# Patient Record
Sex: MALE | Race: WHITE | Employment: OTHER | URBAN - METROPOLITAN AREA
[De-identification: names, ages, dates, MRNs, and addresses within clinical notes are randomized per-mention and may not be internally consistent; named-entity substitution may affect disease eponyms.]

---

## 2017-09-12 ENCOUNTER — OFFICE VISIT (OUTPATIENT)
Dept: FAMILY MEDICINE CLINIC | Age: 67
End: 2017-09-12

## 2017-09-12 VITALS
HEIGHT: 69 IN | WEIGHT: 298.6 LBS | OXYGEN SATURATION: 98 % | BODY MASS INDEX: 44.23 KG/M2 | SYSTOLIC BLOOD PRESSURE: 124 MMHG | DIASTOLIC BLOOD PRESSURE: 72 MMHG | RESPIRATION RATE: 20 BRPM | HEART RATE: 58 BPM

## 2017-09-12 DIAGNOSIS — Z00.00 ROUTINE GENERAL MEDICAL EXAMINATION AT A HEALTH CARE FACILITY: Primary | ICD-10-CM

## 2017-09-12 DIAGNOSIS — S61.011A LACERATION OF THUMB, RIGHT, INITIAL ENCOUNTER: ICD-10-CM

## 2017-09-12 DIAGNOSIS — E66.01 MORBID OBESITY WITH BMI OF 40.0-44.9, ADULT (HCC): ICD-10-CM

## 2017-09-12 DIAGNOSIS — J44.9 OBSTRUCTIVE CHRONIC BRONCHITIS WITHOUT EXACERBATION (HCC): ICD-10-CM

## 2017-09-12 DIAGNOSIS — I71.40 ABDOMINAL AORTIC ANEURYSM WITHOUT RUPTURE: ICD-10-CM

## 2017-09-12 DIAGNOSIS — Z23 NEED FOR VACCINATION: ICD-10-CM

## 2017-09-12 DIAGNOSIS — Z48.02 VISIT FOR SUTURE REMOVAL: ICD-10-CM

## 2017-09-12 PROCEDURE — 1036F TOBACCO NON-USER: CPT | Performed by: FAMILY MEDICINE

## 2017-09-12 PROCEDURE — 4040F PNEUMOC VAC/ADMIN/RCVD: CPT | Performed by: FAMILY MEDICINE

## 2017-09-12 PROCEDURE — G8926 SPIRO NO PERF OR DOC: HCPCS | Performed by: FAMILY MEDICINE

## 2017-09-12 PROCEDURE — G8427 DOCREV CUR MEDS BY ELIG CLIN: HCPCS | Performed by: FAMILY MEDICINE

## 2017-09-12 PROCEDURE — G0008 ADMIN INFLUENZA VIRUS VAC: HCPCS | Performed by: FAMILY MEDICINE

## 2017-09-12 PROCEDURE — G8417 CALC BMI ABV UP PARAM F/U: HCPCS | Performed by: FAMILY MEDICINE

## 2017-09-12 PROCEDURE — 3017F COLORECTAL CA SCREEN DOC REV: CPT | Performed by: FAMILY MEDICINE

## 2017-09-12 PROCEDURE — 99024 POSTOP FOLLOW-UP VISIT: CPT | Performed by: FAMILY MEDICINE

## 2017-09-12 PROCEDURE — 99214 OFFICE O/P EST MOD 30 MIN: CPT | Performed by: FAMILY MEDICINE

## 2017-09-12 PROCEDURE — 90662 IIV NO PRSV INCREASED AG IM: CPT | Performed by: FAMILY MEDICINE

## 2017-09-12 PROCEDURE — 3023F SPIROM DOC REV: CPT | Performed by: FAMILY MEDICINE

## 2017-09-12 PROCEDURE — 1123F ACP DISCUSS/DSCN MKR DOCD: CPT | Performed by: FAMILY MEDICINE

## 2017-09-12 ASSESSMENT — PATIENT HEALTH QUESTIONNAIRE - PHQ9
2. FEELING DOWN, DEPRESSED OR HOPELESS: 0
1. LITTLE INTEREST OR PLEASURE IN DOING THINGS: 0
SUM OF ALL RESPONSES TO PHQ9 QUESTIONS 1 & 2: 0
SUM OF ALL RESPONSES TO PHQ QUESTIONS 1-9: 0

## 2017-10-04 ENCOUNTER — HOSPITAL ENCOUNTER (OUTPATIENT)
Dept: ULTRASOUND IMAGING | Age: 67
Discharge: HOME | End: 2017-10-04
Attending: FAMILY MEDICINE | Admitting: FAMILY MEDICINE

## 2017-10-04 DIAGNOSIS — I71.40 ABDOMINAL AORTIC ANEURYSM WITHOUT RUPTURE: ICD-10-CM

## 2017-10-06 ENCOUNTER — TELEPHONE (OUTPATIENT)
Dept: FAMILY MEDICINE CLINIC | Age: 67
End: 2017-10-06

## 2017-10-06 DIAGNOSIS — I71.40 AAA (ABDOMINAL AORTIC ANEURYSM) WITHOUT RUPTURE: ICD-10-CM

## 2017-10-06 NOTE — TELEPHONE ENCOUNTER
Please notify patient that his ultrasound showed a stable abdominal aneurysm and we will repeat the scan in 3 years.

## 2018-02-09 LAB
ALBUMIN SERPL-MCNC: 3.8 G/DL
ALP BLD-CCNC: 58 U/L
ALT SERPL-CCNC: 28 U/L
ANION GAP SERPL CALCULATED.3IONS-SCNC: 9 MMOL/L
AST SERPL-CCNC: 21 U/L
BASOPHILS ABSOLUTE: 0 /ΜL
BASOPHILS ABSOLUTE: ABNORMAL /ΜL
BASOPHILS RELATIVE PERCENT: 0.5 %
BASOPHILS RELATIVE PERCENT: 0.5 %
BILIRUB SERPL-MCNC: 0.6 MG/DL (ref 0.1–1.4)
BUN BLDV-MCNC: NORMAL MG/DL
CALCIUM SERPL-MCNC: 8.8 MG/DL
CHLORIDE BLD-SCNC: 105 MMOL/L
CHOLESTEROL, TOTAL: 126 MG/DL
CHOLESTEROL/HDL RATIO: ABNORMAL
CO2: 27 MMOL/L
CREAT SERPL-MCNC: 0.9 MG/DL
EOSINOPHILS ABSOLUTE: 0.1 /ΜL
EOSINOPHILS ABSOLUTE: ABNORMAL /ΜL
EOSINOPHILS RELATIVE PERCENT: 2.3 %
EOSINOPHILS RELATIVE PERCENT: 2.3 %
GFR CALCULATED: NORMAL
GLUCOSE BLD-MCNC: 98 MG/DL
HCT VFR BLD CALC: 39.7 % (ref 41–53)
HCT VFR BLD CALC: 39.7 % (ref 41–53)
HDLC SERPL-MCNC: 29 MG/DL (ref 35–70)
HEMOGLOBIN: 13.4 G/DL (ref 13.5–17.5)
HEMOGLOBIN: 13.4 G/DL (ref 13.5–17.5)
LDL CHOLESTEROL CALCULATED: 71 MG/DL (ref 0–160)
LYMPHOCYTES ABSOLUTE: 1.1 /ΜL
LYMPHOCYTES ABSOLUTE: ABNORMAL /ΜL
LYMPHOCYTES RELATIVE PERCENT: 28.4 %
LYMPHOCYTES RELATIVE PERCENT: 28.4 %
MCH RBC QN AUTO: 31.7 PG
MCH RBC QN AUTO: 31.7 PG
MCHC RBC AUTO-ENTMCNC: 33.8 G/DL
MCHC RBC AUTO-ENTMCNC: 33.8 G/DL
MCV RBC AUTO: 93.9 FL
MCV RBC AUTO: 93.9 FL
MONOCYTES ABSOLUTE: 0.3 /ΜL
MONOCYTES ABSOLUTE: 0.32 /ΜL
MONOCYTES RELATIVE PERCENT: ABNORMAL %
MONOCYTES RELATIVE PERCENT: ABNORMAL %
NEUTROPHILS ABSOLUTE: ABNORMAL /ΜL
NEUTROPHILS ABSOLUTE: ABNORMAL /ΜL
NEUTROPHILS RELATIVE PERCENT: ABNORMAL %
NEUTROPHILS RELATIVE PERCENT: ABNORMAL %
PDW BLD-RTO: 13.6 %
PDW BLD-RTO: 13.6 %
PLATELET # BLD: 210 K/ΜL
PLATELET # BLD: 210 K/ΜL
PMV BLD AUTO: 10.3 FL
PMV BLD AUTO: 10.3 FL
POTASSIUM SERPL-SCNC: 3.5 MMOL/L
RBC # BLD: 4.23 10^6/ΜL
RBC # BLD: 4.23 10^6/ΜL
SODIUM BLD-SCNC: 141 MMOL/L
TOTAL PROTEIN: 7
TRIGL SERPL-MCNC: 131 MG/DL
TSH SERPL DL<=0.05 MIU/L-ACNC: 2.39 UIU/ML
VLDLC SERPL CALC-MCNC: ABNORMAL MG/DL
WBC # BLD: 3.87 10^3/ML
WBC # BLD: 3.9 10^3/ML

## 2018-02-26 LAB
ALBUMIN SERPL-MCNC: 3.8 G/DL
ALP BLD-CCNC: 58 U/L
ALT SERPL-CCNC: 28 U/L
ANION GAP SERPL CALCULATED.3IONS-SCNC: 9 MMOL/L
AST SERPL-CCNC: 21 U/L
AVERAGE GLUCOSE: NORMAL
BILIRUB SERPL-MCNC: 0.6 MG/DL (ref 0.1–1.4)
BUN BLDV-MCNC: NORMAL MG/DL
CALCIUM SERPL-MCNC: 8.8 MG/DL
CHLORIDE BLD-SCNC: 105 MMOL/L
CHOLESTEROL, TOTAL: 126 MG/DL
CHOLESTEROL/HDL RATIO: ABNORMAL
CO2: 27 MMOL/L
CREAT SERPL-MCNC: 0.9 MG/DL
GFR CALCULATED: NORMAL
GLUCOSE BLD-MCNC: 98 MG/DL
HBA1C MFR BLD: 5.2 %
HDLC SERPL-MCNC: 29 MG/DL (ref 35–70)
LDL CHOLESTEROL CALCULATED: 71 MG/DL (ref 0–160)
MAGNESIUM: 2 MG/DL
POTASSIUM (K+): 3.6
POTASSIUM SERPL-SCNC: 3.5 MMOL/L
SODIUM BLD-SCNC: 141 MMOL/L
TOTAL PROTEIN: 7
TRIGL SERPL-MCNC: ABNORMAL MG/DL
TSH SERPL DL<=0.05 MIU/L-ACNC: 2.39 UIU/ML
VLDLC SERPL CALC-MCNC: ABNORMAL MG/DL

## 2018-09-11 ENCOUNTER — OFFICE VISIT (OUTPATIENT)
Dept: FAMILY MEDICINE CLINIC | Age: 68
End: 2018-09-11

## 2018-09-11 VITALS
OXYGEN SATURATION: 97 % | DIASTOLIC BLOOD PRESSURE: 76 MMHG | WEIGHT: 312.8 LBS | HEIGHT: 69 IN | HEART RATE: 62 BPM | SYSTOLIC BLOOD PRESSURE: 130 MMHG | BODY MASS INDEX: 46.33 KG/M2

## 2018-09-11 DIAGNOSIS — L57.0 ACTINIC KERATOSIS: ICD-10-CM

## 2018-09-11 DIAGNOSIS — K64.4 EXTERNAL HEMORRHOIDS: ICD-10-CM

## 2018-09-11 DIAGNOSIS — Z00.00 ROUTINE GENERAL MEDICAL EXAMINATION AT A HEALTH CARE FACILITY: Primary | ICD-10-CM

## 2018-09-11 DIAGNOSIS — Z23 NEED FOR INFLUENZA VACCINATION: ICD-10-CM

## 2018-09-11 DIAGNOSIS — E66.01 MORBID OBESITY WITH BMI OF 45.0-49.9, ADULT (HCC): ICD-10-CM

## 2018-09-11 DIAGNOSIS — Z23 NEED FOR PROPHYLACTIC VACCINATION AND INOCULATION AGAINST VARICELLA: ICD-10-CM

## 2018-09-11 DIAGNOSIS — I71.40 AAA (ABDOMINAL AORTIC ANEURYSM) WITHOUT RUPTURE: ICD-10-CM

## 2018-09-11 DIAGNOSIS — J44.9 COPD, MILD (HCC): ICD-10-CM

## 2018-09-11 PROCEDURE — 4040F PNEUMOC VAC/ADMIN/RCVD: CPT | Performed by: FAMILY MEDICINE

## 2018-09-11 PROCEDURE — G0008 ADMIN INFLUENZA VIRUS VAC: HCPCS | Performed by: FAMILY MEDICINE

## 2018-09-11 PROCEDURE — 90662 IIV NO PRSV INCREASED AG IM: CPT | Performed by: FAMILY MEDICINE

## 2018-09-11 PROCEDURE — G0438 PPPS, INITIAL VISIT: HCPCS | Performed by: FAMILY MEDICINE

## 2018-09-11 RX ORDER — HYDROCORTISONE ACETATE 25 MG/1
25 SUPPOSITORY RECTAL 2 TIMES DAILY PRN
Qty: 30 SUPPOSITORY | Refills: 5 | Status: SHIPPED | OUTPATIENT
Start: 2018-09-11 | End: 2019-09-17 | Stop reason: SDUPTHER

## 2018-09-11 ASSESSMENT — PATIENT HEALTH QUESTIONNAIRE - PHQ9
SUM OF ALL RESPONSES TO PHQ QUESTIONS 1-9: 0
SUM OF ALL RESPONSES TO PHQ QUESTIONS 1-9: 0

## 2018-09-11 ASSESSMENT — LIFESTYLE VARIABLES: HOW OFTEN DO YOU HAVE A DRINK CONTAINING ALCOHOL: 0

## 2018-09-11 NOTE — PROGRESS NOTES
[Tramadol] Itching    Vicodin [Hydrocodone-Acetaminophen] Itching    Keflex [Cephalexin] Other (See Comments)     Extreme hyperacitivity    Sulfa Antibiotics Itching and Rash       Prior to Visit Medications    Medication Sig Taking? Authorizing Provider   zoster recombinant adjuvanted vaccine (SHINGRIX) 50 MCG SUSR injection 50 MCG IM then repeat 2-6 months. Yes Parker Cheek MD   albuterol-ipratropium (COMBIVENT RESPIMAT)  MCG/ACT AERS inhaler USE 1 INHALATION TWICE A DAY Yes Parker Cheek MD   hydrocortisone (ANUSOL-HC) 25 MG suppository Place 1 suppository rectally 2 times daily as needed for Hemorrhoids Yes Parker Cheek MD   azelastine HCl 0.15 % SOLN by Nasal route 2 times daily. Yes Historical Provider, MD   hydrochlorothiazide (HYDRODIURIL) 25 MG tablet Take 25 mg by mouth daily. Yes Historical Provider, MD   levothyroxine (SYNTHROID) 25 MCG tablet Take 175 mcg by mouth Daily  Yes Historical Provider, MD   niacin 500 MG CR capsule Take 500 mg by mouth every morning. Yes Historical Provider, MD   traZODone (DESYREL) 50 MG tablet Take 50 mg by mouth nightly. Yes Historical Provider, MD   Multiple Vitamins-Minerals (CENTRUM PO) Take  by mouth. 50 plus Yes Historical Provider, MD   ascorbic acid (VITAMIN C) 500 MG tablet Take 500 mg by mouth daily. Yes Historical Provider, MD   saline nasal gel (AYR) GEL by Nasal route. Yes Historical Provider, MD   mometasone (NASONEX) 50 MCG/ACT nasal spray 2 sprays by Nasal route 2 times daily. Yes Historical Provider, MD   flunisolide (NASALIDE) 25 MCG/ACT (0.025%) SOLN Inhale 2 sprays into the lungs every 12 hours. Yes Historical Provider, MD   SODIUM FLUORIDE, DENTAL GEL, 1.1 % CREA Place  onto teeth. Yes Historical Provider, MD   loratadine (CLARITIN) 10 MG tablet Take 10 mg by mouth daily. PRN Yes Historical Provider, MD   ciprofloxacin-dexamethasone (CIPRODEX) otic suspension 5 drops continuous.  5 drop each ear 09/11/18 1358   BP: 130/76   Site: Right Upper Arm   Position: Sitting   Cuff Size: Large Adult   Pulse: 62   SpO2: 97%   Weight: (!) 312 lb 12.8 oz (141.9 kg)   Height: 5' 9\" (1.753 m)     Body mass index is 46.19 kg/m². Physical Exam   Constitutional: He is oriented to person, place, and time. He appears well-developed and well-nourished. Morbid obese habitus     HENT:   Mouth/Throat: Oropharynx is clear and moist.   Eyes: Pupils are equal, round, and reactive to light. Conjunctivae and EOM are normal.   Neck: Neck supple. Cardiovascular: Normal rate, regular rhythm and normal heart sounds. Pulmonary/Chest: Effort normal and breath sounds normal.   Abdominal: Soft. Bowel sounds are normal.   Neurological: He is alert and oriented to person, place, and time. Skin: Skin is warm and dry. Scattered scaled erythematous macules. Psychiatric: He has a normal mood and affect. Patient's complete Health Risk Assessment and screening values have been reviewed and are found in Flowsheets. The following problems were reviewed today and where indicated follow up appointments were made and/or referrals ordered. Positive Risk Factor Screenings with Interventions:     Health Habits/Nutrition:     Body mass index is 46.19 kg/m². Health Habits/Nutrition Interventions:  · Inadequate physical activity:  patient agrees to increase physical activity as follows: more movement but limited by chronic back and knee pain and will try and get back into the chiropractor.      Personalized Preventive Plan   Current Health Maintenance Status  Immunization History   Administered Date(s) Administered    Influenza A (H1N1) Vaccine IM 11/13/2009    Influenza Vaccine, unspecified formulation 10/15/1996, 10/18/2005, 11/05/2007, 10/20/2008, 09/28/2009, 11/01/2010, 09/27/2011, 10/09/2012, 10/04/2013, 10/14/2014, 11/02/2015    Influenza Virus Vaccine 10/14/2014, 10/01/2015    Influenza, High Dose (Fluzone 65 yrs and older) 10/19/2015, 10/14/2016, 09/12/2017, 09/11/2018    Pneumococcal 13-valent Conjugate (Pumbyoz09) 01/25/2016, 01/27/2016    Pneumococcal Polysaccharide (Lymexetzt23) 01/06/2015    Td 09/19/2003, 08/27/2012    Tdap (Boostrix, Adacel) 11/02/2015, 11/05/2015    Zoster Live (Zostavax) 03/16/2009, 01/06/2015        Health Maintenance   Topic Date Due    Shingles Vaccine (1 of 2 - 2 Dose Series) 03/06/2015    A1C test (Diabetic or Prediabetic)  02/26/2019    TSH testing  02/26/2019    Potassium monitoring  02/26/2019    Creatinine monitoring  02/26/2019    Lipid screen  02/26/2023    Colon cancer screen colonoscopy  12/02/2024    DTaP/Tdap/Td vaccine (2 - Td) 11/05/2025    Flu vaccine  Completed    Pneumococcal low/med risk  Completed    Hepatitis C screen  Completed     Recommendations for Preventive Services Due: see orders and patient instructions/AVS.  . Recommended screening schedule for the next 5-10 years is provided to the patient in written form: see Patient Instructions/AVS.       Diagnosis Orders   1. Routine general medical examination at a health care facility     2. Morbid obesity with BMI of 45.0-49.9, adult ([address]     3. AAA (abdominal aortic aneurysm) without rupture ([address]     4. COPD, mild ([address]     5. Actinic keratosis  External Referral To Dermatology   6. External hemorrhoids  hydrocortisone (ANUSOL-HC) 25 MG suppository   7. Need for prophylactic vaccination and inoculation against varicella  zoster recombinant adjuvanted vaccine (SHINGRIX) 50 MCG SUSR injection   8. Need for influenza vaccination         Quality & Risk Score Accuracy    Visit Dx:  Q84.67, C96.99 - Morbid obesity with BMI of 45.0-49.9, adult ([address]  The current BMI is 46.19 kg/m2 as of 09/11/18 14:13 EDT  Assessment and plan: Worsening.  Patient will work on more exercise/activity and cutting down portions  Visit Dx:  I71.4 - AAA (abdominal aortic aneurysm) without rupture ([address]  Assessment and plan:  Stable based upon

## 2019-02-06 LAB
BASOPHILS ABSOLUTE: ABNORMAL /ΜL
BASOPHILS RELATIVE PERCENT: ABNORMAL %
EOSINOPHILS ABSOLUTE: ABNORMAL /ΜL
EOSINOPHILS RELATIVE PERCENT: ABNORMAL %
HCT VFR BLD CALC: 39.7 % (ref 41–53)
HEMOGLOBIN: 13.5 G/DL (ref 13.5–17.5)
LYMPHOCYTES ABSOLUTE: ABNORMAL /ΜL
LYMPHOCYTES RELATIVE PERCENT: ABNORMAL %
MCH RBC QN AUTO: 32.6 PG
MCHC RBC AUTO-ENTMCNC: 34.1 G/DL
MCV RBC AUTO: 95.5 FL
MONOCYTES ABSOLUTE: ABNORMAL /ΜL
MONOCYTES RELATIVE PERCENT: ABNORMAL %
NEUTROPHILS ABSOLUTE: ABNORMAL /ΜL
NEUTROPHILS RELATIVE PERCENT: ABNORMAL %
PDW BLD-RTO: 14.3 %
PLATELET # BLD: 215 K/ΜL
PMV BLD AUTO: 8.3 FL
RBC # BLD: 4.16 10^6/ΜL
WBC # BLD: 4.1 10^3/ML

## 2019-02-13 LAB
ALBUMIN SERPL-MCNC: 4 G/DL
ALP BLD-CCNC: 65 U/L
ALT SERPL-CCNC: 30 U/L
ANION GAP SERPL CALCULATED.3IONS-SCNC: 9 MMOL/L
AST SERPL-CCNC: 26 U/L
BILIRUB SERPL-MCNC: 0.7 MG/DL (ref 0.1–1.4)
BUN BLDV-MCNC: NORMAL MG/DL
CALCIUM SERPL-MCNC: 8.9 MG/DL
CHLORIDE BLD-SCNC: 106 MMOL/L
CHOLESTEROL, TOTAL: 131 MG/DL
CHOLESTEROL/HDL RATIO: ABNORMAL
CO2: NORMAL MMOL/L
CREAT SERPL-MCNC: 0.9 MG/DL
GFR CALCULATED: NORMAL
GLUCOSE BLD-MCNC: 97 MG/DL
HDLC SERPL-MCNC: 28 MG/DL (ref 35–70)
LDL CHOLESTEROL CALCULATED: 72 MG/DL (ref 0–160)
POTASSIUM SERPL-SCNC: 3.4 MMOL/L
SODIUM BLD-SCNC: 140 MMOL/L
TOTAL PROTEIN: 6.9
TRIGL SERPL-MCNC: 156 MG/DL
VLDLC SERPL CALC-MCNC: ABNORMAL MG/DL

## 2019-03-07 LAB — POTASSIUM (K+): 4.3

## 2019-09-13 LAB
MAGNESIUM: 2.1 MG/DL
POTASSIUM (K+): 3.2

## 2019-09-17 ENCOUNTER — OFFICE VISIT (OUTPATIENT)
Dept: FAMILY MEDICINE CLINIC | Age: 69
End: 2019-09-17
Payer: MEDICARE

## 2019-09-17 VITALS
SYSTOLIC BLOOD PRESSURE: 128 MMHG | OXYGEN SATURATION: 96 % | BODY MASS INDEX: 46.63 KG/M2 | RESPIRATION RATE: 14 BRPM | HEIGHT: 69 IN | WEIGHT: 314.8 LBS | DIASTOLIC BLOOD PRESSURE: 76 MMHG | HEART RATE: 82 BPM

## 2019-09-17 DIAGNOSIS — I71.40 AAA (ABDOMINAL AORTIC ANEURYSM) WITHOUT RUPTURE: ICD-10-CM

## 2019-09-17 DIAGNOSIS — Z23 NEEDS FLU SHOT: ICD-10-CM

## 2019-09-17 DIAGNOSIS — E87.6 HYPOKALEMIA: ICD-10-CM

## 2019-09-17 DIAGNOSIS — Z00.00 ROUTINE GENERAL MEDICAL EXAMINATION AT A HEALTH CARE FACILITY: Primary | ICD-10-CM

## 2019-09-17 DIAGNOSIS — K64.4 EXTERNAL HEMORRHOIDS: ICD-10-CM

## 2019-09-17 DIAGNOSIS — E66.01 MORBID OBESITY WITH BMI OF 40.0-44.9, ADULT (HCC): ICD-10-CM

## 2019-09-17 DIAGNOSIS — J44.9 COPD, MILD (HCC): ICD-10-CM

## 2019-09-17 PROCEDURE — 90653 IIV ADJUVANT VACCINE IM: CPT | Performed by: FAMILY MEDICINE

## 2019-09-17 PROCEDURE — 3017F COLORECTAL CA SCREEN DOC REV: CPT | Performed by: FAMILY MEDICINE

## 2019-09-17 PROCEDURE — G0439 PPPS, SUBSEQ VISIT: HCPCS | Performed by: FAMILY MEDICINE

## 2019-09-17 PROCEDURE — 1123F ACP DISCUSS/DSCN MKR DOCD: CPT | Performed by: FAMILY MEDICINE

## 2019-09-17 PROCEDURE — 4040F PNEUMOC VAC/ADMIN/RCVD: CPT | Performed by: FAMILY MEDICINE

## 2019-09-17 PROCEDURE — G0008 ADMIN INFLUENZA VIRUS VAC: HCPCS | Performed by: FAMILY MEDICINE

## 2019-09-17 RX ORDER — FLUTICASONE PROPIONATE 50 MCG
1 SPRAY, SUSPENSION (ML) NASAL DAILY
Status: ON HOLD | COMMUNITY
End: 2022-08-25 | Stop reason: HOSPADM

## 2019-09-17 RX ORDER — POTASSIUM CHLORIDE 1.5 G/1.77G
20 POWDER, FOR SOLUTION ORAL 2 TIMES DAILY
Status: ON HOLD | COMMUNITY
End: 2022-08-12 | Stop reason: HOSPADM

## 2019-09-17 RX ORDER — HYDROCORTISONE ACETATE 25 MG/1
25 SUPPOSITORY RECTAL 2 TIMES DAILY PRN
Qty: 30 SUPPOSITORY | Refills: 5 | Status: ON HOLD | OUTPATIENT
Start: 2019-09-17 | End: 2022-08-18 | Stop reason: HOSPADM

## 2019-09-17 ASSESSMENT — PATIENT HEALTH QUESTIONNAIRE - PHQ9
SUM OF ALL RESPONSES TO PHQ QUESTIONS 1-9: 0
SUM OF ALL RESPONSES TO PHQ QUESTIONS 1-9: 0

## 2019-09-17 NOTE — PATIENT INSTRUCTIONS
Due to Abdominal Aortic Aneurysm check Oct 2020. Personalized Preventive Plan for Cade Coffey - 9/17/2019  Medicare offers a range of preventive health benefits. Some of the tests and screenings are paid in full while other may be subject to a deductible, co-insurance, and/or copay. Some of these benefits include a comprehensive review of your medical history including lifestyle, illnesses that may run in your family, and various assessments and screenings as appropriate. After reviewing your medical record and screening and assessments performed today your provider may have ordered immunizations, labs, imaging, and/or referrals for you. A list of these orders (if applicable) as well as your Preventive Care list are included within your After Visit Summary for your review. Other Preventive Recommendations:    · A preventive eye exam performed by an eye specialist is recommended every 1-2 years to screen for glaucoma; cataracts, macular degeneration, and other eye disorders. · A preventive dental visit is recommended every 6 months. · Try to get at least 150 minutes of exercise per week or 10,000 steps per day on a pedometer . · Order or download the FREE \"Exercise & Physical Activity: Your Everyday Guide\" from [employer]. Call 4-539.973.5556 or search [employer] online. · You need 6229-6361 mg of calcium and 9637-8084 IU of vitamin D per day. It is possible to meet your calcium requirement with diet alone, but a vitamin D supplement is usually necessary to meet this goal.  · When exposed to the sun, use a sunscreen that protects against both UVA and UVB radiation with an SPF of 30 or greater. Reapply every 2 to 3 hours or after sweating, drying off with a towel, or swimming. · Always wear a seat belt when traveling in a car. Always wear a helmet when riding a bicycle or motorcycle.

## 2019-09-17 NOTE — PROGRESS NOTES
 Zoster Live (Zostavax) 03/16/2009, 01/06/2015    Zoster Recombinant (Shingrix) 06/11/2019, 08/20/2019        Health Maintenance   Topic Date Due    A1C test (Diabetic or Prediabetic)  02/26/2019    TSH testing  02/26/2019    Potassium monitoring  02/26/2019    Creatinine monitoring  02/26/2019    Annual Wellness Visit (AWV)  06/20/2019    Lipid screen  02/26/2023    Colon cancer screen colonoscopy  12/02/2024    DTaP/Tdap/Td vaccine (3 - Td) 11/05/2025    Flu vaccine  Completed    Shingles Vaccine  Completed    Pneumococcal 65+ years Vaccine  Completed    Hepatitis C screen  Completed         Recommendations for Preventive Services Due: see orders and patient instructions/AVS.  . Recommended screening schedule for the next 5-10 years is provided to the patient in written form: see Patient Morgan Fu was seen today for medicare awv. Diagnoses and all orders for this visit:    Routine general medical examination at a health care facility    COPD, mild ([facility]  -     albuterol-ipratropium (COMBIVENT RESPIMAT)  MCG/ACT AERS inhaler; USE 1 INHALATION TWICE A DAY    Morbid obesity with BMI of 40.0-44.9, adult ([facility]    AAA (abdominal aortic aneurysm) without rupture (HCC)    External hemorrhoids  -     hydrocortisone (ANUSOL-HC) 25 MG suppository; Place 1 suppository rectally 2 times daily as needed for Hemorrhoids    Needs flu shot  -     INFLUENZA, TRIV, INACTIVATED, SUBUNIT, ADJUVANTED, 65 YRS AND OLDER, IM, PREFILL SYR, 0.5ML (FLUAD TRIV)    Hypokalemia  Continue K supplements. COPD, mild (HCC)  Stable on combivent 1 puff bid. Morbid obesity with BMI of 40.0-44.9, adult [facility])  Patient will continue to work on weight loss, staying active     AAA (abdominal aortic aneurysm) without rupture ([facility]  Asymptomatic. Patient will have done in 2020 at the [address] [facility] Order given     Return for Medicare Annual Wellness Visit in 1 year.

## 2020-09-15 ASSESSMENT — LIFESTYLE VARIABLES
AUDIT TOTAL SCORE: INCOMPLETE
HOW OFTEN DO YOU HAVE A DRINK CONTAINING ALCOHOL: NEVER
AUDIT-C TOTAL SCORE: INCOMPLETE
HOW OFTEN DO YOU HAVE A DRINK CONTAINING ALCOHOL: 0

## 2020-09-15 ASSESSMENT — PATIENT HEALTH QUESTIONNAIRE - PHQ9
SUM OF ALL RESPONSES TO PHQ QUESTIONS 1-9: 0
1. LITTLE INTEREST OR PLEASURE IN DOING THINGS: 0
2. FEELING DOWN, DEPRESSED OR HOPELESS: 0
SUM OF ALL RESPONSES TO PHQ9 QUESTIONS 1 & 2: 0
SUM OF ALL RESPONSES TO PHQ QUESTIONS 1-9: 0

## 2020-09-22 ENCOUNTER — TELEMEDICINE (OUTPATIENT)
Dept: FAMILY MEDICINE CLINIC | Age: 70
End: 2020-09-22
Payer: MEDICARE

## 2020-09-22 PROCEDURE — G0439 PPPS, SUBSEQ VISIT: HCPCS | Performed by: FAMILY MEDICINE

## 2020-09-22 PROCEDURE — 1123F ACP DISCUSS/DSCN MKR DOCD: CPT | Performed by: FAMILY MEDICINE

## 2020-09-22 PROCEDURE — 3017F COLORECTAL CA SCREEN DOC REV: CPT | Performed by: FAMILY MEDICINE

## 2020-09-22 PROCEDURE — 4040F PNEUMOC VAC/ADMIN/RCVD: CPT | Performed by: FAMILY MEDICINE

## 2020-09-22 NOTE — PROGRESS NOTES
Medicare Annual Wellness Visit  Name: Yuliet Samples Date: 2020   MRN: [id number] Sex: Male   Age: 68 y.o. Ethnicity: Non-/Non    : 1950 Race: Jorge Batista is here for Medicare AWV    Screenings for behavioral, psychosocial and functional/safety risks, and cognitive dysfunction are all negative except as indicated below. These results, as well as other patient data from the [address] form, are documented in Flowsheets linked to this Encounter. COPD has been stable and rarely needs inhaler. Overall doing well. Following with Derm for [place] and ENT s/p PE tues. Following regularly with [facility] providers for med refills. Still has some down thoughts missing his mother who passed away 5 years ago 10/1/2015. Has gained weight since last visit as he is not as active during this COVID time. Allergies   Allergen Reactions    Dilaudid [Hydromorphone Hcl] Itching    Hydrocodone Itching    Morphine Anaphylaxis    Ultram [Tramadol] Itching    Vicodin [Hydrocodone-Acetaminophen] Itching    Keflex [Cephalexin] Other (See Comments)     Extreme hyperacitivity    Sulfa Antibiotics Itching and Rash         Prior to Visit Medications    Medication Sig Taking? Authorizing Provider   potassium chloride (KLOR-CON) 20 MEQ packet Take 20 mEq by mouth 2 times daily Yes Historical Provider, MD   fluticasone (FLONASE) 50 MCG/ACT nasal spray 1 spray by Each Nostril route daily Yes Historical Provider, MD   hydrocortisone (ANUSOL-HC) 25 MG suppository Place 1 suppository rectally 2 times daily as needed for Hemorrhoids Yes Fab Tatum MD   albuterol-ipratropium (COMBIVENT RESPIMAT)  MCG/ACT AERS inhaler USE 1 Dimitris Dye MD   azelastine HCl 0.15 % SOLN by Nasal route 2 times daily. Yes Historical Provider, MD   hydrochlorothiazide (HYDRODIURIL) 25 MG tablet Take 25 mg by mouth daily.  Yes Historical Provider, MD   levothyroxine (SYNTHROID) 25 MCG tablet Take 175 mcg by mouth Daily  Yes Historical Provider, MD   niacin 500 MG CR capsule Take 750 mg by mouth every morning  Yes Historical Provider, MD   traZODone (DESYREL) 50 MG tablet Take 50 mg by mouth nightly. Historical Provider, MD   Multiple Vitamins-Minerals (CENTRUM PO) Take  by mouth. 50 plus  Historical Provider, MD   ascorbic acid (VITAMIN C) 500 MG tablet Take 500 mg by mouth daily. Historical Provider, MD   saline nasal gel (AYR) GEL by Nasal route. Historical Provider, MD   mometasone (NASONEX) 50 MCG/ACT nasal spray 2 sprays by Nasal route 2 times daily. Historical Provider, MD   flunisolide (NASALIDE) 25 MCG/ACT (0.025%) SOLN Inhale 2 sprays into the lungs every 12 hours. Historical Provider, MD   SODIUM FLUORIDE, DENTAL GEL, 1.1 % CREA Place  onto teeth. Historical Provider, MD   loratadine (CLARITIN) 10 MG tablet Take 10 mg by mouth daily. PRN  Historical Provider, MD   ciprofloxacin-dexamethasone (CIPRODEX) otic suspension 5 drops continuous.  5 drop each ear once a week  Historical Provider, MD         Past Medical History:   Diagnosis Date    Abdominal aneurysm ([facility] 2012    4 cm x 3.6 cm followed at [address] [place] [address] 1/21/2014    [facility]    Chronic sinusitis 1/21/2014    COPD, mild ([facility] 1/21/2014    Diverticulosis 2014    Dr. Rosa Maria Mccullough Dyslipidemia 1/21/2014    HTN (hypertension) 1/21/2014    Hyperlipidemia     Hypothyroidism 1/21/2014    Ingrown toenail 2013, 2014    [facility]iatry clinic [facility] 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 Jenaro Diane MEGHAN (obstructive sleep apnea) 1/21/2014    CPAP changed to bipap (July 2014)    Otitis media, serous, TM rupture 1/21/2014    ENT x 2 s/p PE tubes bilaterally, cipro Otic Past Surgical History:   Procedure Laterality Date    [facility]  [facility].      COLONOSCOPY  12/2/14    diverticulosis, internal hemorrhoids    JOINT REPLACEMENT Right 2012    JOINT REPLACEMENT Left 2011    KNEE SURGERY Bilateral     replacement    TOE SURGERY Bilateral     toe nail surgery Dr. Chalino Longoria Right 2014    DR. Rascon          Family History   Problem Relation Age of Onset    High Blood Pressure Mother     Allergies Mother     Anemia Mother     High Cholesterol Mother     Thyroid Disease Mother     Heart Disease Father     Allergies Father     Obesity Paternal Grandmother     Obesity Paternal Grandfather     Cancer Paternal Uncle         prostate cancer       CareTeam (Including outside providers/suppliers regularly involved in providing care):   Patient Care Team:  Spencer Nixon MD as PCP - Burt Rhoades MD as PCP - [facility] Empaneled Provider    Wt Readings from Last 3 Encounters:   09/17/19 (!) 314 lb 12.8 oz (142.8 kg)   09/11/18 (!) 312 lb 12.8 oz (141.9 kg)   09/12/17 298 lb 9.6 oz (135.4 kg)     Patient-Reported Vitals 9/22/2020   Patient-Reported Systolic 683   Patient-Reported Diastolic 66   Patient-Reported Pulse 62      There is no height or weight on file to calculate BMI. Based upon direct observation of the patient, evaluation of cognition reveals recent and remote memory intact. Patient's complete Health Risk Assessment and screening values have been reviewed and are found in Flowsheets. The following problems were reviewed today and where indicated follow up appointments were made and/or referrals ordered.     Blood pressure-  Heart rate-    Respiratory rate-    Temperature-  Pulse oximetry-     Patient-Reported Vitals 9/22/2020   Patient-Reported Systolic 408   Patient-Reported Diastolic 66   Patient-Reported Pulse 62        Nursing note reviewed  There were no vitals taken for this visit. BP Readings from Last 3 Encounters:   09/17/19 128/76   09/11/18 130/76   09/12/17 124/72     Wt Readings from Last 3 Encounters:   09/17/19 (!) 314 lb 12.8 oz (142.8 kg)   09/11/18 (!) 312 lb 12.8 oz (141.9 kg)   09/12/17 298 lb 9.6 oz (135.4 kg)       Constitutional: [x] Appears well-developed and well-nourished [x] No apparent distress      [] Abnormal-   Mental status  [x] Alert and awake  [x] Oriented to person/place/time [x]Able to follow commands      Eyes:  EOM    [x]  Normal  [] Abnormal-  Sclera  [x]  Normal  [] Abnormal -         Discharge []  None visible  [] Abnormal -    HENT:   [x] Normocephalic, atraumatic. [] Abnormal   [] Mouth/Throat: Mucous membranes are moist.     External Ears [] Normal  [] Abnormal-     Neck: [] No visualized mass     Pulmonary/Chest: [x] Respiratory effort normal.  [x] No visualized signs of difficulty breathing or respiratory distress        [] Abnormal-      Musculoskeletal:   [] Normal gait with no signs of ataxia         [] Normal range of motion of neck        [] Abnormal-       Neurological:        [x] No Facial Asymmetry (Cranial nerve 7 motor function) (limited exam to video visit)          [x] No gaze palsy        [] Abnormal-         Skin:        [] No significant exanthematous lesions or discoloration noted on facial skin         [] Abnormal-            Psychiatric:       [x] Normal Affect [x] No Hallucinations        [] Abnormal-       Positive Risk Factor Screenings with Interventions:     Health Habits/Nutrition:  Health Habits/Nutrition  Do you exercise for at least 20 minutes 2-3 times per week?: (!) No  Have you lost any weight without trying in the past 3 months?: No  Do you eat fewer than 2 meals per day?: No  Have you seen a dentist within the past year?: Yes  There is no height or weight on file to calculate BMI. Health Habits/Nutrition Interventions:  · Follow up with dentist as scheduled.  He is trying to stay active for weight control    Hearing/Vision:  No exam data present  Hearing/Vision  Do you or your family notice any trouble with your hearing?: No  Do you have difficulty driving, watching TV, or doing any of your daily activities because of your eyesight?: No  Have you had an eye exam within the past year?: (!) No  Hearing/Vision Interventions:  · non indicated      Following with dentist and having periodontal disease bilateral jaw.        Personalized Preventive Plan   Current Health Maintenance Status  Immunization History   Administered Date(s) Administered    Hepatitis A 03/07/2019    Hepatitis A Vaccine 03/07/2019    Hepatitis A/Hepatitis B (Twinrix) 03/07/2019, 04/05/2019, 09/06/2019    Hepatitis B 03/07/2019    Hepatitis B vaccine 03/07/2019    Influenza 10/01/2015    Influenza A (T1B7-65) Vaccine IM 11/13/2009    Influenza A (H9K3-51) Vaccine PF IM 12/10/2009    Influenza Vaccine, unspecified formulation 10/15/1996, 10/18/2005, 11/05/2007, 10/20/2008, 09/28/2009, 11/01/2010, 09/27/2011, 10/09/2012, 10/04/2013, 10/14/2014, 11/02/2015    Influenza Virus Vaccine 10/15/1996, 11/05/2007, 10/20/2008, 09/28/2009, 11/01/2010, 09/27/2011, 10/09/2012, 10/04/2013, 10/14/2014    Influenza Whole 10/14/2014    Influenza, High Dose (Fluzone 65 yrs and older) 10/19/2015, 10/14/2016, 09/12/2017, 09/11/2018    Influenza, MDCK Quadv, with preserv IM (Flucelvax 4 yrs and older) 06/11/2019, 08/20/2019    Influenza, Triv, inactivated, subunit, adjuvanted, IM (Fluad 65 yrs and older) 09/17/2019    Pneumococcal Conjugate 13-valent (Irtifsc14) 01/25/2016, 01/27/2016    Pneumococcal Polysaccharide (Ienuibhvm56) 01/06/2015    Td (Adult), 5 Lf Tetanus Toxoid, Pf (Tenivac, Decavac) 08/27/2012    Td vaccine (adult) 09/19/2003    Td, unspecified formulation 09/19/2003, 08/27/2012    Tdap (Boostrix, Adacel) 11/02/2015, 11/05/2015    Zoster Live (Zostavax) 03/16/2009, 01/06/2015    Zoster Recombinant (Shingrix) 06/11/2019, 08/20/2019 Health Maintenance   Topic Date Due    A1C test (Diabetic or Prediabetic)  02/26/2019    TSH testing  02/26/2019    Annual Wellness Visit (AWV)  06/20/2019    Creatinine monitoring  02/13/2020    Flu vaccine (1) 09/01/2020    Potassium monitoring  09/13/2020    Lipid screen  02/13/2024    Colon cancer screen colonoscopy  12/02/2024    DTaP/Tdap/Td vaccine (3 - Td) 11/05/2025    Shingles Vaccine  Completed    Pneumococcal 65+ years Vaccine  Completed    Hepatitis C screen  Completed    Hepatitis A vaccine  Aged Out    Hepatitis B vaccine  Aged Out    Hib vaccine  Aged Out    Meningococcal (ACWY) vaccine  Aged Out      Diagnosis Orders   1. Routine general medical examination at a health care facility     2. COPD, mild ([address]     3. AAA (abdominal aortic aneurysm) without rupture (HCC)  US ABDOMINAL AORTA LIMITED   4. Morbid obesity with BMI of 40.0-44.9, adult ([address]     5. Acquired hypothyroidism  Basic Metabolic Panel       Recommendations for Preventive Services Due: see orders and patient instructions/AVS.  . Recommended screening schedule for the next 5-10 years is provided to the patient in written form: see Patient Ajit Fisher was seen today for medicare awv. Diagnoses and all orders for this visit:    Routine general medical examination at a health care facility    COPD, mild ([address]    AAA (abdominal aortic aneurysm) without rupture ([address]  -     US ABDOMINAL AORTA LIMITED; Future    Morbid obesity with BMI of 40.0-44.9, adult [facility])    Acquired hypothyroidism  -     Basic Metabolic Panel; Future              Harpal Sensor is a 68 y.o. male being evaluated by a Virtual Visit (video and audio) encounter to address concerns as mentioned above. A caregiver was present when appropriate.  Due to this being a TeleHealth encounter (During [facility] public health emergency), evaluation of the following organ systems was limited: Vitals/Constitutional/EENT/Resp/CV/GI//MS/Neuro/Skin/Heme-Lymph-Imm. Pursuant to the emergency declaration under the [facility] [address] and the [employer] Act, this Virtual Visit was conducted with patient's (and/or legal guardian's) consent, to reduce the patient's risk of exposure to COVID-19 and provide necessary medical care. The patient (and/or legal guardian) has also been advised to contact this office for worsening conditions or problems, and seek emergency medical treatment and/or call 911 if deemed necessary. Patient identification was verified at the start of the visit: Yes     Diagnosis Orders   1. Routine general medical examination at a health care facility     2. COPD, mild ([address] - stable and asymptomatic. Will continue to follow. Pneuovax booster next year. 3. AAA (abdominal aortic aneurysm) without rupture ([address] -  Asyptomatic. Due fo screening. Order placed. US ABDOMINAL AORTA LIMITED   4. Morbid obesity with BMI of 40.0-44.9, adult [facility]) - patient working on staying active and cutting down portions. 5. Acquired hypothyroidism - managed by [place] providers Basic Metabolic Panel       Derm and Dr. [facility]     Services were provided through a video synchronous discussion virtually to substitute for in-person clinic visit. Patient and provider were located at their individual homes. --Mami Bhardwaj MD on 9/22/2020 at 6:28 PM    An electronic signature was used to authenticate this note.

## 2020-09-22 NOTE — PATIENT INSTRUCTIONS
Personalized Preventive Plan for Eri Pardo - 9/22/2020  Medicare offers a range of preventive health benefits. Some of the tests and screenings are paid in full while other may be subject to a deductible, co-insurance, and/or copay. Some of these benefits include a comprehensive review of your medical history including lifestyle, illnesses that may run in your family, and various assessments and screenings as appropriate. After reviewing your medical record and screening and assessments performed today your provider may have ordered immunizations, labs, imaging, and/or referrals for you. A list of these orders (if applicable) as well as your Preventive Care list are included within your After Visit Summary for your review. Other Preventive Recommendations:    · A preventive eye exam performed by an eye specialist is recommended every 1-2 years to screen for glaucoma; cataracts, macular degeneration, and other eye disorders. · A preventive dental visit is recommended every 6 months. · Try to get at least 150 minutes of exercise per week or 10,000 steps per day on a pedometer . · Order or download the FREE \"Exercise & Physical Activity: Your Everyday Guide\" from [employer]. Call 4-610.187.2934 or search [employer] online. · You need 0408-2123 mg of calcium and 9036-2128 IU of vitamin D per day. It is possible to meet your calcium requirement with diet alone, but a vitamin D supplement is usually necessary to meet this goal.  · When exposed to the sun, use a sunscreen that protects against both UVA and UVB radiation with an SPF of 30 or greater. Reapply every 2 to 3 hours or after sweating, drying off with a towel, or swimming. · Always wear a seat belt when traveling in a car. Always wear a helmet when riding a bicycle or motorcycle.

## 2020-10-01 ENCOUNTER — HOSPITAL ENCOUNTER (OUTPATIENT)
Dept: ULTRASOUND IMAGING | Age: 70
Discharge: HOME | End: 2020-10-01
Payer: MEDICARE

## 2020-10-01 ENCOUNTER — HOSPITAL ENCOUNTER (OUTPATIENT)
Age: 70
Discharge: HOME | End: 2020-10-01
Payer: MEDICARE

## 2020-10-01 LAB
ANION GAP SERPL CALCULATED.3IONS-SCNC: 12 MMOL/L (ref 4–16)
BUN BLDV-MCNC: 12 MG/DL (ref 6–23)
CALCIUM SERPL-MCNC: 9.2 MG/DL (ref 8.3–10.6)
CHLORIDE BLD-SCNC: 101 MMOL/L (ref 99–110)
CO2: 27 MMOL/L (ref 21–32)
CREAT SERPL-MCNC: 0.9 MG/DL (ref 0.9–1.3)
GFR AFRICAN AMERICAN: >60 ML/MIN/1.73M2
GFR NON-AFRICAN AMERICAN: >60 ML/MIN/1.73M2
GLUCOSE BLD-MCNC: 101 MG/DL (ref 70–99)
POTASSIUM SERPL-SCNC: 4 MMOL/L (ref 3.5–5.1)
SODIUM BLD-SCNC: 140 MMOL/L (ref 135–145)

## 2020-10-01 PROCEDURE — 80048 BASIC METABOLIC PNL TOTAL CA: CPT

## 2020-10-01 PROCEDURE — 36415 COLL VENOUS BLD VENIPUNCTURE: CPT

## 2020-10-01 PROCEDURE — 93978 VASCULAR STUDY: CPT

## 2021-02-05 LAB
AVERAGE GLUCOSE: NORMAL
HBA1C MFR BLD: 5.1 %
TSH SERPL DL<=0.05 MIU/L-ACNC: 3.4 UIU/ML

## 2021-10-21 ASSESSMENT — LIFESTYLE VARIABLES
HOW OFTEN DO YOU HAVE A DRINK CONTAINING ALCOHOL: NEVER
HOW OFTEN DO YOU HAVE A DRINK CONTAINING ALCOHOL: 0
AUDIT TOTAL SCORE: INCOMPLETE
AUDIT-C TOTAL SCORE: INCOMPLETE

## 2021-10-21 ASSESSMENT — PATIENT HEALTH QUESTIONNAIRE - PHQ9
1. LITTLE INTEREST OR PLEASURE IN DOING THINGS: 0
SUM OF ALL RESPONSES TO PHQ QUESTIONS 1-9: 0
2. FEELING DOWN, DEPRESSED OR HOPELESS: 0
SUM OF ALL RESPONSES TO PHQ9 QUESTIONS 1 & 2: 0

## 2021-10-28 ENCOUNTER — OFFICE VISIT (OUTPATIENT)
Dept: FAMILY MEDICINE CLINIC | Age: 71
End: 2021-10-28
Payer: MEDICARE

## 2021-10-28 VITALS
BODY MASS INDEX: 45.85 KG/M2 | OXYGEN SATURATION: 96 % | WEIGHT: 309.6 LBS | DIASTOLIC BLOOD PRESSURE: 76 MMHG | HEIGHT: 69 IN | SYSTOLIC BLOOD PRESSURE: 128 MMHG | HEART RATE: 77 BPM

## 2021-10-28 DIAGNOSIS — Z86.69 HISTORY OF RECURRENT EAR INFECTION: ICD-10-CM

## 2021-10-28 DIAGNOSIS — E78.5 DYSLIPIDEMIA: ICD-10-CM

## 2021-10-28 DIAGNOSIS — J32.9 CHRONIC SINUSITIS, UNSPECIFIED LOCATION: ICD-10-CM

## 2021-10-28 DIAGNOSIS — Z00.00 ROUTINE GENERAL MEDICAL EXAMINATION AT A HEALTH CARE FACILITY: Primary | ICD-10-CM

## 2021-10-28 DIAGNOSIS — J44.9 COPD, MILD (HCC): ICD-10-CM

## 2021-10-28 DIAGNOSIS — I10 PRIMARY HYPERTENSION: ICD-10-CM

## 2021-10-28 DIAGNOSIS — Z86.39 HISTORY OF HYPOKALEMIA: ICD-10-CM

## 2021-10-28 DIAGNOSIS — G47.33 OSA (OBSTRUCTIVE SLEEP APNEA): ICD-10-CM

## 2021-10-28 DIAGNOSIS — Z12.5 SCREENING FOR PROSTATE CANCER: ICD-10-CM

## 2021-10-28 DIAGNOSIS — I71.40 AAA (ABDOMINAL AORTIC ANEURYSM) WITHOUT RUPTURE: ICD-10-CM

## 2021-10-28 DIAGNOSIS — F51.01 PRIMARY INSOMNIA: ICD-10-CM

## 2021-10-28 PROCEDURE — G0439 PPPS, SUBSEQ VISIT: HCPCS | Performed by: NURSE PRACTITIONER

## 2021-10-28 PROCEDURE — G8484 FLU IMMUNIZE NO ADMIN: HCPCS | Performed by: NURSE PRACTITIONER

## 2021-10-28 PROCEDURE — 4040F PNEUMOC VAC/ADMIN/RCVD: CPT | Performed by: NURSE PRACTITIONER

## 2021-10-28 PROCEDURE — 1123F ACP DISCUSS/DSCN MKR DOCD: CPT | Performed by: NURSE PRACTITIONER

## 2021-10-28 PROCEDURE — 3017F COLORECTAL CA SCREEN DOC REV: CPT | Performed by: NURSE PRACTITIONER

## 2021-10-28 NOTE — PROGRESS NOTES
10/28/2021     Leighton Alfaro (:  1950) is a 65 y.o. male, here for evaluation of the following medical concerns: Follow-up on chronic's    COPD  uses Combivent twice a day  Albuterol as needed  Follows with [place] pulmonology, but PCP has been refilling meds. Reports shortness of breath with extreme exertion, but no regular cough    Smoker - stopped       Had Covid  In 2021 and reports hot flashes since recovering  Hot flashes are about 1-2 times per week. He does not want any work-up for this, but wants to continue to \"see how it goes\". History of PE tubes, none current. Has had recurrent ear infections and is on ciprodex once weekly-following with ENT    Hypothyroid-taking Synthroid 25 mcg daily    Trazodone 50 mg nightly for insomnia- has been on it 20+ years. No falls or AM grogginess. [facility] --- [facility]     Nasal sprays. Alternates nasalide and nasonex seasonal allergies rhinitis     hypertension- taking hydrochlorothiazide     He is taking potassium 20 MDQ twice daily for history of hypokalemia    He is on niacin for triglycerides    Denies needs or concerns today other than medication refills, labs and concerned about hot flashes after having Covid      AAA- 2020 imaging    Stable 3.1 cm fusiform aneurysm of the distal abdominal aorta.  Further   follow-up of this abnormality is as advised below.       RECOMMENDATIONS:   3.1 cm abdominal aortic aneurysm. Recommend follow-up every 3 years. Review of Systems    Prior to Visit Medications    Medication Sig Taking?  Authorizing Provider   albuterol-ipratropium (COMBIVENT RESPIMAT)  MCG/ACT AERS inhaler USE 1 INHALATION TWICE A DAY Yes DEYA Silverio - NP   potassium chloride (KLOR-CON) 20 MEQ packet Take 20 mEq by mouth 2 times daily Yes Historical Provider, MD   fluticasone (FLONASE) 50 MCG/ACT nasal spray 1 spray by Each Nostril route daily Yes Historical Provider, MD hydrocortisone (ANUSOL-HC) 25 MG suppository Place 1 suppository rectally 2 times daily as needed for Hemorrhoids Yes Libby Corado MD   azelastine HCl 0.15 % SOLN by Nasal route 2 times daily. Yes Historical Provider, MD   levothyroxine (SYNTHROID) 25 MCG tablet Take 175 mcg by mouth Daily  Yes Historical Provider, MD   niacin 500 MG CR capsule Take 750 mg by mouth every morning  Yes Historical Provider, MD   traZODone (DESYREL) 50 MG tablet Take 50 mg by mouth nightly. Yes Historical Provider, MD   Multiple Vitamins-Minerals (CENTRUM PO) Take  by mouth. 50 plus Yes Historical Provider, MD   ascorbic acid (VITAMIN C) 500 MG tablet Take 500 mg by mouth daily. Yes Historical Provider, MD   saline nasal gel (AYR) GEL by Nasal route. Yes Historical Provider, MD   mometasone (NASONEX) 50 MCG/ACT nasal spray 2 sprays by Nasal route 2 times daily. Yes Historical Provider, MD   flunisolide (NASALIDE) 25 MCG/ACT (0.025%) SOLN Inhale 2 sprays into the lungs every 12 hours. Yes Historical Provider, MD   SODIUM FLUORIDE, DENTAL GEL, 1.1 % CREA Place  onto teeth. Yes Historical Provider, MD   ciprofloxacin-dexamethasone (CIPRODEX) otic suspension 5 drops continuous. 5 drop each ear once a week Yes Historical Provider, MD        Social History     Tobacco Use    Smoking status: Former Smoker     Packs/day: 2.00     Years: 36.00     Pack years: 72.00     Types: Cigarettes    Smokeless tobacco: Never Used    Tobacco comment: quit APril 2002   Substance Use Topics    Alcohol use: No     Alcohol/week: 0.0 standard drinks        Vitals:    10/28/21 1018   BP: 128/76   Site: Left Upper Arm   Position: Sitting   Cuff Size: Large Adult   Pulse: 77   SpO2: 96%   Weight: (!) 309 lb 9.6 oz (140.4 kg)   Height: 5' 9\" (1.753 m)     Estimated body mass index is 45.72 kg/m² as calculated from the following:    Height as of this encounter: 5' 9\" (1.753 m).     Weight as of this encounter: 309 lb 9.6 oz (140.4 kg).    Physical Exam  Vitals reviewed. Constitutional:       General: He is not in acute distress. Appearance: Normal appearance. He is obese. He is not ill-appearing, toxic-appearing or diaphoretic. HENT:      Head: Normocephalic and atraumatic. Nose: Nose normal.   Eyes:      Extraocular Movements: Extraocular movements intact. Pupils: Pupils are equal, round, and reactive to light. Cardiovascular:      Rate and Rhythm: Normal rate and regular rhythm. Heart sounds: Normal heart sounds. Pulmonary:      Effort: Pulmonary effort is normal. No respiratory distress. Breath sounds: No wheezing, rhonchi or rales. Abdominal:      General: Bowel sounds are normal. There is no distension. Palpations: Abdomen is soft. There is no mass. Tenderness: There is no abdominal tenderness. Hernia: No hernia is present. Musculoskeletal:         General: Normal range of motion. Cervical back: Normal range of motion and neck supple. Right lower leg: No edema. Left lower leg: No edema. Skin:     General: Skin is warm and dry. Neurological:      General: No focal deficit present. Mental Status: He is alert and oriented to person, place, and time. Mental status is at baseline. Motor: No weakness. Gait: Gait normal.   Psychiatric:         Mood and Affect: Mood normal.         Behavior: Behavior normal.         Thought Content: Thought content normal.         Judgment: Judgment normal.         ASSESSMENT/PLAN:  1. Routine general medical examination at a health care facility      2. COPD, mild ([address]  Controlled. Follows [facility] at [facility]; however, they do not fill his medications  Albuterol as needed  - albuterol-ipratropium (COMBIVENT RESPIMAT)  MCG/ACT AERS inhaler; USE 1 INHALATION TWICE A DAY  Dispense: 12 g; Refill: 11    3. Body mass index (BMI) 45.0-49.9, adult  Portion control, low-carb, exercise as tolerated-walking    4.  MEGHAN (obstructive sleep apnea)- on BIPAP  Compliant with BiPAP nightly  Follows with pulmonology at the [facility]      7. AAA (abdominal aortic aneurysm) without rupture ([facility]- stable , due   Last imaging ----3.1 cm. Radiology advised to follow-up in 3 years    8. Primary hypertension  Controlled with hydrochlorothiazide  Goal less than 140/90    9. Dyslipidemia  Not medicated  Taking niacin for triglycerides    10. Chronic sinusitis, unspecified location  Alternating Nasonex and Nasalide, nasal saline  Not taking antihistamine  Symptoms are controlled    11. Primary insomnia  No falls or drowsiness  Taking trazodone long-term    12. History of recurrent ear infection  Ciprodex weekly ordered by ENT    History of hypokalemia-continue potassium daily. Check labs        Fasting labs today  He will have to get his flu vaccine at his pharmacy-this office is out of high-dose flu vaccines currently          All care gaps addressed     All questions answered    Discussed use, benefit, and side effects of prescribed medications. Barriers to compliance discussed. All patient questions answered. Pt voiced understanding. Present to the ER for any emergent or acute symptoms not managed at home or in office. Please note that this chart was generated using dragon dictation software. Although every effort was made to ensure the accuracy of this automated transcription, some errors in transcription may have occurred. Return in 6 months (on 2022) for Medicare Annual Wellness Visit in 1 year---6 month . An electronic signature was used to authenticate this note. --DEYA Davey NP on 10/28/2021 at 1:09 PM                             -------------------------------------                    Medicare Annual Wellness Visit  Name: Ruth Castellanos Date: 10/28/2021   MRN: [id number] Sex: Male   Age: 65 y.o.  Ethnicity: Non- / Non    : 1950 Race: White (non-)      Yamini Aleman is here for Medicare AWV and Post-COVID Symptoms (having hot flashes post coivd)    Screenings for behavioral, psychosocial and functional/safety risks, and cognitive dysfunction are all negative except as indicated below. These results, as well as other patient data from the [address] form, are documented in Flowsheets linked to this Encounter. Allergies   Allergen Reactions    Dilaudid [Hydromorphone Hcl] Itching    Hydrocodone Itching    Morphine Anaphylaxis    Ultram [Tramadol] Itching    Vicodin [Hydrocodone-Acetaminophen] Itching    Keflex [Cephalexin] Other (See Comments)     Extreme hyperacitivity    Sulfa Antibiotics Itching and Rash         Prior to Visit Medications    Medication Sig Taking? Authorizing Provider   albuterol-ipratropium (COMBIVENT RESPIMAT)  MCG/ACT AERS inhaler USE 1 INHALATION TWICE A DAY Yes DEYA Blanchard - CHELA   potassium chloride (KLOR-CON) 20 MEQ packet Take 20 mEq by mouth 2 times daily Yes Historical Provider, MD   fluticasone (FLONASE) 50 MCG/ACT nasal spray 1 spray by Each Nostril route daily Yes Historical Provider, MD   hydrocortisone (ANUSOL-HC) 25 MG suppository Place 1 suppository rectally 2 times daily as needed for Hemorrhoids Yes Jim Tatum MD   azelastine HCl 0.15 % SOLN by Nasal route 2 times daily. Yes Historical Provider, MD   levothyroxine (SYNTHROID) 25 MCG tablet Take 175 mcg by mouth Daily  Yes Historical Provider, MD   niacin 500 MG CR capsule Take 750 mg by mouth every morning  Yes Historical Provider, MD   traZODone (DESYREL) 50 MG tablet Take 50 mg by mouth nightly. Yes Historical Provider, MD   Multiple Vitamins-Minerals (CENTRUM PO) Take  by mouth. 50 plus Yes Historical Provider, MD   ascorbic acid (VITAMIN C) 500 MG tablet Take 500 mg by mouth daily. Yes Historical Provider, MD   saline nasal gel (AYR) GEL by Nasal route.  Yes Historical Provider, MD   mometasone (NASONEX) 50 MCG/ACT nasal spray 2 sprays by Nasal route 2 times daily. Yes Historical Provider, MD   flunisolide (NASALIDE) 25 MCG/ACT (0.025%) SOLN Inhale 2 sprays into the lungs every 12 hours. Yes Historical Provider, MD   SODIUM FLUORIDE, DENTAL GEL, 1.1 % CREA Place  onto teeth. Yes Historical Provider, MD   ciprofloxacin-dexamethasone (CIPRODEX) otic suspension 5 drops continuous. 5 drop each ear once a week Yes Historical Provider, MD         Past Medical History:   Diagnosis Date    Abdominal aneurysm ([facility] 2012    4 cm x 3.6 cm [facility] at [address] [facility] 1/21/2014    [facility]    Chronic sinusitis 1/21/2014    COPD, mild ([facility] 1/21/2014    Diverticulosis 2014    Dr. Erika Chavez Dyslipidemia 1/21/2014    HTN (hypertension) 1/21/2014    Hyperlipidemia     Hypothyroidism 1/21/2014    Ingrown toenail 2013, 2014    podiatry clinic at [place] [address]. 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 Randol Severs MEGHAN (obstructive sleep apnea) 1/21/2014    CPAP changed to bipap (July 2014)    Otitis media, serous, TM rupture 1/21/2014    ENT x 2 s/p PE tubes bilaterally, cipro Otic       Past Surgical History:   Procedure Laterality Date    [facility]COPY  [facility]- [facility].      COLONOSCOPY  12/2/14    diverticulosis, internal hemorrhoids    JOINT REPLACEMENT Right 2012    JOINT REPLACEMENT Left 2011    KNEE SURGERY Bilateral     replacement    TOE SURGERY Bilateral     toe nail surgery Dr. Marko Dean Right 2014    DR. Rascon          Family History   Problem Relation Age of Onset    High Blood Pressure Mother     Allergies Mother     Anemia Mother     High Cholesterol Mother     Thyroid Disease Mother     Heart Disease Father     Allergies Father     Obesity Paternal Grandmother     Obesity Paternal Grandfather     Cancer Paternal Uncle         prostate cancer       CareTeam (Including outside providers/suppliers regularly involved in providing care):   Patient Care Team:  Jim Tatum MD as PCP - Des Roberts MD as PCP - [facility]    Wt Readings from Last 3 Encounters:   10/28/21 (!) 309 lb 9.6 oz (140.4 kg)   09/17/19 (!) 314 lb 12.8 oz (142.8 kg)   09/11/18 (!) 312 lb 12.8 oz (141.9 kg)     Vitals:    10/28/21 1018   BP: 128/76   Site: Left Upper Arm   Position: Sitting   Cuff Size: Large Adult   Pulse: 77   SpO2: 96%   Weight: (!) 309 lb 9.6 oz (140.4 kg)   Height: 5' 9\" (1.753 m)     Body mass index is 45.72 kg/m². Based upon direct observation of the patient, evaluation of cognition reveals recent and remote memory intact. Patient's complete Health Risk Assessment and screening values have been reviewed and are found in Flowsheets. The following problems were reviewed today and where indicated follow up appointments were made and/or referrals ordered. Positive Risk Factor Screenings with Interventions:            General Health and ACP:  General  In general, how would you say your health is?: Good  In the past 7 days, have you experienced any of the following?  New or Increased Pain, New or Increased Fatigue, Loneliness, Social Isolation, Stress or Anger?: None of These  Do you get the social and emotional support that you need?: Yes  Do you have a Living Will?: Yes  Advance Directives     Power of [address] [place] [address] Will ACP-Advance Directive ACP-Power of     Not on File Not on File Not on File Not on File      General Health Risk Interventions:  · n/a    Health Habits/Nutrition:  Health Habits/Nutrition  Do you exercise for at least 20 minutes 2-3 times per week?: (!) No  Have you lost any weight without trying in the past 3 months?: No  Do you eat only one meal per day?: No  Have you seen the dentist within the past year?: Yes  Body mass index: (!) 45.71  Health Habits/Nutrition Interventions:  · Inadequate physical activity:  educational materials provided to promote increased physical activity  · Nutritional issues:  educational materials for healthy, well-balanced diet provided       Personalized Preventive Plan   Current Health Maintenance Status  Immunization History   Administered Date(s) Administered    Monico CADET, Primary or Immunocompromised, PF, 100mcg/0.5mL 01/12/2021, 02/08/2021    Hepatitis A 03/07/2019    Hepatitis A Vaccine 03/07/2019    Hepatitis A/Hepatitis B (Twinrix) 03/07/2019, 04/05/2019, 09/06/2019    Hepatitis B 03/07/2019    Hepatitis B vaccine 03/07/2019    Influenza 10/01/2015    Influenza A (I1C8-68) Vaccine IM 11/13/2009    Influenza A (I8C2-64) Vaccine PF IM 12/10/2009    Influenza Vaccine, unspecified formulation 10/15/1996, 10/18/2005, 11/05/2007, 10/20/2008, 09/28/2009, 11/01/2010, 09/27/2011, 10/09/2012, 10/04/2013, 10/14/2014, 11/02/2015    Influenza Virus Vaccine 10/15/1996, 11/05/2007, 10/20/2008, 09/28/2009, 11/01/2010, 09/27/2011, 10/09/2012, 10/04/2013, 10/14/2014    Influenza Whole 10/14/2014    Influenza, High Dose (Fluzone 65 yrs and older) 10/19/2015, 10/14/2016, 09/12/2017, 09/11/2018, 09/25/2020    Influenza, MDCK Quadv, with preserv IM (Flucelvax 2 yrs and older) 06/11/2019, 08/20/2019    Influenza, Triv, inactivated, subunit, adjuvanted, IM (Fluad 65 yrs and older) 09/17/2019    Pneumococcal Conjugate 13-valent ([id number]) 01/25/2016, 01/27/2016    Pneumococcal Polysaccharide ([id number]) 01/06/2015    Td (Adult), 5 Lf Tetanus Toxoid, Pf (Tenivac, Decavac) 08/27/2012    Td vaccine (adult) 09/19/2003, 08/27/2012    Td, unspecified formulation 09/19/2003, 08/27/2012    Tdap (Boostrix, Adacel) 11/02/2015, 11/05/2015    Zoster Live (Zostavax) 03/16/2009, 01/06/2015    Zoster Recombinant (Shingrix) 06/11/2019, 08/20/2019 Health Maintenance   Topic Date Due    Flu vaccine (1) 09/01/2021    Annual Wellness Visit (AWV)  09/23/2021    A1C test (Diabetic or Prediabetic)  02/05/2022    TSH testing  02/05/2022    Lipid screen  02/13/2024    Colon cancer screen colonoscopy  12/02/2024    DTaP/Tdap/Td vaccine (3 - Td or Tdap) 11/05/2025    Shingles Vaccine  Completed    Pneumococcal 65+ years Vaccine  Completed    COVID-19 Vaccine  Completed    Hepatitis C screen  Completed    Hepatitis A vaccine  Aged Out    Hepatitis B vaccine  Aged Out    Hib vaccine  Aged Out    Meningococcal (ACWY) vaccine  Aged Out     Recommendations for [employer] Due: see orders and patient instructions/AVS.  . Recommended screening schedule for the next 5-10 years is provided to the patient in written form: see Patient Sonali Mosquera was seen today for medicare awv and post-covid symptoms. Diagnoses and all orders for this visit:    Routine general medical examination at a health care facility    COPD, mild ([address]  -     albuterol-ipratropium (COMBIVENT RESPIMAT)  MCG/ACT AERS inhaler; USE 1 INHALATION TWICE A DAY    Body mass index (BMI) 45.0-49.9, adult    MEGHAN (obstructive sleep apnea)- on BIPAP    AAA (abdominal aortic aneurysm) without rupture ([address]- stable 2020, due 2023    Primary hypertension  -     Comprehensive Metabolic Panel; Future    Dyslipidemia  Comments:  No statin-taking niacin  Orders:  -     Lipid Panel; Future    Chronic sinusitis, unspecified location    Primary insomnia  Comments:  Trazodone long-term-given by PCP    History of recurrent ear infection  Comments:  Ciprodex once weekly ordered by ENT    Screening for prostate cancer  -     Psa screening;  Future    History of hypokalemia  Comments:  Taking potassium supplement daily                   Advance Care Planning   Advanced Care Planning: Discussed the patients choices for care and treatment in case of a health event that adversely affects decision-making abilities. Also discussed the patients long-term treatment options. Reviewed with the patient the [address] of [facility] Will Declaration forms  Reviewed the process of designating a competent adult as an Agent (or -in-fact) that could take make health care decisions for the patient if incompetent. Patient was asked to complete the declaration forms, either acknowledge the forms by a public notary or an eligible witness and provide a signed copy to the practice office. Time spent (minutes): 5     Obesity Counseling: Assessed behavioral health risks and factors affecting choice of behavior. Suggested weight control approaches, including dietary changes behavioral modification and follow up plan. Provided educational and support documentation. Time spent (minutes): 5    Cardiovascular Disease Risk Counseling: Assessed the patient's risk to develop cardiovascular disease and reviewed main risk factors. Reviewed steps to reduce disease risk including:   · Quitting tobacco use, reducing amount smoked, or not starting the habit  · Making healthy food choices  · Being physically active and gradualy increasing activity levels   · Reduce weight and determine a healthy BMI goal  · Monitor blood pressure and treat if higher than 140/90 mmHg  · Maintain blood total cholesterol levels under 5 mmol/l or 190 mg/dl  · Maintain LDL cholesterol levels under 3.0 mmol/l or 115 mg/dl   · Control blood glucose levels  · Consider taking aspirin (75 mg daily), once blood pressure is controlled   Provided a follow up plan.   Time spent (minutes): 5

## 2021-10-28 NOTE — PATIENT INSTRUCTIONS
Learning About Medical Power of   What is a medical power of ? A medical power of , also called a durable power of  for health care, is one type of the legal forms called advance directives. It lets you name the person you want to make treatment decisions for you if you can't speak or decide for yourself. The person you choose is called your health care agent. This person is also called a health care proxy or health care surrogate. A medical power of  may be called something else in your state. How do you choose a health care agent? Choose your health care agent carefully. This person may or may not be a family member. Talk to the person before you make your final decision. Make sure he or she is comfortable with this responsibility. It's a good idea to choose someone who:  · Is at least 25years old. · Knows you well and understands what makes life meaningful for you. · Understands your Restorationist and moral values. · Will do what you want, not what he or she wants. · Will be able to make difficult choices at a stressful time. · Will be able to refuse or stop treatment, if that is what you would want, even if you could die. · Will be firm and confident with health professionals if needed. · Will ask questions to get needed information. · Lives near you or agrees to travel to you if needed. Your family may help you make medical decisions while you can still be part of that process. But it's important to choose one person to be your health care agent in case you aren't able to make decisions for yourself. If you don't fill out the legal form and name a health care agent, the decisions your family can make may be limited. A health care agent may be called something else in your state. Who will make decisions for you if you don't have a health care agent? If you don't have a health care agent or a living will, you may not get the care you want. Decisions may be made by family members who disagree about your medical care. Or decisions may be made by a medical professional who doesn't know you well. In some cases, a  makes the decisions. When you name a health care agent, it is very clear who has the power to make health decisions for you. How do you name a health care agent? You name your health care agent on a legal form. This form is usually called a medical power of . Ask your hospital, state bar association, or office on aging where to find these forms. You must sign the form to make it legal. Some states require you to get the form notarized. This means that a person called a  watches you sign the form and then he or she signs the form. Some states also require that two or more witnesses sign the form. Be sure to tell your family members and doctors who your health care agent is. Where can you learn more? Go to https://OvercartpepicewMyCityWay.uKnow.com. org and sign in to your eCourier.co.uk account. Enter [id number] in the [employer] box to learn more about \"Learning About [address]. \"     If you do not have an account, please click on the \"Sign Up Now\" link. Current as of: March 17, 2021               Content Version: 13.0  © 4128-8028 Healthwise, Incorporated. Care instructions adapted under license by [facility] ([facility]). If you have questions about a medical condition or this instruction, always ask your healthcare professional. [place] [address] any warranty or liability for your use of this information. DASH Diet: Care Instructions  Your Care Instructions     The DASH diet is an eating plan that can help lower your blood pressure. DASH stands for Dietary Approaches to Stop Hypertension. Hypertension is high blood pressure. The DASH diet focuses on eating foods that are high in calcium, potassium, and magnesium. These nutrients can lower blood pressure.  The foods that are highest in these nutrients are fruits, vegetables, low-fat dairy products, nuts, seeds, and legumes. But taking calcium, potassium, and magnesium supplements instead of eating foods that are high in those nutrients does not have the same effect. The DASH diet also includes whole grains, fish, and poultry. The DASH diet is one of several lifestyle changes your doctor may recommend to lower your high blood pressure. Your doctor may also want you to decrease the amount of sodium in your diet. Lowering sodium while following the DASH diet can lower blood pressure even further than just the DASH diet alone. Follow-up care is a key part of your treatment and safety. Be sure to make and go to all appointments, and call your doctor if you are having problems. It's also a good idea to know your test results and keep a list of the medicines you take. How can you care for yourself at home? Following the DASH diet  · Eat 4 to 5 servings of fruit each day. A serving is 1 medium-sized piece of fruit, ½ cup chopped or canned fruit, 1/4 cup dried fruit, or 4 ounces (½ cup) of fruit juice. Choose fruit more often than fruit juice. · Eat 4 to 5 servings of vegetables each day. A serving is 1 cup of lettuce or raw leafy vegetables, ½ cup of chopped or cooked vegetables, or 4 ounces (½ cup) of vegetable juice. Choose vegetables more often than vegetable juice. · Get 2 to 3 servings of low-fat and fat-free dairy each day. A serving is 8 ounces of milk, 1 cup of yogurt, or 1 ½ ounces of cheese. · Eat 6 to 8 servings of grains each day. A serving is 1 slice of bread, 1 ounce of dry cereal, or ½ cup of cooked rice, pasta, or cooked cereal. Try to choose whole-grain products as much as possible. · Limit lean meat, poultry, and fish to 2 servings each day. A serving is 3 ounces, about the size of a deck of cards. · Eat 4 to 5 servings of nuts, seeds, and legumes (cooked dried beans, lentils, and split peas) each week.  A serving is 1/3 cup of nuts, 2 tablespoons of seeds, or ½ cup of cooked beans or peas. · Limit fats and oils to 2 to 3 servings each day. A serving is 1 teaspoon of vegetable oil or 2 tablespoons of salad dressing. · Limit sweets and added sugars to 5 servings or less a week. A serving is 1 tablespoon jelly or jam, ½ cup sorbet, or 1 cup of lemonade. · Eat less than 2,300 milligrams (mg) of sodium a day. If you limit your sodium to 1,500 mg a day, you can lower your blood pressure even more. · Be aware that all of these are the suggested number of servings for people who eat 1,800 to 2,000 calories a day. Your recommended number of servings may be different if you need more or fewer calories. Tips for success  · Start small. Do not try to make dramatic changes to your diet all at once. You might feel that you are missing out on your favorite foods and then be more likely to not follow the plan. Make small changes, and stick with them. Once those changes become habit, add a few more changes. · Try some of the following:  ? Make it a goal to eat a fruit or vegetable at every meal and at snacks. This will make it easy to get the recommended amount of fruits and vegetables each day. ? Try yogurt topped with fruit and nuts for a snack or healthy dessert. ? Add lettuce, tomato, cucumber, and onion to sandwiches. ? Combine a ready-made pizza crust with low-fat mozzarella cheese and lots of vegetable toppings. Try using tomatoes, squash, spinach, broccoli, carrots, cauliflower, and onions. ? Have a variety of cut-up vegetables with a low-fat dip as an appetizer instead of chips and dip. ? Sprinkle sunflower seeds or chopped almonds over salads. Or try adding chopped walnuts or almonds to cooked vegetables. ? Try some vegetarian meals using beans and peas. Add garbanzo or kidney beans to salads. Make burritos and tacos with mashed wolff beans or black beans. Where can you learn more? Go to https://ian.health-partners. org and sign in to your FST21 account. Enter J434 in the GetO2 box to learn more about \"DASH Diet: Care Instructions. \"     If you do not have an account, please click on the \"Sign Up Now\" link. Current as of: April 29, 2021               Content Version: 13.0  © 9250-4758 [employer]. Care instructions adapted under license by [facility] ([facility]). If you have questions about a medical condition or this instruction, always ask your healthcare professional. [place] [address] any warranty or liability for your use of this information. Learning About Healthy Weight  What is a healthy weight? A healthy weight is the weight at which you feel good about yourself and have energy for work and play. It's also one that lowers your risk for health problems. What can you do to stay at a healthy weight? It can be hard to stay at a healthy weight, especially when fast food, vending-machine snacks, and processed foods are so easy to find. And with your busy lifestyle, activity may be low on your list of things to do. But staying at a healthy weight may be easier than you think. Here are some dos and don'ts for staying at a healthy weight. Do eat healthy foods  The kinds of foods you eat have a big impact on both your weight and your health. Reaching and staying at a healthy weight is not about going on a diet. It's about making healthier food choices every day and changing your diet for good. Healthy eating means eating a variety of foods so that you get all the nutrients you need. Your body needs protein, carbohydrate, and fats for energy. They keep your heart beating, your brain active, and your muscles working. On most days, try to eat from each food group. This means eating a variety of:  · Whole grains, such as whole wheat breads and pastas. · Fruits and vegetables. · Dairy products, such as low-fat milk, yogurt, and cheese.   · Lean proteins, such as all types of fish, chicken without the skin, and beans. Don't have too much or too little of one thing. All foods, if eaten in moderation, can be part of healthy eating. Even sweets can be okay. If your favorite foods are high in fat, salt, sugar, or calories, limit how often you eat them. Eat smaller servings, or look for healthy substitutes. Do watch what you eat  Many people eat more than their bodies need. Part of staying at a healthy weight means learning how much food you really need from day to day and not eating more than that. Even with healthy foods, eating too much can make you gain weight. Having a well-balanced diet means that you eat enough, but not too much, and that your food gives you the nutrients you need to stay healthy. So listen to your body. Eat when you're hungry. Stop when you feel satisfied. It's a good idea to have healthy snacks ready for when you get hungry. Keep healthy snacks with you at work, in your car, and at home. If you have a healthy snack easily available, you'll be less likely to pick a candy bar or bag of chips from a vending machine instead. Some healthy snacks you might want to keep on hand are fruit, low-fat yogurt, string cheese, low-fat microwave popcorn, raisins and other dried fruit, nuts, whole wheat crackers, pretzels, carrots, celery sticks, and broccoli. Do some physical activity  A big part of reaching and staying at a healthy weight is being active. When you're active, you burn calories. This makes it easier to reach and stay at a healthy weight. When you're active on a regular basis, your body burns more calories, even when you're at rest. Being active helps you lose fat and build lean muscle. Try to be active for at least 1 hour every day. This may sound like a lot, but it's okay to be active in smaller blocks of time that add up to 1 hour a day. Any activity that makes your heart beat faster and keeps it there for a while counts.  A brisk walk, run, or swim will get your heart beating faster. So will climbing stairs, shooting baskets, or cycling. Even some household chores like vacuuming and mowing the lawn will get your heart rate up. Pick activities that you enjoy--ones that make your heart beat faster, your muscles stronger, and your muscles and joints more flexible. If you find more than one thing you like doing, do them all. You don't have to do the same thing every day. Don't diet  Diets don't work. Diets are temporary. Because you give up so much when you diet, you may be hungry and think about food all the time. And after you stop dieting, you also may overeat to make up for what you missed. Most people who diet end up gaining back the pounds they lost--and more. Remember that healthy bodies come in lots of shapes and sizes. Everyone can get healthier by eating better and being more active. Where can you learn more? Go to https://Vyyo.Atlas Learning. org and sign in to your Elyssafregori account. Enter [id number] in the [employer] box to learn more about \"Learning About Healthy Weight. \"     If you do not have an account, please click on the \"Sign Up Now\" link. Current as of: March 17, 2021               Content Version: 13.0  © 2006-2021 Healthwise, Incorporated. Care instructions adapted under license by [facility] ([facility]). If you have questions about a medical condition or this instruction, always ask your healthcare professional. [place] [address] any warranty or liability for your use of this information. Eating Healthy Foods: Care Instructions  Your Care Instructions     Eating healthy foods can help lower your risk for disease. Healthy food gives you energy and keeps your heart strong, your brain active, your muscles working, and your bones strong. A healthy diet includes a variety of foods from the basic food groups: grains, vegetables, fruits, milk and milk products, and meat and beans.  Some people may eat more of their favorite foods from only one food group and, as a result, miss getting the nutrients they need. So, it is important to pay attention not only to what you eat but also to what you are missing from your diet. You can eat a healthy, balanced diet by making a few small changes. Follow-up care is a key part of your treatment and safety. Be sure to make and go to all appointments, and call your doctor if you are having problems. It's also a good idea to know your test results and keep a list of the medicines you take. How can you care for yourself at home? Look at what you eat  · Keep a food diary for a week or two and record everything you eat or drink. Track the number of servings you eat from each food group. · For a balanced diet every day, eat a variety of:  ? 6 or more ounce-equivalents of grains, such as cereals, breads, crackers, rice, or pasta, every day. An ounce-equivalent is 1 slice of bread, 1 cup of ready-to-eat cereal, or ½ cup of cooked rice, cooked pasta, or cooked cereal.  ? 2½ cups of vegetables, especially:  § Dark-green vegetables such as broccoli and spinach. § Orange vegetables such as carrots and sweet potatoes. § Dry beans (such as wolff and kidney beans) and peas (such as lentils). ? 2 cups of fresh, frozen, or canned fruit. A small apple or 1 banana or orange equals 1 cup. ? 3 cups of nonfat or low-fat milk, yogurt, or other milk products. ? 5½ ounces of meat and beans, such as chicken, fish, lean meat, beans, nuts, and seeds. One egg, 1 tablespoon of peanut butter, ½ ounce nuts or seeds, or ¼ cup of cooked beans equals 1 ounce of meat. · Learn how to read food labels for serving sizes and ingredients. Fast-food and convenience-food meals often contain few or no fruits or vegetables. Make sure you eat some fruits and vegetables to make the meal more nutritious. · Look at your food diary.  For each food group, add up what you have eaten and then divide the total by the number of days. This will give you an idea of how much you are eating from each food group. See if you can find some ways to change your diet to make it more healthy. Start small  · Do not try to make dramatic changes to your diet all at once. You might feel that you are missing out on your favorite foods and then be more likely to fail. · Start slowly, and gradually change your habits. Try some of the following:  ? Use whole wheat bread instead of white bread. ? Use nonfat or low-fat milk instead of whole milk. ? Eat brown rice instead of white rice, and eat whole wheat pasta instead of white-flour pasta. ? Try low-fat cheeses and low-fat yogurt. ? Add more fruits and vegetables to meals and have them for snacks. ? Add lettuce, tomato, cucumber, and onion to sandwiches. ? Add fruit to yogurt and cereal.  Enjoy food  · You can still eat your favorite foods. You just may need to eat less of them. If your favorite foods are high in fat, salt, and sugar, limit how often you eat them, but do not cut them out entirely. · Eat a wide variety of foods. Make healthy choices when eating out  · The type of restaurant you choose can help you make healthy choices. Even fast-food chains are now offering more low-fat or healthier choices on the menu. · Choose smaller portions, or take half of your meal home. · When eating out, try:  ? A veggie pizza with a whole wheat crust or grilled chicken (instead of sausage or pepperoni). ? Pasta with roasted vegetables, grilled chicken, or marinara sauce instead of cream sauce. ? A vegetable wrap or grilled chicken wrap. ? Broiled or poached food instead of fried or breaded items. Make healthy choices easy  · Buy packaged, prewashed, ready-to-eat fresh vegetables and fruits, such as baby carrots, salad mixes, and chopped or shredded broccoli and cauliflower. · Buy packaged, presliced fruits, such as melon or pineapple. · Choose 100% fruit or vegetable juice instead of soda.  Limit juice intake to 4 to 6 oz (½ to ¾ cup) a day. · Blend low-fat yogurt, fruit juice, and canned or frozen fruit to make a smoothie for breakfast or a snack. Where can you learn more? Go to https://chleonidas.Burstly. org and sign in to your ioSafe account. Enter C034 in the [facility] box to learn more about \"Eating Healthy Foods: Care Instructions. \"     If you do not have an account, please click on the \"Sign Up Now\" link. Current as of: December 17, 2020               Content Version: 13.0  © 6765-3165 Healthwise, Zoomabet. Care instructions adapted under license by [facility] ([facility]). If you have questions about a medical condition or this instruction, always ask your healthcare professional. [address] any warranty or liability for your use of this information. Personalized Preventive Plan for Varun Quesada - 10/28/2021  Medicare offers a range of preventive health benefits. Some of the tests and screenings are paid in full while other may be subject to a deductible, co-insurance, and/or copay. Some of these benefits include a comprehensive review of your medical history including lifestyle, illnesses that may run in your family, and various assessments and screenings as appropriate. After reviewing your medical record and screening and assessments performed today your provider may have ordered immunizations, labs, imaging, and/or referrals for you. A list of these orders (if applicable) as well as your Preventive Care list are included within your After Visit Summary for your review. Other Preventive Recommendations:    · A preventive eye exam performed by an eye specialist is recommended every 1-2 years to screen for glaucoma; cataracts, macular degeneration, and other eye disorders. · A preventive dental visit is recommended every 6 months.   · Try to get at least 150 minutes of exercise per week or 10,000 steps per day on a pedometer .  · Order or download the FREE \"Exercise & Physical Activity: Your Everyday Guide\" from [employer]. Call 2-102.955.5758 or search [employer] online. · You need 5844-8641 mg of calcium and 7459-6630 IU of vitamin D per day. It is possible to meet your calcium requirement with diet alone, but a vitamin D supplement is usually necessary to meet this goal.  · When exposed to the sun, use a sunscreen that protects against both UVA and UVB radiation with an SPF of 30 or greater. Reapply every 2 to 3 hours or after sweating, drying off with a towel, or swimming. · Always wear a seat belt when traveling in a car. Always wear a helmet when riding a bicycle or motorcycle.

## 2021-10-29 DIAGNOSIS — E78.5 DYSLIPIDEMIA: ICD-10-CM

## 2021-10-29 DIAGNOSIS — I10 PRIMARY HYPERTENSION: ICD-10-CM

## 2021-10-29 DIAGNOSIS — Z12.5 SCREENING FOR PROSTATE CANCER: ICD-10-CM

## 2021-10-29 LAB
A/G RATIO: 1.5 (ref 1.1–2.2)
ALBUMIN SERPL-MCNC: 4.4 G/DL (ref 3.4–5)
ALP BLD-CCNC: 74 U/L (ref 40–129)
ALT SERPL-CCNC: 16 U/L (ref 10–40)
ANION GAP SERPL CALCULATED.3IONS-SCNC: 15 MMOL/L (ref 3–16)
AST SERPL-CCNC: 21 U/L (ref 15–37)
BILIRUB SERPL-MCNC: 0.5 MG/DL (ref 0–1)
BUN BLDV-MCNC: 9 MG/DL (ref 7–20)
CALCIUM SERPL-MCNC: 9.4 MG/DL (ref 8.3–10.6)
CHLORIDE BLD-SCNC: 98 MMOL/L (ref 99–110)
CHOLESTEROL, TOTAL: 143 MG/DL (ref 0–199)
CO2: 24 MMOL/L (ref 21–32)
CREAT SERPL-MCNC: 0.8 MG/DL (ref 0.8–1.3)
GFR AFRICAN AMERICAN: >60
GFR NON-AFRICAN AMERICAN: >60
GLUCOSE BLD-MCNC: 92 MG/DL (ref 70–99)
HDLC SERPL-MCNC: 32 MG/DL (ref 40–60)
LDL CHOLESTEROL CALCULATED: 88 MG/DL
POTASSIUM SERPL-SCNC: 4 MMOL/L (ref 3.5–5.1)
PROSTATE SPECIFIC ANTIGEN: <0.01 NG/ML (ref 0–4)
SODIUM BLD-SCNC: 137 MMOL/L (ref 136–145)
TOTAL PROTEIN: 7.4 G/DL (ref 6.4–8.2)
TRIGL SERPL-MCNC: 117 MG/DL (ref 0–150)
VLDLC SERPL CALC-MCNC: 23 MG/DL

## 2021-11-27 DIAGNOSIS — J44.9 COPD, MILD (HCC): ICD-10-CM

## 2022-05-03 ENCOUNTER — OFFICE VISIT (OUTPATIENT)
Dept: FAMILY MEDICINE CLINIC | Age: 72
End: 2022-05-03
Payer: MEDICARE

## 2022-05-03 VITALS
HEART RATE: 62 BPM | SYSTOLIC BLOOD PRESSURE: 128 MMHG | DIASTOLIC BLOOD PRESSURE: 86 MMHG | WEIGHT: 315 LBS | BODY MASS INDEX: 47.99 KG/M2 | OXYGEN SATURATION: 96 %

## 2022-05-03 DIAGNOSIS — J44.9 COPD, MILD (HCC): Primary | ICD-10-CM

## 2022-05-03 DIAGNOSIS — R19.5 POSITIVE FIT (FECAL IMMUNOCHEMICAL TEST): ICD-10-CM

## 2022-05-03 DIAGNOSIS — I10 PRIMARY HYPERTENSION: ICD-10-CM

## 2022-05-03 DIAGNOSIS — I71.40 AAA (ABDOMINAL AORTIC ANEURYSM) WITHOUT RUPTURE: ICD-10-CM

## 2022-05-03 PROCEDURE — 3017F COLORECTAL CA SCREEN DOC REV: CPT | Performed by: FAMILY MEDICINE

## 2022-05-03 PROCEDURE — 3023F SPIROM DOC REV: CPT | Performed by: FAMILY MEDICINE

## 2022-05-03 PROCEDURE — 99213 OFFICE O/P EST LOW 20 MIN: CPT | Performed by: FAMILY MEDICINE

## 2022-05-03 PROCEDURE — G8427 DOCREV CUR MEDS BY ELIG CLIN: HCPCS | Performed by: FAMILY MEDICINE

## 2022-05-03 PROCEDURE — 1123F ACP DISCUSS/DSCN MKR DOCD: CPT | Performed by: FAMILY MEDICINE

## 2022-05-03 PROCEDURE — 4040F PNEUMOC VAC/ADMIN/RCVD: CPT | Performed by: FAMILY MEDICINE

## 2022-05-03 PROCEDURE — 1036F TOBACCO NON-USER: CPT | Performed by: FAMILY MEDICINE

## 2022-05-03 PROCEDURE — G8417 CALC BMI ABV UP PARAM F/U: HCPCS | Performed by: FAMILY MEDICINE

## 2022-05-03 RX ORDER — HYDROCHLOROTHIAZIDE 25 MG/1
25 TABLET ORAL DAILY
COMMUNITY
Start: 2022-05-03 | End: 2022-08-07 | Stop reason: ALTCHOICE

## 2022-05-03 RX ORDER — PSEUDOEPHEDRINE HCL 30 MG
100 TABLET ORAL DAILY
COMMUNITY
End: 2022-05-17

## 2022-05-03 NOTE — PROGRESS NOTES
Plan: 1. COPD, mild ([address]  Assessment & Plan:  Stable on periodic use of inhaler twice per day with guiafenisen as needed Will continue to follow   2. AAA (abdominal aortic aneurysm) without rupture [facility])  Assessment & Plan:  Doing well overall. Will plan to repeat imaging next year per recommended plan   3. Positive FIT (fecal immunochemical test)  -     Herberth Bridges MD, Gastroenterology, [facility]  4. Primary hypertension  Assessment & Plan:  Stable on hydrochlorothiazide. [facility] also [facility]     All patient questions answered. Pt voiced understanding. Return for Keep upcoming appointment in this office. -----------------------------------------------------------------------------------------------            Chief Complaint   Patient presents with    Follow-up   [facility], TSH this year Feb was normal.  He is staying busy by participating in clubs and memberships around [facility]. He is very active as a [employer] member. His moods are doing fine. His breathing has been stable using the inhaler twice a day. The [facility] providers also had him do a fit test which was positive but he would like to get his colonoscopy evaluation here in [facility] and needs a referral.  He is compliant with his hydrochlorothiazide and potassium per his [facility] doctors. ROS: Pertinent items are noted in HPI. All other ROS negative     reports that he has quit smoking. His smoking use included cigarettes. He has a 72.00 pack-year smoking history.  He has never used smokeless tobacco.      Physical Exam   Nursing note reviewed  /86 (Site: Left Upper Arm, Position: Sitting, Cuff Size: Medium Adult)   Pulse 62   Wt (!) 325 lb (147.4 kg)   SpO2 96%   BMI 47.99 kg/m²   BP Readings from Last 3 Encounters:   05/03/22 128/86   10/28/21 128/76   09/17/19 128/76     Wt Readings from Last 3 Encounters:   05/03/22 (!) 325 lb (147.4 kg)   10/28/21 (!) 309 lb 9.6 oz (140.4 kg)   09/17/19 (!) 314 lb 12.8 oz (142.8 kg)     No results found for this visit on 05/03/22. General appearance: cooperative   Neck: supple, symmetrical, trachea midline  Lungs: clear to auscultation bilaterally, no wheezing or ronchi. Heart: regular rate and rhythm, S1, S2 normal,  Abdomen:  bowel sounds normal and soft, non-tender  MSK no LE edema  Skin: Skin color, texture, turgor normal. No rashes or lesions  Neurologic: Grossly normal   Psych: Alert and oriented, appropriate affect.     Assessment and Plan: See above

## 2022-05-04 ENCOUNTER — TELEPHONE (OUTPATIENT)
Dept: GASTROENTEROLOGY | Age: 72
End: 2022-05-04

## 2022-05-17 ENCOUNTER — PREP FOR PROCEDURE (OUTPATIENT)
Dept: GASTROENTEROLOGY | Age: 72
End: 2022-05-17

## 2022-05-17 ENCOUNTER — OFFICE VISIT (OUTPATIENT)
Dept: GASTROENTEROLOGY | Age: 72
End: 2022-05-17
Payer: MEDICARE

## 2022-05-17 VITALS
TEMPERATURE: 96.9 F | SYSTOLIC BLOOD PRESSURE: 114 MMHG | WEIGHT: 315 LBS | OXYGEN SATURATION: 95 % | BODY MASS INDEX: 46.65 KG/M2 | HEIGHT: 69 IN | HEART RATE: 62 BPM | DIASTOLIC BLOOD PRESSURE: 60 MMHG

## 2022-05-17 DIAGNOSIS — R19.5 POSITIVE FIT (FECAL IMMUNOCHEMICAL TEST): Primary | ICD-10-CM

## 2022-05-17 DIAGNOSIS — Z87.19 HISTORY OF DIVERTICULOSIS: ICD-10-CM

## 2022-05-17 PROCEDURE — G8417 CALC BMI ABV UP PARAM F/U: HCPCS | Performed by: NURSE PRACTITIONER

## 2022-05-17 PROCEDURE — 99204 OFFICE O/P NEW MOD 45 MIN: CPT | Performed by: NURSE PRACTITIONER

## 2022-05-17 PROCEDURE — 1123F ACP DISCUSS/DSCN MKR DOCD: CPT | Performed by: NURSE PRACTITIONER

## 2022-05-17 PROCEDURE — 4040F PNEUMOC VAC/ADMIN/RCVD: CPT | Performed by: NURSE PRACTITIONER

## 2022-05-17 PROCEDURE — G8427 DOCREV CUR MEDS BY ELIG CLIN: HCPCS | Performed by: NURSE PRACTITIONER

## 2022-05-17 PROCEDURE — 3017F COLORECTAL CA SCREEN DOC REV: CPT | Performed by: NURSE PRACTITIONER

## 2022-05-17 PROCEDURE — 1036F TOBACCO NON-USER: CPT | Performed by: NURSE PRACTITIONER

## 2022-05-17 RX ORDER — BISACODYL 5 MG
TABLET, DELAYED RELEASE (ENTERIC COATED) ORAL
Qty: 4 TABLET | Refills: 0 | Status: ON HOLD | OUTPATIENT
Start: 2022-05-17 | End: 2022-08-12 | Stop reason: HOSPADM

## 2022-05-17 RX ORDER — SODIUM CHLORIDE 0.9 % (FLUSH) 0.9 %
5-40 SYRINGE (ML) INJECTION PRN
Status: CANCELLED | OUTPATIENT
Start: 2022-05-17

## 2022-05-17 RX ORDER — ASPIRIN 81 MG
60 TABLET, DELAYED RELEASE (ENTERIC COATED) ORAL DAILY
COMMUNITY
Start: 2019-01-01

## 2022-05-17 RX ORDER — SODIUM CHLORIDE 9 MG/ML
25 INJECTION, SOLUTION INTRAVENOUS PRN
Status: CANCELLED | OUTPATIENT
Start: 2022-05-17

## 2022-05-17 RX ORDER — POLYETHYLENE GLYCOL 3350 17 G/17G
238 POWDER, FOR SOLUTION ORAL ONCE
Qty: 238 G | Refills: 0 | Status: SHIPPED | OUTPATIENT
Start: 2022-05-17 | End: 2022-05-17

## 2022-05-17 RX ORDER — SODIUM CHLORIDE 0.9 % (FLUSH) 0.9 %
5-40 SYRINGE (ML) INJECTION EVERY 12 HOURS SCHEDULED
Status: CANCELLED | OUTPATIENT
Start: 2022-05-17

## 2022-05-17 RX ORDER — SODIUM CHLORIDE, SODIUM LACTATE, POTASSIUM CHLORIDE, CALCIUM CHLORIDE 600; 310; 30; 20 MG/100ML; MG/100ML; MG/100ML; MG/100ML
INJECTION, SOLUTION INTRAVENOUS CONTINUOUS
Status: CANCELLED | OUTPATIENT
Start: 2022-05-17

## 2022-05-17 ASSESSMENT — ENCOUNTER SYMPTOMS
BACK PAIN: 1
NAUSEA: 0
ABDOMINAL PAIN: 0
EYE DISCHARGE: 0
DIARRHEA: 0
CONSTIPATION: 0
WHEEZING: 0
COUGH: 0
COLOR CHANGE: 0
SHORTNESS OF BREATH: 0
EYE PAIN: 0
BLOOD IN STOOL: 0
VOMITING: 0

## 2022-05-17 NOTE — PROGRESS NOTES
Chandrika Calle 66 y.o. male was seen by DENNIS Ron on 05/17/22     Wt Readings from Last 3 Encounters:   05/17/22 (!) 324 lb 9.6 oz (147.2 kg)   05/03/22 (!) 325 lb (147.4 kg)   10/28/21 (!) 309 lb 9.6 oz (140.4 kg)       HPI  Chandrika Calle is a pleasant 66 y.o.  male who presents today for positive fecal immunochemical (FIT) test.  He has a past medical history of abdominal aneurysm, arthritis, chronic back pain, chronic sinusitis, COPD, mild, diverticulosis, dyslipidemia, HTN (hypertension), hyperlipidemia, hypothyroidism, ingrown toenail, MDRO (multiple drug resistant organisms) resistance, morbid obesity with BMI of 45.0-49.9, adult, onychocryptosis, MEGHAN (obstructive sleep apnea), and otitis media, serous, TM rupture. His FIT test was noted to be positive done on 4-7-2022. His last colonoscopy was done by Dr. Drew Epley on 12-2-2014 showing left sided diverticulosis and non-bleeding hemorrhoids. He denies changes in his bowel pattern. His typical bowel pattern is daily to every other day with soft brown formed stools. He takes Benefiber once a day. No diarrhea or constipation. No blood in his stools or melena. No excess belching or flatulence. His appetite is good without early satiety. His weight is stable. No nausea or vomiting. No abdominal pain, bloating or distention. No heartburn or acid reflux. No nocturnal awakeings with acid reflux. No dysphagia or apin with swallowing. No family history of stomach or colon cancer. ROS  Review of Systems   Constitutional: Negative for chills, diaphoresis and fever. HENT: Positive for tinnitus. Negative for ear pain and hearing loss. Eyes: Positive for visual disturbance. Negative for pain and discharge. Respiratory: Negative for cough, shortness of breath and wheezing. Cardiovascular: Negative for chest pain, palpitations and leg swelling.    Gastrointestinal: Negative for abdominal pain, blood in stool, constipation, diarrhea, nausea and vomiting. Endocrine: Negative for cold intolerance and heat intolerance. Genitourinary: Negative for dysuria, frequency, hematuria and urgency. Musculoskeletal: Positive for back pain. Negative for myalgias and neck pain. Skin: Negative for color change, pallor and rash. Allergic/Immunologic: Positive for environmental allergies (Pollen, ragweed). Negative for food allergies. Neurological: Positive for headaches. Negative for dizziness, seizures and weakness. Hematological: Bruises/bleeds easily. Psychiatric/Behavioral: Negative for dysphoric mood and sleep disturbance. The patient is not nervous/anxious. Allergies  Allergies   Allergen Reactions    Dilaudid [Hydromorphone Hcl] Itching    Hydrocodone Itching    Morphine Anaphylaxis    Ultram [Tramadol] Itching    Vicodin [Hydrocodone-Acetaminophen] Itching    Keflex [Cephalexin] Other (See Comments)     Extreme hyperacitivity    Sulfa Antibiotics Itching and Rash       Medications  Current Outpatient Medications   Medication Sig Dispense Refill    Docusate Sodium 100 MG TABS       hydroCHLOROthiazide (HYDRODIURIL) 25 MG tablet Take 1 tablet by mouth daily      albuterol-ipratropium (COMBIVENT RESPIMAT)  MCG/ACT AERS inhaler USE 1 INHALATION TWICE A DAY 12 g 5    potassium chloride (KLOR-CON) 20 MEQ packet Take 20 mEq by mouth 2 times daily      fluticasone (FLONASE) 50 MCG/ACT nasal spray 1 spray by Each Nostril route daily      hydrocortisone (ANUSOL-HC) 25 MG suppository Place 1 suppository rectally 2 times daily as needed for Hemorrhoids 30 suppository 5    azelastine HCl 0.15 % SOLN by Nasal route 2 times daily.  levothyroxine (SYNTHROID) 25 MCG tablet Take 175 mcg by mouth Daily       niacin 500 MG CR capsule Take 750 mg by mouth every morning       traZODone (DESYREL) 50 MG tablet Take 50 mg by mouth nightly.  Multiple Vitamins-Minerals (CENTRUM PO) Take  by mouth.  50 plus      ascorbic acid (VITAMIN C) 500 MG tablet Take 500 mg by mouth daily.  saline nasal gel (AYR) GEL by Nasal route.  mometasone (NASONEX) 50 MCG/ACT nasal spray 2 sprays by Nasal route 2 times daily.  flunisolide (NASALIDE) 25 MCG/ACT (0.025%) SOLN Inhale 2 sprays into the lungs every 12 hours.  SODIUM FLUORIDE, DENTAL GEL, 1.1 % CREA Place  onto teeth.  ciprofloxacin-dexamethasone (CIPRODEX) otic suspension 5 drops continuous. 5 drop each ear once a week       No current facility-administered medications for this visit. Past medical history:   He has a past medical history of Abdominal aneurysm ([facility] [address] Arthritis, Chronic back pain, Chronic sinusitis, COPD, mild ([facility] [address] Diverticulosis, Dyslipidemia, HTN (hypertension), Hyperlipidemia, Hypothyroidism, Ingrown toenail, MDRO (multiple drug resistant organisms) resistance, Morbid obesity with BMI of 45.0-49.9, [facility] Onychocryptosis, MEGHAN (obstructive sleep apnea), and Otitis media, serous, TM rupture. Past surgical history:  He has a past surgical history that includes joint replacement (Right, 2012); joint replacement (Left, 2011); Tympanostomy tube placement (Right, 2014); knee surgery (Bilateral); Colonoscopy (2008); Colonoscopy (12/2/14); and Toe Surgery (Bilateral). Social History:  He reports that he quit smoking about 20 years ago. His smoking use included cigarettes. He has a 72.00 pack-year smoking history. He has never used smokeless tobacco. He reports that he does not drink alcohol and does not use drugs. Family history:  His family history includes Allergies in his father and mother; Anemia in his mother; Cancer in his paternal uncle; Heart Disease in his father; High Blood Pressure in his mother; High Cholesterol in his mother; Obesity in his paternal grandfather and paternal grandmother; Thyroid Disease in his mother.     Objective    Vitals:    05/17/22 1029   BP: 114/60   Pulse: 62   Temp: 96.9 °F (36.1 °C)   SpO2: 95%        Physical exam    Physical Exam  Constitutional:       General: He is not in acute distress. Appearance: He is well-developed. He is obese. He is not ill-appearing, toxic-appearing or diaphoretic. HENT:      Head: Normocephalic and atraumatic. Nose: Nose normal.      Mouth/Throat:      Mouth: Mucous membranes are moist.   Cardiovascular:      Rate and Rhythm: Normal rate and regular rhythm. Pulses: Normal pulses. Heart sounds: Normal heart sounds. No murmur heard. No gallop. Pulmonary:      Effort: Pulmonary effort is normal. No respiratory distress. Breath sounds: Normal breath sounds. No stridor. No wheezing or rhonchi. Abdominal:      General: Bowel sounds are normal. There is no distension. Palpations: Abdomen is soft. There is no mass. Tenderness: There is no abdominal tenderness. Hernia: No hernia is present. Musculoskeletal:         General: Normal range of motion. Cervical back: Neck supple. Skin:     General: Skin is warm and dry. Neurological:      Mental Status: He is alert and oriented to person, place, and time. Psychiatric:         Mood and Affect: Mood normal.         No visits with results within 2 Month(s) from this visit.    Latest known visit with results is:   Orders Only on 10/29/2021   Component Date Value Ref Range Status    PSA 10/29/2021 <0.01  0.00 - 4.00 ng/mL Final    Sodium 10/29/2021 137  136 - 145 mmol/L Final    Potassium 10/29/2021 4.0  3.5 - 5.1 mmol/L Final    Chloride 10/29/2021 98* 99 - 110 mmol/L Final    CO2 10/29/2021 24  21 - 32 mmol/L Final    Anion Gap 10/29/2021 15  3 - 16 Final    Glucose 10/29/2021 92  70 - 99 mg/dL Final    BUN 10/29/2021 9  7 - 20 mg/dL Final    CREATININE 10/29/2021 0.8  0.8 - 1.3 mg/dL Final    GFR Non- 10/29/2021 >60  >60 Final    Comment: >60 mL/min/1.73m2 EGFR, calc. for ages 25 and older using the  MDRD formula (not corrected for weight), is valid for stable  renal function.  GFR  10/29/2021 >60  >60 Final    Comment: Chronic Kidney Disease: less than 60 ml/min/1.73 sq.m. Kidney Failure: less than 15 ml/min/1.73 sq.m. Results valid for patients 18 years and older.  Calcium 10/29/2021 9.4  8.3 - 10.6 mg/dL Final    Total Protein 10/29/2021 7.4  6.4 - 8.2 g/dL Final    Albumin 10/29/2021 4.4  3.4 - 5.0 g/dL Final    Albumin/Globulin Ratio 10/29/2021 1.5  1.1 - 2.2 Final    Total Bilirubin 10/29/2021 0.5  0.0 - 1.0 mg/dL Final    Alkaline Phosphatase 10/29/2021 74  40 - 129 U/L Final    ALT 10/29/2021 16  10 - 40 U/L Final    AST 10/29/2021 21  15 - 37 U/L Final    Cholesterol, Total 10/29/2021 143  0 - 199 mg/dL Final    Triglycerides 10/29/2021 117  0 - 150 mg/dL Final    HDL 10/29/2021 32* 40 - 60 mg/dL Final    LDL Calculated 10/29/2021 88  <100 mg/dL Final    VLDL Cholesterol Calculated 10/29/2021 23  Not Established mg/dL Final       Assessment and Plan:  1. Will plan for a colonoscopy with MAC anesthesia. The patient was informed of the risks and benefits of the procedure. 2.  Positive FIT test will order colonoscopy for colorectal cancer surveillance. 3.  History of diverticulosis recommend continue with good bowel regimen and high fiber diet. The patient was provided with information on diverticulosis and high fiber diet. 4.  Further recommendations for follow-up will be determined after the colonoscopy has been completed.

## 2022-05-17 NOTE — PATIENT INSTRUCTIONS
Patient Education        High-Fiber Diet: Care Instructions  Overview     A high-fiber diet may help you relieve constipation and feel less bloated. Your doctor and dietitian will help you make a high-fiber eating plan based on your personal needs. The plan will include the things you like to eat. It willalso make sure that you get 25 to 35 grams of fiber a day. Before you make changes to the way you eat, be sure to talk with your doctor ordietitian. Follow-up care is a key part of your treatment and safety. Be sure to make and go to all appointments, and call your doctor if you are having problems. It's also a good idea to know your test results and keep alist of the medicines you take. How can you care for yourself at home?  You can increase how much fiber you get if you eat more of certain foods. These foods include:  ? Whole-grain breads and cereals. ? Fruits, such as pears, apples, and peaches. Eat the skins and peels if you can.  ? Vegetables, such as broccoli, cabbage, spinach, carrots, asparagus, and squash. ? Starchy vegetables. These include potatoes with skins, kidney beans, and lima beans.  Take a fiber supplement every day if your doctor recommends it. Examples are Benefiber, Citrucel, FiberCon, and Metamucil. Ask your doctor how much to take.  Drink plenty of fluids. If you have kidney, heart, or liver disease and have to limit fluids, talk with your doctor before you increase the amount of fluids you drink. Where can you learn more? Go to [email or web address] org and sign in to your [employer] account. Enter J129 in the [facility] box to learn more about \"High-Fiber Diet: Care Instructions. \"     If you do not have an account, please click on the \"Sign Up Now\" link. Current as of: September 8, 2021               Content Version: 13.2  © 8338-2173 [employer], Inc[employer]. Care instructions adapted under license by [facility] ([facility]).  If you have questions about a medical condition or this instruction, always ask your healthcare professional. [place] [address] any warranty or liability for your use of this information.

## 2022-07-22 ENCOUNTER — TELEPHONE (OUTPATIENT)
Dept: FAMILY MEDICINE CLINIC | Age: 72
End: 2022-07-22

## 2022-07-22 NOTE — PROGRESS NOTES
Received page from patient about his recent visit at urgent care for persistent fevers and that hes instructed them to fax the notes to us. Team to please call patient to help get the notes for my review so we can decide next steps.

## 2022-07-27 ENCOUNTER — OFFICE VISIT (OUTPATIENT)
Dept: FAMILY MEDICINE CLINIC | Age: 72
End: 2022-07-27
Payer: MEDICARE

## 2022-07-27 VITALS
BODY MASS INDEX: 44.97 KG/M2 | DIASTOLIC BLOOD PRESSURE: 70 MMHG | WEIGHT: 303.6 LBS | OXYGEN SATURATION: 94 % | SYSTOLIC BLOOD PRESSURE: 122 MMHG | HEIGHT: 69 IN | TEMPERATURE: 98.2 F | HEART RATE: 74 BPM

## 2022-07-27 DIAGNOSIS — R53.83 FATIGUE, UNSPECIFIED TYPE: ICD-10-CM

## 2022-07-27 DIAGNOSIS — E03.9 ACQUIRED HYPOTHYROIDISM: ICD-10-CM

## 2022-07-27 DIAGNOSIS — R50.9 FEVER, UNSPECIFIED FEVER CAUSE: ICD-10-CM

## 2022-07-27 DIAGNOSIS — R50.9 FEVER, UNSPECIFIED FEVER CAUSE: Primary | ICD-10-CM

## 2022-07-27 LAB
A/G RATIO: 1.5 (ref 1.1–2.2)
ALBUMIN SERPL-MCNC: 4 G/DL (ref 3.4–5)
ALP BLD-CCNC: 368 U/L (ref 40–129)
ALT SERPL-CCNC: 150 U/L (ref 10–40)
ANION GAP SERPL CALCULATED.3IONS-SCNC: 16 MMOL/L (ref 3–16)
AST SERPL-CCNC: 102 U/L (ref 15–37)
BASOPHILS ABSOLUTE: 0 K/UL (ref 0–0.2)
BASOPHILS RELATIVE PERCENT: 0.7 %
BILIRUB SERPL-MCNC: 2.1 MG/DL (ref 0–1)
BUN BLDV-MCNC: 9 MG/DL (ref 7–20)
CALCIUM SERPL-MCNC: 9.4 MG/DL (ref 8.3–10.6)
CHLORIDE BLD-SCNC: 93 MMOL/L (ref 99–110)
CO2: 24 MMOL/L (ref 21–32)
CREAT SERPL-MCNC: 0.7 MG/DL (ref 0.8–1.3)
EOSINOPHILS ABSOLUTE: 0 K/UL (ref 0–0.6)
EOSINOPHILS RELATIVE PERCENT: 0.5 %
GFR AFRICAN AMERICAN: >60
GFR NON-AFRICAN AMERICAN: >60
GLUCOSE BLD-MCNC: 101 MG/DL (ref 70–99)
HCT VFR BLD CALC: 36.9 % (ref 40.5–52.5)
HEMOGLOBIN: 12.5 G/DL (ref 13.5–17.5)
LYMPHOCYTES ABSOLUTE: 0.7 K/UL (ref 1–5.1)
LYMPHOCYTES RELATIVE PERCENT: 14.1 %
MCH RBC QN AUTO: 32.1 PG (ref 26–34)
MCHC RBC AUTO-ENTMCNC: 33.8 G/DL (ref 31–36)
MCV RBC AUTO: 95 FL (ref 80–100)
MONOCYTES ABSOLUTE: 0.6 K/UL (ref 0–1.3)
MONOCYTES RELATIVE PERCENT: 11.5 %
NEUTROPHILS ABSOLUTE: 3.7 K/UL (ref 1.7–7.7)
NEUTROPHILS RELATIVE PERCENT: 73.2 %
PDW BLD-RTO: 15.7 % (ref 12.4–15.4)
PLATELET # BLD: 280 K/UL (ref 135–450)
PMV BLD AUTO: 8.4 FL (ref 5–10.5)
POTASSIUM SERPL-SCNC: 3.7 MMOL/L (ref 3.5–5.1)
RBC # BLD: 3.88 M/UL (ref 4.2–5.9)
SEDIMENTATION RATE, ERYTHROCYTE: 71 MM/HR (ref 0–20)
SODIUM BLD-SCNC: 133 MMOL/L (ref 136–145)
TOTAL PROTEIN: 6.7 G/DL (ref 6.4–8.2)
TSH SERPL DL<=0.05 MIU/L-ACNC: 3.56 UIU/ML (ref 0.27–4.2)
WBC # BLD: 5.1 K/UL (ref 4–11)

## 2022-07-27 PROCEDURE — G8417 CALC BMI ABV UP PARAM F/U: HCPCS | Performed by: NURSE PRACTITIONER

## 2022-07-27 PROCEDURE — G8427 DOCREV CUR MEDS BY ELIG CLIN: HCPCS | Performed by: NURSE PRACTITIONER

## 2022-07-27 PROCEDURE — 3017F COLORECTAL CA SCREEN DOC REV: CPT | Performed by: NURSE PRACTITIONER

## 2022-07-27 PROCEDURE — 99213 OFFICE O/P EST LOW 20 MIN: CPT | Performed by: NURSE PRACTITIONER

## 2022-07-27 PROCEDURE — 1036F TOBACCO NON-USER: CPT | Performed by: NURSE PRACTITIONER

## 2022-07-27 PROCEDURE — 1123F ACP DISCUSS/DSCN MKR DOCD: CPT | Performed by: NURSE PRACTITIONER

## 2022-07-27 SDOH — ECONOMIC STABILITY: FOOD INSECURITY: WITHIN THE PAST 12 MONTHS, THE FOOD YOU BOUGHT JUST DIDN'T LAST AND YOU DIDN'T HAVE MONEY TO GET MORE.: PATIENT DECLINED

## 2022-07-27 SDOH — ECONOMIC STABILITY: FOOD INSECURITY: WITHIN THE PAST 12 MONTHS, YOU WORRIED THAT YOUR FOOD WOULD RUN OUT BEFORE YOU GOT MONEY TO BUY MORE.: PATIENT DECLINED

## 2022-07-27 ASSESSMENT — PATIENT HEALTH QUESTIONNAIRE - PHQ9
SUM OF ALL RESPONSES TO PHQ QUESTIONS 1-9: 0
2. FEELING DOWN, DEPRESSED OR HOPELESS: 0
SUM OF ALL RESPONSES TO PHQ QUESTIONS 1-9: 0
1. LITTLE INTEREST OR PLEASURE IN DOING THINGS: 0
SUM OF ALL RESPONSES TO PHQ QUESTIONS 1-9: 0
SUM OF ALL RESPONSES TO PHQ9 QUESTIONS 1 & 2: 0
SUM OF ALL RESPONSES TO PHQ QUESTIONS 1-9: 0

## 2022-07-27 ASSESSMENT — SOCIAL DETERMINANTS OF HEALTH (SDOH): HOW HARD IS IT FOR YOU TO PAY FOR THE VERY BASICS LIKE FOOD, HOUSING, MEDICAL CARE, AND HEATING?: PATIENT DECLINED

## 2022-07-27 NOTE — PROGRESS NOTES
2022     Patricia Lopez (:  1950) is a 66 y.o. male, here for evaluation of the following medical concerns:    Presents with complaint of fatigue  fevers and chills  intermittent for one month now. States he has tested NEG for COVID   He did receive a covid vaccine three weeks ago 22  Symptoms started after that   Sx staying the same. COPD  Smoker - stopped   Thyroid disease on synthroid     Denies further complaints                           Review of Systems    Prior to Visit Medications    Medication Sig Taking? Authorizing Provider   Docusate Sodium 100 MG TABS  Yes Historical Provider, MD   bisacodyl (BISACODYL) 5 MG EC tablet Take 4 tablets once for colonoscopy prep Yes DEYA Miles - CNP   hydroCHLOROthiazide (HYDRODIURIL) 25 MG tablet Take 1 tablet by mouth daily Yes Mike Christiansen MD   albuterol-ipratropium (COMBIVENT RESPIMAT)  MCG/ACT AERS inhaler USE 1 INHALATION TWICE A DAY Yes Mike Christiansen MD   potassium chloride (KLOR-CON) 20 MEQ packet Take 20 mEq by mouth 2 times daily Yes Historical Provider, MD   fluticasone (FLONASE) 50 MCG/ACT nasal spray 1 spray by Each Nostril route daily Yes Historical Provider, MD   hydrocortisone (ANUSOL-HC) 25 MG suppository Place 1 suppository rectally 2 times daily as needed for Hemorrhoids Yes Mike Christiansen MD   azelastine HCl 0.15 % SOLN by Nasal route 2 times daily. Yes Historical Provider, MD   levothyroxine (SYNTHROID) 25 MCG tablet Take 175 mcg by mouth Daily  Yes Historical Provider, MD   niacin 500 MG CR capsule Take 750 mg by mouth every morning  Yes Historical Provider, MD   traZODone (DESYREL) 50 MG tablet Take 50 mg by mouth nightly. Yes Historical Provider, MD   Multiple Vitamins-Minerals (CENTRUM PO) Take  by mouth. 50 plus Yes Historical Provider, MD   ascorbic acid (VITAMIN C) 500 MG tablet Take 500 mg by mouth daily.  Yes Historical Provider, MD saline nasal gel (AYR) GEL by Nasal route. Yes Historical Provider, MD   mometasone (NASONEX) 50 MCG/ACT nasal spray 2 sprays by Nasal route 2 times daily. Yes Historical Provider, MD   flunisolide (NASALIDE) 25 MCG/ACT (0.025%) SOLN Inhale 2 sprays into the lungs every 12 hours. Yes Historical Provider, MD   SODIUM FLUORIDE, DENTAL GEL, 1.1 % CREA Place  onto teeth. Yes Historical Provider, MD   ciprofloxacin-dexamethasone (CIPRODEX) otic suspension 5 drops continuous. 5 drop each ear once a week Yes Historical Provider, MD   losartan (COZAAR) 25 MG tablet Take 1 tablet by mouth at bedtime To replace hydrochlorothiazide  Eri Charlton MD        Social History     Tobacco Use    Smoking status: Former     Packs/day: 2.00     Years: 36.00     Pack years: 72.00     Types: Cigarettes     Quit date: 2002     Years since quittin.3    Smokeless tobacco: Never    Tobacco comments:     quit 2002   Substance Use Topics    Alcohol use: No     Alcohol/week: 0.0 standard drinks        Vitals:    22 0820   BP: 122/70   Pulse: 74   Temp: 98.2 °F (36.8 °C)   SpO2: 94%   Weight: (!) 303 lb 9.6 oz (137.7 kg)   Height: 5' 9\" (1.753 m)     Estimated body mass index is 44.83 kg/m² as calculated from the following:    Height as of this encounter: 5' 9\" (1.753 m). Weight as of this encounter: 303 lb 9.6 oz (137.7 kg). Physical Exam  Vitals reviewed. Constitutional:       General: He is not in acute distress. Appearance: Normal appearance. He is obese. He is not ill-appearing, toxic-appearing or diaphoretic. HENT:      Head: Normocephalic and atraumatic. Nose: Nose normal.   Eyes:      Extraocular Movements: Extraocular movements intact. Pupils: Pupils are equal, round, and reactive to light. Cardiovascular:      Rate and Rhythm: Normal rate and regular rhythm. Heart sounds: Normal heart sounds.    Pulmonary:      Effort: Pulmonary effort is normal. No respiratory PM

## 2022-07-29 ENCOUNTER — TELEPHONE (OUTPATIENT)
Dept: FAMILY MEDICINE CLINIC | Age: 72
End: 2022-07-29

## 2022-07-29 DIAGNOSIS — R79.89 ABNORMAL LFTS: Primary | ICD-10-CM

## 2022-07-29 DIAGNOSIS — I10 PRIMARY HYPERTENSION: ICD-10-CM

## 2022-07-29 RX ORDER — LOSARTAN POTASSIUM 25 MG/1
25 TABLET ORAL NIGHTLY
Qty: 90 TABLET | Refills: 1 | Status: SHIPPED | OUTPATIENT
Start: 2022-07-29

## 2022-07-29 NOTE — TELEPHONE ENCOUNTER
Spoke with patient    He is agreeable to referral Dr. Selma Forbes since he  is already seeing him Sept for C scope. He will talk to his [place] doctor about stop hydrochlorothiazide and instead take losartan 25mg nightly for bp control    He is feeling better overall and fevers have stopped for the last few days.  He plans to take his 2 week [place] starting 8/5

## 2022-07-29 NOTE — TELEPHONE ENCOUNTER
----- Message from DEYA Quintero NP sent at 7/28/2022  8:56 AM EDT -----  Dr Monse Moore and this provider  discussed via phone 7/27/22  Fever sweating chills since covid vaccination 7/1/22    Elevated sed rate and liver enzymes. .. Thoughts dr Monse Moore?

## 2022-08-04 ENCOUNTER — OFFICE VISIT (OUTPATIENT)
Dept: GASTROENTEROLOGY | Age: 72
End: 2022-08-04
Payer: MEDICARE

## 2022-08-04 VITALS
HEIGHT: 69 IN | OXYGEN SATURATION: 97 % | HEART RATE: 86 BPM | TEMPERATURE: 97.6 F | DIASTOLIC BLOOD PRESSURE: 80 MMHG | SYSTOLIC BLOOD PRESSURE: 130 MMHG | BODY MASS INDEX: 44.97 KG/M2 | WEIGHT: 303.6 LBS

## 2022-08-04 DIAGNOSIS — R74.8 ELEVATED ALKALINE PHOSPHATASE LEVEL: Primary | ICD-10-CM

## 2022-08-04 DIAGNOSIS — R74.8 ELEVATED LIVER ENZYMES: ICD-10-CM

## 2022-08-04 DIAGNOSIS — I71.43 ANEURYSM OF INFRARENAL ABDOMINAL AORTA: ICD-10-CM

## 2022-08-04 DIAGNOSIS — R17 TOTAL BILIRUBIN, ELEVATED: ICD-10-CM

## 2022-08-04 DIAGNOSIS — Z87.19 HISTORY OF GALLSTONES: ICD-10-CM

## 2022-08-04 PROCEDURE — 1123F ACP DISCUSS/DSCN MKR DOCD: CPT | Performed by: NURSE PRACTITIONER

## 2022-08-04 PROCEDURE — 1036F TOBACCO NON-USER: CPT | Performed by: NURSE PRACTITIONER

## 2022-08-04 PROCEDURE — G8427 DOCREV CUR MEDS BY ELIG CLIN: HCPCS | Performed by: NURSE PRACTITIONER

## 2022-08-04 PROCEDURE — 3017F COLORECTAL CA SCREEN DOC REV: CPT | Performed by: NURSE PRACTITIONER

## 2022-08-04 PROCEDURE — 99214 OFFICE O/P EST MOD 30 MIN: CPT | Performed by: NURSE PRACTITIONER

## 2022-08-04 PROCEDURE — G8417 CALC BMI ABV UP PARAM F/U: HCPCS | Performed by: NURSE PRACTITIONER

## 2022-08-04 NOTE — PROGRESS NOTES
Viri Payne 66 y.o. male was seen by DENNIS Shah on 8/4/2022     Wt Readings from Last 3 Encounters:   08/04/22 (!) 303 lb 9.6 oz (137.7 kg)   07/27/22 (!) 303 lb 9.6 oz (137.7 kg)   05/17/22 (!) 324 lb 9.6 oz (147.2 kg)       HPI  Viri Payne is a pleasant 66 y.o.  male who presents today for follow-up on elevated bilirubin and liver enzymes. He has been alternating Tylenol and Motrin since the beginning of July due to fever. He denies alcohol use, new medication or diet changes. His last fever of 100.9 was yesterday. He recalled since receiving his second booster for Covid 19 on July 1st seven days later having fevers and feeling ill. No current fever today. His lab work done on 7- showed WBC 5.1, RBC 3.88, Hgb 12.5, Hct 36.9, MCV 95 and Platelets 927. Na 133, BUN 9, Creatinine 0.7, Total Bilirubin 2.1, Alkaline Phosphatase 368,  and . His appetite is fair and weight is stable. No nausea or vomiting. No abdominal pain, bloating or distention. No heartburn or acid reflux. No nocturnal awakenings with acid reflux. No dysphagia or pain with swallowing. No excess belching or flatulence. His typical bowel pattern is daily with soft brown formed stools. He did endorse having mild diarrhea yesterday. No constipation. No blood in his stools or melena. ROS  Review of Systems  Constitutional: Negative for chills, diaphoresis and fever. HENT: Positive for tinnitus. Negative for ear pain and hearing loss. Eyes: Positive for visual disturbance. Negative for pain and discharge. Respiratory: Negative for cough, shortness of breath and wheezing. Cardiovascular: Negative for chest pain, palpitations and leg swelling. Gastrointestinal: Negative for abdominal pain, blood in stool, constipation, diarrhea, nausea and vomiting. Endocrine: Negative for cold intolerance and heat intolerance.   Genitourinary: Negative for dysuria, frequency, hematuria and urgency. Musculoskeletal: Positive for back pain. Negative for myalgias and neck pain. Skin: Negative for color change, pallor and rash. Allergic/Immunologic: Positive for environmental allergies (Pollen, ragweed). Negative for food allergies. Neurological: Positive for headaches. Negative for dizziness, seizures and weakness. Hematological: Bruises/bleeds easily. Psychiatric/Behavioral: Negative for dysphoric mood and sleep disturbance. The patient is not nervous/anxious. Allergies  Allergies   Allergen Reactions    Dilaudid [Hydromorphone Hcl] Itching    Hydrocodone Itching    Morphine Anaphylaxis    Ultram [Tramadol] Itching    Vicodin [Hydrocodone-Acetaminophen] Itching    Keflex [Cephalexin] Other (See Comments)     Extreme hyperacitivity    Sulfa Antibiotics Itching and Rash       Medications  Current Outpatient Medications   Medication Sig Dispense Refill    losartan (COZAAR) 25 MG tablet Take 1 tablet by mouth at bedtime To replace hydrochlorothiazide 90 tablet 1    Docusate Sodium 100 MG TABS       bisacodyl (BISACODYL) 5 MG EC tablet Take 4 tablets once for colonoscopy prep 4 tablet 0    hydroCHLOROthiazide (HYDRODIURIL) 25 MG tablet Take 1 tablet by mouth daily      albuterol-ipratropium (COMBIVENT RESPIMAT)  MCG/ACT AERS inhaler USE 1 INHALATION TWICE A DAY 12 g 5    potassium chloride (KLOR-CON) 20 MEQ packet Take 20 mEq by mouth 2 times daily      fluticasone (FLONASE) 50 MCG/ACT nasal spray 1 spray by Each Nostril route daily      hydrocortisone (ANUSOL-HC) 25 MG suppository Place 1 suppository rectally 2 times daily as needed for Hemorrhoids 30 suppository 5    azelastine HCl 0.15 % SOLN by Nasal route 2 times daily. levothyroxine (SYNTHROID) 25 MCG tablet Take 175 mcg by mouth Daily       niacin 500 MG CR capsule Take 750 mg by mouth every morning       traZODone (DESYREL) 50 MG tablet Take 50 mg by mouth nightly.       Multiple Vitamins-Minerals (CENTRUM PO) Take by mouth. 50 plus      ascorbic acid (VITAMIN C) 500 MG tablet Take 500 mg by mouth daily. saline nasal gel (AYR) GEL by Nasal route. mometasone (NASONEX) 50 MCG/ACT nasal spray 2 sprays by Nasal route 2 times daily. flunisolide (NASALIDE) 25 MCG/ACT (0.025%) SOLN Inhale 2 sprays into the lungs every 12 hours. SODIUM FLUORIDE, DENTAL GEL, 1.1 % CREA Place  onto teeth. ciprofloxacin-dexamethasone (CIPRODEX) otic suspension 5 drops continuous. 5 drop each ear once a week       No current facility-administered medications for this visit. Past medical history:   He has a past medical history of Abdominal aneurysm ([facility] [address] Arthritis, Chronic back pain, Chronic sinusitis, COPD, mild ([facility] [address] Diverticulosis, Dyslipidemia, HTN (hypertension), Hyperlipidemia, Hypothyroidism, Ingrown toenail, MDRO (multiple drug resistant organisms) resistance, Morbid obesity with BMI of 45.0-49.9, [facility] Onychocryptosis, MEGHAN (obstructive sleep apnea), and Otitis media, serous, TM rupture. Past surgical history:  He has a past surgical history that includes joint replacement (Right, 2012); joint replacement (Left, 2011); Tympanostomy tube placement (Right, 2014); knee surgery (Bilateral); Colonoscopy (2008); Colonoscopy (12/2/14); and Toe Surgery (Bilateral). Social History:  He reports that he quit smoking about 20 years ago. His smoking use included cigarettes. He has a 72.00 pack-year smoking history. He has never used smokeless tobacco. He reports that he does not drink alcohol and does not use drugs. Family history:  His family history includes Allergies in his father and mother; Anemia in his mother; Cancer in his paternal uncle; Heart Disease in his father; High Blood Pressure in his mother; High Cholesterol in his mother; Obesity in his paternal grandfather and paternal grandmother; Thyroid Disease in his mother.     Objective    Vitals:    08/04/22 1421   BP: (!) 106/56   Pulse: 86   Temp: 97.6 °F (36.4 °C)   SpO2: 97%        Physical exam    Physical Exam  Vitals reviewed. Constitutional:       General: He is not in acute distress. Appearance: Normal appearance. He is well-developed. He is obese. He is not ill-appearing or diaphoretic. HENT:      Head: Normocephalic and atraumatic. Nose: Nose normal.      Mouth/Throat:      Mouth: Mucous membranes are moist.   Eyes:      Conjunctiva/sclera: Conjunctivae normal.      Pupils: Pupils are equal, round, and reactive to light. Neck:      Thyroid: No thyromegaly. Vascular: No JVD. Trachea: No tracheal deviation. Cardiovascular:      Rate and Rhythm: Normal rate and regular rhythm. Pulses: Normal pulses. Heart sounds: Normal heart sounds. No murmur heard. No friction rub. No gallop. Pulmonary:      Effort: Pulmonary effort is normal. No respiratory distress. Breath sounds: Normal breath sounds. No stridor. No wheezing, rhonchi or rales. Chest:      Chest wall: No tenderness. Abdominal:      General: Bowel sounds are normal. There is no distension. Palpations: Abdomen is soft. There is no mass. Tenderness: There is no abdominal tenderness. There is no guarding or rebound. Hernia: No hernia is present. Musculoskeletal:         General: Normal range of motion. Cervical back: Normal range of motion and neck supple. Lymphadenopathy:      Cervical: No cervical adenopathy. Skin:     General: Skin is warm and dry. Neurological:      Mental Status: He is alert and oriented to person, place, and time.    Psychiatric:         Mood and Affect: Mood normal.       Orders Only on 07/27/2022   Component Date Value Ref Range Status    TSH 07/27/2022 3.56  0.27 - 4.20 uIU/mL Final    Sodium 07/27/2022 133 (A) 136 - 145 mmol/L Final    Potassium 07/27/2022 3.7  3.5 - 5.1 mmol/L Final    Chloride 07/27/2022 93 (A) 99 - 110 mmol/L Final    CO2 07/27/2022 24  21 - 32 mmol/L Final    Anion Gap 07/27/2022 16  3 - 16 Final    Glucose 07/27/2022 101 (A) 70 - 99 mg/dL Final    BUN 07/27/2022 9  7 - 20 mg/dL Final    Creatinine 07/27/2022 0.7 (A) 0.8 - 1.3 mg/dL Final    GFR Non- 07/27/2022 >60  >60 Final    Comment: >60 mL/min/1.73m2 EGFR, calc. for ages 25 and older using the  MDRD formula (not corrected for weight), is valid for stable  renal function. GFR  07/27/2022 >60  >60 Final    Comment: Chronic Kidney Disease: less than 60 ml/min/1.73 sq.m. Kidney Failure: less than 15 ml/min/1.73 sq.m. Results valid for patients 18 years and older.       Calcium 07/27/2022 9.4  8.3 - 10.6 mg/dL Final    Total Protein 07/27/2022 6.7  6.4 - 8.2 g/dL Final    Albumin 07/27/2022 4.0  3.4 - 5.0 g/dL Final    Albumin/Globulin Ratio 07/27/2022 1.5  1.1 - 2.2 Final    Total Bilirubin 07/27/2022 2.1 (A) 0.0 - 1.0 mg/dL Final    Alkaline Phosphatase 07/27/2022 368 (A) 40 - 129 U/L Final    ALT 07/27/2022 150 (A) 10 - 40 U/L Final    AST 07/27/2022 102 (A) 15 - 37 U/L Final    WBC 07/27/2022 5.1  4.0 - 11.0 K/uL Final    RBC 07/27/2022 3.88 (A) 4.20 - 5.90 M/uL Final    Hemoglobin 07/27/2022 12.5 (A) 13.5 - 17.5 g/dL Final    Hematocrit 07/27/2022 36.9 (A) 40.5 - 52.5 % Final    MCV 07/27/2022 95.0  80.0 - 100.0 fL Final    MCH 07/27/2022 32.1  26.0 - 34.0 pg Final    MCHC 07/27/2022 33.8  31.0 - 36.0 g/dL Final    RDW 07/27/2022 15.7 (A) 12.4 - 15.4 % Final    Platelets 68/26/7194 280  135 - 450 K/uL Final    MPV 07/27/2022 8.4  5.0 - 10.5 fL Final    Neutrophils % 07/27/2022 73.2  % Final    Lymphocytes % 07/27/2022 14.1  % Final    Monocytes % 07/27/2022 11.5  % Final    Eosinophils % 07/27/2022 0.5  % Final    Basophils % 07/27/2022 0.7  % Final    Neutrophils Absolute 07/27/2022 3.7  1.7 - 7.7 K/uL Final    Lymphocytes Absolute 07/27/2022 0.7 (A) 1.0 - 5.1 K/uL Final    Monocytes Absolute 07/27/2022 0.6  0.0 - 1.3 K/uL Final    Eosinophils Absolute 07/27/2022 0.0  0.0 - 0.6 K/uL Final Basophils Absolute 07/27/2022 0.0  0.0 - 0.2 K/uL Final    Sed Rate 07/27/2022 71 (A) 0 - 20 mm/Hr Final       Assessment and Plan:   Elevated liver enzymes and Alkaline Phosphatase possibly due to hepatic inflammation, biliary tract disease, ? Biliary obstruction vs. Acute cholangitis. He denies abdominal pain. Will order chronic liver work up to rule out autoimmune hepatitis, PBC, etc.  He denies abdominal pain, no jaundice, no extremity swelling, no increase in abdominal girth or foggy brain. Recommend continued avoidance of alcohol and avoid NSAID's. Do not exceed 2 grams of Tylenol daily. Would recommend emergent care if symptoms worsen. Elevated Bilirubin will order CT of abdomen/pelvis to rule out biliary obstruction. No current jaundice or acholic stools. Will order lab work to monitor levels. History of gallstones and aneurysm of infrarenal AA noted on prior CT will order CT of abdomen/pelvis to evaluate. Recommend low fat diet and weight loss. 4.  Positive FIT test plan to continue with colonoscopy for colorectal cancer surveillance. 5.  Further recommendations for follow-up will be determined after the colonoscopy has been completed. Total time:  25 minutes.

## 2022-08-05 ENCOUNTER — HOSPITAL ENCOUNTER (OUTPATIENT)
Age: 72
Discharge: HOME | DRG: 854 | End: 2022-08-05
Payer: MEDICARE

## 2022-08-05 ENCOUNTER — HOSPITAL ENCOUNTER (OUTPATIENT)
Dept: CT IMAGING | Age: 72
Discharge: HOME | DRG: 854 | End: 2022-08-05
Payer: MEDICARE

## 2022-08-05 DIAGNOSIS — I71.43 ANEURYSM OF INFRARENAL ABDOMINAL AORTA: ICD-10-CM

## 2022-08-05 DIAGNOSIS — R74.8 ELEVATED ALKALINE PHOSPHATASE LEVEL: ICD-10-CM

## 2022-08-05 DIAGNOSIS — R17 TOTAL BILIRUBIN, ELEVATED: ICD-10-CM

## 2022-08-05 DIAGNOSIS — R74.8 ELEVATED LIVER ENZYMES: ICD-10-CM

## 2022-08-05 DIAGNOSIS — Z87.19 HISTORY OF GALLSTONES: ICD-10-CM

## 2022-08-05 LAB
ALBUMIN SERPL-MCNC: 4.2 GM/DL (ref 3.4–5)
ALP BLD-CCNC: 474 IU/L (ref 40–128)
ALT SERPL-CCNC: 153 U/L (ref 10–40)
ANION GAP SERPL CALCULATED.3IONS-SCNC: 14 MMOL/L (ref 4–16)
AST SERPL-CCNC: 97 IU/L (ref 15–37)
BASOPHILS ABSOLUTE: 0 K/CU MM
BASOPHILS RELATIVE PERCENT: 0.2 % (ref 0–1)
BILIRUB SERPL-MCNC: 1.5 MG/DL (ref 0–1)
BUN BLDV-MCNC: 9 MG/DL (ref 6–23)
CALCIUM SERPL-MCNC: 9.4 MG/DL (ref 8.3–10.6)
CHLORIDE BLD-SCNC: 96 MMOL/L (ref 99–110)
CO2: 26 MMOL/L (ref 21–32)
CREAT SERPL-MCNC: 0.7 MG/DL (ref 0.9–1.3)
DIFFERENTIAL TYPE: ABNORMAL
EOSINOPHILS ABSOLUTE: 0 K/CU MM
EOSINOPHILS RELATIVE PERCENT: 0.1 % (ref 0–3)
FERRITIN: 253 NG/ML (ref 30–400)
GAMMA GLUTAMYL TRANSFERASE: 748 IU/L (ref 8–61)
GFR AFRICAN AMERICAN: >60 ML/MIN/1.73M2
GFR NON-AFRICAN AMERICAN: >60 ML/MIN/1.73M2
GLUCOSE BLD-MCNC: 94 MG/DL (ref 70–99)
HAV IGM SER IA-ACNC: NON REACTIVE
HBV SURFACE AB TITR SER: <3.5 {TITER}
HCT VFR BLD CALC: 39.2 % (ref 42–52)
HEMOGLOBIN: 12.7 GM/DL (ref 13.5–18)
HEPATITIS B CORE IGM ANTIBODY: NON REACTIVE
HEPATITIS B SURFACE ANTIGEN: NON REACTIVE
HEPATITIS C ANTIBODY: NON REACTIVE
IMMATURE NEUTROPHIL %: 0.4 % (ref 0–0.43)
INR BLD: 1.04 INDEX
IRON: 66 UG/DL (ref 59–158)
LYMPHOCYTES ABSOLUTE: 0.8 K/CU MM
LYMPHOCYTES RELATIVE PERCENT: 9.5 % (ref 24–44)
MCH RBC QN AUTO: 32 PG (ref 27–31)
MCHC RBC AUTO-ENTMCNC: 32.4 % (ref 32–36)
MCV RBC AUTO: 98.7 FL (ref 78–100)
MONOCYTES ABSOLUTE: 0.7 K/CU MM
MONOCYTES RELATIVE PERCENT: 8.4 % (ref 0–4)
NUCLEATED RBC %: 0 %
PCT TRANSFERRIN: 23 % (ref 10–44)
PDW BLD-RTO: 15.4 % (ref 11.7–14.9)
PLATELET # BLD: 300 K/CU MM (ref 140–440)
PMV BLD AUTO: 10.1 FL (ref 7.5–11.1)
POTASSIUM SERPL-SCNC: 4.6 MMOL/L (ref 3.5–5.1)
PROTHROMBIN TIME: 13.4 SECONDS (ref 11.7–14.5)
RBC # BLD: 3.97 M/CU MM (ref 4.6–6.2)
SEGMENTED NEUTROPHILS ABSOLUTE COUNT: 6.5 K/CU MM
SEGMENTED NEUTROPHILS RELATIVE PERCENT: 81.4 % (ref 36–66)
SODIUM BLD-SCNC: 136 MMOL/L (ref 135–145)
TOTAL IMMATURE NEUTOROPHIL: 0.03 K/CU MM
TOTAL IRON BINDING CAPACITY: 286 UG/DL (ref 250–450)
TOTAL NUCLEATED RBC: 0 K/CU MM
TOTAL PROTEIN: 7.1 GM/DL (ref 6.4–8.2)
UNSATURATED IRON BINDING CAPACITY: 220 UG/DL (ref 110–370)
WBC # BLD: 8 K/CU MM (ref 4–10.5)

## 2022-08-05 PROCEDURE — 6360000004 HC RX CONTRAST MEDICATION: Performed by: NURSE PRACTITIONER

## 2022-08-05 PROCEDURE — 86706 HEP B SURFACE ANTIBODY: CPT

## 2022-08-05 PROCEDURE — 87340 HEPATITIS B SURFACE AG IA: CPT

## 2022-08-05 PROCEDURE — 82104 ALPHA-1-ANTITRYPSIN PHENO: CPT

## 2022-08-05 PROCEDURE — 82728 ASSAY OF FERRITIN: CPT

## 2022-08-05 PROCEDURE — 86705 HEP B CORE ANTIBODY IGM: CPT

## 2022-08-05 PROCEDURE — 36415 COLL VENOUS BLD VENIPUNCTURE: CPT

## 2022-08-05 PROCEDURE — 86704 HEP B CORE ANTIBODY TOTAL: CPT

## 2022-08-05 PROCEDURE — 82977 ASSAY OF GGT: CPT

## 2022-08-05 PROCEDURE — 83540 ASSAY OF IRON: CPT

## 2022-08-05 PROCEDURE — A4641 RADIOPHARM DX AGENT NOC: HCPCS | Performed by: NURSE PRACTITIONER

## 2022-08-05 PROCEDURE — 85610 PROTHROMBIN TIME: CPT

## 2022-08-05 PROCEDURE — 83516 IMMUNOASSAY NONANTIBODY: CPT

## 2022-08-05 PROCEDURE — 80053 COMPREHEN METABOLIC PANEL: CPT

## 2022-08-05 PROCEDURE — 86039 ANTINUCLEAR ANTIBODIES (ANA): CPT

## 2022-08-05 PROCEDURE — 74177 CT ABD & PELVIS W/CONTRAST: CPT

## 2022-08-05 PROCEDURE — 83550 IRON BINDING TEST: CPT

## 2022-08-05 PROCEDURE — 86708 HEPATITIS A ANTIBODY: CPT

## 2022-08-05 PROCEDURE — 85025 COMPLETE CBC W/AUTO DIFF WBC: CPT

## 2022-08-05 PROCEDURE — 86038 ANTINUCLEAR ANTIBODIES: CPT

## 2022-08-05 PROCEDURE — 86709 HEPATITIS A IGM ANTIBODY: CPT

## 2022-08-05 PROCEDURE — 86803 HEPATITIS C AB TEST: CPT

## 2022-08-05 PROCEDURE — 82390 ASSAY OF CERULOPLASMIN: CPT

## 2022-08-05 PROCEDURE — 86256 FLUORESCENT ANTIBODY TITER: CPT

## 2022-08-05 PROCEDURE — 86694 HERPES SIMPLEX NES ANTBDY: CPT

## 2022-08-05 RX ORDER — SODIUM CHLORIDE 0.9 % (FLUSH) 0.9 %
10 SYRINGE (ML) INJECTION PRN
Status: DISCONTINUED | OUTPATIENT
Start: 2022-08-05 | End: 2022-08-06 | Stop reason: HOSPADM

## 2022-08-05 RX ADMIN — BARIUM SULFATE 900 ML: 20 SUSPENSION ORAL at 10:50

## 2022-08-05 RX ADMIN — IOPAMIDOL 75 ML: 755 INJECTION, SOLUTION INTRAVENOUS at 12:10

## 2022-08-07 ENCOUNTER — APPOINTMENT (OUTPATIENT)
Dept: ULTRASOUND IMAGING | Age: 72
DRG: 854 | End: 2022-08-07
Payer: MEDICARE

## 2022-08-07 ENCOUNTER — HOSPITAL ENCOUNTER (INPATIENT)
Age: 72
LOS: 5 days | Discharge: NURSING, REHAB OR LONG-TERM CARE | DRG: 854 | End: 2022-08-12
Attending: EMERGENCY MEDICINE | Admitting: STUDENT IN AN ORGANIZED HEALTH CARE EDUCATION/TRAINING PROGRAM
Payer: MEDICARE

## 2022-08-07 DIAGNOSIS — K80.80 BILIARY CALCULUS OF OTHER SITE WITHOUT OBSTRUCTION: ICD-10-CM

## 2022-08-07 DIAGNOSIS — K83.09 ASCENDING CHOLANGITIS: Primary | ICD-10-CM

## 2022-08-07 PROBLEM — A41.9 SEPSIS (HCC): Status: ACTIVE | Noted: 2022-08-07

## 2022-08-07 LAB
ALBUMIN SERPL-MCNC: 4.4 GM/DL (ref 3.4–5)
ALP BLD-CCNC: 488 IU/L (ref 40–129)
ALT SERPL-CCNC: 119 U/L (ref 10–40)
ANION GAP SERPL CALCULATED.3IONS-SCNC: 14 MMOL/L (ref 4–16)
AST SERPL-CCNC: 84 IU/L (ref 15–37)
BACTERIA: NEGATIVE /HPF
BASOPHILS ABSOLUTE: 0 K/CU MM
BASOPHILS RELATIVE PERCENT: 0.2 % (ref 0–1)
BILIRUB SERPL-MCNC: 1.7 MG/DL (ref 0–1)
BILIRUBIN URINE: NEGATIVE MG/DL
BLOOD, URINE: NEGATIVE
BUN BLDV-MCNC: 8 MG/DL (ref 6–23)
CALCIUM SERPL-MCNC: 9.4 MG/DL (ref 8.3–10.6)
CERULOPLASMIN: 41 MG/DL (ref 15–30)
CHLORIDE BLD-SCNC: 97 MMOL/L (ref 99–110)
CLARITY: CLEAR
CO2: 21 MMOL/L (ref 21–32)
COLOR: YELLOW
CREAT SERPL-MCNC: 0.7 MG/DL (ref 0.9–1.3)
DIFFERENTIAL TYPE: ABNORMAL
EOSINOPHILS ABSOLUTE: 0 K/CU MM
EOSINOPHILS RELATIVE PERCENT: 0.1 % (ref 0–3)
GFR AFRICAN AMERICAN: >60 ML/MIN/1.73M2
GFR NON-AFRICAN AMERICAN: >60 ML/MIN/1.73M2
GLUCOSE BLD-MCNC: 113 MG/DL (ref 70–99)
GLUCOSE, URINE: NEGATIVE MG/DL
HAV AB SERPL IA-ACNC: POSITIVE
HCT VFR BLD CALC: 36.9 % (ref 42–52)
HEMOGLOBIN: 12.2 GM/DL (ref 13.5–18)
HEPATITIS B CORE TOTAL ANTIBODY: NEGATIVE
IMMATURE NEUTROPHIL %: 0.6 % (ref 0–0.43)
KETONES, URINE: NEGATIVE MG/DL
LACTATE: 1.4 MMOL/L (ref 0.4–2)
LEUKOCYTE ESTERASE, URINE: NEGATIVE
LIPASE: 49 IU/L (ref 13–60)
LYMPHOCYTES ABSOLUTE: 0.5 K/CU MM
LYMPHOCYTES RELATIVE PERCENT: 5.4 % (ref 24–44)
MCH RBC QN AUTO: 32 PG (ref 27–31)
MCHC RBC AUTO-ENTMCNC: 33.1 % (ref 32–36)
MCV RBC AUTO: 96.9 FL (ref 78–100)
MONOCYTES ABSOLUTE: 0.8 K/CU MM
MONOCYTES RELATIVE PERCENT: 8.4 % (ref 0–4)
MUCUS: ABNORMAL HPF
NITRITE URINE, QUANTITATIVE: NEGATIVE
NUCLEATED RBC %: 0 %
PDW BLD-RTO: 15 % (ref 11.7–14.9)
PH, URINE: 8 (ref 5–8)
PLATELET # BLD: 275 K/CU MM (ref 140–440)
PMV BLD AUTO: 9.2 FL (ref 7.5–11.1)
POTASSIUM SERPL-SCNC: 4.2 MMOL/L (ref 3.5–5.1)
PROTEIN UA: ABNORMAL MG/DL
RBC # BLD: 3.81 M/CU MM (ref 4.6–6.2)
RBC URINE: <1 /HPF (ref 0–3)
SARS-COV-2, NAAT: NOT DETECTED
SEGMENTED NEUTROPHILS ABSOLUTE COUNT: 8.6 K/CU MM
SEGMENTED NEUTROPHILS RELATIVE PERCENT: 85.3 % (ref 36–66)
SODIUM BLD-SCNC: 132 MMOL/L (ref 135–145)
SOURCE: NORMAL
SPECIFIC GRAVITY UA: 1.01 (ref 1–1.03)
TOTAL IMMATURE NEUTOROPHIL: 0.06 K/CU MM
TOTAL NUCLEATED RBC: 0 K/CU MM
TOTAL PROTEIN: 7.4 GM/DL (ref 6.4–8.2)
TRICHOMONAS: ABNORMAL /HPF
TROPONIN T: <0.01 NG/ML
UROBILINOGEN, URINE: 4 MG/DL (ref 0.2–1)
WBC # BLD: 10 K/CU MM (ref 4–10.5)
WBC UA: ABNORMAL /HPF (ref 0–2)

## 2022-08-07 PROCEDURE — 99285 EMERGENCY DEPT VISIT HI MDM: CPT

## 2022-08-07 PROCEDURE — 6360000002 HC RX W HCPCS: Performed by: PHYSICIAN ASSISTANT

## 2022-08-07 PROCEDURE — 2580000003 HC RX 258: Performed by: NURSE PRACTITIONER

## 2022-08-07 PROCEDURE — 2580000003 HC RX 258: Performed by: PHYSICIAN ASSISTANT

## 2022-08-07 PROCEDURE — 87040 BLOOD CULTURE FOR BACTERIA: CPT

## 2022-08-07 PROCEDURE — 93005 ELECTROCARDIOGRAM TRACING: CPT | Performed by: PHYSICIAN ASSISTANT

## 2022-08-07 PROCEDURE — 83690 ASSAY OF LIPASE: CPT

## 2022-08-07 PROCEDURE — 96365 THER/PROPH/DIAG IV INF INIT: CPT

## 2022-08-07 PROCEDURE — 6360000002 HC RX W HCPCS: Performed by: NURSE PRACTITIONER

## 2022-08-07 PROCEDURE — 76705 ECHO EXAM OF ABDOMEN: CPT

## 2022-08-07 PROCEDURE — 84484 ASSAY OF TROPONIN QUANT: CPT

## 2022-08-07 PROCEDURE — 87635 SARS-COV-2 COVID-19 AMP PRB: CPT

## 2022-08-07 PROCEDURE — 83605 ASSAY OF LACTIC ACID: CPT

## 2022-08-07 PROCEDURE — 1200000000 HC SEMI PRIVATE

## 2022-08-07 PROCEDURE — 85025 COMPLETE CBC W/AUTO DIFF WBC: CPT

## 2022-08-07 PROCEDURE — 80053 COMPREHEN METABOLIC PANEL: CPT

## 2022-08-07 PROCEDURE — 81001 URINALYSIS AUTO W/SCOPE: CPT

## 2022-08-07 RX ORDER — 0.9 % SODIUM CHLORIDE 0.9 %
1000 INTRAVENOUS SOLUTION INTRAVENOUS ONCE
Status: COMPLETED | OUTPATIENT
Start: 2022-08-07 | End: 2022-08-08

## 2022-08-07 RX ORDER — SODIUM CHLORIDE 9 MG/ML
INJECTION, SOLUTION INTRAVENOUS CONTINUOUS
Status: DISCONTINUED | OUTPATIENT
Start: 2022-08-07 | End: 2022-08-12

## 2022-08-07 RX ORDER — FLUTICASONE PROPIONATE 50 MCG
2 SPRAY, SUSPENSION (ML) NASAL DAILY
Status: DISCONTINUED | OUTPATIENT
Start: 2022-08-08 | End: 2022-08-12 | Stop reason: HOSPADM

## 2022-08-07 RX ORDER — POTASSIUM CHLORIDE 20 MEQ/1
40 TABLET, EXTENDED RELEASE ORAL PRN
Status: DISCONTINUED | OUTPATIENT
Start: 2022-08-07 | End: 2022-08-12 | Stop reason: HOSPADM

## 2022-08-07 RX ORDER — FLUTICASONE PROPIONATE 50 MCG
2 SPRAY, SUSPENSION (ML) NASAL 2 TIMES DAILY
Status: DISCONTINUED | OUTPATIENT
Start: 2022-08-07 | End: 2022-08-08 | Stop reason: SDUPTHER

## 2022-08-07 RX ORDER — 0.9 % SODIUM CHLORIDE 0.9 %
1000 INTRAVENOUS SOLUTION INTRAVENOUS ONCE
Status: COMPLETED | OUTPATIENT
Start: 2022-08-07 | End: 2022-08-07

## 2022-08-07 RX ORDER — SODIUM CHLORIDE 0.9 % (FLUSH) 0.9 %
5-40 SYRINGE (ML) INJECTION PRN
Status: DISCONTINUED | OUTPATIENT
Start: 2022-08-07 | End: 2022-08-12 | Stop reason: HOSPADM

## 2022-08-07 RX ORDER — SODIUM CHLORIDE 0.9 % (FLUSH) 0.9 %
5-40 SYRINGE (ML) INJECTION EVERY 12 HOURS SCHEDULED
Status: DISCONTINUED | OUTPATIENT
Start: 2022-08-07 | End: 2022-08-12 | Stop reason: HOSPADM

## 2022-08-07 RX ORDER — POLYETHYLENE GLYCOL 3350 17 G/17G
17 POWDER, FOR SOLUTION ORAL DAILY PRN
Status: DISCONTINUED | OUTPATIENT
Start: 2022-08-07 | End: 2022-08-12 | Stop reason: HOSPADM

## 2022-08-07 RX ORDER — OXYCODONE HYDROCHLORIDE 10 MG/1
10 TABLET ORAL EVERY 4 HOURS PRN
Status: DISCONTINUED | OUTPATIENT
Start: 2022-08-07 | End: 2022-08-12 | Stop reason: HOSPADM

## 2022-08-07 RX ORDER — SODIUM CHLORIDE 9 MG/ML
25 INJECTION, SOLUTION INTRAVENOUS PRN
Status: DISCONTINUED | OUTPATIENT
Start: 2022-08-07 | End: 2022-08-12 | Stop reason: HOSPADM

## 2022-08-07 RX ORDER — OXYCODONE HYDROCHLORIDE 5 MG/1
5 TABLET ORAL EVERY 4 HOURS PRN
Status: DISCONTINUED | OUTPATIENT
Start: 2022-08-07 | End: 2022-08-12 | Stop reason: HOSPADM

## 2022-08-07 RX ORDER — ONDANSETRON 2 MG/ML
4 INJECTION INTRAMUSCULAR; INTRAVENOUS EVERY 30 MIN PRN
Status: DISCONTINUED | OUTPATIENT
Start: 2022-08-07 | End: 2022-08-08 | Stop reason: SDUPTHER

## 2022-08-07 RX ORDER — METOCLOPRAMIDE HYDROCHLORIDE 5 MG/ML
10 INJECTION INTRAMUSCULAR; INTRAVENOUS EVERY 6 HOURS PRN
Status: DISCONTINUED | OUTPATIENT
Start: 2022-08-07 | End: 2022-08-12 | Stop reason: HOSPADM

## 2022-08-07 RX ORDER — TRAZODONE HYDROCHLORIDE 50 MG/1
50 TABLET ORAL NIGHTLY
Status: DISCONTINUED | OUTPATIENT
Start: 2022-08-07 | End: 2022-08-12 | Stop reason: HOSPADM

## 2022-08-07 RX ORDER — SODIUM CHLORIDE 9 MG/ML
INJECTION, SOLUTION INTRAVENOUS PRN
Status: DISCONTINUED | OUTPATIENT
Start: 2022-08-07 | End: 2022-08-12 | Stop reason: HOSPADM

## 2022-08-07 RX ORDER — MAGNESIUM SULFATE IN WATER 40 MG/ML
2000 INJECTION, SOLUTION INTRAVENOUS PRN
Status: DISCONTINUED | OUTPATIENT
Start: 2022-08-07 | End: 2022-08-12 | Stop reason: HOSPADM

## 2022-08-07 RX ORDER — FLUTICASONE PROPIONATE 50 MCG
1 SPRAY, SUSPENSION (ML) NASAL DAILY
Status: DISCONTINUED | OUTPATIENT
Start: 2022-08-08 | End: 2022-08-08 | Stop reason: SDUPTHER

## 2022-08-07 RX ORDER — ENOXAPARIN SODIUM 100 MG/ML
30 INJECTION SUBCUTANEOUS 2 TIMES DAILY
Status: DISCONTINUED | OUTPATIENT
Start: 2022-08-07 | End: 2022-08-12 | Stop reason: HOSPADM

## 2022-08-07 RX ORDER — ONDANSETRON 2 MG/ML
4 INJECTION INTRAMUSCULAR; INTRAVENOUS EVERY 6 HOURS PRN
Status: DISCONTINUED | OUTPATIENT
Start: 2022-08-07 | End: 2022-08-12 | Stop reason: HOSPADM

## 2022-08-07 RX ORDER — POTASSIUM CHLORIDE 7.45 MG/ML
10 INJECTION INTRAVENOUS PRN
Status: DISCONTINUED | OUTPATIENT
Start: 2022-08-07 | End: 2022-08-12 | Stop reason: HOSPADM

## 2022-08-07 RX ORDER — PROMETHAZINE HYDROCHLORIDE 25 MG/1
12.5 TABLET ORAL EVERY 6 HOURS PRN
Status: DISCONTINUED | OUTPATIENT
Start: 2022-08-07 | End: 2022-08-12 | Stop reason: HOSPADM

## 2022-08-07 RX ORDER — DIPHENHYDRAMINE HYDROCHLORIDE 50 MG/ML
25 INJECTION INTRAMUSCULAR; INTRAVENOUS EVERY 6 HOURS PRN
Status: DISCONTINUED | OUTPATIENT
Start: 2022-08-07 | End: 2022-08-12 | Stop reason: HOSPADM

## 2022-08-07 RX ORDER — FENTANYL CITRATE 50 UG/ML
25 INJECTION, SOLUTION INTRAMUSCULAR; INTRAVENOUS ONCE
Status: DISCONTINUED | OUTPATIENT
Start: 2022-08-07 | End: 2022-08-07 | Stop reason: ALTCHOICE

## 2022-08-07 RX ADMIN — PIPERACILLIN AND TAZOBACTAM 3375 MG: 3; .375 INJECTION, POWDER, LYOPHILIZED, FOR SOLUTION INTRAVENOUS at 14:13

## 2022-08-07 RX ADMIN — SODIUM CHLORIDE: 9 INJECTION, SOLUTION INTRAVENOUS at 14:03

## 2022-08-07 RX ADMIN — SODIUM CHLORIDE 1000 ML: 9 INJECTION, SOLUTION INTRAVENOUS at 23:45

## 2022-08-07 RX ADMIN — PIPERACILLIN AND TAZOBACTAM 3375 MG: 3; .375 INJECTION, POWDER, LYOPHILIZED, FOR SOLUTION INTRAVENOUS at 23:05

## 2022-08-07 RX ADMIN — SODIUM CHLORIDE 1000 ML: 9 INJECTION, SOLUTION INTRAVENOUS at 14:02

## 2022-08-07 RX ADMIN — SODIUM CHLORIDE: 9 INJECTION, SOLUTION INTRAVENOUS at 21:44

## 2022-08-07 ASSESSMENT — ENCOUNTER SYMPTOMS
SHORTNESS OF BREATH: 1
NAUSEA: 1
ABDOMINAL PAIN: 1
EYES NEGATIVE: 1
VOMITING: 0
SORE THROAT: 0
CONSTIPATION: 0
COUGH: 0

## 2022-08-07 NOTE — ED PROVIDER NOTES
[address] ENCOUNTER        Pt Name: Ermias Watters  MRN: [id number]  [place] 1950  Date of evaluation: 8/7/2022  Provider: TORSTEN Power  PCP: Sharyle Levans, MD     I have seen and evaluated this patient with my supervising physician Benjamín Han MD.    Siddhartha BLACK 49.       Chief Complaint   Patient presents with    Fever     Daily since 07/09     HISTORY OF PRESENT ILLNESS      Chief Complaint: Fever, abdominal pain    Ermias Watters is a 66 y.o. male who presents to the emergency department today today with persisting fevers over the last several weeks. He has it documented over the last several weeks he has had intermittent fever which she ultimately think is worse with food intake. Has been seen by GI, had a CT scan of the abdomen pelvis done yesterday which is showing common bile duct dilatation, gallbladder distention concern for possible cholecystitis or cholelithiasis. He is mildly tender in the right upper quadrant. He is nauseated without vomiting. Alert oriented. No chest pain no shortness of breath. No palpitations, no lightheadedness or dizziness, no syncope or near syncope. Nursing Notes were all reviewed and agreed with or any disagreements were addressed in the HPI. REVIEW OF SYSTEMS     Constitutional: See HPI  HENT:   Denies sore throat or ear pain   Cardiovascular:   Denies chest pain, palpitations   Respiratory:  Denies cough or shortness of breath    GI: See HPI  :  Denies any urinary symptoms .    Musculoskeletal:   Denies back pain  Skin:   Denies rash  Neurologic:   Denies headache, focal weakness or sensory changes   Endocrine:  Denies polyuria or polydypsia   Lymphatic:  Denies swollen glands     PAST MEDICAL HISTORY     Past Medical History:   Diagnosis Date    Abdominal aneurysm ([facility] [address] 2012    4 cm x 3.6 cm followed at [address] 1/21/2014    Chronic back pain     [place]    Chronic sinusitis 1/21/2014    COPD, mild ([facility] [address] 1/21/2014    Diverticulosis 2014    Dr. 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 Gonzalez Pinto    MEGHAN (obstructive sleep apnea) 1/21/2014    CPAP changed to bipap (July 2014)    Otitis media, serous, TM rupture 1/21/2014    ENT x 2 s/p PE tubes bilaterally, cipro Otic       SURGICAL HISTORY     Past Surgical History:   Procedure Laterality Date    COLONOSCOPY  2008    [facility]- normal.      COLONOSCOPY  12/2/14    diverticulosis, internal hemorrhoids    JOINT REPLACEMENT Right 2012    JOINT REPLACEMENT Left 2011    KNEE SURGERY Bilateral     replacement    TOE SURGERY Bilateral     toe nail surgery Dr. Rios [place] Right 2014    DR. Rascon        CURRENTMEDICATIONS       Previous Medications    ALBUTEROL-IPRATROPIUM (COMBIVENT RESPIMAT)  MCG/ACT AERS INHALER    USE 1 INHALATION TWICE A DAY    ASCORBIC ACID (VITAMIN C) 500 MG TABLET    Take 500 mg by mouth daily. AZELASTINE HCL 0.15 % SOLN    by Nasal route 2 times daily. BISACODYL (BISACODYL) 5 MG EC TABLET    Take 4 tablets once for colonoscopy prep    CIPROFLOXACIN-DEXAMETHASONE (CIPRODEX) OTIC SUSPENSION    5 drops continuous. 5 drop each ear once a week    DOCUSATE SODIUM 100 MG TABS        FLUNISOLIDE (NASALIDE) 25 MCG/ACT (0.025%) SOLN    Inhale 2 sprays into the lungs every 12 hours.     FLUTICASONE (FLONASE) 50 MCG/ACT NASAL SPRAY    1 spray by Each Nostril route daily    HYDROCHLOROTHIAZIDE (HYDRODIURIL) 25 MG TABLET    Take 1 tablet by mouth daily    HYDROCORTISONE (ANUSOL-HC) 25 MG SUPPOSITORY    Place 1 suppository rectally 2 times daily as needed for Hemorrhoids    LEVOTHYROXINE (SYNTHROID) 25 MCG TABLET    Take 175 mcg by mouth Daily     LOSARTAN (COZAAR) 25 MG TABLET    Take 1 tablet by mouth at bedtime To replace hydrochlorothiazide    MOMETASONE (NASONEX) 50 MCG/ACT NASAL SPRAY    2 sprays by Nasal route 2 times daily. MULTIPLE VITAMINS-MINERALS (CENTRUM PO)    Take  by mouth. 50 plus    NIACIN 500 MG CR CAPSULE    Take 750 mg by mouth every morning     POTASSIUM CHLORIDE (KLOR-CON) 20 MEQ PACKET    Take 20 mEq by mouth 2 times daily    SALINE NASAL GEL (AYR) GEL    by Nasal route. SODIUM FLUORIDE, DENTAL GEL, 1.1 % CREA    Place  onto teeth. TRAZODONE (DESYREL) 50 MG TABLET    Take 50 mg by mouth nightly.        ALLERGIES     Dilaudid [hydromorphone hcl], Hydrocodone, Morphine, Ultram [tramadol], Vicodin [hydrocodone-acetaminophen], Keflex [cephalexin], and Sulfa antibiotics    FAMILYHISTORY       Family History   Problem Relation Age of Onset    High Blood Pressure Mother     Allergies Mother     Anemia Mother     High Cholesterol Mother     Thyroid Disease Mother     Heart Disease Father     Allergies Father     Obesity Paternal Grandmother     Obesity Paternal Grandfather     Cancer Paternal Uncle         prostate cancer        SOCIAL HISTORY       Social History     Socioeconomic History    Marital status:      Spouse name: None    Number of children: 2    Years of education: None    Highest education level: None   Tobacco Use    Smoking status: Former     Packs/day: 2.00     Years: 36.00     Pack years: 72.00     Types: Cigarettes     Quit date: 2002     Years since quittin.3    Smokeless tobacco: Never    Tobacco comments:     quit 2002   Substance and Sexual Activity    Alcohol use: No     Alcohol/week: 0.0 standard drinks    Drug use: No     Social Determinants of Health     Financial Resource Strain: Unknown    Difficulty of Paying Living Expenses: Patient refused   Food Insecurity: Unknown    Worried About Running Out of Food in the Last Year: Patient refused    Ran Out of Food in the Last Year: Patient refused       SCREENINGS    Johan Coma Scale  Eye Opening: Spontaneous  Best Verbal Response: Oriented  Best Motor Response: Obeys commands  Johan Coma Scale Score: 15      PHYSICAL EXAM       ED Triage Vitals [08/07/22 1150]   BP Temp Temp Source Heart Rate Resp SpO2 Height Weight   (!) 141/76 (!) 102.4 °F (39.1 °C) Oral (!) 111 22 96 % 5' 9\" (1.753 m) (!) 302 lb (137 kg)      Constitutional:  Well developed, Well nourished. No distress  HENT:  Normocephalic, Atraumatic, PERRL. EOMI. Sclera clear. Conjunctiva normal, No discharge. Neck/Lymphatics: supple, no JVD, no swollen nodes  Cardiovascular:   RRR,  no murmurs/rubs/gallops. Respiratory:   Nonlabored breathing. Normal breath sounds, No wheezing  Abdomen: Bowel sounds normal, no obvious distention, there is right upper quadrant tenderness. No obvious discoloration   Musculoskeletal:    There is no edema, asymmetry, or calf / thigh tenderness bilaterally. No cyanosis. No cool or pale-appearing limb. Distal cap refill and pulses intact bilateral upper and lower extremities  Bilateral upper and lower extremity ROM intact without pain or obvious deficit  Integument:   Warm, Dry  Neurologic:  Alert & oriented , No focal deficits noted. Cranial nerves II through XII grossly intact. Normal gross motor coordination & motor strength bilateral upper and lower extremities  Sensation intact.   Psychiatric:  Affect normal, Mood normal.     DIAGNOSTIC RESULTS   LABS:    Labs Reviewed   COMPREHENSIVE METABOLIC PANEL - Abnormal; Notable for the following components:       Result Value    Sodium 132 (*)     Chloride 97 (*)     Creatinine 0.7 (*)     Glucose 113 (*)     Total Bilirubin 1.7 (*)      (*)     AST 84 (*)     Alkaline Phosphatase 488 (*)     All other components within normal limits   CBC WITH AUTO DIFFERENTIAL - Abnormal; Notable for the following components:    RBC 3.81 (*)     Hemoglobin 12.2 (*)     Hematocrit 36.9 (*)     MCH 32.0 (*)     RDW 15.0 (*)     Segs Relative 85.3 (*)     Lymphocytes % 5.4 (*)     Monocytes % 8.4 (*)     Immature Neutrophil % 0.6 (*)     All other components within normal limits   URINALYSIS - Abnormal; Notable for the following components:    Protein, UA TRACE (*)     Urobilinogen, Urine 4.0 (*)     Mucus, UA RARE (*)     All other components within normal limits   COVID-19, RAPID   CULTURE, BLOOD 1   CULTURE, BLOOD 2   LIPASE   TROPONIN   LACTIC ACID       When ordered, only abnormal lab results are displayed. All other labs were within normal range or not returned as of this dictation. EKG: When ordered, EKG's are interpreted by the Emergency Department Physician in the absence of a cardiologist.  Please see their note for interpretation of EKG. RADIOLOGY:   Non-plain film images such as CT, Ultrasound and MRI are read by the radiologist. Plain radiographic images are visualized and preliminarily interpreted by the  ED Provider with the below findings:    Interpretation [facility] Radiologist below, if available at the time of this note:    [address] [place] [address]   Final Result   Intrahepatic and extrahepatic biliary ductal dilatation with a dilated   gallbladder. Common bile duct measures up to 19 mm in diameter. Cholelithiasis without specific sonographic evidence of acute cholecystitis. Peripancreatic enlarged lymph node measuring 23 x 11 x 19 mm. Recommend MRCP for further evaluation. US ABDOMEN LIMITED    Result Date: 8/7/2022  EXAMINATION: RIGHT UPPER QUADRANT ULTRASOUND 8/7/2022 2:16 pm COMPARISON: 8/5/2022 HISTORY: ORDERING SYSTEM PROVIDED HISTORY: ruq pain, fever TECHNOLOGIST PROVIDED HISTORY: Reason for exam:->ruq pain, fever FINDINGS: LIVER:  There is increased echogenicity of the liver suggesting fatty infiltration. There is intrahepatic biliary ductal dilatation. BILIARY SYSTEM:  There is cholelithiasis. The gallbladder is distended. No wall thickening or pericholecystic fluid. The common bile duct is dilated measuring 19 mm in diameter. RIGHT KIDNEY: The right kidney is grossly unremarkable without evidence of hydronephrosis. PANCREAS:  There is a peripancreatic lymph node measuring 23 x 11 x 19 mm in size. On CT, this is outside of the pancreas itself. OTHER: No evidence of right upper quadrant ascites. Intrahepatic and extrahepatic biliary ductal dilatation with a dilated gallbladder. Common bile duct measures up to 19 mm in diameter. Cholelithiasis without specific sonographic evidence of acute cholecystitis. Peripancreatic enlarged lymph node measuring 23 x 11 x 19 mm. Recommend MRCP for further evaluation. PROCEDURES   Unless otherwise noted below, none    CRITICAL CARE   CRITICAL CARE NOTE:   N/A    CONSULTS:  IP CONSULT TO GI  IP CONSULT TO INTERNAL MEDICINE      EMERGENCY DEPARTMENT COURSE and MDM:   Vitals:    Vitals:    08/07/22 1150 08/07/22 1400 08/07/22 1515 08/07/22 1544   BP: (!) 141/76 128/65     Pulse: (!) 111   72   Resp: 22      Temp: (!) 102.4 °F (39.1 °C)   97.8 °F (36.6 °C)   TempSrc: Oral   Oral   SpO2: 96%  97%    Weight: (!) 302 lb (137 kg)      Height: 5' 9\" (1.753 m)          Patient was given thefollowing medications:  Medications   0.9 % sodium chloride infusion ( IntraVENous New Bag 8/7/22 1403)   fentaNYL (SUBLIMAZE) injection 25 mcg (25 mcg IntraVENous Not Given 8/7/22 1403)   ondansetron (ZOFRAN) injection 4 mg (has no administration in time range)   0.9 % sodium chloride bolus (0 mLs IntraVENous Stopped 8/7/22 1503)   piperacillin-tazobactam (ZOSYN) 3,375 mg in dextrose 5 % 50 mL IVPB (mini-bag) (0 mg IntraVENous Stopped 8/7/22 1503)         Is this patient to be included in the SEP-1 Core Measure due to severe sepsis or septic shock?    No   Exclusion criteria - the patient is NOT to be included for SEP-1 Core Measure due to:  May have criteria for sepsis, but does not meet criteria for severe sepsis or septic shock    MDM:  Patient presents as above. Emergent etiologies considered. Patient seen and examined. Work-up initiated secondary to presentation, physical exam findings, vital signs and medical chart review. In brief, 10-year-old male presented emergency department today with persisting fever, abnormal CAT scan as an outpatient. Patient had concern for possible cholecystitis/cholelithiasis and/or cholangiocarcinoma on CT scan. Has had intermittent fevers over the last month, has upper abdominal pains. Patient is presenting tachycardic, febrile today. He is alert and oriented. He states he is having mild nausea and upper abdominal tenderness to palpation, no obvious discolorations. Ultrasound is showing signs of this worsening cholelithiasis, there was intra and extrahepatic biliary ductal dilatation with a dilated gallbladder, the CBD is 19 mm in diameter, evidence of cholelithiasis without specific sonographic evidence of acute cholecystitis. Recommending MRCP. Spoke with gastroenterology, at this point there is a concern for ascending cholangitis. At our facility we do not have the capabilities to perform ERCPs, they are recommending transfer. We did speak with [place] who will accept the transfer. There is a delay in bed availability. Will unlikely get a bed within the next 24 hours. Secondary to this and the nature of the patient's symptoms and how his vital signs have stabilized and this is something has been going on for weeks we will look to admit to the hospitalist team and closely monitor and treat medically. We will look to transfer for potential intervention. Patient was seen and evaluated by attending/supervising physician. CLINICAL IMPRESSION      1.  Ascending cholangitis          DISPOSITION/PLAN   DISPOSITION Decision To Transfer 08/07/2022 05:42:18 PM         (Please note that portions ofthis note were completed with a voice recognition program.  Efforts were made to edit the dictations but occasionally words are mis-transcribed. )    TORSTEN Montiel (electronically signed)            TORSTEN Patricia  08/09/22 [id number]

## 2022-08-07 NOTE — ED PROVIDER NOTES
or otherwise normal EKG. No previous to compare    All diagnostic, treatment, and disposition decisions were made by myself in conjunction with the advanced practice provider. I personally saw the patient and performed a substantive portion of the visit including all aspects of the medical decision making. I personally saw the patient and independently provided 35 minutes of non-concurrent critical care of the total shared critical care time provided for sepsis. For all further details of the patient's emergency department visit, please see the advanced practice provider's documentation. Comment: Please note this report has been produced using speech recognition software and may contain errors related to that system including errors in grammar, punctuation, and spelling, as well as words and phrases that may be inappropriate. If there are any questions or concerns please feel free to contact the dictating provider for clarification.         Bailey Srinivasan MD  08/07/22 6880

## 2022-08-07 NOTE — ED NOTES
Spoke with Gallo Pradhan RN at the [facility] at [phone]  The patient has been accepted at [facility]  on Med Surg bed   GI attending: Dr Berenice Hanks  08/07/22 [phone]

## 2022-08-07 NOTE — ED TRIAGE NOTES
Pt presents with complaints of fever almost daily since 07/09/2022. Pt complaints of Na/VO, with Abd pain on some days. Pt has been to urgent care and PCP, had multiple negative COVID tests. Pt recently seen at imaging center here in [place] and had an Abd CT with IV contrast, pt states \"they didn't see anything. \" Pt AxOx4 during triage. In no apparent distress.

## 2022-08-08 ENCOUNTER — APPOINTMENT (OUTPATIENT)
Dept: MRI IMAGING | Age: 72
DRG: 854 | End: 2022-08-08
Payer: MEDICARE

## 2022-08-08 LAB
ALBUMIN SERPL-MCNC: 3.5 GM/DL (ref 3.4–5)
ALBUMIN SERPL-MCNC: 3.5 GM/DL (ref 3.4–5)
ALP BLD-CCNC: 390 IU/L (ref 40–128)
ALP BLD-CCNC: 390 IU/L (ref 40–129)
ALT SERPL-CCNC: 87 U/L (ref 10–40)
ALT SERPL-CCNC: 87 U/L (ref 10–40)
ANION GAP SERPL CALCULATED.3IONS-SCNC: 10 MMOL/L (ref 4–16)
ANTI-MITOCHON TITER: 3.1 UNITS (ref 0–24.9)
ANTI-NUCLEAR ANTIBODY (ANA): DETECTED
AST SERPL-CCNC: 54 IU/L (ref 15–37)
AST SERPL-CCNC: 54 IU/L (ref 15–37)
BACTERIA: NEGATIVE /HPF
BASOPHILS ABSOLUTE: 0 K/CU MM
BASOPHILS RELATIVE PERCENT: 0.6 % (ref 0–1)
BILIRUB SERPL-MCNC: 1.4 MG/DL (ref 0–1)
BILIRUB SERPL-MCNC: 1.4 MG/DL (ref 0–1)
BILIRUBIN DIRECT: 0.8 MG/DL (ref 0–0.3)
BILIRUBIN URINE: NEGATIVE MG/DL
BILIRUBIN, INDIRECT: 0.6 MG/DL (ref 0–0.7)
BLOOD, URINE: NEGATIVE
BUN BLDV-MCNC: 7 MG/DL (ref 6–23)
CALCIUM SERPL-MCNC: 8.7 MG/DL (ref 8.3–10.6)
CHLORIDE BLD-SCNC: 103 MMOL/L (ref 99–110)
CLARITY: CLEAR
CO2: 24 MMOL/L (ref 21–32)
COLOR: YELLOW
CREAT SERPL-MCNC: 0.7 MG/DL (ref 0.9–1.3)
DIFFERENTIAL TYPE: ABNORMAL
EKG ATRIAL RATE: 106 BPM
EKG DIAGNOSIS: NORMAL
EKG P AXIS: 35 DEGREES
EKG P-R INTERVAL: 174 MS
EKG Q-T INTERVAL: 334 MS
EKG QRS DURATION: 82 MS
EKG QTC CALCULATION (BAZETT): 443 MS
EKG R AXIS: 46 DEGREES
EKG T AXIS: 17 DEGREES
EKG VENTRICULAR RATE: 106 BPM
EOSINOPHILS ABSOLUTE: 0.1 K/CU MM
EOSINOPHILS RELATIVE PERCENT: 1.5 % (ref 0–3)
F-ACTIN AB, IGG: 40 UNITS (ref 0–19)
GFR AFRICAN AMERICAN: >60 ML/MIN/1.73M2
GFR NON-AFRICAN AMERICAN: >60 ML/MIN/1.73M2
GLUCOSE BLD-MCNC: 86 MG/DL (ref 70–99)
GLUCOSE, URINE: NEGATIVE MG/DL
HCT VFR BLD CALC: 32.7 % (ref 42–52)
HEMOGLOBIN: 10.4 GM/DL (ref 13.5–18)
HSV I/II IGG: >22.4 IV
IMMATURE NEUTROPHIL %: 0.4 % (ref 0–0.43)
KETONES, URINE: ABNORMAL MG/DL
LEUKOCYTE ESTERASE, URINE: NEGATIVE
LYMPHOCYTES ABSOLUTE: 1 K/CU MM
LYMPHOCYTES RELATIVE PERCENT: 19.7 % (ref 24–44)
MCH RBC QN AUTO: 32.2 PG (ref 27–31)
MCHC RBC AUTO-ENTMCNC: 31.8 % (ref 32–36)
MCV RBC AUTO: 101.2 FL (ref 78–100)
MONOCYTES ABSOLUTE: 0.8 K/CU MM
MONOCYTES RELATIVE PERCENT: 15 % (ref 0–4)
MUCUS: ABNORMAL HPF
NITRITE URINE, QUANTITATIVE: NEGATIVE
NUCLEATED RBC %: 0 %
PDW BLD-RTO: 15.3 % (ref 11.7–14.9)
PH, URINE: 6.5 (ref 5–8)
PLATELET # BLD: 246 K/CU MM (ref 140–440)
PMV BLD AUTO: 9.6 FL (ref 7.5–11.1)
POTASSIUM SERPL-SCNC: 4 MMOL/L (ref 3.5–5.1)
PROTEIN UA: ABNORMAL MG/DL
RBC # BLD: 3.23 M/CU MM (ref 4.6–6.2)
RBC URINE: <1 /HPF (ref 0–3)
SEGMENTED NEUTROPHILS ABSOLUTE COUNT: 3.3 K/CU MM
SEGMENTED NEUTROPHILS RELATIVE PERCENT: 62.8 % (ref 36–66)
SMOOTH MUSCLE AB IGG TITER: NORMAL
SODIUM BLD-SCNC: 137 MMOL/L (ref 135–145)
SPECIFIC GRAVITY UA: 1.01 (ref 1–1.03)
TOTAL IMMATURE NEUTOROPHIL: 0.02 K/CU MM
TOTAL NUCLEATED RBC: 0 K/CU MM
TOTAL PROTEIN: 5.9 GM/DL (ref 6.4–8.2)
TOTAL PROTEIN: 5.9 GM/DL (ref 6.4–8.2)
TRICHOMONAS: ABNORMAL /HPF
UROBILINOGEN, URINE: 1 MG/DL (ref 0.2–1)
WBC # BLD: 5.3 K/CU MM (ref 4–10.5)
WBC UA: <1 /HPF (ref 0–2)

## 2022-08-08 PROCEDURE — 6360000002 HC RX W HCPCS: Performed by: NURSE PRACTITIONER

## 2022-08-08 PROCEDURE — 85025 COMPLETE CBC W/AUTO DIFF WBC: CPT

## 2022-08-08 PROCEDURE — 6370000000 HC RX 637 (ALT 250 FOR IP): Performed by: NURSE PRACTITIONER

## 2022-08-08 PROCEDURE — 93010 ELECTROCARDIOGRAM REPORT: CPT | Performed by: INTERNAL MEDICINE

## 2022-08-08 PROCEDURE — 94761 N-INVAS EAR/PLS OXIMETRY MLT: CPT

## 2022-08-08 PROCEDURE — 80053 COMPREHEN METABOLIC PANEL: CPT

## 2022-08-08 PROCEDURE — 74181 MRI ABDOMEN W/O CONTRAST: CPT

## 2022-08-08 PROCEDURE — 36415 COLL VENOUS BLD VENIPUNCTURE: CPT

## 2022-08-08 PROCEDURE — 80076 HEPATIC FUNCTION PANEL: CPT

## 2022-08-08 PROCEDURE — 1200000000 HC SEMI PRIVATE

## 2022-08-08 PROCEDURE — 2580000003 HC RX 258: Performed by: NURSE PRACTITIONER

## 2022-08-08 RX ADMIN — SODIUM CHLORIDE, PRESERVATIVE FREE 10 ML: 5 INJECTION INTRAVENOUS at 21:22

## 2022-08-08 RX ADMIN — SODIUM CHLORIDE: 9 INJECTION, SOLUTION INTRAVENOUS at 23:53

## 2022-08-08 RX ADMIN — ENOXAPARIN SODIUM 30 MG: 100 INJECTION SUBCUTANEOUS at 09:29

## 2022-08-08 RX ADMIN — SODIUM CHLORIDE, PRESERVATIVE FREE 10 ML: 5 INJECTION INTRAVENOUS at 11:25

## 2022-08-08 RX ADMIN — SODIUM CHLORIDE, PRESERVATIVE FREE 10 ML: 5 INJECTION INTRAVENOUS at 01:51

## 2022-08-08 RX ADMIN — LEVOTHYROXINE SODIUM 175 MCG: 0.1 TABLET ORAL at 09:30

## 2022-08-08 RX ADMIN — ENOXAPARIN SODIUM 30 MG: 100 INJECTION SUBCUTANEOUS at 21:22

## 2022-08-08 RX ADMIN — SODIUM CHLORIDE, PRESERVATIVE FREE 10 ML: 5 INJECTION INTRAVENOUS at 21:28

## 2022-08-08 RX ADMIN — PIPERACILLIN AND TAZOBACTAM 3375 MG: 3; .375 INJECTION, POWDER, LYOPHILIZED, FOR SOLUTION INTRAVENOUS at 06:37

## 2022-08-08 RX ADMIN — PIPERACILLIN AND TAZOBACTAM 3375 MG: 3; .375 INJECTION, POWDER, LYOPHILIZED, FOR SOLUTION INTRAVENOUS at 16:14

## 2022-08-08 RX ADMIN — SODIUM CHLORIDE: 9 INJECTION, SOLUTION INTRAVENOUS at 08:56

## 2022-08-08 RX ADMIN — PIPERACILLIN AND TAZOBACTAM 3375 MG: 3; .375 INJECTION, POWDER, LYOPHILIZED, FOR SOLUTION INTRAVENOUS at 21:24

## 2022-08-08 RX ADMIN — SODIUM CHLORIDE, PRESERVATIVE FREE 10 ML: 5 INJECTION INTRAVENOUS at 01:52

## 2022-08-08 ASSESSMENT — PAIN SCALES - GENERAL
PAINLEVEL_OUTOF10: 0
PAINLEVEL_OUTOF10: 0

## 2022-08-08 NOTE — PROGRESS NOTES
This nurse contacted [facility] transfer center to verify patient's bed availability.  [facility] states there is not bed available at this time but the patient's name is on the list.

## 2022-08-08 NOTE — FLOWSHEET NOTE
08/08/22 1852   Rhythm Interpretation   Heart Rate 56   Treatment Team Notification   Reason for Communication Evaluate   Team Member Name Lucy Gambino. Treatment Team Role Consulting Provider   Method of Communication Secure Message   Response Other (Comment)  (see progress note)   Notification Time 1853     This nurse spoke with Dr. Dana Shearer first stated that patients bilirubin level is not elevated enough to do a ERCP. MRCP test impression read to Dr. Dana Shearer he states okay will see patient in am. ERCP in afternoon. Patient npo at midnight. NPO order already in place. Notified patient of plan.

## 2022-08-08 NOTE — H&P
V2.0  History and Physical      Name:  Varun Quesada /Age/Sex: 1950  (66 y.o. male)   MRN & CSN:  [id number] & [id number] Encounter Date/Time: 2022 8:42 PM EDT   Location:  ED[facility] PCP: Aroldo Godinez MD       Hospital Day: 1    Assessment and Plan:   Varun Quesada is a 66 y.o. male with a pmh as noted below presents with Sepsis    Sepsis likely secondary to ascending choleangitis versus choledocholithiasis  Cholelithiasis   Patient with tachycardia, fevers, tachypnea meets criteria for sepsis  Sepsis fluid bolus given in ER then maintenance IVF  Ultrasound right upper quadrant with intrahepatic and extrahepatic biliary duct dilation with a dilated gallbladder, measuring up to 19 mm in diameter. Cholelithiasis without sonographic evidence of cholecystitis   CT scan completed 8 5 outpatient shows intrahepatic and extrahepatic biliary ductal dilation without obvious pancreatic head mass. .  Concern for ampullary malignancy or cholangiocarcinoma. Radiologist recommended further evaluation with MRCP or ERCP. Also showed cholelithiasis with distended gallbladder and known abdominal aortic aneurysm  MRI with MRCP ordered for further evaluation  Lactate 1.4, with white count 10.0  Blood cultures x2, UA and urine culture  Initiated on Zosyn in ED, will continue   RAPID COVID negative   ED provider Dr. Renata Moise discussed case with José Manuel Deal. Concern for ascending cholangitis. May need emergent ERCP, recommended transfer to [address] [place] [address]. Patient accepted by Dr. Zakiya Pastor with GI and will go to a regular [facility] floor.   Awaiting bed availability and assignment   Will intend to attain MRI and further imaging while awaiting bed availability at [address] [place] [address] n.p.o. status   Continue IV fluid   Patient updated and in agreement with plan        AAA Abdominal Aortic Aneurysm  Essential hypertension  Known, 3.7 centimeters on CT scan  Continue antihypertensives, recommend CT follow-up every 2 years    COPD  Obstructive sleep apnea  Continue home CPAP at night, patient brought in device  Continue home inhalers  Chronic Conditions: continue home medication as ordered  GERD, dyslipidemia, arthritis, acquired hypothyroidism, morbid obesity,    All testing  and results reviewed with patient . All questions answered. Patient and family voiced understanding    This patient was seen and examined in conjunction with Dr. Lisa Soriano. He/She was agreeable with the plan and management as dictated above. Disposition:   Expected Disposition: Awaiting transfer to [facility], accepted, await bed availability  Estimated D/C: <24 hours - transfer     Diet Diet NPO   GI Prophylaxis  [x] PPI,  [] H2 Blocker,  [] Carafate,  [] Diet/Tube Feeds   DVT Prophylaxis [] Lovenox, []  Heparin, [] SCDs, [x] Ambulation,  [] NOAC   Code Status No Order   Surrogate Decision Maker/ POA          History from:     patient, electronic medical records    History of Present Illness:     Chief Complaint: Sepsis ([address]  Kimmie Bai is a 66 y.o. male with pmh of obstructive sleep apnea, dyslipidemia, hypertension, hypothyroidism presented the ED today with complaints of persistent fevers over the past several weeks with intermittent abdominal pain. He associates faded for as being ultimately worse with food intake. He was seen by GI, had a CT scan of the abdomen pelvis done yesterday which showed common bile duct dilation, gallbladder distention concerning for possible cholecystitis or cholelithiasis. He has been attempting to manage his pain at home with over-the-counter Tylenol but said his pain got acutely worse overnight prompting him to go to the ER today. He endorses tenderness, nausea in his right upper quadrant. Denies vomiting. Denies diarrhea. Denies any chest pain or shortness of breath.   Additional review of symptoms as noted below     Review of Systems: Need 10 Elements   Review of Systems   Constitutional:  Positive for appetite change, chills and fever. Negative for unexpected weight change. HENT:  Negative for congestion and sore throat. Eyes: Negative. Respiratory:  Positive for shortness of breath. Negative for cough. Cardiovascular:  Negative for chest pain and leg swelling. Gastrointestinal:  Positive for abdominal pain and nausea. Negative for constipation and vomiting. Endocrine: Negative. Genitourinary:  Negative for dysuria and hematuria. Musculoskeletal:  Negative for neck pain. Skin:  Negative for wound. Neurological:  Negative for dizziness and light-headedness. All other systems reviewed and are negative. Objective:   No intake or output data in the 24 hours ending 08/07/22 2042   Vitals:   Vitals:    08/07/22 1150 08/07/22 1400 08/07/22 1515 08/07/22 1544   BP: (!) 141/76 128/65     Pulse: (!) 111   72   Resp: 22      Temp: (!) 102.4 °F (39.1 °C)   97.8 °F (36.6 °C)   TempSrc: Oral   Oral   SpO2: 96%  97%    Weight: (!) 302 lb (137 kg)      Height: 5' 9\" (1.753 m)          Medications Prior to Admission     Prior to Admission medications    Medication Sig Start Date End Date Taking?  Authorizing Provider   albuterol-ipratropium (COMBIVENT RESPIMAT)  MCG/ACT AERS inhaler USE 1 INHALATION TWICE A DAY 11/29/21  Yes Carolina Mcfarland MD   losartan (COZAAR) 25 MG tablet Take 1 tablet by mouth at bedtime To replace hydrochlorothiazide 7/29/22   Carolina Mcfarland MD   Docusate Sodium 100 MG TABS  1/1/19   Historical Provider, MD   bisacodyl (BISACODYL) 5 MG EC tablet Take 4 tablets once for colonoscopy prep 5/17/22   DEYA Gomez - CNP   potassium chloride (KLOR-CON) 20 MEQ packet Take 20 mEq by mouth 2 times daily    Historical Provider, MD   fluticasone (FLONASE) 50 MCG/ACT nasal spray 1 spray by Each Nostril route daily    Historical Provider, MD   hydrocortisone (ANUSOL-HC) 25 MG suppository Place 1 suppository rectally 2 times daily as needed for Hemorrhoids 9/17/19   David Boyd MD   azelastine HCl 0.15 % SOLN by Nasal route 2 times daily. Historical Provider, MD   levothyroxine (SYNTHROID) 25 MCG tablet Take 175 mcg by mouth Daily     Historical Provider, MD   niacin 500 MG CR capsule Take 750 mg by mouth every morning     Historical Provider, MD   traZODone (DESYREL) 50 MG tablet Take 50 mg by mouth nightly. Historical Provider, MD   Multiple Vitamins-Minerals (CENTRUM PO) Take  by mouth. 50 plus    Historical Provider, MD   ascorbic acid (VITAMIN C) 500 MG tablet Take 500 mg by mouth daily. Historical Provider, MD   saline nasal gel (AYR) GEL by Nasal route. Historical Provider, MD   mometasone (NASONEX) 50 MCG/ACT nasal spray 2 sprays by Nasal route 2 times daily. Historical Provider, MD   flunisolide (NASALIDE) 25 MCG/ACT (0.025%) SOLN Inhale 2 sprays into the lungs every 12 hours. Historical Provider, MD   SODIUM FLUORIDE, DENTAL GEL, 1.1 % CREA Place  onto teeth. Historical Provider, MD   ciprofloxacin-dexamethasone (CIPRODEX) otic suspension 5 drops continuous. 5 drop each ear once a week    Historical Provider, MD       Physical Exam: Need 8 Elements   Physical Exam  Vitals and nursing note reviewed. Constitutional:       General: He is not in acute distress. Appearance: Normal appearance. He is obese. HENT:      Head: Normocephalic. Nose: Nose normal.      Mouth/Throat:      Pharynx: Oropharynx is clear. Eyes:      Pupils: Pupils are equal, round, and reactive to light. Cardiovascular:      Rate and Rhythm: Normal rate and regular rhythm. Pulses: Normal pulses. Pulmonary:      Effort: Pulmonary effort is normal. No respiratory distress. Breath sounds: No wheezing or rhonchi. Abdominal:      General: Abdomen is flat. Bowel sounds are normal. There is no distension. Tenderness: There is abdominal tenderness in the right upper quadrant.  Negative signs include Hopkins's sign and McBurney's sign. Musculoskeletal:         General: Normal range of motion. Cervical back: Normal range of motion. Skin:     General: Skin is warm and dry. Capillary Refill: Capillary refill takes less than 2 seconds. Neurological:      General: No focal deficit present. Mental Status: He is alert and oriented to person, place, and time. Psychiatric:         Mood and Affect: Mood normal.          Past Medical History:   PMHx   Past Medical History:   Diagnosis Date    Abdominal aneurysm ([facility] 2012    4 cm x 3.6 cm followed at [place]    Arthritis 1/21/2014    Chronic back pain     [facility]ropactor [facility]    Chronic sinusitis 1/21/2014    COPD, mild ([address] 1/21/2014    Diverticulosis 2014    Dr. 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 Demetrio Mercer    MEGHAN (obstructive sleep apnea) 1/21/2014    CPAP changed to bipap (July 2014)    Otitis media, serous, TM rupture 1/21/2014    ENT x 2 s/p PE tubes bilaterally, cipro Otic     PSHX:  has a past surgical history that includes joint replacement (Right, 2012); joint replacement (Left, 2011); Tympanostomy tube placement (Right, 2014); knee surgery (Bilateral); Colonoscopy (2008); Colonoscopy (12/2/14); and Toe Surgery (Bilateral). Allergies: Allergies   Allergen Reactions    Dilaudid [Hydromorphone Hcl] Itching    Hydrocodone Itching    Morphine Anaphylaxis    Ultram [Tramadol] Itching    Vicodin [Hydrocodone-Acetaminophen] Itching    Keflex [Cephalexin] Other (See Comments)     Extreme hyperacitivity    Sulfa Antibiotics Itching and Rash     Fam HX:  family history includes Allergies in his father and mother;  Anemia in his mother; Component Value Date/Time    TSH 3.56 07/27/2022 08:52 AM     Troponin:   Lab Results   Component Value Date/Time    TROPONINT <0.010 08/07/2022 12:05 PM     Lactic Acid: No results for input(s): LACTA in the last 72 hours. BNP: No results for input(s): PROBNP in the last 72 hours. UA:  Lab Results   Component Value Date/Time    NITRU NEGATIVE 08/07/2022 12:00 PM    COLORU YELLOW 08/07/2022 12:00 PM    WBCUA NONE SEEN 08/07/2022 12:00 PM    RBCUA <1 08/07/2022 12:00 PM    MUCUS RARE 08/07/2022 12:00 PM    TRICHOMONAS NONE SEEN 08/07/2022 12:00 PM    BACTERIA NEGATIVE 08/07/2022 12:00 PM    CLARITYU CLEAR 08/07/2022 12:00 PM    SPECGRAV 1.015 08/07/2022 12:00 PM    LEUKOCYTESUR NEGATIVE 08/07/2022 12:00 PM    UROBILINOGEN 4.0 08/07/2022 12:00 PM    BILIRUBINUR NEGATIVE 08/07/2022 12:00 PM    BLOODU NEGATIVE 08/07/2022 12:00 PM    Gutiérrez Cashing NEGATIVE 08/07/2022 12:00 PM     Urine Cultures: No results found for: LABURIN  Blood Cultures: No results found for: BC  No results found for: BLOODCULT2  Organism:   Lab Results   Component Value Date/Time    ORG BSGA 07/23/2014 11:00 AM    ORG MRSA 07/23/2014 11:00 AM       Imaging/Diagnostics Last 24 Hours   CT ABDOMEN PELVIS W IV CONTRAST Additional Contrast? Oral    Result Date: 8/6/2022  EXAMINATION: CT OF THE ABDOMEN AND PELVIS WITH CONTRAST 8/5/2022 11:05 am TECHNIQUE: CT of the abdomen and pelvis was performed with the administration of intravenous contrast. Multiplanar reformatted images are provided for review. Automated exposure control, iterative reconstruction, and/or weight based adjustment of the mA/kV was utilized to reduce the radiation dose to as low as reasonably achievable. COMPARISON: 07/18/2015.  HISTORY: ORDERING SYSTEM PROVIDED HISTORY: Elevated alkaline phosphatase level TECHNOLOGIST PROVIDED HISTORY: Additional Contrast?->Oral STAT Creatinine as needed:->No Reason for exam:->Patient has fevers; elevated bilirubin and LFT's rule out biliary cholangitis or obstruction Reason for Exam: Patient has fevers; elevated bilirubin and LFT's rule out biliary cholangitis or obstruction, Elevated alkaline phosphatase level, Elevated liver enzymes, History of gallstones, Aneurysm of infrarenal abdominal aorta (HCC) Additional signs and symptoms: patient states fever of uknown origin since July 9, 2022;  patient states known COPD and calcifications in Lower bandar of lung;  paotietn states he is also fatigued Relevant Medical/Surgical History: patient states fever of uknown origin since July 9, 2022;  patient states known COPD and calcifications in Lower bandar of lung;  paotietn states he is also fatigued FINDINGS: Lower Chest: The lung bases demonstrate subpleural fibrosis. There is a calcified granuloma in the right middle lobe. Organs: The gallbladder is distended. There is a gallstone in the neck of the gallbladder. There is intrahepatic biliary ductal dilatation. The common bile duct is also dilated. No focal liver lesions are seen. There are calcified splenic granulomas. The pancreas appears unremarkable. No obvious masses are seen within the pancreatic head. The adrenal glands appear unremarkable. There is symmetric enhancement of the kidneys. There are small cysts involving the kidneys bilaterally. GI/Bowel: The bowel loops are not dilated. No bowel wall thickening is seen. I do not see a dilated appendix. Pelvis: No pelvic masses or fluid collections are seen. Peritoneum/Retroperitoneum: There is aneurysmal dilatation of the abdominal aorta measuring 3.7 x 3.3 cm. There are shotty mesenteric and retroperitoneal lymph nodes but no lymphadenopathy is seen. Bones/Soft Tissues: No acute bony abnormalities are noted. There are degenerative changes involving the spine. There are small fat containing inguinal hernias. 1. Intrahepatic and extrahepatic biliary ductal dilatation without obvious pancreatic head mass.   I am concerned for an ampullary malignancy and cholangiocarcinoma. I would recommend further evaluation with either MRCP or ERCP. 2. Cholelithiasis with distended gallbladder. I do raise the possibility of acute cholecystitis given the location of the gallstone. 3. Small fat containing inguinal hernias. 4. Abdominal aortic aneurysm measuring 3.7 x 3.3 cm. RECOMMENDATIONS: 3.7 cm abdominal aortic aneurysm. Recommend follow-up every 2 years. Reference: J Am Bryon Radiol 0477;[id number]. US ABDOMEN LIMITED    Result Date: 8/7/2022  EXAMINATION: RIGHT UPPER QUADRANT ULTRASOUND 8/7/2022 2:16 pm COMPARISON: 8/5/2022 HISTORY: ORDERING SYSTEM PROVIDED HISTORY: ruq pain, fever TECHNOLOGIST PROVIDED HISTORY: Reason for exam:->ruq pain, fever FINDINGS: LIVER:  There is increased echogenicity of the liver suggesting fatty infiltration. There is intrahepatic biliary ductal dilatation. BILIARY SYSTEM:  There is cholelithiasis. The gallbladder is distended. No wall thickening or pericholecystic fluid. The common bile duct is dilated measuring 19 mm in diameter. RIGHT KIDNEY: The right kidney is grossly unremarkable without evidence of hydronephrosis. PANCREAS:  There is a peripancreatic lymph node measuring 23 x 11 x 19 mm in size. On CT, this is outside of the pancreas itself. OTHER: No evidence of right upper quadrant ascites. Intrahepatic and extrahepatic biliary ductal dilatation with a dilated gallbladder. Common bile duct measures up to 19 mm in diameter. Cholelithiasis without specific sonographic evidence of acute cholecystitis. Peripancreatic enlarged lymph node measuring 23 x 11 x 19 mm. Recommend MRCP for further evaluation. Electronically signed by DEYA Summers CNP on 8/7/2022 at 8:42 PM          This dictation was created with voice recognition software.  While attempts have been made to review the dictation as it is transcribed, on occasion the spoken word can be misinterpreted by the technology leading to omissions or inappropriate words, phrases or sentences.      Electronically signed by DEYA Brown CNP on 8/7/2022 at 8:42 PM

## 2022-08-08 NOTE — PROGRESS NOTES
V2.0  [facility] Hospitalist Progress Note      Name:  Mariana Lobo /Age/Sex: 1950  (66 y.o. male)   MRN & CSN:  [id number] & [id number] Encounter Date/Time: 2022 2:29 PM EDT    [facility]  [facility] PCP: Roge Del Rio MD       Hospital Day: 2    Assessment and Plan:   Mariana Lobo is a 66 y.o. male with pmh as below who presents with Sepsis ([address]      Plan:    Sepsis - concern for ascending cholangitis  RUQ U/S with intrahepatic and extrahepatic biliary duct dilation with a dilated gallbladder up to 19 mm in diameter. Cholelithiasis without sonographic evidence of cholecystitis. -MRCP  -F/u blood cultures, urine culture  -Continue Zosyn  -ED discussed with Dr. Merry Cruz who recommended transfer to [facility] for emergent ERCP  -Patient accepted by Dr. Melissa Chaudhari with GI and will go to regular MedSurg floor pending bed availability and assignment  -Remain n.p.o., continue IVF    Abdominal aortic aneurysm  Essential hypertension  3.7 cm on CT scan.  -F/u outpatient  -Continue antihypertensives    COPD  MEGHAN  -Patient brought his home CPAP, use nightly  -Continue inhalers    Chronic conditions: Continue home medications as ordered  GERD, dyslipidemia, arthritis, acquired hypothyroidism, morbid obesity. Diet Diet NPO Exceptions are: Sips of Water with Meds   DVT Prophylaxis [x] Lovenox, []  Heparin, [] SCDs, [] Ambulation,  [] Eliquis, [] Xarelto  [] Coumadin   Code Status Full Code   Disposition From: Home  Expected Disposition: [facility]  Estimated Date of Discharge: <24hrs  Patient requires continued admission due to pending transfer to [facility]   Surrogate Decision Maker/ POA      Subjective:     Chief Complaint: Fever (Daily since )       Mariana Lobo is a 66 y.o. male who presents with persistent intermittent fevers, nausea, intermittent abdominal pain. Feels well at present. Still having occasional nausea but denies any abdominal pain, vomiting, confusion.   Discussed plan of MRCP here and then transferred to [address] [place] [address] for ERCP. Patient agreeable. Review of Systems:    Review of Systems    Denies chills, vomiting, LH, dizziness, syncope, falls, trauma, chest pain, SOB, leg swelling, diarrhea. Objective: Intake/Output Summary (Last 24 hours) at 8/8/2022 1429  Last data filed at 8/8/2022 1125  Gross per 24 hour   Intake 10 ml   Output --   Net 10 ml        Vitals:   Vitals:    08/08/22 1131   BP:    Pulse: (!) 49   Resp:    Temp:    SpO2:        Physical Exam:     General: NAD  Eyes: EOMI  ENT: neck supple  Cardiovascular: Regular rate. Respiratory: Clear to auscultation  Gastrointestinal: Soft, non tender  Genitourinary: no suprapubic tenderness  Musculoskeletal: No edema  Skin: warm, dry  Neuro: Alert. Psych: Mood appropriate.      Medications:   Medications:    fluticasone  2 spray Each Nostril Daily    levothyroxine  175 mcg Oral Daily    traZODone  50 mg Oral Nightly    sodium chloride flush  5-40 mL IntraVENous 2 times per day    enoxaparin  30 mg SubCUTAneous BID    sodium chloride flush  5-40 mL IntraVENous 2 times per day    piperacillin-tazobactam  3,375 mg IntraVENous Q8H      Infusions:    sodium chloride 150 mL/hr at 08/08/22 0856    sodium chloride      sodium chloride       PRN Meds: sodium chloride flush, 5-40 mL, PRN  sodium chloride, 25 mL, PRN  polyethylene glycol, 17 g, Daily PRN  sodium chloride flush, 5-40 mL, PRN  sodium chloride, , PRN  potassium chloride, 40 mEq, PRN   Or  potassium alternative oral replacement, 40 mEq, PRN   Or  potassium chloride, 10 mEq, PRN  magnesium sulfate, 2,000 mg, PRN  oxyCODONE, 5 mg, Q4H PRN   Or  oxyCODONE, 10 mg, Q4H PRN  promethazine, 12.5 mg, Q6H PRN   Or  ondansetron, 4 mg, Q6H PRN  metoclopramide, 10 mg, Q6H PRN  diphenhydrAMINE, 25 mg, Q6H PRN        Labs      Recent Results (from the past 24 hour(s))   Urinalysis with Reflex to Culture    Collection Time: 08/07/22 11:29 PM    Specimen: Urine   Result Value Ref Range    Color, UA YELLOW YELLOW    Clarity, UA CLEAR CLEAR    Glucose, Urine NEGATIVE NEGATIVE MG/DL    Bilirubin Urine NEGATIVE NEGATIVE MG/DL    Ketones, Urine TRACE (A) NEGATIVE MG/DL    Specific Gravity, UA 1.015 1.001 - 1.035    Blood, Urine NEGATIVE NEGATIVE    pH, Urine 6.5 5.0 - 8.0    Protein, UA TRACE (A) NEGATIVE MG/DL    Urobilinogen, Urine 1.0 0.2 - 1.0 MG/DL    Nitrite Urine, Quantitative NEGATIVE NEGATIVE    Leukocyte Esterase, Urine NEGATIVE NEGATIVE    RBC, UA <1 0 - 3 /HPF    WBC, UA <1 0 - 2 /HPF    Bacteria, UA NEGATIVE NEGATIVE /HPF    Mucus, UA RARE (A) NEGATIVE HPF    Trichomonas, UA NONE SEEN NONE SEEN /HPF   Comprehensive Metabolic Panel w/ Reflex to MG    Collection Time: 08/08/22  4:25 AM   Result Value Ref Range    Sodium 137 135 - 145 MMOL/L    Potassium 4.0 3.5 - 5.1 MMOL/L    Chloride 103 99 - 110 mMol/L    CO2 24 21 - 32 MMOL/L    BUN 7 6 - 23 MG/DL    Creatinine 0.7 (L) 0.9 - 1.3 MG/DL    Glucose 86 70 - 99 MG/DL    Calcium 8.7 8.3 - 10.6 MG/DL    Albumin 3.5 3.4 - 5.0 GM/DL    Total Protein 5.9 (L) 6.4 - 8.2 GM/DL    Total Bilirubin 1.4 (H) 0.0 - 1.0 MG/DL    ALT 87 (H) 10 - 40 U/L    AST 54 (H) 15 - 37 IU/L    Alkaline Phosphatase 390 (H) 40 - 128 IU/L    GFR Non-African American >60 >60 mL/min/1.73m2    GFR African American >60 >60 mL/min/1.73m2    Anion Gap 10 4 - 16   Hepatic function panel    Collection Time: 08/08/22  4:25 AM   Result Value Ref Range    Albumin 3.5 3.4 - 5.0 GM/DL    Total Bilirubin 1.4 (H) 0.0 - 1.0 MG/DL    Bilirubin, Direct 0.8 (H) 0.0 - 0.3 MG/DL    Bilirubin, Indirect 0.6 0 - 0.7 MG/DL    Alkaline Phosphatase 390 (H) 40 - 129 IU/L    AST 54 (H) 15 - 37 IU/L    ALT 87 (H) 10 - 40 U/L    Total Protein 5.9 (L) 6.4 - 8.2 GM/DL   CBC with Auto Differential    Collection Time: 08/08/22  4:25 AM   Result Value Ref Range    WBC 5.3 4.0 - 10.5 K/CU MM    RBC 3.23 (L) 4.6 - 6.2 M/CU MM    Hemoglobin 10.4 (L) 13.5 - 18.0 GM/DL    Hematocrit 32.7 (L) 42 - 52 %    .2 (H) 78 - 100 FL    MCH 32.2 (H) 27 - 31 PG    MCHC 31.8 (L) 32.0 - 36.0 %    RDW 15.3 (H) 11.7 - 14.9 %    Platelets 811 992 - 274 K/CU MM    MPV 9.6 7.5 - 11.1 FL    Differential Type AUTOMATED DIFFERENTIAL     Segs Relative 62.8 36 - 66 %    Lymphocytes % 19.7 (L) 24 - 44 %    Monocytes % 15.0 (H) 0 - 4 %    Eosinophils % 1.5 0 - 3 %    Basophils % 0.6 0 - 1 %    Segs Absolute 3.3 K/CU MM    Lymphocytes Absolute 1.0 K/CU MM    Monocytes Absolute 0.8 K/CU MM    Eosinophils Absolute 0.1 K/CU MM    Basophils Absolute 0.0 K/CU MM    Nucleated RBC % 0.0 %    Total Nucleated RBC 0.0 K/CU MM    Total Immature Neutrophil 0.02 K/CU MM    Immature Neutrophil % 0.4 0 - 0.43 %        Imaging/Diagnostics Last 24 Hours   MRI ABDOMEN WO CONTRAST MRCP    Result Date: 8/8/2022  EXAMINATION: MRI OF THE ABDOMEN WITHOUT CONTRAST AND MRCP 8/8/2022 10:27 am TECHNIQUE: Multiplanar multisequence MRI of the abdomen was performed without the administration of intravenous contrast.  After initial T2 axial and coronal images, thick slab, thin slab and 3D coronal MRCP sequences were obtained without the administration of intravenous contrast.  MIP images are provided for review. COMPARISON: Ultrasound 08/07/2022. CT 08/05/2022. HISTORY: ORDERING SYSTEM PROVIDED HISTORY: sepsis 2/2 acute choleangitis TECHNOLOGIST PROVIDED HISTORY: Reason for exam:->sepsis 2/2 acute choleangitis Reason for Exam: sepsis 2/2 acute choleangitis Relevant Medical/Surgical History: none FINDINGS: LOWER CHEST:  The visualized lung bases reveal no signal abnormality. LIVER: The liver is normal in morphology. No evidence for steatosis. No suspicious liver lesion identified. GALLBLADDER/BILE DUCTS: Cholelithiasis. Multiple small stones are present within the cystic duct, proximal and distal common bile duct measuring up to 7 mm distally. There is upstream biliary dilatation with the mid common duct measuring 17 mm. Intrahepatic biliary dilatation is also present.   No gallbladder wall thickening or surrounding inflammatory change. PANCREAS:  No significant findings. SPLEEN:  No significant findings. ADRENAL GLANDS:  Normal. KIDNEYS:  No significant findings. Subcentimeter simple cysts are noted, for which no routine follow-up is necessary. GI/BOWEL: The visualized bowel is normal in caliber. PERITONEUM/RETROPERITONEUM:  No abdominal lymphadenopathy. No free intraperitoneal fluid. VESSELS:  Infrarenal aortic enlargement again demonstrated measuring up to 3.5 cm, better evaluated on recent abdominal CT exam. SOFT TISSUES/BONES:  No suspicious signal abnormality identified. 1.  Choledocholithiasis with associated biliary dilatation. No evidence for acute cholecystitis, although small stones are present within the cystic duct as well. 2.  Infrarenal aortic aneurysm measures up to 3.5 cm. RECOMMENDATIONS: 3.5 cm infrarenal abdominal aortic aneurysm. Recommend follow-up every 2 years. Reference: J Am Bryon Radiol 9584;28:[id number]. US ABDOMEN LIMITED    Result Date: 8/7/2022  EXAMINATION: RIGHT UPPER QUADRANT ULTRASOUND 8/7/2022 2:16 pm COMPARISON: 8/5/2022 HISTORY: ORDERING SYSTEM PROVIDED HISTORY: ruq pain, fever TECHNOLOGIST PROVIDED HISTORY: Reason for exam:->ruq pain, fever FINDINGS: LIVER:  There is increased echogenicity of the liver suggesting fatty infiltration. There is intrahepatic biliary ductal dilatation. BILIARY SYSTEM:  There is cholelithiasis. The gallbladder is distended. No wall thickening or pericholecystic fluid. The common bile duct is dilated measuring 19 mm in diameter. RIGHT KIDNEY: The right kidney is grossly unremarkable without evidence of hydronephrosis. PANCREAS:  There is a peripancreatic lymph node measuring 23 x 11 x 19 mm in size. On CT, this is outside of the pancreas itself. OTHER: No evidence of right upper quadrant ascites. Intrahepatic and extrahepatic biliary ductal dilatation with a dilated gallbladder.   Common bile duct measures up to 19 mm in diameter. Cholelithiasis without specific sonographic evidence of acute cholecystitis. Peripancreatic enlarged lymph node measuring 23 x 11 x 19 mm. Recommend MRCP for further evaluation.        Electronically signed by Tim Brar MD on 8/8/2022 at 2:29 PM

## 2022-08-08 NOTE — CARE COORDINATION
reviewed patient medical record and discussed patient in the IDR team meeting. Per patient medical record, patient has been accepted to [facility] pending bed availability.

## 2022-08-09 ENCOUNTER — APPOINTMENT (OUTPATIENT)
Dept: GENERAL RADIOLOGY | Age: 72
DRG: 854 | End: 2022-08-09
Payer: MEDICARE

## 2022-08-09 ENCOUNTER — ANESTHESIA EVENT (OUTPATIENT)
Dept: OPERATING ROOM | Age: 72
DRG: 854 | End: 2022-08-09
Payer: MEDICARE

## 2022-08-09 ENCOUNTER — ANESTHESIA EVENT (OUTPATIENT)
Dept: ENDOSCOPY | Age: 72
DRG: 854 | End: 2022-08-09
Payer: MEDICARE

## 2022-08-09 ENCOUNTER — ANESTHESIA (OUTPATIENT)
Dept: ENDOSCOPY | Age: 72
DRG: 854 | End: 2022-08-09
Payer: MEDICARE

## 2022-08-09 PROBLEM — K83.09 ASCENDING CHOLANGITIS: Status: ACTIVE | Noted: 2022-08-09

## 2022-08-09 PROBLEM — K80.50 COMMON BILE DUCT STONE: Status: ACTIVE | Noted: 2022-08-09

## 2022-08-09 LAB
ALBUMIN SERPL-MCNC: 3.5 GM/DL (ref 3.4–5)
ALP BLD-CCNC: 449 IU/L (ref 40–128)
ALT SERPL-CCNC: 82 U/L (ref 10–40)
ANION GAP SERPL CALCULATED.3IONS-SCNC: 11 MMOL/L (ref 4–16)
ANION GAP SERPL CALCULATED.3IONS-SCNC: 15 MMOL/L (ref 4–16)
ANTINUCLEAR ANTIBODY, HEP-2, IGG: NORMAL
AST SERPL-CCNC: 66 IU/L (ref 15–37)
BASOPHILS ABSOLUTE: 0 K/CU MM
BASOPHILS RELATIVE PERCENT: 0.5 % (ref 0–1)
BILIRUB SERPL-MCNC: 1.7 MG/DL (ref 0–1)
BUN BLDV-MCNC: 11 MG/DL (ref 6–23)
BUN BLDV-MCNC: 9 MG/DL (ref 6–23)
CALCIUM SERPL-MCNC: 8.5 MG/DL (ref 8.3–10.6)
CALCIUM SERPL-MCNC: 8.5 MG/DL (ref 8.3–10.6)
CHLORIDE BLD-SCNC: 106 MMOL/L (ref 99–110)
CHLORIDE BLD-SCNC: 107 MMOL/L (ref 99–110)
CO2: 20 MMOL/L (ref 21–32)
CO2: 21 MMOL/L (ref 21–32)
CREAT SERPL-MCNC: 0.6 MG/DL (ref 0.9–1.3)
CREAT SERPL-MCNC: 0.7 MG/DL (ref 0.9–1.3)
DIFFERENTIAL TYPE: ABNORMAL
EOSINOPHILS ABSOLUTE: 0.1 K/CU MM
EOSINOPHILS RELATIVE PERCENT: 1.8 % (ref 0–3)
GFR AFRICAN AMERICAN: >60 ML/MIN/1.73M2
GFR AFRICAN AMERICAN: >60 ML/MIN/1.73M2
GFR NON-AFRICAN AMERICAN: >60 ML/MIN/1.73M2
GFR NON-AFRICAN AMERICAN: >60 ML/MIN/1.73M2
GLUCOSE BLD-MCNC: 107 MG/DL (ref 70–99)
GLUCOSE BLD-MCNC: 70 MG/DL (ref 70–99)
HCT VFR BLD CALC: 32.4 % (ref 42–52)
HEMOGLOBIN: 10.2 GM/DL (ref 13.5–18)
IMMATURE NEUTROPHIL %: 0.4 % (ref 0–0.43)
INTERPRETATION: NORMAL
LYMPHOCYTES ABSOLUTE: 1.2 K/CU MM
LYMPHOCYTES RELATIVE PERCENT: 21.4 % (ref 24–44)
MCH RBC QN AUTO: 32.1 PG (ref 27–31)
MCHC RBC AUTO-ENTMCNC: 31.5 % (ref 32–36)
MCV RBC AUTO: 101.9 FL (ref 78–100)
MONOCYTES ABSOLUTE: 0.6 K/CU MM
MONOCYTES RELATIVE PERCENT: 11.1 % (ref 0–4)
NUCLEATED RBC %: 0 %
PDW BLD-RTO: 15.1 % (ref 11.7–14.9)
PLATELET # BLD: 260 K/CU MM (ref 140–440)
PMV BLD AUTO: 9.9 FL (ref 7.5–11.1)
POTASSIUM SERPL-SCNC: 4.1 MMOL/L (ref 3.5–5.1)
POTASSIUM SERPL-SCNC: 4.6 MMOL/L (ref 3.5–5.1)
RBC # BLD: 3.18 M/CU MM (ref 4.6–6.2)
SEGMENTED NEUTROPHILS ABSOLUTE COUNT: 3.6 K/CU MM
SEGMENTED NEUTROPHILS RELATIVE PERCENT: 64.8 % (ref 36–66)
SODIUM BLD-SCNC: 139 MMOL/L (ref 135–145)
SODIUM BLD-SCNC: 141 MMOL/L (ref 135–145)
T4 FREE: 1.36 NG/DL (ref 0.9–1.8)
TOTAL IMMATURE NEUTOROPHIL: 0.02 K/CU MM
TOTAL NUCLEATED RBC: 0 K/CU MM
TOTAL PROTEIN: 5.6 GM/DL (ref 6.4–8.2)
TSH HIGH SENSITIVITY: 2.33 UIU/ML (ref 0.27–4.2)
WBC # BLD: 5.5 K/CU MM (ref 4–10.5)

## 2022-08-09 PROCEDURE — 6360000002 HC RX W HCPCS: Performed by: INTERNAL MEDICINE

## 2022-08-09 PROCEDURE — 2709999900 HC NON-CHARGEABLE SUPPLY: Performed by: INTERNAL MEDICINE

## 2022-08-09 PROCEDURE — 2580000003 HC RX 258: Performed by: INTERNAL MEDICINE

## 2022-08-09 PROCEDURE — 2580000003 HC RX 258: Performed by: NURSE PRACTITIONER

## 2022-08-09 PROCEDURE — 99221 1ST HOSP IP/OBS SF/LOW 40: CPT | Performed by: SURGERY

## 2022-08-09 PROCEDURE — 6370000000 HC RX 637 (ALT 250 FOR IP): Performed by: INTERNAL MEDICINE

## 2022-08-09 PROCEDURE — 99222 1ST HOSP IP/OBS MODERATE 55: CPT | Performed by: INTERNAL MEDICINE

## 2022-08-09 PROCEDURE — 6370000000 HC RX 637 (ALT 250 FOR IP): Performed by: NURSE PRACTITIONER

## 2022-08-09 PROCEDURE — 85025 COMPLETE CBC W/AUTO DIFF WBC: CPT

## 2022-08-09 PROCEDURE — 0F798ZZ DILATION OF COMMON BILE DUCT, VIA NATURAL OR ARTIFICIAL OPENING ENDOSCOPIC: ICD-10-PCS | Performed by: INTERNAL MEDICINE

## 2022-08-09 PROCEDURE — 36415 COLL VENOUS BLD VENIPUNCTURE: CPT

## 2022-08-09 PROCEDURE — APPNB180 APP NON BILLABLE TIME > 60 MINS: Performed by: NURSE PRACTITIONER

## 2022-08-09 PROCEDURE — 2500000003 HC RX 250 WO HCPCS

## 2022-08-09 PROCEDURE — 3700000000 HC ANESTHESIA ATTENDED CARE: Performed by: INTERNAL MEDICINE

## 2022-08-09 PROCEDURE — 3700000001 HC ADD 15 MINUTES (ANESTHESIA): Performed by: INTERNAL MEDICINE

## 2022-08-09 PROCEDURE — 43262 ENDO CHOLANGIOPANCREATOGRAPH: CPT | Performed by: INTERNAL MEDICINE

## 2022-08-09 PROCEDURE — 1200000000 HC SEMI PRIVATE

## 2022-08-09 PROCEDURE — 6360000002 HC RX W HCPCS

## 2022-08-09 PROCEDURE — 7100000001 HC PACU RECOVERY - ADDTL 15 MIN: Performed by: INTERNAL MEDICINE

## 2022-08-09 PROCEDURE — C1769 GUIDE WIRE: HCPCS | Performed by: INTERNAL MEDICINE

## 2022-08-09 PROCEDURE — 99223 1ST HOSP IP/OBS HIGH 75: CPT | Performed by: INTERNAL MEDICINE

## 2022-08-09 PROCEDURE — 6360000002 HC RX W HCPCS: Performed by: NURSE PRACTITIONER

## 2022-08-09 PROCEDURE — 80048 BASIC METABOLIC PNL TOTAL CA: CPT

## 2022-08-09 PROCEDURE — 43264 ERCP REMOVE DUCT CALCULI: CPT | Performed by: INTERNAL MEDICINE

## 2022-08-09 PROCEDURE — 84439 ASSAY OF FREE THYROXINE: CPT

## 2022-08-09 PROCEDURE — 80053 COMPREHEN METABOLIC PANEL: CPT

## 2022-08-09 PROCEDURE — 3609014900 HC ERCP W/SPHINCTEROTOMY &/OR PAPILLOTOMY: Performed by: INTERNAL MEDICINE

## 2022-08-09 PROCEDURE — 7100000000 HC PACU RECOVERY - FIRST 15 MIN: Performed by: INTERNAL MEDICINE

## 2022-08-09 PROCEDURE — 6360000004 HC RX CONTRAST MEDICATION: Performed by: INTERNAL MEDICINE

## 2022-08-09 PROCEDURE — 84443 ASSAY THYROID STIM HORMONE: CPT

## 2022-08-09 PROCEDURE — 2720000010 HC SURG SUPPLY STERILE: Performed by: INTERNAL MEDICINE

## 2022-08-09 PROCEDURE — 76000 FLUOROSCOPY <1 HR PHYS/QHP: CPT

## 2022-08-09 RX ORDER — PROPOFOL 10 MG/ML
INJECTION, EMULSION INTRAVENOUS PRN
Status: DISCONTINUED | OUTPATIENT
Start: 2022-08-09 | End: 2022-08-09 | Stop reason: SDUPTHER

## 2022-08-09 RX ORDER — DROPERIDOL 2.5 MG/ML
0.62 INJECTION, SOLUTION INTRAMUSCULAR; INTRAVENOUS
Status: DISCONTINUED | OUTPATIENT
Start: 2022-08-09 | End: 2022-08-09 | Stop reason: HOSPADM

## 2022-08-09 RX ORDER — SODIUM CHLORIDE 9 MG/ML
25 INJECTION, SOLUTION INTRAVENOUS PRN
Status: DISCONTINUED | OUTPATIENT
Start: 2022-08-09 | End: 2022-08-09 | Stop reason: HOSPADM

## 2022-08-09 RX ORDER — SUCCINYLCHOLINE/SOD CL,ISO/PF 100 MG/5ML
SYRINGE (ML) INTRAVENOUS PRN
Status: DISCONTINUED | OUTPATIENT
Start: 2022-08-09 | End: 2022-08-09 | Stop reason: SDUPTHER

## 2022-08-09 RX ORDER — ONDANSETRON 2 MG/ML
4 INJECTION INTRAMUSCULAR; INTRAVENOUS
Status: DISCONTINUED | OUTPATIENT
Start: 2022-08-09 | End: 2022-08-09 | Stop reason: HOSPADM

## 2022-08-09 RX ORDER — LABETALOL HYDROCHLORIDE 5 MG/ML
10 INJECTION, SOLUTION INTRAVENOUS
Status: DISCONTINUED | OUTPATIENT
Start: 2022-08-09 | End: 2022-08-09 | Stop reason: HOSPADM

## 2022-08-09 RX ORDER — ROCURONIUM BROMIDE 10 MG/ML
INJECTION, SOLUTION INTRAVENOUS PRN
Status: DISCONTINUED | OUTPATIENT
Start: 2022-08-09 | End: 2022-08-09 | Stop reason: SDUPTHER

## 2022-08-09 RX ORDER — EPINEPHRINE 1 MG/ML
INJECTION, SOLUTION, CONCENTRATE INTRAVENOUS PRN
Status: DISCONTINUED | OUTPATIENT
Start: 2022-08-09 | End: 2022-08-09 | Stop reason: SDUPTHER

## 2022-08-09 RX ORDER — LOSARTAN POTASSIUM 25 MG/1
25 TABLET ORAL DAILY
Status: DISCONTINUED | OUTPATIENT
Start: 2022-08-09 | End: 2022-08-12 | Stop reason: HOSPADM

## 2022-08-09 RX ORDER — SODIUM CHLORIDE 0.9 % (FLUSH) 0.9 %
5-40 SYRINGE (ML) INJECTION EVERY 12 HOURS SCHEDULED
Status: DISCONTINUED | OUTPATIENT
Start: 2022-08-09 | End: 2022-08-09 | Stop reason: HOSPADM

## 2022-08-09 RX ORDER — IPRATROPIUM BROMIDE AND ALBUTEROL SULFATE 2.5; .5 MG/3ML; MG/3ML
1 SOLUTION RESPIRATORY (INHALATION)
Status: DISCONTINUED | OUTPATIENT
Start: 2022-08-09 | End: 2022-08-09 | Stop reason: HOSPADM

## 2022-08-09 RX ORDER — SODIUM CHLORIDE 0.9 % (FLUSH) 0.9 %
5-40 SYRINGE (ML) INJECTION PRN
Status: DISCONTINUED | OUTPATIENT
Start: 2022-08-09 | End: 2022-08-09 | Stop reason: HOSPADM

## 2022-08-09 RX ORDER — FENTANYL CITRATE 50 UG/ML
25 INJECTION, SOLUTION INTRAMUSCULAR; INTRAVENOUS EVERY 5 MIN PRN
Status: DISCONTINUED | OUTPATIENT
Start: 2022-08-09 | End: 2022-08-09 | Stop reason: HOSPADM

## 2022-08-09 RX ORDER — LIDOCAINE HYDROCHLORIDE 20 MG/ML
INJECTION, SOLUTION EPIDURAL; INFILTRATION; INTRACAUDAL; PERINEURAL PRN
Status: DISCONTINUED | OUTPATIENT
Start: 2022-08-09 | End: 2022-08-09 | Stop reason: SDUPTHER

## 2022-08-09 RX ORDER — DEXAMETHASONE SODIUM PHOSPHATE 4 MG/ML
INJECTION, SOLUTION INTRA-ARTICULAR; INTRALESIONAL; INTRAMUSCULAR; INTRAVENOUS; SOFT TISSUE PRN
Status: DISCONTINUED | OUTPATIENT
Start: 2022-08-09 | End: 2022-08-09 | Stop reason: SDUPTHER

## 2022-08-09 RX ORDER — ONDANSETRON 2 MG/ML
INJECTION INTRAMUSCULAR; INTRAVENOUS PRN
Status: DISCONTINUED | OUTPATIENT
Start: 2022-08-09 | End: 2022-08-09 | Stop reason: SDUPTHER

## 2022-08-09 RX ORDER — HYDRALAZINE HYDROCHLORIDE 20 MG/ML
10 INJECTION INTRAMUSCULAR; INTRAVENOUS
Status: DISCONTINUED | OUTPATIENT
Start: 2022-08-09 | End: 2022-08-09 | Stop reason: HOSPADM

## 2022-08-09 RX ADMIN — PROPOFOL 150 MG: 10 INJECTION, EMULSION INTRAVENOUS at 12:22

## 2022-08-09 RX ADMIN — PIPERACILLIN AND TAZOBACTAM 3375 MG: 3; .375 INJECTION, POWDER, LYOPHILIZED, FOR SOLUTION INTRAVENOUS at 23:10

## 2022-08-09 RX ADMIN — LOSARTAN POTASSIUM 25 MG: 25 TABLET, FILM COATED ORAL at 17:40

## 2022-08-09 RX ADMIN — Medication 200 MG: at 12:31

## 2022-08-09 RX ADMIN — LEVOTHYROXINE SODIUM 175 MCG: 0.1 TABLET ORAL at 07:02

## 2022-08-09 RX ADMIN — ROCURONIUM BROMIDE 10 MG: 10 SOLUTION INTRAVENOUS at 12:22

## 2022-08-09 RX ADMIN — INDOMETHACIN 100 MG: 50 SUPPOSITORY RECTAL at 12:41

## 2022-08-09 RX ADMIN — PIPERACILLIN AND TAZOBACTAM 3375 MG: 3; .375 INJECTION, POWDER, LYOPHILIZED, FOR SOLUTION INTRAVENOUS at 07:05

## 2022-08-09 RX ADMIN — PIPERACILLIN AND TAZOBACTAM 3375 MG: 3; .375 INJECTION, POWDER, LYOPHILIZED, FOR SOLUTION INTRAVENOUS at 16:21

## 2022-08-09 RX ADMIN — FLUTICASONE PROPIONATE 2 SPRAY: 50 SPRAY, METERED NASAL at 23:01

## 2022-08-09 RX ADMIN — ONDANSETRON 4 MG: 2 INJECTION INTRAMUSCULAR; INTRAVENOUS at 12:40

## 2022-08-09 RX ADMIN — SODIUM CHLORIDE: 9 INJECTION, SOLUTION INTRAVENOUS at 23:09

## 2022-08-09 RX ADMIN — DEXAMETHASONE SODIUM PHOSPHATE 8 MG: 4 INJECTION, SOLUTION INTRAMUSCULAR; INTRAVENOUS at 12:22

## 2022-08-09 RX ADMIN — SODIUM CHLORIDE: 9 INJECTION, SOLUTION INTRAVENOUS at 14:13

## 2022-08-09 RX ADMIN — SODIUM CHLORIDE: 9 INJECTION, SOLUTION INTRAVENOUS at 06:45

## 2022-08-09 RX ADMIN — EPINEPHRINE 8 MCG: 1 INJECTION, SOLUTION, CONCENTRATE INTRAVENOUS at 12:17

## 2022-08-09 RX ADMIN — LIDOCAINE HYDROCHLORIDE 5 ML: 20 INJECTION, SOLUTION EPIDURAL; INFILTRATION; INTRACAUDAL; PERINEURAL at 12:22

## 2022-08-09 RX ADMIN — TRAZODONE HYDROCHLORIDE 50 MG: 50 TABLET ORAL at 23:01

## 2022-08-09 RX ADMIN — SUGAMMADEX 400 MG: 100 INJECTION, SOLUTION INTRAVENOUS at 13:41

## 2022-08-09 RX ADMIN — ROCURONIUM BROMIDE 40 MG: 10 SOLUTION INTRAVENOUS at 12:37

## 2022-08-09 ASSESSMENT — PAIN SCALES - GENERAL
PAINLEVEL_OUTOF10: 0

## 2022-08-09 ASSESSMENT — PAIN - FUNCTIONAL ASSESSMENT: PAIN_FUNCTIONAL_ASSESSMENT: NONE - DENIES PAIN

## 2022-08-09 ASSESSMENT — ENCOUNTER SYMPTOMS
SHORTNESS OF BREATH: 0
NAUSEA: 1

## 2022-08-09 NOTE — CONSULTS
Department of Internal Medicine  Gastroenterology Consult Note  Ashley Hernandez MD      Reason for Consult:  Common bile duct stone. Primary Care Physician:  Emily Clifton MD    History Obtained From:  patient    HISTORY OF PRESENT ILLNESS:              The patient is a 66 y.o.  male who presents with about a week history of intermittent fever. He noted that the fever was made worse by eating. He did see the gi NP who noted a gallstone that he was not previously aware of. A ct of the abdomen was ordered and revealed intra and extrahepatic duct dilation. He developed nausea and went to the ED and was subsequently admitted. He was to be transferred to [address] [place] [address] but a bed was not obtainable so I was contacted. A recent MRCP revealed intra and extrahepatic duct dilation with a cbd stone. His labs on admssion showed an ast of 84, alt of 119 and a bili of 1.7 with an alkaline phosphatase of 488. He feels better.      Past Medical History:        Diagnosis Date    Abdominal aneurysm ([facility] [address] 2012    4 cm x 3.6 cm followed at [place]    Arthritis 1/21/2014    Chronic back pain     [facility]    Chronic sinusitis 1/21/2014    COPD, mild ([facility] [address] 1/21/2014    Diverticulosis 2014    Dr. 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 Alicja Sotelo    MEGHAN (obstructive sleep apnea) 1/21/2014    CPAP changed to bipap (July 2014)    Otitis media, serous, TM rupture 1/21/2014    ENT x 2 s/p PE tubes bilaterally, cipro Otic       Past Surgical History:        Procedure Laterality Date    COLONOSCOPY  2008    [facility] normal.      COLONOSCOPY  12/2/14    diverticulosis, internal hemorrhoids JOINT REPLACEMENT Right 2012    JOINT REPLACEMENT Left 2011    KNEE SURGERY Bilateral     replacement    TOE SURGERY Bilateral     toe nail surgery Dr. Lois Monique Right 2014    DR. Rascon        Medications Prior to Admission:    Prior to Admission medications    Medication Sig Start Date End Date Taking? Authorizing Provider   albuterol-ipratropium (COMBIVENT RESPIMAT)  MCG/ACT AERS inhaler USE 1 INHALATION TWICE A DAY 11/29/21  Yes Amy Proctor MD   losartan (COZAAR) 25 MG tablet Take 1 tablet by mouth at bedtime To replace hydrochlorothiazide 7/29/22   Amy Proctor MD   Docusate Sodium 100 MG TABS Take 60 mg by mouth daily 1/1/19   Historical Provider, MD   bisacodyl (BISACODYL) 5 MG EC tablet Take 4 tablets once for colonoscopy prep  Patient not taking: Reported on 8/8/2022 5/17/22   DEYA Ford - CNP   potassium chloride (KLOR-CON) 20 MEQ packet Take 20 mEq by mouth 2 times daily    Historical Provider, MD   fluticasone (FLONASE) 50 MCG/ACT nasal spray 1 spray by Each Nostril route daily    Historical Provider, MD   hydrocortisone (ANUSOL-HC) 25 MG suppository Place 1 suppository rectally 2 times daily as needed for Hemorrhoids  Patient not taking: Reported on 8/8/2022 9/17/19   Amy Proctor MD   azelastine HCl 0.15 % SOLN by Nasal route 2 times daily. Patient not taking: Reported on 8/8/2022    Historical Provider, MD   levothyroxine (SYNTHROID) 25 MCG tablet Take 175 mcg by mouth Daily     Historical Provider, MD   niacin 500 MG CR capsule Take 750 mg by mouth every morning     Historical Provider, MD   traZODone (DESYREL) 50 MG tablet Take 50 mg by mouth nightly. Historical Provider, MD   Multiple Vitamins-Minerals (CENTRUM PO) Take  by mouth. 50 plus    Historical Provider, MD   ascorbic acid (VITAMIN C) 500 MG tablet Take 500 mg by mouth daily.     Historical Provider, MD   saline nasal gel (AYR) GEL by Nasal route. Historical Provider, MD   mometasone (NASONEX) 50 MCG/ACT nasal spray 2 sprays by Nasal route 2 times daily. Historical Provider, MD   flunisolide (NASALIDE) 25 MCG/ACT (0.025%) SOLN Inhale 2 sprays into the lungs every 12 hours. Historical Provider, MD   SODIUM FLUORIDE, DENTAL GEL, 1.1 % CREA Place  onto teeth. Historical Provider, MD   ciprofloxacin-dexamethasone (CIPRODEX) otic suspension 5 drops continuous. 5 drop each ear once a week  Patient not taking: Reported on 8/8/2022    Historical Provider, MD       Allergies: Allergies   Allergen Reactions    Dilaudid [Hydromorphone Hcl] Itching    Hydrocodone Itching    Morphine Anaphylaxis    Ultram [Tramadol] Itching    Vicodin [Hydrocodone-Acetaminophen] Itching    Keflex [Cephalexin] Other (See Comments)     Extreme hyperacitivity    Sulfa Antibiotics Itching and Rash   . Social History:    TOBACCO:  No  ETOH:  No    Family History:   Family History   Problem Relation Age of Onset    High Blood Pressure Mother     Allergies Mother     Anemia Mother     High Cholesterol Mother     Thyroid Disease Mother     Heart Disease Father     Allergies Father     Obesity Paternal Grandmother     Obesity Paternal Grandfather     Cancer Paternal Uncle         prostate cancer       REVIEW OF SYSTEMS: (POSITIVES WILL BE UNDERLINED)  CONSTITUTIONAL:    Weight change,fatigue, fever, chills  EYES:  Diplopia, change in vision  EARS:  hearing loss, tinnitus, vertigo  NOSE:   epistaxis  MOUTH/THROAT:     hoarseness, sore throat. RESPIRATORY:  SOB,  cough, sputum, hemoptysis  CARDIOVASCULAR : chest pain,palpitations, dyspnea exertion, orthopnea, paroxysmal nocturnal dyspnea, pedal edema. GASTROINTESTINAL:  See HPI  GENITOURINARY:   dysuria, hematuria, . HEMATOLOGIC/LYMPHATIC:   Anemia, bleeding tendencies.   MUSCULOSKELETAL:    myalgias,  joint pain  NEUROLOGICAL:   Loss of Consciousness, paresthesias, anesthesias, focal weakness  SKIN :   History of dermatitis, rashes  PSYCHIATRIC:  depression, , anxiety past psychosis  ENDOCRINE:  History of diabetes, thyroid disease  ALL/IMM : allergies, rashes    PHYSICAL EXAM:    Vitals:  BP (!) 115/55   Pulse (!) 45   Temp 98.2 °F (36.8 °C) (Oral)   Resp 18   Ht 5' 9\" (1.753 m)   Wt (!) 303 lb 9 oz (137.7 kg)   SpO2 95%   BMI 44.83 kg/m²   CONSTITUTIONAL: alert, cooperative, no apparent distress,   EYES:  pupils equal, round and reactive to light and sclera clear  ENT:  normocepalic, without obvious abnormality  NECK:  supple, symmetrical, trachea midline  HEMATOLOGIC/LYMPHATICS:  no cervical lymphadenopathy and no supraclavicular lymphadenopathy  LUNGS:  clear to auscultation  CARDIOVASCULAR:  regular rate and rhythm and no murmur noted  ABDOMEN:  Soft, non-tender with normal bowel sounds. No organomegaly or masses  NEUROLOGIC: no focal deficit detected  SKIN:  no lesions  EXTREMITIES: no clubbing, cyanosis, or edema    IMPRESSION:  1) Common bile duct stone. 2) Ascending cholangitis. RECOMMENDATIONS:  1) Will schedule ERCP for today. 2) Surgical consult.          Electronically signed by Iveth Castillo MD on 8/9/2022 at 7:51 AM

## 2022-08-09 NOTE — ANESTHESIA PRE PROCEDURE
by mouth daily. Historical Provider, MD   saline nasal gel (AYR) GEL by Nasal route. Historical Provider, MD   mometasone (NASONEX) 50 MCG/ACT nasal spray 2 sprays by Nasal route 2 times daily. Historical Provider, MD   flunisolide (NASALIDE) 25 MCG/ACT (0.025%) SOLN Inhale 2 sprays into the lungs every 12 hours. Historical Provider, MD   SODIUM FLUORIDE, DENTAL GEL, 1.1 % CREA Place  onto teeth. Historical Provider, MD   ciprofloxacin-dexamethasone (CIPRODEX) otic suspension 5 drops continuous.  5 drop each ear once a week  Patient not taking: Reported on 8/8/2022    Historical Provider, MD       Current medications:    Current Facility-Administered Medications   Medication Dose Route Frequency Provider Last Rate Last Admin    indomethacin (INDOCIN) 50 MG suppository 100 mg  100 mg Rectal Once Mere Ashley MD        sodium chloride (OCEAN, BABY AYR) 0.65 % nasal spray 1 spray  1 spray Each Nostril PRN Tere Singleton MD        0.9 % sodium chloride infusion   IntraVENous Continuous Tere Singleton MD 50 mL/hr at 08/09/22 1046 Rate Change at 08/09/22 1046    fluticasone (FLONASE) 50 MCG/ACT nasal spray 2 spray  2 spray Each Nostril Daily DEYA Olguin CNP        levothyroxine (SYNTHROID) tablet 175 mcg  175 mcg Oral Daily DEYA Jackson - CNP   175 mcg at 08/09/22 0702    traZODone (DESYREL) tablet 50 mg  50 mg Oral Nightly DEYA Olguin CNP        sodium chloride flush 0.9 % injection 5-40 mL  5-40 mL IntraVENous 2 times per day DEYA Jackson - CNP   10 mL at 08/08/22 2128    sodium chloride flush 0.9 % injection 5-40 mL  5-40 mL IntraVENous PRN DEYA Olguin CNP        0.9 % sodium chloride infusion  25 mL IntraVENous PRN DEYA Olguin CNP        enoxaparin Sodium (LOVENOX) injection 30 mg  30 mg SubCUTAneous BID DEYA Jackson - CNP   30 mg at 08/08/22 2122    polyethylene glycol (GLYCOLAX) packet 17 g  17 g Oral Daily PRN Nova I Scotty, APRN - CNP        sodium chloride flush 0.9 % injection 5-40 mL  5-40 mL IntraVENous 2 times per day Kylee Bell, APRN - CNP   10 mL at 08/08/22 2122    sodium chloride flush 0.9 % injection 5-40 mL  5-40 mL IntraVENous PRN Nova I Scotty, APRN - CNP        0.9 % sodium chloride infusion   IntraVENous PRN Nova I Scotty, APRN - CNP        potassium chloride (KLOR-CON M) extended release tablet 40 mEq  40 mEq Oral PRN Nova I Scotty, APRN - CNP        Or    potassium bicarb-citric acid (EFFER-K) effervescent tablet 40 mEq  40 mEq Oral PRN Nova I Scotty, APRN - CNP        Or    potassium chloride 10 mEq/100 mL IVPB (Peripheral Line)  10 mEq IntraVENous PRN Nova I Scotty, APRN - CNP        magnesium sulfate 2000 mg in 50 mL IVPB premix  2,000 mg IntraVENous PRN Nova I Scotty, APRN - CNP        oxyCODONE (ROXICODONE) immediate release tablet 5 mg  5 mg Oral Q4H PRN Nova I Scotty, APRN - CNP        Or    oxyCODONE HCl (OXY-IR) immediate release tablet 10 mg  10 mg Oral Q4H PRN Nova I Scotty, APRN - CNP        promethazine (PHENERGAN) tablet 12.5 mg  12.5 mg Oral Q6H PRN Nova I Scotty, APRN - CNP        Or    ondansetron (ZOFRAN) injection 4 mg  4 mg IntraVENous Q6H PRN Nova I Scotty, APRN - CNP        metoclopramide (REGLAN) injection 10 mg  10 mg IntraVENous Q6H PRN Nova I Scotty, APRN - CNP        diphenhydrAMINE (BENADRYL) injection 25 mg  25 mg IntraVENous Q6H PRN Nova I Scotty, APRN - CNP        piperacillin-tazobactam (ZOSYN) 3,375 mg in dextrose 5 % 50 mL IVPB (mini-bag)  3,375 mg IntraVENous Q8H Nova I Scotty, APRN - CNP 12.5 mL/hr at 08/09/22 0705 3,375 mg at 08/09/22 0705       Allergies:     Allergies   Allergen Reactions    Dilaudid [Hydromorphone Hcl] Itching    Hydrocodone Itching    Morphine Anaphylaxis    Ultram [Tramadol] Itching    Vicodin [Hydrocodone-Acetaminophen] Itching    Keflex [Cephalexin] Other (See Comments)     Extreme hyperacitivity    Sulfa Antibiotics Itching and Rash       Problem List:    Patient Active Problem List   Diagnosis Code    MEGHAN (obstructive sleep apnea)- on BIPAP G47.33    COPD, mild (HCC) J44.9    Arthritis M19.90    Chronic sinusitis J32.9    Acquired hypothyroidism E03.9    Dyslipidemia E78.5    HTN (hypertension) I10    Anemia D64.9    NSAID long-term use Z79.1    GERD (gastroesophageal reflux disease)- due to chronic NSAID use K21.9    Elevated fasting glucose R73.01    External hemorrhoids K64.4    AAA (abdominal aortic aneurysm) without rupture (HCC)- stable 2020, due 2023 I71.4    Primary insomnia F51.01    History of recurrent ear infection Z86.69    Positive FIT (fecal immunochemical test) R19.5    Sepsis ([facility]) A41.9       Past Medical History:        Diagnosis Date    Abdominal aneurysm ([facility] [address] 2012    4 cm x 3.6 cm followed at [id number] [place] [address] 1/21/2014    [facility]    Chronic sinusitis 1/21/2014    COPD, mild ([facility] [address] 1/21/2014    Diverticulosis 2014    Dr. Nestor Jorge C Scope    Dyslipidemia 1/21/2014    HTN (hypertension) 1/21/2014    Hyperlipidemia     Hypothyroidism 1/21/2014    Ingrown toenail 2013, 2014    podiatry clinic at [place] [address]. 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 Cash Duran MEGHAN (obstructive sleep apnea) 1/21/2014    CPAP changed to bipap (July 2014)    Otitis media, serous, TM rupture 1/21/2014    ENT x 2 s/p PE tubes bilaterally, cipro Otic       Past Surgical History:        Procedure Laterality Date    [facility]  2008    [facility]- [facility].      COLONOSCOPY  12/2/14    diverticulosis, internal hemorrhoids    JOINT REPLACEMENT Right 2012    JOINT REPLACEMENT Left     KNEE SURGERY Bilateral     replacement    TOE SURGERY Bilateral     toe nail surgery Dr. Lexie Francisco Right     DR. Rascon        Social History:    Social History     Tobacco Use    Smoking status: Former     Packs/day: 2.00     Years: 36.00     Pack years: 72.00     Types: Cigarettes     Quit date: 2002     Years since quittin.3    Smokeless tobacco: Never    Tobacco comments:     quit 2002   Substance Use Topics    Alcohol use: No     Alcohol/week: 0.0 standard drinks                                Counseling given: Not Answered  Tobacco comments: quit 2002      Vital Signs (Current):   Vitals:    22 2128 22 0300 22 0700 22 0753   BP: (!) 148/72 (!) 115/55  139/66   Pulse: 57 (!) 45  60   Resp: 18 18  18   Temp: 36.8 °C (98.2 °F) 36.8 °C (98.2 °F)  36.9 °C (98.5 °F)   TempSrc: Oral Oral  Oral   SpO2: 98% 95%  98%   Weight:   (!) 303 lb 9 oz (137.7 kg)    Height:                                                  BP Readings from Last 3 Encounters:   22 139/66   22 130/80   22 122/70       NPO Status:                                                                                 BMI:   Wt Readings from Last 3 Encounters:   22 (!) 303 lb 9 oz (137.7 kg)   22 (!) 302 lb (137 kg)   22 (!) 303 lb 9.6 oz (137.7 kg)     Body mass index is 44.83 kg/m².     CBC:   Lab Results   Component Value Date/Time    WBC 5.5 2022 01:47 AM    RBC 3.18 2022 01:47 AM    HGB 10.2 2022 01:47 AM    HCT 32.4 2022 01:47 AM    .9 2022 01:47 AM    RDW 15.1 2022 01:47 AM     2022 01:47 AM       CMP:   Lab Results   Component Value Date/Time     2022 01:47 AM    K 4.1 2022 01:47 AM     2022 01:47 AM    CO2 21 2022 01:47 AM    BUN 9 2022 01:47 AM    CREATININE 0.7 2022 01:47 AM    GFRAA >60 2022 01:47 AM AGRATIO 1.5 07/27/2022 08:52 AM    LABGLOM >60 08/09/2022 01:47 AM    GLUCOSE 70 08/09/2022 01:47 AM    PROT 5.6 08/09/2022 01:47 AM    CALCIUM 8.5 08/09/2022 01:47 AM    BILITOT 1.7 08/09/2022 01:47 AM    ALKPHOS 449 08/09/2022 01:47 AM    AST 66 08/09/2022 01:47 AM    ALT 82 08/09/2022 01:47 AM       POC Tests: No results for input(s): POCGLU, POCNA, POCK, POCCL, POCBUN, POCHEMO, POCHCT in the last 72 hours. Coags:   Lab Results   Component Value Date/Time    PROTIME 13.4 08/05/2022 10:34 AM    INR 1.04 08/05/2022 10:34 AM    APTT 31.4 11/04/2014 08:03 AM       HCG (If Applicable): No results found for: PREGTESTUR, PREGSERUM, HCG, HCGQUANT     ABGs: No results found for: PHART, PO2ART, KRD9UHB, BLR5GNS, BEART, D8HOTCEA     Type & Screen (If Applicable):  No results found for: LABABO, LABRH    Drug/Infectious Status (If Applicable):  Lab Results   Component Value Date/Time    HEPCAB NON REACTIVE 08/05/2022 10:34 AM       COVID-19 Screening (If Applicable):   Lab Results   Component Value Date/Time    COVID19 NOT DETECTED 08/07/2022 11:51 AM           Anesthesia Evaluation  Patient summary reviewed  Airway: Mallampati: II  TM distance: <3 FB   Neck ROM: full  Mouth opening: > = 3 FB   Dental:    (+) implants and bridge      Pulmonary:   (+) COPD:  sleep apnea:                             Cardiovascular:    (+) hypertension:, hyperlipidemia      ECG reviewed  Rhythm: irregular  Rate: abnormal                    Neuro/Psych:               GI/Hepatic/Renal:   (+) GERD:,           Endo/Other:    (+) hypothyroidism::., .                 Abdominal:   (+) obese,           Vascular: Other Findings:           Anesthesia Plan      general     ASA 3     (Chart review)  Induction: intravenous. MIPS: Postoperative opioids intended. Anesthetic plan and risks discussed with patient. Plan discussed with attending.     Attending anesthesiologist reviewed and agrees with Preprocedure content                Herlinda Briscoe Tiffanie Prince - DARSHAN   8/9/2022

## 2022-08-09 NOTE — CONSULTS
CARDIOLOGY CONSULT NOTE       Reason for consultation: Bradycardia    Referring physician:  Elisha Garcia MD     Primary care physician: Belinda Atwood MD      Dear  Dr. Elisha Garcia MD   Thanks for the consult. Chief Complaints :  Chief Complaint   Patient presents with    Fever     Daily since 07/09        History of present illness:Matthew is a 66 y. o.year old who has past medical history of MEGHAN, dyslipidemia, hypertension, obesity, AAA, and hypothyroidism. Patient presents emergency department 2 days prior to consult with complaints of fever and abdominal pain for several weeks. Patient was seen and evaluated by GI who conducted a CT scan of abdomen and pelvis showing acute cholecystitis. Today patient had ERCP. Cardiology consulted for bradycardia. Patient denies seen cardiology in the past.    Past medical history:    has a past medical history of Abdominal aneurysm ([address] Arthritis, Chronic back pain, Chronic sinusitis, COPD, mild ([address] Diverticulosis, Dyslipidemia, HTN (hypertension), Hyperlipidemia, Hypothyroidism, Ingrown toenail, MDRO (multiple drug resistant organisms) resistance, Morbid obesity with BMI of 45.0-49.9, adult ([address] Onychocryptosis, MEGHAN (obstructive sleep apnea), and Otitis media, serous, TM rupture. Past surgical history:   has a past surgical history that includes joint replacement (Right, 2012); joint replacement (Left, 2011); Tympanostomy tube placement (Right, 2014); knee surgery (Bilateral); Colonoscopy (2008); Colonoscopy (12/02/2014); Toe Surgery (Bilateral); and ERCP (08/09/2022). Social History:   reports that he quit smoking about 20 years ago. His smoking use included cigarettes. He has a 72.00 pack-year smoking history. He has never used smokeless tobacco. He reports that he does not drink alcohol and does not use drugs.   Family history:   no family history of CAD, STROKE of DM at early age    Allergies   Allergen Reactions Dilaudid [Hydromorphone Hcl] Itching    Hydrocodone Itching    Morphine Anaphylaxis    Ultram [Tramadol] Itching    Vicodin [Hydrocodone-Acetaminophen] Itching    Keflex [Cephalexin] Other (See Comments)     Extreme hyperacitivity    Sulfa Antibiotics Itching and Rash       sodium chloride (OCEAN, BABY AYR) 0.65 % nasal spray 1 spray, PRN  0.9 % sodium chloride infusion, Continuous  fluticasone (FLONASE) 50 MCG/ACT nasal spray 2 spray, Daily  levothyroxine (SYNTHROID) tablet 175 mcg, Daily  traZODone (DESYREL) tablet 50 mg, Nightly  sodium chloride flush 0.9 % injection 5-40 mL, 2 times per day  sodium chloride flush 0.9 % injection 5-40 mL, PRN  0.9 % sodium chloride infusion, PRN  enoxaparin Sodium (LOVENOX) injection 30 mg, BID  polyethylene glycol (GLYCOLAX) packet 17 g, Daily PRN  sodium chloride flush 0.9 % injection 5-40 mL, 2 times per day  sodium chloride flush 0.9 % injection 5-40 mL, PRN  0.9 % sodium chloride infusion, PRN  potassium chloride (KLOR-CON M) extended release tablet 40 mEq, PRN   Or  potassium bicarb-citric acid (EFFER-K) effervescent tablet 40 mEq, PRN   Or  potassium chloride 10 mEq/100 mL IVPB (Peripheral Line), PRN  magnesium sulfate 2000 mg in 50 mL IVPB premix, PRN  oxyCODONE (ROXICODONE) immediate release tablet 5 mg, Q4H PRN   Or  oxyCODONE HCl (OXY-IR) immediate release tablet 10 mg, Q4H PRN  promethazine (PHENERGAN) tablet 12.5 mg, Q6H PRN   Or  ondansetron (ZOFRAN) injection 4 mg, Q6H PRN  metoclopramide (REGLAN) injection 10 mg, Q6H PRN  diphenhydrAMINE (BENADRYL) injection 25 mg, Q6H PRN  piperacillin-tazobactam (ZOSYN) 3,375 mg in dextrose 5 % 50 mL IVPB (mini-bag), Q8H      Current Facility-Administered Medications   Medication Dose Route Frequency Provider Last Rate Last Admin    sodium chloride (OCEAN, BABY AYR) 0.65 % nasal spray 1 spray  1 spray Each Nostril PRN Mere Ashley MD        0.9 % sodium chloride infusion   IntraVENous Continuous Mere Ashley MD 50 mL/hr at 08/09/22 1413 New Bag at 08/09/22 1413    fluticasone (FLONASE) 50 MCG/ACT nasal spray 2 spray  2 spray Each Nostril Daily Bertha Madison MD        levothyroxine (SYNTHROID) tablet 175 mcg  175 mcg Oral Daily Bertha Madison MD   175 mcg at 08/09/22 5790    traZODone (DESYREL) tablet 50 mg  50 mg Oral Nightly Bertha Madison MD        sodium chloride flush 0.9 % injection 5-40 mL  5-40 mL IntraVENous 2 times per day Bertha Madison MD   10 mL at 08/08/22 2128    sodium chloride flush 0.9 % injection 5-40 mL  5-40 mL IntraVENous PRN Bertha Madison MD        0.9 % sodium chloride infusion  25 mL IntraVENous PRN Bertha Madison MD        enoxaparin Sodium (LOVENOX) injection 30 mg  30 mg SubCUTAneous BID Bertha Madison MD   30 mg at 08/08/22 2122    polyethylene glycol (GLYCOLAX) packet 17 g  17 g Oral Daily PRN Bertha Madison MD        sodium chloride flush 0.9 % injection 5-40 mL  5-40 mL IntraVENous 2 times per day Bertha Madison MD   10 mL at 08/08/22 2122    sodium chloride flush 0.9 % injection 5-40 mL  5-40 mL IntraVENous PRN Bertha Madison MD        0.9 % sodium chloride infusion   IntraVENous PRN Bertha Madison MD        potassium chloride Viktoriya ALVARADO) extended release tablet 40 mEq  40 mEq Oral PRN Bertha Madison MD        Or    potassium bicarb-citric acid (EFFER-K) effervescent tablet 40 mEq  40 mEq Oral PRN Bertha Madison MD        Or    potassium chloride 10 mEq/100 mL IVPB (Peripheral Line)  10 mEq IntraVENous PRN Bertha Madison MD        magnesium sulfate 2000 mg in 50 mL IVPB premix  2,000 mg IntraVENous PRN Bertha Madison MD        oxyCODONE (ROXICODONE) immediate release tablet 5 mg  5 mg Oral Q4H PRN Bertha Madison MD        Or    oxyCODONE HCl (OXY-IR) immediate release tablet 10 mg  10 mg Oral Q4H PRN Bertha Madison MD        promethazine (PHENERGAN) tablet 12.5 mg  12.5 mg Oral Q6H PRN Bertha Madison MD        Or    ondansetron [facility]) injection 4 mg  4 mg IntraVENous Q6H PRN Karyle Cradle, MD        metoclopramide [facility]) injection 10 mg  10 mg IntraVENous Q6H PRN Karyle Cradle, MD        diphenhydrAMINE (BENADRYL) injection 25 mg  25 mg IntraVENous Q6H PRN Karyle Cradle, MD        piperacillin-tazobactam (ZOSYN) 3,375 mg in dextrose 5 % 50 mL IVPB (mini-bag)  3,375 mg IntraVENous Claudia Lees MD 12.5 mL/hr at 08/09/22 0705 3,375 mg at 08/09/22 1653     Review of Systems:   Review of Systems   Respiratory:  Negative for shortness of breath. Cardiovascular:  Negative for chest pain, palpitations and leg swelling. Musculoskeletal: Negative. Skin: Negative. Neurological:  Negative for dizziness and weakness. All other systems reviewed and are negative. Physical Examination:    Physical Exam  Constitutional:       Appearance: He is well-developed. He is obese. Cardiovascular:      Rate and Rhythm: Regular rhythm. Bradycardia present. Pulses: Intact distal pulses. Dorsalis pedis pulses are 2+ on the right side and 2+ on the left side. Posterior tibial pulses are 2+ on the right side and 2+ on the left side. Heart sounds: Normal heart sounds, S1 normal and S2 normal.   Pulmonary:      Effort: Pulmonary effort is normal.      Breath sounds: Normal breath sounds. Musculoskeletal:         General: Normal range of motion. Skin:     General: Skin is warm and dry. Neurological:      Mental Status: He is alert and oriented to person, place, and time.          Medical decision making and Data review:  Vitals:    08/09/22 1425 08/09/22 1430 08/09/22 1458 08/09/22 1539   BP: 130/75 130/73 131/75 137/78   Pulse: 54  (!) 41 (!) 48   Resp:   18 20   Temp:   97.5 °F (36.4 °C)    TempSrc:    Axillary   SpO2: 97% 96% 96% 96%   Weight:       Height:            Lab Review   Recent Labs     08/09/22  0147   WBC 5.5   HGB 10.2*   HCT 32.4*         Recent Labs     08/09/22  0147      K 4.1      CO2 21   BUN 9   CREATININE 0.7*     Recent Labs     08/08/22  0425 08/09/22  0147   AST 54*  54* 66*   ALT 87*  87* 82*   BILIDIR 0.8*  --    BILITOT 1.4*  1.4* 1.7*   ALKPHOS 390*  390* 449*     Recent Labs     08/07/22  1205   TROPONINT <0.010       No results for input(s): PROBNP in the last 72 hours. Lab Results   Component Value Date    INR 1.04 08/05/2022    PROTIME 13.4 08/05/2022       EKG: (reviewed by myself)    ECHO:(reviewed by myself)    Chest Xray:(reviewed by myself)  CT ABDOMEN PELVIS W IV CONTRAST Additional Contrast? Oral    Result Date: 8/6/2022  EXAMINATION: CT OF THE ABDOMEN AND PELVIS WITH CONTRAST 8/5/2022 11:05 am TECHNIQUE: CT of the abdomen and pelvis was performed with the administration of intravenous contrast. Multiplanar reformatted images are provided for review. Automated exposure control, iterative reconstruction, and/or weight based adjustment of the mA/kV was utilized to reduce the radiation dose to as low as reasonably achievable. COMPARISON: 07/18/2015.  HISTORY: ORDERING SYSTEM PROVIDED HISTORY: Elevated alkaline phosphatase level TECHNOLOGIST PROVIDED HISTORY: Additional Contrast?->Oral STAT Creatinine as needed:->No Reason for exam:->Patient has fevers; elevated bilirubin and LFT's rule out biliary cholangitis or obstruction Reason for Exam: Patient has fevers; elevated bilirubin and LFT's rule out biliary cholangitis or obstruction, Elevated alkaline phosphatase level, Elevated liver enzymes, History of gallstones, Aneurysm of infrarenal abdominal aorta (HCC) Additional signs and symptoms: patient states fever of uknown origin since July 9, 2022;  patient states known COPD and calcifications in Lower bandar of lung;  paotietn states he is also fatigued Relevant Medical/Surgical History: patient states fever of uknown origin since July 9, 2022;  patient states known COPD and calcifications in Lower bandar of lung;  paotietn states he is also fatigued FINDINGS: Lower Chest: The lung bases demonstrate subpleural fibrosis. There is a calcified granuloma in the right middle lobe. Organs: The gallbladder is distended. There is a gallstone in the neck of the gallbladder. There is intrahepatic biliary ductal dilatation. The common bile duct is also dilated. No focal liver lesions are seen. There are calcified splenic granulomas. The pancreas appears unremarkable. No obvious masses are seen within the pancreatic head. The adrenal glands appear unremarkable. There is symmetric enhancement of the kidneys. There are small cysts involving the kidneys bilaterally. GI/Bowel: The bowel loops are not dilated. No bowel wall thickening is seen. I do not see a dilated appendix. Pelvis: No pelvic masses or fluid collections are seen. Peritoneum/Retroperitoneum: There is aneurysmal dilatation of the abdominal aorta measuring 3.7 x 3.3 cm. There are shotty mesenteric and retroperitoneal lymph nodes but no lymphadenopathy is seen. Bones/Soft Tissues: No acute bony abnormalities are noted. There are degenerative changes involving the spine. There are small fat containing inguinal hernias. 1. Intrahepatic and extrahepatic biliary ductal dilatation without obvious pancreatic head mass. I am concerned for an ampullary malignancy and cholangiocarcinoma. I would recommend further evaluation with either MRCP or ERCP. 2. Cholelithiasis with distended gallbladder. I do raise the possibility of acute cholecystitis given the location of the gallstone. 3. Small fat containing inguinal hernias. 4. Abdominal aortic aneurysm measuring 3.7 x 3.3 cm. RECOMMENDATIONS: 3.7 cm abdominal aortic aneurysm. Recommend follow-up every 2 years. Reference: J Am Bryon Radiol 2157;[id number].      MRI ABDOMEN WO CONTRAST MRCP    Result Date: 8/8/2022  EXAMINATION: MRI OF THE ABDOMEN WITHOUT CONTRAST AND MRCP 8/8/2022 10:27 am TECHNIQUE: Multiplanar multisequence MRI of the abdomen was performed without the administration of intravenous contrast.  After initial T2 axial and coronal images, thick slab, thin slab and 3D coronal MRCP sequences were obtained without the administration of intravenous contrast.  MIP images are provided for review. COMPARISON: Ultrasound 08/07/2022. CT 08/05/2022. HISTORY: ORDERING SYSTEM PROVIDED HISTORY: sepsis 2/2 acute choleangitis TECHNOLOGIST PROVIDED HISTORY: Reason for exam:->sepsis 2/2 acute choleangitis Reason for Exam: sepsis 2/2 acute choleangitis Relevant Medical/Surgical History: none FINDINGS: LOWER CHEST:  The visualized lung bases reveal no signal abnormality. LIVER: The liver is normal in morphology. No evidence for steatosis. No suspicious liver lesion identified. GALLBLADDER/BILE DUCTS: Cholelithiasis. Multiple small stones are present within the cystic duct, proximal and distal common bile duct measuring up to 7 mm distally. There is upstream biliary dilatation with the mid common duct measuring 17 mm. Intrahepatic biliary dilatation is also present. No gallbladder wall thickening or surrounding inflammatory change. PANCREAS:  No significant findings. SPLEEN:  No significant findings. ADRENAL GLANDS:  Normal. KIDNEYS:  No significant findings. Subcentimeter simple cysts are noted, for which no routine follow-up is necessary. GI/BOWEL: The visualized bowel is normal in caliber. PERITONEUM/RETROPERITONEUM:  No abdominal lymphadenopathy. No free intraperitoneal fluid. VESSELS:  Infrarenal aortic enlargement again demonstrated measuring up to 3.5 cm, better evaluated on recent abdominal CT exam. SOFT TISSUES/BONES:  No suspicious signal abnormality identified. 1.  Choledocholithiasis with associated biliary dilatation. No evidence for acute cholecystitis, although small stones are present within the cystic duct as well. 2.  Infrarenal aortic aneurysm measures up to 3.5 cm. RECOMMENDATIONS: 3.5 cm infrarenal abdominal aortic aneurysm. Recommend follow-up every 2 years.  Reference: J adult ([address] Onychocryptosis, MEGHAN (obstructive sleep apnea), and Otitis media, serous, TM rupture. Thank you  much for consult and giving us the opportunity in contributing in the care of this patient. Please feel free to call me for any questions. Kai Ryan, APRN - CNP, 8/9/2022 3:39 PM          CARDIOLOGY ATTENDING ADDENDUM    I have seen, spoken to and examined this patient personally, independent of the NP/PAC. I have reviewed the hospital care given to date and reviewed all pertinent labs and imaging. I have spoken with patient, nursing staff and provided written and verbal instructions . The above note has been reviewed. I have spent substantive amount of time in formulating patient care. Physical Exam:    General:   Awake, alert  Head:normal  Eye:normal  Chest:   Clear to auscultation 0 Basilar crackles   Cardiovascular:  S1S2   Abdomen: soft   Extremities:   0 edema  Pulses; palpable      MEDICAL DECISION MAKING :          Bradycardia: Rhythm strips reviewed, lowest rate 34, is sinus bradycardia. Likely in the setting of sepsis and MEGHAN. Will get echo to R/O any structural abnormalities. Recommend OP event monitor and ischemic work-up. Pre-Op cardiovascular risk. Patient is active with no prior cardiac history. No ST or T wave abnormalities on EKG. Patient considered moderate cardiac risk for low risk surgery. HTN: B/P stable, restart home medication Losartan 25 mg daily. Sepsis:cholangitis, S/P ERCP sphincter/papillotomy. Pending cholecystomy. MEGHAN: patient has home CPAP and is using while hospitalized. [facility]: unchanged[facility] AAA 3.7 cm, continue with good B/P control. Outpatient follow-up at [place]  Dyslipidemia: continue statins   Obesity: recommend weight loss.      Dr. Nabeel Garcia MD

## 2022-08-09 NOTE — PLAN OF CARE
Problem: Discharge Planning  Goal: Discharge to home or other facility with appropriate resources  Outcome: Progressing  Flowsheets  Taken 8/8/2022 1728 by Fede Gambino LPN  Discharge to home or other facility with appropriate resources: Identify discharge learning needs (meds, wound care, etc)  Taken 8/8/2022 1712 by Fede Gambino LPN  Discharge to home or other facility with appropriate resources: Identify discharge learning needs (meds, wound care, etc)     Problem: Pain  Goal: Verbalizes/displays adequate comfort level or baseline comfort level  8/9/2022 0112 by Narcisa Lopez RN  Outcome: Progressing  8/8/2022 1726 by Vidhya Puri RN  Outcome: Progressing     Problem: ABCDS Injury Assessment  Goal: Absence of physical injury  Outcome: Progressing

## 2022-08-09 NOTE — PROGRESS NOTES
1348- pt. Arrived to pacu via bed. She is attached to monitor and alarms on. Received report from [place], CRNA and Alexandrea Mcmillan RN. Pt. Is drowsy from anesthesia but will respond to verbal stimuli. He is SB on the monitor. Pt. Is wearing a simple mask at 8L. Pt. Denies pain or n/v. Will continue to monitor. 1400- Dr. Cayla Roe at bedside to speak with patient. 46- Dr. Cecilio Shahid at bedside to evaluate and speak with pt.     1420- pt. Awake and alert. He is able to answer questions appropriately. Pt. Is on RA sating 96%. HR is 53 SB. Pt. Denies pain or n/v. IV fluids infusing into left ac without any issues. At this time pt is stable and able to transfer to floor for continued care. Called and gave report to pt's receiving nurse, Sally Sorensen. 1430- spoke with pt's nurse Sunita and updated on new orders for pt. Pt.  To be clear liquids and NPO at midnight per Dr. Cecilio Shahid, due to potentially having surgery tomorrow am.

## 2022-08-09 NOTE — ANESTHESIA POSTPROCEDURE EVALUATION
Department of Anesthesiology  Postprocedure Note    Patient: Socrates Mckenzie  MRN: [id number]  YOB: 1950  Date of evaluation: 8/9/2022      Procedure Summary     Date: 08/09/22 Room / Location: [address]    Anesthesia Start: 1217 Anesthesia Stop: 1351    Procedure: ERCP SPHINCTER/PAPILLOTOMY and sweep and removal of debris, sludge and stones with use of 12-15 extractor balloon Diagnosis:       Gall stones, common bile duct      (Gall stones, common bile duct [K80.50])    Surgeons: Beth Gamble MD Responsible Provider: Chidi Valadez MD    Anesthesia Type: General ASA Status: 3          Anesthesia Type: General    Nati Phase I: Nati Score: 10    Nati Phase II:        Anesthesia Post Evaluation    Patient location during evaluation: PACU  Patient participation: complete - patient participated  Level of consciousness: awake and alert  Pain score: 1  Airway patency: patent  Nausea & Vomiting: no nausea and no vomiting  Complications: no  Cardiovascular status: blood pressure returned to baseline  Respiratory status: acceptable  Hydration status: euvolemic

## 2022-08-09 NOTE — BRIEF OP NOTE
Brief Postoperative Note      Patient: Johnnie Lincoln  YOB: 1950  MRN: [id number]    Date of Procedure: 8/9/2022    Pre-Op Diagnosis: Gall stones, common bile duct [K80.50]    Post-Op Diagnosis: CBD stone. Procedure(s):  ERCP SPHINCTER/PAPILLOTOMY    Surgeon(s):  Ajit Kasper MD    Assistant:  * No surgical staff found *    Anesthesia: General    Estimated Blood Loss (mL): Minimal    Complications: None    Specimens:   * No specimens in log *    Implants:  * No implants in log *      Drains: * No LDAs found *    Findings: As above, duct massively dilated. Cholangiogram did not specifically show stone. As mrcp showed cbd stone, decision made to do sphincterotomy. Duct swept x 3 with 12,15 mm balloon. Ampulla was edematous with heme at start of procedure ? If patient had already passed stone overnight. Needs to be kept npo overnight, watch for signs of cholangitis and pancreatitis.      Electronically signed by Ajit Kasper MD on 8/9/2022 at 1:41 PM

## 2022-08-09 NOTE — PROGRESS NOTES
This nurse contacted Dr Alyssa Kaiser to ask if patient's transfer to [facility] was emergent because [facility] did not have a bed available. Dr Lady Peoples wanted to make sure if we would need to find another option for the patient to transfer if necessary. Dr Alyssa Kaiser states to consult Dr Camilla Storey for ERCP and see if he can do it at our facility. Primary nurse notified of new consult.

## 2022-08-09 NOTE — CONSULTS
Department of General Surgery   Surgical Service Dr. Vianney Ott   Consult Note    Date of Consult: 8/9/22    Reason for Consult:  Choledocholithiasis    Requesting Physician:  Hospitalist    CHIEF COMPLAINT:  abdominal pain and fever    History Obtained From:  patient, electronic medical record    HISTORY OF PRESENT ILLNESS:      The patient is a 66 y.o. male who presented to the ED with complaints described as:      Location: Fevers, nausea  Quality: denies  Severity: denied abdominal pain  Duration: ongoing for about 1-2 weeks  Timing: acute  Context: denies  Modifying factors: denies  Associated signs and symptoms: fevers, chills, nausea. Pt was seen by PCP and then by GI. He was found to have abnormal LFTs and then was worked up further with imaging. Pt's condition worsened and then he presented to ED. Further w/u showed CBD stones. Pt was started on IV Abx and was initially going to be transferred to [address] [place] [address] lack of ERCP here. Pt did have ERCP here today with Dr. Marco A Martinez.          Past Medical History:    Past Medical History:   Diagnosis Date    Abdominal aneurysm ([facility] 2012    4 cm x 3.6 cm followed at [address] [facility]    Arthritis 1/21/2014    [facility]ronic [facility]    Chronic sinusitis 1/21/2014    COPD, mild ([address] 1/21/2014    Diverticulosis 2014    Dr. 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 Brittany Vasquez    MEGHAN (obstructive sleep apnea) 1/21/2014    CPAP changed to bipap (July 2014)    Otitis media, serous, TM rupture 1/21/2014    ENT x 2 s/p PE tubes bilaterally, cipro Otic       Past Surgical History:    Past Surgical History:   Procedure Laterality Date COLONOSCOPY  2008    [facility]- normal.      COLONOSCOPY  12/2/14    diverticulosis, internal hemorrhoids    JOINT REPLACEMENT Right 2012    JOINT REPLACEMENT Left 2011    KNEE SURGERY Bilateral     replacement    TOE SURGERY Bilateral     toe nail surgery Dr. India Hoyos Right 2014    DR. Rascon        Current Medications:   Current Facility-Administered Medications   Medication Dose Route Frequency Provider Last Rate Last Admin    indomethacin (INDOCIN) 50 MG suppository 100 mg  100 mg Rectal Once Ajit Kasper MD        0.9 % sodium chloride infusion   IntraVENous Continuous Antonio Blount  mL/hr at 08/09/22 0645 New Bag at 08/09/22 0645    fluticasone (FLONASE) 50 MCG/ACT nasal spray 2 spray  2 spray Each Nostril Daily Nova AUSTIN Fajardo APRN - CNP        levothyroxine (SYNTHROID) tablet 175 mcg  175 mcg Oral Daily Shruti Coppersmith, APRN - CNP   175 mcg at 08/09/22 1526    traZODone (DESYREL) tablet 50 mg  50 mg Oral Nightly Nova Fajardo, APRN - CNP        sodium chloride flush 0.9 % injection 5-40 mL  5-40 mL IntraVENous 2 times per day Shruti Coppersmith, APRN - CNP   10 mL at 08/08/22 2128    sodium chloride flush 0.9 % injection 5-40 mL  5-40 mL IntraVENous PRN Nova I Scotty, APRN - CNP        0.9 % sodium chloride infusion  25 mL IntraVENous PRN Nova I Scotty APRN - CNP        enoxaparin Sodium (LOVENOX) injection 30 mg  30 mg SubCUTAneous BID Shruti Coppersmith, APRN - CNP   30 mg at 08/08/22 2122    polyethylene glycol (GLYCOLAX) packet 17 g  17 g Oral Daily PRN Nova I Scotty, APRN - CNP        sodium chloride flush 0.9 % injection 5-40 mL  5-40 mL IntraVENous 2 times per day Shruti Coppersmith, APRN - CNP   10 mL at 08/08/22 2122    sodium chloride flush 0.9 % injection 5-40 mL  5-40 mL IntraVENous PRN Nova I Scotty, APRN - CNP        0.9 % sodium chloride infusion   IntraVENous PRN Shruti Coppersmith, APRN - quit APril 2002   Substance and Sexual Activity    Alcohol use: No     Alcohol/week: 0.0 standard drinks    Drug use: No     Social Determinants of Health     Financial Resource Strain: Unknown    Difficulty of Paying Living Expenses: Patient refused   Food Insecurity: Unknown    Worried About Running Out of Food in the Last Year: Patient refused    Ran Out of Food in the Last Year: Patient refused       Family History:   Family History   Problem Relation Age of Onset    High Blood Pressure Mother     Allergies Mother     Anemia Mother     High Cholesterol Mother     Thyroid Disease Mother     Heart Disease Father     Allergies Father     Obesity Paternal Grandmother     Obesity Paternal Grandfather     Cancer Paternal Uncle         prostate cancer       REVIEW OFSYSTEMS:    Review of Systems   Constitutional:  Positive for chills, fatigue and fever. Gastrointestinal:  Positive for nausea. All other systems reviewed and are negative. PHYSICAL EXAM:  Vitals:    08/08/22 2128 08/09/22 0300 08/09/22 0700 08/09/22 0753   BP: (!) 148/72 (!) 115/55  139/66   Pulse: 57 (!) 45  60   Resp: 18 18  18   Temp: 98.2 °F (36.8 °C) 98.2 °F (36.8 °C)  98.5 °F (36.9 °C)   TempSrc: Oral Oral  Oral   SpO2: 98% 95%  98%   Weight:   (!) 303 lb 9 oz (137.7 kg)    Height:           Physical Exam  Vitals reviewed. Constitutional:       General: He is not in acute distress. Appearance: He is obese. HENT:      Head: Normocephalic and atraumatic. Right Ear: External ear normal.      Left Ear: External ear normal.      Nose: Nose normal.   Eyes:      General:         Right eye: No discharge. Left eye: No discharge. Extraocular Movements: Extraocular movements intact. Cardiovascular:      Rate and Rhythm: Normal rate. Pulmonary:      Effort: No respiratory distress. Abdominal:      Palpations: Abdomen is soft. Tenderness: There is no abdominal tenderness.    Musculoskeletal:         General: No swelling. Skin:     Coloration: Skin is not jaundiced. Neurological:      General: No focal deficit present. Mental Status: He is alert. Psychiatric:         Mood and Affect: Mood normal.         Thought Content:  Thought content normal.         DATA:    CBC:   Lab Results   Component Value Date/Time    WBC 5.5 08/09/2022 01:47 AM    RBC 3.18 08/09/2022 01:47 AM    HGB 10.2 08/09/2022 01:47 AM    HCT 32.4 08/09/2022 01:47 AM    .9 08/09/2022 01:47 AM    MCH 32.1 08/09/2022 01:47 AM    MCHC 31.5 08/09/2022 01:47 AM    RDW 15.1 08/09/2022 01:47 AM     08/09/2022 01:47 AM    MPV 9.9 08/09/2022 01:47 AM     CBC with Differential:    Lab Results   Component Value Date/Time    WBC 5.5 08/09/2022 01:47 AM    RBC 3.18 08/09/2022 01:47 AM    HGB 10.2 08/09/2022 01:47 AM    HCT 32.4 08/09/2022 01:47 AM     08/09/2022 01:47 AM    .9 08/09/2022 01:47 AM    MCH 32.1 08/09/2022 01:47 AM    MCHC 31.5 08/09/2022 01:47 AM    RDW 15.1 08/09/2022 01:47 AM    SEGSPCT 64.8 08/09/2022 01:47 AM    LYMPHOPCT 21.4 08/09/2022 01:47 AM    MONOPCT 11.1 08/09/2022 01:47 AM    EOSPCT 2.3 02/09/2018 12:00 AM    EOSPCT 2.3 02/09/2018 12:00 AM    BASOPCT 0.5 08/09/2022 01:47 AM    MONOSABS 0.6 08/09/2022 01:47 AM    LYMPHSABS 1.2 08/09/2022 01:47 AM    EOSABS 0.1 08/09/2022 01:47 AM    BASOSABS 0.0 08/09/2022 01:47 AM    DIFFTYPE AUTOMATED DIFFERENTIAL 08/09/2022 01:47 AM     WBC:    Lab Results   Component Value Date/Time    WBC 5.5 08/09/2022 01:47 AM     Platelets:    Lab Results   Component Value Date/Time     08/09/2022 01:47 AM     Hemoglobin/Hematocrit:    Lab Results   Component Value Date/Time    HGB 10.2 08/09/2022 01:47 AM    HCT 32.4 08/09/2022 01:47 AM     CMP:    Lab Results   Component Value Date/Time     08/09/2022 01:47 AM    K 4.1 08/09/2022 01:47 AM     08/09/2022 01:47 AM    CO2 21 08/09/2022 01:47 AM    BUN 9 08/09/2022 01:47 AM    CREATININE 0.7 08/09/2022 01:47 AM GFRAA >60 08/09/2022 01:47 AM    AGRATIO 1.5 07/27/2022 08:52 AM    LABGLOM >60 08/09/2022 01:47 AM    GLUCOSE 70 08/09/2022 01:47 AM    PROT 5.6 08/09/2022 01:47 AM    LABALBU 3.5 08/09/2022 01:47 AM    CALCIUM 8.5 08/09/2022 01:47 AM    BILITOT 1.7 08/09/2022 01:47 AM    ALKPHOS 449 08/09/2022 01:47 AM    AST 66 08/09/2022 01:47 AM    ALT 82 08/09/2022 01:47 AM     BMP:    Lab Results   Component Value Date/Time     08/09/2022 01:47 AM    K 4.1 08/09/2022 01:47 AM     08/09/2022 01:47 AM    CO2 21 08/09/2022 01:47 AM    BUN 9 08/09/2022 01:47 AM    LABALBU 3.5 08/09/2022 01:47 AM    CREATININE 0.7 08/09/2022 01:47 AM    CALCIUM 8.5 08/09/2022 01:47 AM    GFRAA >60 08/09/2022 01:47 AM    LABGLOM >60 08/09/2022 01:47 AM    GLUCOSE 70 08/09/2022 01:47 AM     Sodium:    Lab Results   Component Value Date/Time     08/09/2022 01:47 AM     Potassium:    Lab Results   Component Value Date/Time    K 4.1 08/09/2022 01:47 AM     BUN/Creatinine:    Lab Results   Component Value Date/Time    BUN 9 08/09/2022 01:47 AM    CREATININE 0.7 08/09/2022 01:47 AM     Hepatic Function Panel:    Lab Results   Component Value Date/Time    ALKPHOS 449 08/09/2022 01:47 AM    ALT 82 08/09/2022 01:47 AM    AST 66 08/09/2022 01:47 AM    PROT 5.6 08/09/2022 01:47 AM    BILITOT 1.7 08/09/2022 01:47 AM    BILIDIR 0.8 08/08/2022 04:25 AM    IBILI 0.6 08/08/2022 04:25 AM    LABALBU 3.5 08/09/2022 01:47 AM     Albumin:    Lab Results   Component Value Date/Time    LABALBU 3.5 08/09/2022 01:47 AM     Calcium:    Lab Results   Component Value Date/Time    CALCIUM 8.5 08/09/2022 01:47 AM     Ionized Calcium:  No results found for: IONCA  Magnesium:    Lab Results   Component Value Date/Time    MG 2.1 09/13/2019 12:00 AM     Phosphorus:  No results found for: PHOS  LDH:  No results found for: LDH  Uric Acid:  No results found for: LABURIC, URICACID  PT/INR:    Lab Results   Component Value Date/Time    PROTIME 13.4 08/05/2022 10:34 AM INR 1.04 08/05/2022 10:34 AM     Warfarin PT/INR:  No components found for: PTPATWAR, PTINRWAR    CT A/P:  1. Intrahepatic and extrahepatic biliary ductal dilatation without obvious   pancreatic head mass. I am concerned for an ampullary malignancy and   cholangiocarcinoma. I would recommend further evaluation with either MRCP or   ERCP. 2. Cholelithiasis with distended gallbladder. I do raise the possibility of   acute cholecystitis given the location of the gallstone. 3. Small fat containing inguinal hernias. 4. Abdominal aortic aneurysm measuring 3.7 x 3.3 cm. US:  Intrahepatic and extrahepatic biliary ductal dilatation with a dilated   gallbladder. Common bile duct measures up to 19 mm in diameter. Cholelithiasis without specific sonographic evidence of acute cholecystitis. Peripancreatic enlarged lymph node measuring 23 x 11 x 19 mm. Recommend MRCP for further evaluation. MRCP:  1. Choledocholithiasis with associated biliary dilatation. No evidence for   acute cholecystitis, although small stones are present within the cystic duct   as well. 2.  Infrarenal aortic aneurysm measures up to 3.5 cm. IMPRESSION:        Patient Active Problem List:     MEGHAN (obstructive sleep apnea)- on BIPAP     COPD, mild (HCC)     Arthritis     Chronic sinusitis     Acquired hypothyroidism     Dyslipidemia     HTN (hypertension)     Anemia     NSAID long-term use     GERD (gastroesophageal reflux disease)- due to chronic NSAID use     Elevated fasting glucose     External hemorrhoids     AAA (abdominal aortic aneurysm) without rupture ([address]- stable 2020, due 2023     Primary insomnia     History of recurrent ear infection     Positive FIT (fecal immunochemical test)     Sepsis ([address]      65 y/o M with cholelithiasis, choledocholithiasis     PLAN:    -Reviewed labs, images, exam findings with pt. -Reviewed Dr. Meng Ferndale brief op note.   -Continue IVF, IV Abx.  -Clears.  NPO at midnight.   -Check lipase, hepatic panel tomorrow.   -Ultimately will need lap alina prior to d/c. Timing TBD based on clinical course and labs.          Ina King MD

## 2022-08-09 NOTE — CARE COORDINATION
CM reviewed notes. Pt to have an ERCP today. Will follow. . [address]  1230 CM collaborated with Ghassan Khan, charge nurse. Pt is no longer going to [address] [place] [address]. Earnestine Simon CM will follow. Pt currently out of room.  [address]

## 2022-08-09 NOTE — ANESTHESIA PRE PROCEDURE
mg by mouth daily. Historical Provider, MD   saline nasal gel (AYR) GEL by Nasal route. Historical Provider, MD   mometasone (NASONEX) 50 MCG/ACT nasal spray 2 sprays by Nasal route 2 times daily. Historical Provider, MD   flunisolide (NASALIDE) 25 MCG/ACT (0.025%) SOLN Inhale 2 sprays into the lungs every 12 hours. Historical Provider, MD   SODIUM FLUORIDE, DENTAL GEL, 1.1 % CREA Place  onto teeth. Historical Provider, MD   ciprofloxacin-dexamethasone (CIPRODEX) otic suspension 5 drops continuous.  5 drop each ear once a week  Patient not taking: Reported on 8/8/2022    Historical Provider, MD       Current medications:    Current Facility-Administered Medications   Medication Dose Route Frequency Provider Last Rate Last Admin    sodium chloride (OCEAN, BABY AYR) 0.65 % nasal spray 1 spray  1 spray Each Nostril PRN Danika Pham MD        0.9 % sodium chloride infusion   IntraVENous Continuous Danika Pham MD 50 mL/hr at 08/09/22 1413 New Bag at 08/09/22 1413    fluticasone (FLONASE) 50 MCG/ACT nasal spray 2 spray  2 spray Each Nostril Daily Danika Pham MD        levothyroxine (SYNTHROID) tablet 175 mcg  175 mcg Oral Daily Danika Pham MD   175 mcg at 08/09/22 4341    traZODone (DESYREL) tablet 50 mg  50 mg Oral Nightly Danika Pham MD        sodium chloride flush 0.9 % injection 5-40 mL  5-40 mL IntraVENous 2 times per day Danika Pham MD   10 mL at 08/08/22 2128    sodium chloride flush 0.9 % injection 5-40 mL  5-40 mL IntraVENous PRN Danika Pham MD        0.9 % sodium chloride infusion  25 mL IntraVENous PRN Danika Pham MD        enoxaparin Sodium (LOVENOX) injection 30 mg  30 mg SubCUTAneous BID Danika Pham MD   30 mg at 08/08/22 2122    polyethylene glycol (GLYCOLAX) packet 17 g  17 g Oral Daily PRN Danika Pham MD        sodium chloride flush 0.9 % injection 5-40 mL  5-40 mL IntraVENous 2 times per day Danika Pham MD   10 mL at 08/08/22 2122    sodium chloride flush 0.9 % injection 5-40 mL  5-40 mL IntraVENous PRN Too Olsen MD        0.9 % sodium chloride infusion   IntraVENous PRN Too Olsen MD        potassium chloride (KLOR-CON M) extended release tablet 40 mEq  40 mEq Oral PRN Too Olsen MD        Or    potassium bicarb-citric acid (EFFER-K) effervescent tablet 40 mEq  40 mEq Oral PRN Too Olsen MD        Or    potassium chloride 10 mEq/100 mL IVPB (Peripheral Line)  10 mEq IntraVENous PRN Too Olsen MD        magnesium sulfate 2000 mg in 50 mL IVPB premix  2,000 mg IntraVENous PRN Too Olsen MD        oxyCODONE (ROXICODONE) immediate release tablet 5 mg  5 mg Oral Q4H PRN Too Olsen MD        Or    oxyCODONE HCl (OXY-IR) immediate release tablet 10 mg  10 mg Oral Q4H PRN Too Olsen MD        [facility]) tablet 12.5 mg  12.5 mg Oral Q6H PRN Too Olsen MD        Or    ondansetron [facility]) injection 4 mg  4 mg IntraVENous Q6H PRN Too Olsen MD        metoclopramide [facility]) injection 10 mg  10 mg IntraVENous Q6H PRN Too Olsen MD        diphenhydrAMINE (BENADRYL) injection 25 mg  25 mg IntraVENous Q6H PRN Too Olsen MD        piperacillin-tazobactam (ZOSYN) 3,375 mg in dextrose 5 % 50 mL IVPB (mini-bag)  3,375 mg IntraVENous Q8H Too Olsen MD 12.5 mL/hr at 08/09/22 0705 3,375 mg at 08/09/22 7305       Allergies:     Allergies   Allergen Reactions    Dilaudid [Hydromorphone Hcl] Itching    Hydrocodone Itching    Morphine Anaphylaxis    Ultram [Tramadol] Itching    Vicodin [Hydrocodone-Acetaminophen] Itching    Keflex [Cephalexin] Other (See Comments)     Extreme hyperacitivity    Sulfa Antibiotics Itching and Rash       Problem List:    Patient Active Problem List   Diagnosis Code    MEGHAN (obstructive sleep apnea)- on BIPAP G47.33    COPD, mild (HCC) J44.9    Arthritis M19.90    Chronic sinusitis J32.9    Acquired hypothyroidism E03.9  Dyslipidemia E78.5    HTN (hypertension) I10    Anemia D64.9    NSAID long-term use Z79.1    GERD (gastroesophageal reflux disease)- due to chronic NSAID use K21.9    Elevated fasting glucose R73.01    External hemorrhoids K64.4    AAA (abdominal aortic aneurysm) without rupture (HCC)- stable 2020, due 2023 I71.4    Primary insomnia F51.01    History of recurrent ear infection Z86.69    Positive FIT (fecal immunochemical test) R19.5    Sepsis (HCC) A41.9    Common bile duct stone K80.50    Ascending cholangitis K83.09       Past Medical History:        Diagnosis Date    Abdominal aneurysm ([facility] 2012    4 cm x 3.6 cm followed at [id number] [place] [address] 1/21/2014    [facility]    Chronic sinusitis 1/21/2014    COPD, mild ([facility] 1/21/2014    Diverticulosis 2014    Dr. Tracy ORTEGA Scope    Dyslipidemia 1/21/2014    HTN (hypertension) 1/21/2014    Hyperlipidemia     Hypothyroidism 1/21/2014    Ingrown toenail 2013, 2014    podiatry clinic at [place] [address]. 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 Rosealee Sandifer MEGHAN (obstructive sleep apnea) 1/21/2014    CPAP changed to bipap (July 2014)    Otitis media, serous, TM rupture 1/21/2014    ENT x 2 s/p PE tubes bilaterally, cipro Otic       Past Surgical History:        Procedure Laterality Date    [facility]COPY  2008    [facility]- normal.      COLONOSCOPY  12/02/2014    diverticulosis, internal hemorrhoids    ERCP  08/09/2022    ERCP SPHINCTER/PAPILLOTOMY performed by Dr. Teena Singh at [address] [place] [address] Right 2012    JOINT REPLACEMENT Left 2011    KNEE SURGERY Bilateral     replacement    TOE SURGERY Bilateral     toe nail surgery Dr. Juany Goldman Right 2014    DR. Rascon        Social History:    Social History     Tobacco Use    Smoking status: Former     Packs/day: 2.00     Years: 36.00     Pack years: 72.00     Types: Cigarettes     Quit date: 2002     Years since quittin.3    Smokeless tobacco: Never    Tobacco comments:     quit 2002   Substance Use Topics    Alcohol use: No     Alcohol/week: 0.0 standard drinks                                Counseling given: Not Answered  Tobacco comments: quit 2002      Vital Signs (Current):   Vitals:    22 1415 22 1420 22 1425 22 1430   BP: (!) 120/95 133/76 130/75 130/73   Pulse: 53 53 54 59   Resp: 16 16     Temp:  36.2 °C (97.1 °F)     TempSrc:  Temporal     SpO2: 96% 95% 97% 96%   Weight:       Height:                                                  BP Readings from Last 3 Encounters:   22 130/73   22 130/80   22 122/70       NPO Status: Time of last liquid consumption: 07                        Time of last solid consumption: 1000                        Date of last liquid consumption: 22                        Date of last solid food consumption: 22    BMI:   Wt Readings from Last 3 Encounters:   22 (!) 303 lb 9 oz (137.7 kg)   22 (!) 302 lb (137 kg)   22 (!) 303 lb 9.6 oz (137.7 kg)     Body mass index is 44.83 kg/m².     CBC:   Lab Results   Component Value Date/Time    WBC 5.5 2022 01:47 AM    RBC 3.18 2022 01:47 AM    HGB 10.2 2022 01:47 AM    HCT 32.4 2022 01:47 AM    .9 2022 01:47 AM    RDW 15.1 2022 01:47 AM     2022 01:47 AM       CMP:   Lab Results   Component Value Date/Time     2022 01:47 AM    K 4.1 2022 01:47 AM     2022 01:47 AM    CO2 21 2022 01:47 AM    BUN 9 2022 01:47 AM    CREATININE 0.7 2022 01:47 AM    GFRAA >60 2022 01:47 AM    AGRATIO 1.5 2022 08:52 AM    LABGLOM >60 2022 01:47 AM    GLUCOSE 70 2022 01:47 AM    PROT 5.6 2022 01:47 AM    CALCIUM 8.5 08/09/2022 01:47 AM    BILITOT 1.7 08/09/2022 01:47 AM    ALKPHOS 449 08/09/2022 01:47 AM    AST 66 08/09/2022 01:47 AM    ALT 82 08/09/2022 01:47 AM       POC Tests: No results for input(s): POCGLU, POCNA, POCK, POCCL, POCBUN, POCHEMO, POCHCT in the last 72 hours. Coags:   Lab Results   Component Value Date/Time    PROTIME 13.4 08/05/2022 10:34 AM    INR 1.04 08/05/2022 10:34 AM    APTT 31.4 11/04/2014 08:03 AM       HCG (If Applicable): No results found for: PREGTESTUR, PREGSERUM, HCG, HCGQUANT     ABGs: No results found for: PHART, PO2ART, LFH2BAS, IUO1PFB, BEART, O4YASQRS     Type & Screen (If Applicable):  No results found for: LABABO, LABRH    Drug/Infectious Status (If Applicable):  Lab Results   Component Value Date/Time    HEPCAB NON REACTIVE 08/05/2022 10:34 AM       COVID-19 Screening (If Applicable):   Lab Results   Component Value Date/Time    COVID19 NOT DETECTED 08/07/2022 11:51 AM           Anesthesia Evaluation    Airway: Mallampati: II  TM distance: >3 FB   Neck ROM: full  Mouth opening: > = 3 FB   Dental:          Pulmonary:normal exam    (+) COPD:  sleep apnea:                             Cardiovascular:    (+) hypertension:,                   Neuro/Psych:               GI/Hepatic/Renal:   (+) GERD:, morbid obesity          Endo/Other:    (+) hypothyroidism::., .                 Abdominal:             Vascular: Other Findings:           Anesthesia Plan      general     ASA 2       Induction: intravenous. MIPS: Postoperative opioids intended. Anesthetic plan and risks discussed with patient. Plan discussed with CRNA.     Attending anesthesiologist reviewed and agrees with Pre Eval content                DEYA Kumar - CRNA   8/9/2022

## 2022-08-10 ENCOUNTER — ANESTHESIA (OUTPATIENT)
Dept: OPERATING ROOM | Age: 72
DRG: 854 | End: 2022-08-10
Payer: MEDICARE

## 2022-08-10 PROBLEM — G47.33 OBSTRUCTIVE SLEEP APNEA TREATED WITH BIPAP: Status: ACTIVE | Noted: 2022-08-10

## 2022-08-10 LAB
ALBUMIN SERPL-MCNC: 3.3 GM/DL (ref 3.4–5)
ALBUMIN SERPL-MCNC: 3.3 GM/DL (ref 3.4–5)
ALP BLD-CCNC: 400 IU/L (ref 40–128)
ALP BLD-CCNC: 400 IU/L (ref 40–129)
ALPHA-1 ANTITRYPSIN PHENOTYPE: ABNORMAL
ALPHA-1 ANTITRYPSIN: 236 MG/DL (ref 90–200)
ALT SERPL-CCNC: 81 U/L (ref 10–40)
ALT SERPL-CCNC: 81 U/L (ref 10–40)
ANION GAP SERPL CALCULATED.3IONS-SCNC: 13 MMOL/L (ref 4–16)
AST SERPL-CCNC: 55 IU/L (ref 15–37)
AST SERPL-CCNC: 55 IU/L (ref 15–37)
BASOPHILS ABSOLUTE: 0 K/CU MM
BASOPHILS RELATIVE PERCENT: 0 % (ref 0–1)
BILIRUB SERPL-MCNC: 1 MG/DL (ref 0–1)
BILIRUB SERPL-MCNC: 1 MG/DL (ref 0–1)
BILIRUBIN DIRECT: 0.6 MG/DL (ref 0–0.3)
BILIRUBIN, INDIRECT: 0.4 MG/DL (ref 0–0.7)
BUN BLDV-MCNC: 12 MG/DL (ref 6–23)
CALCIUM SERPL-MCNC: 8.4 MG/DL (ref 8.3–10.6)
CHLORIDE BLD-SCNC: 106 MMOL/L (ref 99–110)
CO2: 19 MMOL/L (ref 21–32)
CREAT SERPL-MCNC: 0.8 MG/DL (ref 0.9–1.3)
DIFFERENTIAL TYPE: ABNORMAL
EOSINOPHILS ABSOLUTE: 0 K/CU MM
EOSINOPHILS RELATIVE PERCENT: 0.2 % (ref 0–3)
GFR AFRICAN AMERICAN: >60 ML/MIN/1.73M2
GFR NON-AFRICAN AMERICAN: >60 ML/MIN/1.73M2
GLUCOSE BLD-MCNC: 84 MG/DL (ref 70–99)
HCT VFR BLD CALC: 32.7 % (ref 42–52)
HEMOGLOBIN: 10.4 GM/DL (ref 13.5–18)
IMMATURE NEUTROPHIL %: 0.7 % (ref 0–0.43)
LIPASE: 30 IU/L (ref 13–60)
LYMPHOCYTES ABSOLUTE: 1 K/CU MM
LYMPHOCYTES RELATIVE PERCENT: 21.9 % (ref 24–44)
MCH RBC QN AUTO: 32.2 PG (ref 27–31)
MCHC RBC AUTO-ENTMCNC: 31.8 % (ref 32–36)
MCV RBC AUTO: 101.2 FL (ref 78–100)
MONOCYTES ABSOLUTE: 0.3 K/CU MM
MONOCYTES RELATIVE PERCENT: 6.3 % (ref 0–4)
NUCLEATED RBC %: 0 %
PDW BLD-RTO: 15.4 % (ref 11.7–14.9)
PLATELET # BLD: 241 K/CU MM (ref 140–440)
PMV BLD AUTO: 9.7 FL (ref 7.5–11.1)
POTASSIUM SERPL-SCNC: 4.2 MMOL/L (ref 3.5–5.1)
RBC # BLD: 3.23 M/CU MM (ref 4.6–6.2)
SEGMENTED NEUTROPHILS ABSOLUTE COUNT: 3.1 K/CU MM
SEGMENTED NEUTROPHILS RELATIVE PERCENT: 70.9 % (ref 36–66)
SODIUM BLD-SCNC: 138 MMOL/L (ref 135–145)
TOTAL IMMATURE NEUTOROPHIL: 0.03 K/CU MM
TOTAL NUCLEATED RBC: 0 K/CU MM
TOTAL PROTEIN: 5.6 GM/DL (ref 6.4–8.2)
TOTAL PROTEIN: 5.6 GM/DL (ref 6.4–8.2)
WBC # BLD: 4.4 K/CU MM (ref 4–10.5)

## 2022-08-10 PROCEDURE — 36415 COLL VENOUS BLD VENIPUNCTURE: CPT

## 2022-08-10 PROCEDURE — 6370000000 HC RX 637 (ALT 250 FOR IP): Performed by: SURGERY

## 2022-08-10 PROCEDURE — 6360000002 HC RX W HCPCS

## 2022-08-10 PROCEDURE — 7100000000 HC PACU RECOVERY - FIRST 15 MIN: Performed by: SURGERY

## 2022-08-10 PROCEDURE — 2580000003 HC RX 258: Performed by: INTERNAL MEDICINE

## 2022-08-10 PROCEDURE — 3700000000 HC ANESTHESIA ATTENDED CARE: Performed by: SURGERY

## 2022-08-10 PROCEDURE — 85025 COMPLETE CBC W/AUTO DIFF WBC: CPT

## 2022-08-10 PROCEDURE — 88304 TISSUE EXAM BY PATHOLOGIST: CPT | Performed by: PATHOLOGY

## 2022-08-10 PROCEDURE — 2720000010 HC SURG SUPPLY STERILE: Performed by: SURGERY

## 2022-08-10 PROCEDURE — 2500000003 HC RX 250 WO HCPCS

## 2022-08-10 PROCEDURE — 3600000004 HC SURGERY LEVEL 4 BASE: Performed by: SURGERY

## 2022-08-10 PROCEDURE — 2580000003 HC RX 258: Performed by: SURGERY

## 2022-08-10 PROCEDURE — 99232 SBSQ HOSP IP/OBS MODERATE 35: CPT | Performed by: SURGERY

## 2022-08-10 PROCEDURE — 2500000003 HC RX 250 WO HCPCS: Performed by: SURGERY

## 2022-08-10 PROCEDURE — 1200000000 HC SEMI PRIVATE

## 2022-08-10 PROCEDURE — 6360000002 HC RX W HCPCS: Performed by: ANESTHESIOLOGY

## 2022-08-10 PROCEDURE — 80053 COMPREHEN METABOLIC PANEL: CPT

## 2022-08-10 PROCEDURE — 6360000002 HC RX W HCPCS: Performed by: SURGERY

## 2022-08-10 PROCEDURE — 7100000001 HC PACU RECOVERY - ADDTL 15 MIN: Performed by: SURGERY

## 2022-08-10 PROCEDURE — 3700000001 HC ADD 15 MINUTES (ANESTHESIA): Performed by: SURGERY

## 2022-08-10 PROCEDURE — 47562 LAPAROSCOPIC CHOLECYSTECTOMY: CPT | Performed by: NURSE PRACTITIONER

## 2022-08-10 PROCEDURE — 0FT44ZZ RESECTION OF GALLBLADDER, PERCUTANEOUS ENDOSCOPIC APPROACH: ICD-10-PCS | Performed by: SURGERY

## 2022-08-10 PROCEDURE — 80076 HEPATIC FUNCTION PANEL: CPT

## 2022-08-10 PROCEDURE — 2709999900 HC NON-CHARGEABLE SUPPLY: Performed by: SURGERY

## 2022-08-10 PROCEDURE — 94761 N-INVAS EAR/PLS OXIMETRY MLT: CPT

## 2022-08-10 PROCEDURE — 3600000014 HC SURGERY LEVEL 4 ADDTL 15MIN: Performed by: SURGERY

## 2022-08-10 PROCEDURE — 99233 SBSQ HOSP IP/OBS HIGH 50: CPT | Performed by: INTERNAL MEDICINE

## 2022-08-10 PROCEDURE — 83690 ASSAY OF LIPASE: CPT

## 2022-08-10 PROCEDURE — 6360000002 HC RX W HCPCS: Performed by: INTERNAL MEDICINE

## 2022-08-10 PROCEDURE — 47562 LAPAROSCOPIC CHOLECYSTECTOMY: CPT | Performed by: SURGERY

## 2022-08-10 RX ORDER — ACETAMINOPHEN 325 MG/1
650 TABLET ORAL EVERY 4 HOURS PRN
Status: DISCONTINUED | OUTPATIENT
Start: 2022-08-10 | End: 2022-08-12 | Stop reason: HOSPADM

## 2022-08-10 RX ORDER — DIPHENHYDRAMINE HCL 25 MG
25 TABLET ORAL EVERY 12 HOURS PRN
Status: DISCONTINUED | OUTPATIENT
Start: 2022-08-10 | End: 2022-08-12 | Stop reason: HOSPADM

## 2022-08-10 RX ORDER — OXYCODONE HYDROCHLORIDE 5 MG/1
5 TABLET ORAL
Status: DISCONTINUED | OUTPATIENT
Start: 2022-08-10 | End: 2022-08-10 | Stop reason: HOSPADM

## 2022-08-10 RX ORDER — IPRATROPIUM BROMIDE AND ALBUTEROL SULFATE 2.5; .5 MG/3ML; MG/3ML
1 SOLUTION RESPIRATORY (INHALATION)
Status: DISCONTINUED | OUTPATIENT
Start: 2022-08-10 | End: 2022-08-10 | Stop reason: HOSPADM

## 2022-08-10 RX ORDER — HYDRALAZINE HYDROCHLORIDE 20 MG/ML
10 INJECTION INTRAMUSCULAR; INTRAVENOUS
Status: DISCONTINUED | OUTPATIENT
Start: 2022-08-10 | End: 2022-08-10 | Stop reason: HOSPADM

## 2022-08-10 RX ORDER — DIPHENHYDRAMINE HYDROCHLORIDE 50 MG/ML
12.5 INJECTION INTRAMUSCULAR; INTRAVENOUS
Status: DISCONTINUED | OUTPATIENT
Start: 2022-08-10 | End: 2022-08-10 | Stop reason: HOSPADM

## 2022-08-10 RX ORDER — PROPOFOL 10 MG/ML
INJECTION, EMULSION INTRAVENOUS PRN
Status: DISCONTINUED | OUTPATIENT
Start: 2022-08-10 | End: 2022-08-10 | Stop reason: SDUPTHER

## 2022-08-10 RX ORDER — LORAZEPAM 2 MG/ML
0.5 INJECTION INTRAMUSCULAR
Status: DISCONTINUED | OUTPATIENT
Start: 2022-08-10 | End: 2022-08-10 | Stop reason: HOSPADM

## 2022-08-10 RX ORDER — GLYCOPYRROLATE 0.2 MG/ML
INJECTION INTRAMUSCULAR; INTRAVENOUS PRN
Status: DISCONTINUED | OUTPATIENT
Start: 2022-08-10 | End: 2022-08-10 | Stop reason: SDUPTHER

## 2022-08-10 RX ORDER — SODIUM CHLORIDE, SODIUM LACTATE, POTASSIUM CHLORIDE, CALCIUM CHLORIDE 600; 310; 30; 20 MG/100ML; MG/100ML; MG/100ML; MG/100ML
INJECTION, SOLUTION INTRAVENOUS CONTINUOUS
Status: DISCONTINUED | OUTPATIENT
Start: 2022-08-10 | End: 2022-08-12

## 2022-08-10 RX ORDER — ONDANSETRON 2 MG/ML
4 INJECTION INTRAMUSCULAR; INTRAVENOUS
Status: DISCONTINUED | OUTPATIENT
Start: 2022-08-10 | End: 2022-08-10 | Stop reason: HOSPADM

## 2022-08-10 RX ORDER — BUPIVACAINE HYDROCHLORIDE 5 MG/ML
INJECTION, SOLUTION EPIDURAL; INTRACAUDAL
Status: COMPLETED | OUTPATIENT
Start: 2022-08-10 | End: 2022-08-10

## 2022-08-10 RX ORDER — PROCHLORPERAZINE EDISYLATE 5 MG/ML
5 INJECTION INTRAMUSCULAR; INTRAVENOUS
Status: DISCONTINUED | OUTPATIENT
Start: 2022-08-10 | End: 2022-08-10 | Stop reason: HOSPADM

## 2022-08-10 RX ORDER — MEPERIDINE HYDROCHLORIDE 25 MG/ML
12.5 INJECTION INTRAMUSCULAR; INTRAVENOUS; SUBCUTANEOUS ONCE
Status: DISCONTINUED | OUTPATIENT
Start: 2022-08-10 | End: 2022-08-10 | Stop reason: HOSPADM

## 2022-08-10 RX ORDER — DEXAMETHASONE SODIUM PHOSPHATE 4 MG/ML
INJECTION, SOLUTION INTRA-ARTICULAR; INTRALESIONAL; INTRAMUSCULAR; INTRAVENOUS; SOFT TISSUE PRN
Status: DISCONTINUED | OUTPATIENT
Start: 2022-08-10 | End: 2022-08-10 | Stop reason: SDUPTHER

## 2022-08-10 RX ORDER — DEXMEDETOMIDINE HYDROCHLORIDE 4 UG/ML
INJECTION, SOLUTION INTRAVENOUS CONTINUOUS PRN
Status: DISCONTINUED | OUTPATIENT
Start: 2022-08-10 | End: 2022-08-10 | Stop reason: SDUPTHER

## 2022-08-10 RX ORDER — FENTANYL CITRATE 50 UG/ML
50 INJECTION, SOLUTION INTRAMUSCULAR; INTRAVENOUS EVERY 5 MIN PRN
Status: DISCONTINUED | OUTPATIENT
Start: 2022-08-10 | End: 2022-08-10 | Stop reason: HOSPADM

## 2022-08-10 RX ORDER — HYDROCODONE BITARTRATE AND ACETAMINOPHEN 5; 325 MG/1; MG/1
1 TABLET ORAL EVERY 6 HOURS PRN
Status: DISCONTINUED | OUTPATIENT
Start: 2022-08-10 | End: 2022-08-12 | Stop reason: HOSPADM

## 2022-08-10 RX ORDER — KETAMINE HCL 50MG/ML(1)
SYRINGE (ML) INTRAVENOUS PRN
Status: DISCONTINUED | OUTPATIENT
Start: 2022-08-10 | End: 2022-08-10 | Stop reason: SDUPTHER

## 2022-08-10 RX ORDER — DEXTROSE MONOHYDRATE 100 MG/ML
INJECTION, SOLUTION INTRAVENOUS CONTINUOUS PRN
Status: DISCONTINUED | OUTPATIENT
Start: 2022-08-10 | End: 2022-08-10 | Stop reason: HOSPADM

## 2022-08-10 RX ORDER — ONDANSETRON 2 MG/ML
INJECTION INTRAMUSCULAR; INTRAVENOUS PRN
Status: DISCONTINUED | OUTPATIENT
Start: 2022-08-10 | End: 2022-08-10 | Stop reason: SDUPTHER

## 2022-08-10 RX ORDER — SODIUM CHLORIDE 0.9 % (FLUSH) 0.9 %
5-40 SYRINGE (ML) INJECTION PRN
Status: DISCONTINUED | OUTPATIENT
Start: 2022-08-10 | End: 2022-08-10 | Stop reason: HOSPADM

## 2022-08-10 RX ORDER — LIDOCAINE HYDROCHLORIDE 20 MG/ML
INJECTION, SOLUTION INFILTRATION; PERINEURAL CONTINUOUS PRN
Status: DISCONTINUED | OUTPATIENT
Start: 2022-08-10 | End: 2022-08-10 | Stop reason: SDUPTHER

## 2022-08-10 RX ORDER — MORPHINE SULFATE 2 MG/ML
2 INJECTION, SOLUTION INTRAMUSCULAR; INTRAVENOUS
Status: DISCONTINUED | OUTPATIENT
Start: 2022-08-10 | End: 2022-08-12 | Stop reason: HOSPADM

## 2022-08-10 RX ORDER — ROCURONIUM BROMIDE 10 MG/ML
INJECTION, SOLUTION INTRAVENOUS PRN
Status: DISCONTINUED | OUTPATIENT
Start: 2022-08-10 | End: 2022-08-10 | Stop reason: SDUPTHER

## 2022-08-10 RX ORDER — SODIUM CHLORIDE 9 MG/ML
25 INJECTION, SOLUTION INTRAVENOUS PRN
Status: DISCONTINUED | OUTPATIENT
Start: 2022-08-10 | End: 2022-08-10 | Stop reason: HOSPADM

## 2022-08-10 RX ORDER — SODIUM CHLORIDE 0.9 % (FLUSH) 0.9 %
5-40 SYRINGE (ML) INJECTION EVERY 12 HOURS SCHEDULED
Status: DISCONTINUED | OUTPATIENT
Start: 2022-08-10 | End: 2022-08-10 | Stop reason: HOSPADM

## 2022-08-10 RX ADMIN — TRAZODONE HYDROCHLORIDE 50 MG: 50 TABLET ORAL at 22:52

## 2022-08-10 RX ADMIN — SUGAMMADEX 400 MG: 100 INJECTION, SOLUTION INTRAVENOUS at 12:28

## 2022-08-10 RX ADMIN — GLYCOPYRROLATE 0.4 MG: 0.2 INJECTION INTRAMUSCULAR; INTRAVENOUS at 11:45

## 2022-08-10 RX ADMIN — SODIUM CHLORIDE: 9 INJECTION, SOLUTION INTRAVENOUS at 06:45

## 2022-08-10 RX ADMIN — ROCURONIUM BROMIDE 100 MG: 10 SOLUTION INTRAVENOUS at 11:28

## 2022-08-10 RX ADMIN — FENTANYL CITRATE 50 MCG: 50 INJECTION, SOLUTION INTRAMUSCULAR; INTRAVENOUS at 12:59

## 2022-08-10 RX ADMIN — SODIUM CHLORIDE: 9 INJECTION, SOLUTION INTRAVENOUS at 13:12

## 2022-08-10 RX ADMIN — SODIUM CHLORIDE: 9 INJECTION, SOLUTION INTRAVENOUS at 04:11

## 2022-08-10 RX ADMIN — MORPHINE SULFATE 2 MG: 2 INJECTION, SOLUTION INTRAMUSCULAR; INTRAVENOUS at 15:14

## 2022-08-10 RX ADMIN — PIPERACILLIN AND TAZOBACTAM 3375 MG: 3; .375 INJECTION, POWDER, LYOPHILIZED, FOR SOLUTION INTRAVENOUS at 15:27

## 2022-08-10 RX ADMIN — FLUTICASONE PROPIONATE 2 SPRAY: 50 SPRAY, METERED NASAL at 22:52

## 2022-08-10 RX ADMIN — DEXMEDETOMIDINE HYDROCHLORIDE 0.4 MCG/KG/HR: 4 INJECTION, SOLUTION INTRAVENOUS at 11:39

## 2022-08-10 RX ADMIN — DEXAMETHASONE SODIUM PHOSPHATE 8 MG: 4 INJECTION, SOLUTION INTRAMUSCULAR; INTRAVENOUS at 11:28

## 2022-08-10 RX ADMIN — SODIUM CHLORIDE: 9 INJECTION, SOLUTION INTRAVENOUS at 23:01

## 2022-08-10 RX ADMIN — Medication 25 MG: at 11:28

## 2022-08-10 RX ADMIN — PIPERACILLIN AND TAZOBACTAM 3375 MG: 3; .375 INJECTION, POWDER, LYOPHILIZED, FOR SOLUTION INTRAVENOUS at 23:03

## 2022-08-10 RX ADMIN — Medication 25 MG: at 11:45

## 2022-08-10 RX ADMIN — LIDOCAINE HYDROCHLORIDE 2 MG/KG/HR: 20 INJECTION, SOLUTION INFILTRATION; PERINEURAL at 11:39

## 2022-08-10 RX ADMIN — PIPERACILLIN AND TAZOBACTAM 3375 MG: 3; .375 INJECTION, POWDER, LYOPHILIZED, FOR SOLUTION INTRAVENOUS at 06:46

## 2022-08-10 RX ADMIN — ONDANSETRON 4 MG: 2 INJECTION INTRAMUSCULAR; INTRAVENOUS at 12:26

## 2022-08-10 RX ADMIN — ENOXAPARIN SODIUM 30 MG: 100 INJECTION SUBCUTANEOUS at 22:51

## 2022-08-10 RX ADMIN — PROPOFOL 150 MG: 10 INJECTION, EMULSION INTRAVENOUS at 11:28

## 2022-08-10 ASSESSMENT — PAIN DESCRIPTION - LOCATION
LOCATION: ABDOMEN

## 2022-08-10 ASSESSMENT — PAIN DESCRIPTION - DESCRIPTORS
DESCRIPTORS: DISCOMFORT
DESCRIPTORS: DISCOMFORT

## 2022-08-10 ASSESSMENT — PAIN DESCRIPTION - ORIENTATION
ORIENTATION: MID
ORIENTATION: UPPER;LEFT;RIGHT
ORIENTATION: MID

## 2022-08-10 ASSESSMENT — PAIN DESCRIPTION - PAIN TYPE
TYPE: SURGICAL PAIN
TYPE: SURGICAL PAIN

## 2022-08-10 ASSESSMENT — PAIN DESCRIPTION - ONSET: ONSET: ON-GOING

## 2022-08-10 ASSESSMENT — ENCOUNTER SYMPTOMS
COLOR CHANGE: 0
SHORTNESS OF BREATH: 0
ABDOMINAL PAIN: 0

## 2022-08-10 ASSESSMENT — PAIN SCALES - GENERAL
PAINLEVEL_OUTOF10: 3
PAINLEVEL_OUTOF10: 5
PAINLEVEL_OUTOF10: 0
PAINLEVEL_OUTOF10: 4
PAINLEVEL_OUTOF10: 8

## 2022-08-10 ASSESSMENT — PAIN - FUNCTIONAL ASSESSMENT
PAIN_FUNCTIONAL_ASSESSMENT: 0-10
PAIN_FUNCTIONAL_ASSESSMENT: PREVENTS OR INTERFERES SOME ACTIVE ACTIVITIES AND ADLS

## 2022-08-10 ASSESSMENT — PAIN DESCRIPTION - FREQUENCY: FREQUENCY: CONTINUOUS

## 2022-08-10 NOTE — PROGRESS NOTES
1231 Patient arrived to [facility], monitors applied and alarms on. Received report from [address].   1300 Medicated for complaint of pain. 1307 Patient awake and conversating. States pain med has been effective but realizes gas pain will take time to pass. Oral mouthswabs given per patient request.    1325 Report called to [place]. Patient friend updated on status. 1330 Transported to room 1103 with belongings. Patient denied any further complaints at this time, stated he would like to just sleep.

## 2022-08-10 NOTE — PROGRESS NOTES
V2.0  [facility] [place]      Name:  Sarah Larose /Age/Sex: 1950  (66 y.o. male)   MRN & CSN:  [id number] & [id number] Encounter Date/Time: 2022 2:29 PM EDT    Location:  [facility] PCP: Betty Cruz MD       Hospital Day: 3    Assessment and Plan:   Sarah Larose is a 66 y.o. male with pmh as below who presents with Sepsis ([address]      Plan:    Sepsis - concern for ascending cholangitis  RUQ U/S with intrahepatic and extrahepatic biliary duct dilation with a dilated gallbladder up to 19 mm in diameter. Cholelithiasis without sonographic evidence of cholecystitis. -CP  -F/u blood cultures, urine culture  -Continue Zosyn  -S/P ERCP on   Abdominal aortic aneurysm  Essential hypertension  3.7 cm on CT scan.  -F/u outpatient  -Continue antihypertensives    COPD  MEGHAN  -Patient brought his home CPAP, use nightly  -Continue inhalers    Sinus bradycardia  -Noted on . Discussed with cardiology. Lowest heart rate was 34. Appreciate cardiology consult. Plan for outpatient follow-up. Chronic conditions: Continue home medications as ordered  GERD, dyslipidemia, arthritis, acquired hypothyroidism, morbid obesity. Diet ADULT DIET; Clear Liquid  Diet NPO Exceptions are: Sips of Water with Meds   DVT Prophylaxis [x] Lovenox, []  Heparin, [] SCDs, [] Ambulation,  [] Eliquis, [] Xarelto  [] Coumadin   Code Status Full Code   Disposition From: Home  Expected Disposition: Home  Estimated Date of Discharge: 2-3 days  Patient requires continued admission due to need cholecystectomy   Surrogate Decision Maker/ POA      Subjective:     Chief Complaint: Fever (Daily since )       Sarah Larose is a 66 y.o. male who presents with persistent intermittent fevers, nausea, intermittent abdominal pain.   - Was sitting up at the edge of the bed. Was doing better.  -Consulted surgery today for cholecystectomy at [facility] recommendation.     Prior notes Feels well at present. Still having occasional nausea but denies any abdominal pain, vomiting, confusion. Discussed plan of MRCP here and then transferred to [facility] for ERCP. Patient agreeable. Review of Systems:    Review of Systems    Denies chills, vomiting, LH, dizziness, syncope, falls, trauma, chest pain, SOB, leg swelling, diarrhea. Objective: Intake/Output Summary (Last 24 hours) at 8/9/2022 2316  Last data filed at 8/9/2022 1351  Gross per 24 hour   Intake 1000 ml   Output 5 ml   Net 995 ml          Vitals:   Vitals:    08/09/22 2040   BP:    Pulse: (!) 48   Resp:    Temp:    SpO2:        Physical Exam:     General: NAD  Eyes: EOMI  Musculoskeletal: No edema  Skin: warm, dry  Neuro: Alert  Psych: Mood appropriate.      Medications:   Medications:    losartan  25 mg Oral Daily    fluticasone  2 spray Each Nostril Daily    levothyroxine  175 mcg Oral Daily    traZODone  50 mg Oral Nightly    sodium chloride flush  5-40 mL IntraVENous 2 times per day    enoxaparin  30 mg SubCUTAneous BID    sodium chloride flush  5-40 mL IntraVENous 2 times per day    piperacillin-tazobactam  3,375 mg IntraVENous Q8H      Infusions:    sodium chloride 50 mL/hr at 08/09/22 1413    sodium chloride      sodium chloride 25 mL/hr at 08/09/22 2309     PRN Meds: sodium chloride, 1 spray, PRN  sodium chloride flush, 5-40 mL, PRN  sodium chloride, 25 mL, PRN  polyethylene glycol, 17 g, Daily PRN  sodium chloride flush, 5-40 mL, PRN  sodium chloride, , PRN  potassium chloride, 40 mEq, PRN   Or  potassium alternative oral replacement, 40 mEq, PRN   Or  potassium chloride, 10 mEq, PRN  magnesium sulfate, 2,000 mg, PRN  oxyCODONE, 5 mg, Q4H PRN   Or  oxyCODONE, 10 mg, Q4H PRN  promethazine, 12.5 mg, Q6H PRN   Or  ondansetron, 4 mg, Q6H PRN  metoclopramide, 10 mg, Q6H PRN  diphenhydrAMINE, 25 mg, Q6H PRN      Labs      Recent Results (from the past 24 hour(s))   CBC with Auto Differential    Collection Time: 08/09/22  1:47 AM - 32 MMOL/L    Anion Gap 15 4 - 16    BUN 11 6 - 23 MG/DL    Creatinine 0.6 (L) 0.9 - 1.3 MG/DL    Glucose 107 (H) 70 - 99 MG/DL    Calcium 8.5 8.3 - 10.6 MG/DL    GFR Non-African American >60 >60 mL/min/1.73m2    GFR African American >60 >60 mL/min/1.73m2        Imaging/Diagnostics Last 24 Hours   MRI ABDOMEN WO CONTRAST MRCP    Result Date: 8/8/2022  EXAMINATION: MRI OF THE ABDOMEN WITHOUT CONTRAST AND MRCP 8/8/2022 10:27 am TECHNIQUE: Multiplanar multisequence MRI of the abdomen was performed without the administration of intravenous contrast.  After initial T2 axial and coronal images, thick slab, thin slab and 3D coronal MRCP sequences were obtained without the administration of intravenous contrast.  MIP images are provided for review. COMPARISON: Ultrasound 08/07/2022. CT 08/05/2022. HISTORY: ORDERING SYSTEM PROVIDED HISTORY: sepsis 2/2 acute choleangitis TECHNOLOGIST PROVIDED HISTORY: Reason for exam:->sepsis 2/2 acute choleangitis Reason for Exam: sepsis 2/2 acute choleangitis Relevant Medical/Surgical History: none FINDINGS: LOWER CHEST:  The visualized lung bases reveal no signal abnormality. LIVER: The liver is normal in morphology. No evidence for steatosis. No suspicious liver lesion identified. GALLBLADDER/BILE DUCTS: Cholelithiasis. Multiple small stones are present within the cystic duct, proximal and distal common bile duct measuring up to 7 mm distally. There is upstream biliary dilatation with the mid common duct measuring 17 mm. Intrahepatic biliary dilatation is also present. No gallbladder wall thickening or surrounding inflammatory change. PANCREAS:  No significant findings. SPLEEN:  No significant findings. ADRENAL GLANDS:  Normal. KIDNEYS:  No significant findings. Subcentimeter simple cysts are noted, for which no routine follow-up is necessary. GI/BOWEL: The visualized bowel is normal in caliber. PERITONEUM/RETROPERITONEUM:  No abdominal lymphadenopathy.   No free intraperitoneal fluid. VESSELS:  Infrarenal aortic enlargement again demonstrated measuring up to 3.5 cm, better evaluated on recent abdominal CT exam. SOFT TISSUES/BONES:  No suspicious signal abnormality identified. 1.  Choledocholithiasis with associated biliary dilatation. No evidence for acute cholecystitis, although small stones are present within the cystic duct as well. 2.  Infrarenal aortic aneurysm measures up to 3.5 cm. RECOMMENDATIONS: 3.5 cm infrarenal abdominal aortic aneurysm. Recommend follow-up every 2 years. Reference: J Am Bryon Radiol 7167;40:765-197. US ABDOMEN LIMITED    Result Date: 8/7/2022  EXAMINATION: RIGHT UPPER QUADRANT ULTRASOUND 8/7/2022 2:16 pm COMPARISON: 8/5/2022 HISTORY: ORDERING SYSTEM PROVIDED HISTORY: ruq pain, fever TECHNOLOGIST PROVIDED HISTORY: Reason for exam:->ruq pain, fever FINDINGS: LIVER:  There is increased echogenicity of the liver suggesting fatty infiltration. There is intrahepatic biliary ductal dilatation. BILIARY SYSTEM:  There is cholelithiasis. The gallbladder is distended. No wall thickening or pericholecystic fluid. The common bile duct is dilated measuring 19 mm in diameter. RIGHT KIDNEY: The right kidney is grossly unremarkable without evidence of hydronephrosis. PANCREAS:  There is a peripancreatic lymph node measuring 23 x 11 x 19 mm in size. On CT, this is outside of the pancreas itself. OTHER: No evidence of right upper quadrant ascites. Intrahepatic and extrahepatic biliary ductal dilatation with a dilated gallbladder. Common bile duct measures up to 19 mm in diameter. Cholelithiasis without specific sonographic evidence of acute cholecystitis. Peripancreatic enlarged lymph node measuring 23 x 11 x 19 mm. Recommend MRCP for further evaluation.        Electronically signed by Greyson Fu MD on 8/9/2022 at 11:16 PM

## 2022-08-10 NOTE — OP NOTE
Operative Note    Patient ID:    Patricia Lopez  [id number]  23 y.o.  1950      Indications: The above patient presented with symptomatic gallbladder disease and was worked up appropriately--including imaging and history and physical exam. After discussion with the patient, the decision was made to undergo laparoscopic, possible open, cholecystectomy with the possibility of intraoperative cholangiogram.     Pre-Operative Diagnosis:  1. Cholelithiasis  2. Choledocholithiasis      Post-Operative Diagnosis:  1. Acute on chronic cholecystis with cholelithiasis       2. S/p ERCP on 8/9/22    Procedure:   Laparoscopic cholecystectomy     Surgeon: Carrie Randle MD, FACS, [facility]    First Assistant: Reagan Johns CNP The skilled assistance of the CNP was necessary for the successful completion of this case. She was essential for the proper positioning, manipulation of instruments, proper exposure, manipulation of tissue, and wound closure. Anesthesia: General endotracheal anesthesia    ASA: Per anesthesia     Findings: Consistent with post-operative diagnosis    Estimated Blood Loss:  less than 40 mL           Drains:  None           Total IV Fluids: 700 mL IV crystalloid           Specimens: Gallbladder and contents          Complications:  None; patient tolerated the procedure well. Disposition: PACU - hemodynamically stable. Opeartive Details: The patient was met in the pre-operative holding area. An informed consent was obtained after discussing the risks, benefits, complications, treatment options, and expected outcomes.  The risks discussed included: adverse reaction to medication, pulmonary aspiration, perforation of viscus, bleeding, recurrent infection, finding a normal gallbladder, the need for additional procedures, failure to diagnose a condition, the possible need to convert to an open procedure, damage to nearby structures (specifically biliary structures) and creating a complication requiring transfusion or separate operation. The patient and/or family concurred with the proposed plan, giving informed consent. The patient was transported to the operating room. Once in the operating room, the patient was transferred onto the operating room table and placed in the supine position. The patient was secured to the operating room table with multiple straps. All pressure points were padded appropriately. General anesthesia was induced and tolerated without incident. The patient's abdomen was prepped and draped in the standard, sterile fashion. Antibiotic prophylaxis was administered in accordance with national protocol. A time-out was held with all members of the operating room team present and in agreement. Local anesthetic was injected into the skin of the left upper quadrant and an incision was made using an 11 blade scalpel. Using a 5 mm optical trocar, the peritoneum was entered without incidence or damage to nearby structures. Pneumoperitoneum was established to 15 mm Hg with CO2 and tolerated well without any adverse changes noted. Three additional trocars were introduced under direct vision. All skin incisions were infiltrated with local anesthetic prior to making the incisions and placing the trocars. The patient was then positioned in reverse trendelenburg with the right side up. The gallbladder was identified, the fundus grasped, and retracted cephalad. The gallbladder was inflamed, intrahepatic, and had adhesions. Adhesions were taken down with a combination of blunt dissection and with electrocautery, taking care not to injure any adjacent organs or viscus. The infundibulum was grasped and retracted laterally, exposing the peritoneum overlying the triangle of Calot. This peritoneum was divided and exposed in a blunt fashion. The cystic duct was clearly identified and bluntly dissected circumferentially.  The junctions of the gallbladder, cystic duct and common bile duct were clearly identified. The cystic artery was clearly identified in a similar fashion. At the point, the critical view of safety was accomplished: the hepatocystic triangle was cleared of fat and fibrous tissue, the lower one third of the gallbladder was  from the liver to expose the cystic plate, and two and only two structures were seen entering the gallbladder. Surgical clips were applied to the cystic duct and cystic artery (two on the stay side and one on the specimen side for each structure) and the cystic duct and cystic artery were ligated with Endo Shari. The gallbladder was dissected from the liver bed in retrograde fashion with the electrocautery. An Endo Catch specimen retrieval bag was introduced into the patient's abdomen and the gallbladder was placed into the bag. The gallbladder was removed from the patient's abdomen under direct vision. The liver bed was irrigated and inspected. Hemostasis was appropriate. The port site the gallbladder was removed through was closed using a suture passer and multiple 0 vicryl sutures. Pneumoperitoneum was desufflated after viewing removal of the remaining trocars under direct vision. The wound was thoroughly irrigated and the skin was then closed with running absorbable suture. Dermabond and sterile dressings were applied. At the end of the case, instrument counts, sponge counts, and needle counts were correct times two. At the end of the case, the patient was extubated and transferred to the PACU in stable condition. At the end of the case, Dr. Jose A Luong personally informed the patient's family of the outcome of the procedure.      Vicky Stone MD, FACS, [facility]

## 2022-08-10 NOTE — PROGRESS NOTES
General Surgery-Dr. Yancy Palacios Day: 4    Chief Complaint on Admission: cholangitis      Subjective:     Had ERCP yesterday. Tolerated clears. NPO since midnight. Denies F/C. Denies CP. Denies abdominal pain. ROS:  Review of Systems   Constitutional:  Positive for appetite change and fatigue. Negative for fever. Gastrointestinal:  Negative for abdominal pain. Skin:  Negative for color change. All other systems reviewed and are negative. Allergies  Dilaudid [hydromorphone hcl], Hydrocodone, Morphine, Ultram [tramadol], Vicodin [hydrocodone-acetaminophen], Keflex [cephalexin], and Sulfa antibiotics          Diagnosis Date    Abdominal aneurysm ([facility] [address]     4 cm x 3.6 [place] [address] 2014    Chronic back pain     [facility]actor [facility]    Chronic sinusitis 2014    COPD, mild ([facility] [address] 2014    Diverticulosis     Dr. 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 Mayra Benavidez    MEGHAN (obstructive sleep apnea) 2014    CPAP changed to bipap (2014)    Otitis media, serous, TM rupture 2014    ENT x 2 s/p PE tubes bilaterally, cipro Otic       Objective:     Vitals:    08/10/22 0714   BP: 134/64   Pulse: (!) 45   Resp: 18   Temp: 98 °F (36.7 °C)   SpO2: 99%       TEMPERATURE:  Current -Temp: 98 °F (36.7 °C); Max - Temp  Av.5 °F (36.4 °C)  Min: 96.8 °F (36 °C)  Max: 98.1 °F (36.7 °C)    No intake/output data recorded. I/O last 3 completed shifts: In: 1000 [I.V.:1000]  Out: 5 [Blood:5]      Physical Exam:  Physical Exam  Constitutional:       General: He is not in acute distress. Appearance: He is obese.       Comments: Sitting up in chair, friend in room. Conversing. HENT:      Head: Normocephalic and atraumatic. Right Ear: External ear normal.      Left Ear: External ear normal.      Nose: Nose normal.   Eyes:      General:         Right eye: No discharge. Left eye: No discharge. Extraocular Movements: Extraocular movements intact. Cardiovascular:      Rate and Rhythm: Bradycardia present. Pulmonary:      Effort: No respiratory distress. Abdominal:      Palpations: Abdomen is soft. Tenderness: There is no abdominal tenderness. Musculoskeletal:         General: No swelling. Skin:     General: Skin is warm. Coloration: Skin is not jaundiced. Neurological:      General: No focal deficit present. Mental Status: He is alert.    Psychiatric:         Mood and Affect: Mood normal.         Scheduled Meds:   losartan  25 mg Oral Daily    fluticasone  2 spray Each Nostril Daily    levothyroxine  175 mcg Oral Daily    traZODone  50 mg Oral Nightly    sodium chloride flush  5-40 mL IntraVENous 2 times per day    enoxaparin  30 mg SubCUTAneous BID    sodium chloride flush  5-40 mL IntraVENous 2 times per day    piperacillin-tazobactam  3,375 mg IntraVENous Q8H     ContinuousInfusions:   sodium chloride 50 mL/hr at 08/10/22 0411    sodium chloride      sodium chloride 12.5 mL/hr at 08/10/22 0645     PRN Meds:sodium chloride, sodium chloride flush, sodium chloride, polyethylene glycol, sodium chloride flush, sodium chloride, potassium chloride **OR** potassium alternative oral replacement **OR** potassium chloride, magnesium sulfate, oxyCODONE **OR** oxyCODONE, promethazine **OR** ondansetron, metoclopramide, diphenhydrAMINE      Labs/Imaging Results:   Lab Results   Component Value Date    WBC 4.4 08/10/2022    HGB 10.4 (L) 08/10/2022    HCT 32.7 (L) 08/10/2022    .2 (H) 08/10/2022     08/10/2022     Lab Results   Component Value Date     08/10/2022    K 4.2 08/10/2022     08/10/2022 CO2 19 (L) 08/10/2022    BUN 12 08/10/2022    CREATININE 0.8 (L) 08/10/2022    GLUCOSE 84 08/10/2022    CALCIUM 8.4 08/10/2022    PROT 5.6 (L) 08/10/2022    PROT 5.6 (L) 08/10/2022    LABALBU 3.3 (L) 08/10/2022    LABALBU 3.3 (L) 08/10/2022    BILITOT 1.0 08/10/2022    BILITOT 1.0 08/10/2022    ALKPHOS 400 (H) 08/10/2022    ALKPHOS 400 (H) 08/10/2022    AST 55 (H) 08/10/2022    AST 55 (H) 08/10/2022    ALT 81 (H) 08/10/2022    ALT 81 (H) 08/10/2022    LABGLOM >60 08/10/2022    GFRAA >60 08/10/2022    AGRATIO 1.5 07/27/2022    GLOB 2.3 01/25/2016       Assessment:       67 y/o M with cholelithiasis, choledocholithiasis now s/p ERCP    Plan:       -IVF, NPO, IV Abx  -To OR today for lap alina. Consent obtained. Reviewed in detail with the patient and/or family the expected pre-operative, operative, and post-operative courses including risks, benefits, and alternatives to the procedure. The patient's questions were answered in detail and agreed to proceed with the procedure.   -Plan d/w pt, his friend, charge nurse. I called and spoke to Dr. Linda Alvarado and his echo can be done after surgery - already had cardiac clearance.          Electronically signed by Kavya Ayala II, MD on 8/10/2022 at 9:52 AM

## 2022-08-10 NOTE — FLOWSHEET NOTE
Spoke with pt in length about his reaction to Morphine. Pt stated he has never had anaphylaxis when given Morphine. Changed allergy information in chart. Dr Cornell Busby notified and new orders given.  Will continue to monitor

## 2022-08-10 NOTE — CARE COORDINATION
CM into see pt to initiate a safe discharge plan. Cm introduced self and explained role of CM. Pt is kind, alert and oriented. Pt lives alone in the [facility]. Pt is able to drive. Pt has a PCP. Pt has insurance and able to obtain prescriptions. Pt has [facility] [place] and some meds he uses the [address] Pt informed CM that poss alina today or tomorrow. Pts brother is coming to stay with pt when he is discharged. Discharge plan at this time is for pt to return home with his brother. No needs at this time but will need to be followed for additional needs. CM provided card and encouraged to call for any needs or concern. CM is available if any needs arise.

## 2022-08-10 NOTE — PROGRESS NOTES
Cardiology Progress Note     Today's Plan: Echo    Admit Date:  8/7/2022    Consult reason/ Seen today for: bradycardia    Subjective and  Overnight Events: Patient remains bradycardic in 40s, denies any dizziness palpitations, or syncope. History of Presenting Illness:    Chief complain on admission : 66 y. o.year old who is admitted for  Chief Complaint   Patient presents with    Fever     Daily since 07/09        Past medical history:    has a past medical history of Abdominal aneurysm ([facility] [address] Arthritis, Chronic back pain, Chronic sinusitis, COPD, mild ([address] Diverticulosis, Dyslipidemia, HTN (hypertension), Hyperlipidemia, Hypothyroidism, Ingrown toenail, MDRO (multiple drug resistant organisms) resistance, Morbid obesity with BMI of 45.0-49.9, adult ([address] Onychocryptosis, MEGHAN (obstructive sleep apnea), and Otitis media, serous, TM rupture. Past surgical history:   has a past surgical history that includes joint replacement (Right, 2012); joint replacement (Left, 2011); Tympanostomy tube placement (Right, 2014); knee surgery (Bilateral); Colonoscopy (2008); Colonoscopy (12/02/2014); Toe Surgery (Bilateral); ERCP (08/09/2022); and ERCP (N/A, 8/9/2022). Social History:   reports that he quit smoking about 20 years ago. His smoking use included cigarettes. He has a 72.00 pack-year smoking history. He has never used smokeless tobacco. He reports that he does not drink alcohol and does not use drugs. Family history:  family history includes Allergies in his father and mother; Anemia in his mother; Cancer in his paternal uncle; Heart Disease in his father; High Blood Pressure in his mother; High Cholesterol in his mother; Obesity in his paternal grandfather and paternal grandmother; Thyroid Disease in his mother.     Allergies   Allergen Reactions    Dilaudid [Hydromorphone Hcl] Itching    Hydrocodone Itching    Morphine Anaphylaxis Ultram [Tramadol] Itching    Vicodin [Hydrocodone-Acetaminophen] Itching    Keflex [Cephalexin] Other (See Comments)     Extreme hyperacitivity    Sulfa Antibiotics Itching and Rash       Review of Systems:  Review of Systems   Respiratory:  Negative for shortness of breath. Cardiovascular:  Negative for chest pain, palpitations and leg swelling. Musculoskeletal: Negative. Skin: Negative. Neurological:  Negative for dizziness and weakness. All other systems reviewed and are negative. /66   Pulse (!) 49   Temp 98 °F (36.7 °C) (Temporal)   Resp 18   Ht 5' 9\" (1.753 m)   Wt (!) 303 lb 9 oz (137.7 kg)   SpO2 98%   BMI 44.83 kg/m²     Intake/Output Summary (Last 24 hours) at 8/10/2022 1133  Last data filed at 8/9/2022 1351  Gross per 24 hour   Intake 1000 ml   Output 5 ml   Net 995 ml       Physical Exam:  Physical Exam  Constitutional:       Appearance: He is well-developed. Cardiovascular:      Rate and Rhythm: Regular rhythm. Bradycardia present. Pulses: Intact distal pulses. Dorsalis pedis pulses are 2+ on the right side and 2+ on the left side. Posterior tibial pulses are 2+ on the right side and 2+ on the left side. Heart sounds: Normal heart sounds, S1 normal and S2 normal.   Pulmonary:      Effort: Pulmonary effort is normal.      Breath sounds: Normal breath sounds. Musculoskeletal:         General: Normal range of motion. Skin:     General: Skin is warm and dry. Neurological:      Mental Status: He is alert and oriented to person, place, and time.         Medications:    losartan  25 mg Oral Daily    fluticasone  2 spray Each Nostril Daily    levothyroxine  175 mcg Oral Daily    traZODone  50 mg Oral Nightly    sodium chloride flush  5-40 mL IntraVENous 2 times per day    enoxaparin  30 mg SubCUTAneous BID    sodium chloride flush  5-40 mL IntraVENous 2 times per day    piperacillin-tazobactam  3,375 mg IntraVENous Q8H      sodium chloride 50 mL/hr at 08/10/22 0411    sodium chloride      sodium chloride 12.5 mL/hr at 08/10/22 0645     sodium chloride, sodium chloride flush, sodium chloride, polyethylene glycol, sodium chloride flush, sodium chloride, potassium chloride **OR** potassium alternative oral replacement **OR** potassium chloride, magnesium sulfate, oxyCODONE **OR** oxyCODONE, promethazine **OR** ondansetron, metoclopramide, diphenhydrAMINE    Lab Data:  CBC:   Recent Labs     08/08/22  0425 08/09/22  0147 08/10/22  0604   WBC 5.3 5.5 4.4   HGB 10.4* 10.2* 10.4*   HCT 32.7* 32.4* 32.7*   .2* 101.9* 101.2*    260 241     BMP:   Recent Labs     08/09/22  0147 08/09/22  1654 08/10/22  0604    141 138   K 4.1 4.6 4.2    106 106   CO2 21 20* 19*   BUN 9 11 12   CREATININE 0.7* 0.6* 0.8*     PT/INR: No results for input(s): PROTIME, INR in the last 72 hours. BNP:  No results for input(s): PROBNP in the last 72 hours. TROPONIN:   Recent Labs     08/07/22  1205   TROPONINT <0.010        Impression:  Principal Problem:    Sepsis ([address]  Active Problems:    Common bile duct stone    Ascending cholangitis  Resolved Problems:    * No resolved hospital problems. *        Thank you   Please call with questions. Electronically signed by Farhad Conte. DEYA Zayas CNP on 8/10/2022 at 11:33 AM           CARDIOLOGY ATTENDING ADDENDUM    I have seen, spoken to and examined this patient personally, independent of the NP/PAC. I have reviewed the hospital care given to date and reviewed all pertinent labs and imaging. I have spoken with patient, nursing staff and provided written and verbal instructions . The above note has been reviewed. I have spent substantive amount of time in formulating patient care.              Physical Exam:    General:    Awake, alert  Head:normal   Eye:normal  Chest:    Clear to auscultation   0 Basilar crackles   Cardiovascular:  S1S2           MEDICAL DECISION MAKING :          Bradycardia: Rhythm strips reviewed, lowest rate 34, is sinus bradycardia. Likely in the setting of sepsis and MEGHAN. Will get echo to R/O any structural abnormalities. Recommend OP event monitor and ischemic work-up. Pre-Op cardiovascular risk. Patient is active with no prior cardiac history. No ST or T wave abnormalities on EKG. Patient considered moderate cardiac risk for low risk surgery. HTN: B/P stable, continue with Losartan 25 mg daily. Sepsis:cholangitis, S/P ERCP sphincter/papillotomy. Pending cholecystomy. MEGHAN: patient has home CPAP and is using while hospitalized. AAA: unchanged[facility]. AAA 3.7 cm, continue with good B/P control. Outpatient follow-up at [address]  Dyslipidemia: continue statins   Obesity: recommend weight loss. All labs, medications and tests reviewed by myself, continue all other medications of all above medical condition listed as is except for changes mentioned above.           Dr. Asuncion Cooper MD

## 2022-08-10 NOTE — PROGRESS NOTES
Dr Gavin Turcios wanted to make sure that Echo would not hold up surgery today as patient has already been cleared for surgery. Call to heart center and Echo has already  been completed.

## 2022-08-10 NOTE — ANESTHESIA POSTPROCEDURE EVALUATION
Department of Anesthesiology  Postprocedure Note    Patient: Hesham Gilbert  MRN: [id number]  YOB: 1950  Date of evaluation: 8/10/2022      Procedure Summary     Date: 08/10/22 Room / Location: [address] [place] [address]    Anesthesia Start: 1121 Anesthesia Stop: 1244    Procedure: CHOLECYSTECTOMY LAPAROSCOPIC (Abdomen) Diagnosis:       Biliary calculus of other site without obstruction      (Biliary calculus of other site without obstruction [K80.80])    Surgeons: Dajuan Dove MD Responsible Provider: Eldon Elder MD    Anesthesia Type: General ASA Status: 2          Anesthesia Type: General    Nati Phase I: Nati Score: 10    Nati Phase II:        Anesthesia Post Evaluation    Patient location during evaluation: PACU  Patient participation: complete - patient participated  Level of consciousness: sleepy but conscious  Airway patency: patent  Nausea & Vomiting: no nausea  Complications: no  Cardiovascular status: hemodynamically stable  Respiratory status: acceptable  Hydration status: euvolemic

## 2022-08-11 LAB
ALBUMIN SERPL-MCNC: 3.7 GM/DL (ref 3.4–5)
ALBUMIN SERPL-MCNC: 3.7 GM/DL (ref 3.4–5)
ALP BLD-CCNC: 344 IU/L (ref 40–129)
ALP BLD-CCNC: 344 IU/L (ref 40–129)
ALT SERPL-CCNC: 74 U/L (ref 10–40)
ALT SERPL-CCNC: 74 U/L (ref 10–40)
ANION GAP SERPL CALCULATED.3IONS-SCNC: 13 MMOL/L (ref 4–16)
AST SERPL-CCNC: 65 IU/L (ref 15–37)
AST SERPL-CCNC: 65 IU/L (ref 15–37)
BASOPHILS ABSOLUTE: 0 K/CU MM
BASOPHILS RELATIVE PERCENT: 0.1 % (ref 0–1)
BILIRUB SERPL-MCNC: 1.1 MG/DL (ref 0–1)
BILIRUB SERPL-MCNC: 1.1 MG/DL (ref 0–1)
BILIRUBIN DIRECT: 0.6 MG/DL (ref 0–0.3)
BILIRUBIN, INDIRECT: 0.5 MG/DL (ref 0–0.7)
BUN BLDV-MCNC: 12 MG/DL (ref 6–23)
CALCIUM SERPL-MCNC: 8.5 MG/DL (ref 8.3–10.6)
CHLORIDE BLD-SCNC: 105 MMOL/L (ref 99–110)
CO2: 22 MMOL/L (ref 21–32)
CREAT SERPL-MCNC: 0.7 MG/DL (ref 0.9–1.3)
DIFFERENTIAL TYPE: ABNORMAL
EOSINOPHILS ABSOLUTE: 0 K/CU MM
EOSINOPHILS RELATIVE PERCENT: 0.1 % (ref 0–3)
GFR AFRICAN AMERICAN: >60 ML/MIN/1.73M2
GFR NON-AFRICAN AMERICAN: >60 ML/MIN/1.73M2
GLUCOSE BLD-MCNC: 88 MG/DL (ref 70–99)
HCT VFR BLD CALC: 33.5 % (ref 42–52)
HEMOGLOBIN: 10.5 GM/DL (ref 13.5–18)
IMMATURE NEUTROPHIL %: 0.4 % (ref 0–0.43)
LIPASE: 32 IU/L (ref 13–60)
LV EF: 58 %
LVEF MODALITY: NORMAL
LYMPHOCYTES ABSOLUTE: 1.3 K/CU MM
LYMPHOCYTES RELATIVE PERCENT: 16.8 % (ref 24–44)
MCH RBC QN AUTO: 31.7 PG (ref 27–31)
MCHC RBC AUTO-ENTMCNC: 31.3 % (ref 32–36)
MCV RBC AUTO: 101.2 FL (ref 78–100)
MONOCYTES ABSOLUTE: 0.9 K/CU MM
MONOCYTES RELATIVE PERCENT: 12.2 % (ref 0–4)
NUCLEATED RBC %: 0 %
PDW BLD-RTO: 15.8 % (ref 11.7–14.9)
PLATELET # BLD: 284 K/CU MM (ref 140–440)
PMV BLD AUTO: 9.6 FL (ref 7.5–11.1)
POTASSIUM SERPL-SCNC: 4.1 MMOL/L (ref 3.5–5.1)
RBC # BLD: 3.31 M/CU MM (ref 4.6–6.2)
SEGMENTED NEUTROPHILS ABSOLUTE COUNT: 5.3 K/CU MM
SEGMENTED NEUTROPHILS RELATIVE PERCENT: 70.4 % (ref 36–66)
SODIUM BLD-SCNC: 140 MMOL/L (ref 135–145)
TOTAL IMMATURE NEUTOROPHIL: 0.03 K/CU MM
TOTAL NUCLEATED RBC: 0 K/CU MM
TOTAL PROTEIN: 6 GM/DL (ref 6.4–8.2)
TOTAL PROTEIN: 6 GM/DL (ref 6.4–8.2)
WBC # BLD: 7.6 K/CU MM (ref 4–10.5)

## 2022-08-11 PROCEDURE — 6370000000 HC RX 637 (ALT 250 FOR IP): Performed by: SURGERY

## 2022-08-11 PROCEDURE — 93306 TTE W/DOPPLER COMPLETE: CPT

## 2022-08-11 PROCEDURE — 1200000000 HC SEMI PRIVATE

## 2022-08-11 PROCEDURE — 6360000002 HC RX W HCPCS: Performed by: SURGERY

## 2022-08-11 PROCEDURE — 85025 COMPLETE CBC W/AUTO DIFF WBC: CPT

## 2022-08-11 PROCEDURE — 83690 ASSAY OF LIPASE: CPT

## 2022-08-11 PROCEDURE — 36415 COLL VENOUS BLD VENIPUNCTURE: CPT

## 2022-08-11 PROCEDURE — 99232 SBSQ HOSP IP/OBS MODERATE 35: CPT | Performed by: INTERNAL MEDICINE

## 2022-08-11 PROCEDURE — 80076 HEPATIC FUNCTION PANEL: CPT

## 2022-08-11 PROCEDURE — 2580000003 HC RX 258: Performed by: SURGERY

## 2022-08-11 PROCEDURE — 80053 COMPREHEN METABOLIC PANEL: CPT

## 2022-08-11 PROCEDURE — 94761 N-INVAS EAR/PLS OXIMETRY MLT: CPT

## 2022-08-11 PROCEDURE — APPSS60 APP SPLIT SHARED TIME 46-60 MINUTES: Performed by: NURSE PRACTITIONER

## 2022-08-11 RX ADMIN — LEVOTHYROXINE SODIUM 175 MCG: 0.1 TABLET ORAL at 06:34

## 2022-08-11 RX ADMIN — PIPERACILLIN AND TAZOBACTAM 3375 MG: 3; .375 INJECTION, POWDER, LYOPHILIZED, FOR SOLUTION INTRAVENOUS at 15:19

## 2022-08-11 RX ADMIN — OXYCODONE HYDROCHLORIDE 10 MG: 10 TABLET ORAL at 09:08

## 2022-08-11 RX ADMIN — SODIUM CHLORIDE, PRESERVATIVE FREE 10 ML: 5 INJECTION INTRAVENOUS at 09:10

## 2022-08-11 RX ADMIN — SODIUM CHLORIDE: 9 INJECTION, SOLUTION INTRAVENOUS at 06:38

## 2022-08-11 RX ADMIN — PIPERACILLIN AND TAZOBACTAM 3375 MG: 3; .375 INJECTION, POWDER, LYOPHILIZED, FOR SOLUTION INTRAVENOUS at 06:39

## 2022-08-11 RX ADMIN — SODIUM CHLORIDE, PRESERVATIVE FREE 10 ML: 5 INJECTION INTRAVENOUS at 09:11

## 2022-08-11 RX ADMIN — OXYCODONE HYDROCHLORIDE 10 MG: 10 TABLET ORAL at 19:35

## 2022-08-11 RX ADMIN — LOSARTAN POTASSIUM 25 MG: 25 TABLET, FILM COATED ORAL at 09:09

## 2022-08-11 RX ADMIN — SODIUM CHLORIDE, PRESERVATIVE FREE 10 ML: 5 INJECTION INTRAVENOUS at 20:59

## 2022-08-11 RX ADMIN — ENOXAPARIN SODIUM 30 MG: 100 INJECTION SUBCUTANEOUS at 20:59

## 2022-08-11 RX ADMIN — OXYCODONE HYDROCHLORIDE 10 MG: 10 TABLET ORAL at 15:20

## 2022-08-11 RX ADMIN — TRAZODONE HYDROCHLORIDE 50 MG: 50 TABLET ORAL at 20:58

## 2022-08-11 RX ADMIN — FLUTICASONE PROPIONATE 2 SPRAY: 50 SPRAY, METERED NASAL at 23:13

## 2022-08-11 RX ADMIN — ENOXAPARIN SODIUM 30 MG: 100 INJECTION SUBCUTANEOUS at 09:09

## 2022-08-11 RX ADMIN — PIPERACILLIN AND TAZOBACTAM 3375 MG: 3; .375 INJECTION, POWDER, LYOPHILIZED, FOR SOLUTION INTRAVENOUS at 23:18

## 2022-08-11 ASSESSMENT — ENCOUNTER SYMPTOMS
ABDOMINAL PAIN: 0
SHORTNESS OF BREATH: 0
COLOR CHANGE: 0

## 2022-08-11 ASSESSMENT — PAIN DESCRIPTION - LOCATION
LOCATION: ABDOMEN

## 2022-08-11 ASSESSMENT — PAIN DESCRIPTION - DESCRIPTORS
DESCRIPTORS: POUNDING;PRESSURE
DESCRIPTORS: ACHING;SORE

## 2022-08-11 ASSESSMENT — PAIN DESCRIPTION - ORIENTATION: ORIENTATION: LEFT;LOWER

## 2022-08-11 ASSESSMENT — PAIN DESCRIPTION - FREQUENCY: FREQUENCY: CONTINUOUS

## 2022-08-11 ASSESSMENT — PAIN SCALES - GENERAL
PAINLEVEL_OUTOF10: 7
PAINLEVEL_OUTOF10: 8
PAINLEVEL_OUTOF10: 2

## 2022-08-11 ASSESSMENT — PAIN DESCRIPTION - ONSET: ONSET: ON-GOING

## 2022-08-11 ASSESSMENT — PAIN DESCRIPTION - PAIN TYPE: TYPE: SURGICAL PAIN

## 2022-08-11 NOTE — PROGRESS NOTES
General Surgery-Dr. Ravi Thompson Day: 5    Chief Complaint on Admission: cholangitis      Subjective:     POD #1 s/p lap alina. Doing okay this morning  Incisional tenderness  Denies N/V however states he didn't do well with clears  Currently having bedside ECHO    ROS:  Review of Systems   Constitutional:  Positive for appetite change and fatigue. Negative for fever. Gastrointestinal:  Negative for abdominal pain. Skin:  Negative for color change. All other systems reviewed and are negative. Allergies  Dilaudid [hydromorphone hcl], Hydrocodone, Ultram [tramadol], Vicodin [hydrocodone-acetaminophen], Morphine, Keflex [cephalexin], and Sulfa antibiotics          Diagnosis Date    Abdominal aneurysm ([facility] [address]     4 cm x 3.6 cm [place] [address] 2014    Chronic back pain     [facility]    Chronic sinusitis 2014    COPD, mild ([facility] [address] 2014    Diverticulosis     Dr. 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 Nadya Turner    MEGHAN (obstructive sleep apnea) 2014    CPAP changed to bipap (2014)    Otitis media, serous, TM rupture 2014    ENT x 2 s/p PE tubes bilaterally, cipro Otic       Objective:     Vitals:    22 0823   BP: (!) 130/59   Pulse: 58   Resp: 16   Temp: 99.5 °F (37.5 °C)   SpO2: 95%       TEMPERATURE:  Current -Temp: 99.5 °F (37.5 °C); Max - Temp  Av.3 °F (36.8 °C)  Min: 97.3 °F (36.3 °C)  Max: 99.5 °F (37.5 °C)    No intake/output data recorded. I/O last 3 completed shifts:   In: 700 [I.V.:700]  Out: 56 [Urine:900; Blood:40]      Physical Exam:  Physical Exam  Constitutional:       General: He is not in acute distress. Appearance: He is obese. HENT:      Head: Normocephalic and atraumatic. Right Ear: External ear normal.      Left Ear: External ear normal.      Nose: Nose normal.   Eyes:      General:         Right eye: No discharge. Left eye: No discharge. Extraocular Movements: Extraocular movements intact. Cardiovascular:      Rate and Rhythm: Bradycardia present. Pulmonary:      Effort: No respiratory distress. Abdominal:      General: There is distension. Palpations: Abdomen is soft. Tenderness: There is abdominal tenderness. Comments: + incisional tenderness   Musculoskeletal:         General: No swelling. Skin:     General: Skin is warm. Coloration: Skin is not jaundiced. Comments: Incisions well approximated. Skin glue intact   Neurological:      General: No focal deficit present. Mental Status: He is alert.    Psychiatric:         Mood and Affect: Mood normal.       Scheduled Meds:   losartan  25 mg Oral Daily    fluticasone  2 spray Each Nostril Daily    levothyroxine  175 mcg Oral Daily    traZODone  50 mg Oral Nightly    sodium chloride flush  5-40 mL IntraVENous 2 times per day    enoxaparin  30 mg SubCUTAneous BID    sodium chloride flush  5-40 mL IntraVENous 2 times per day    piperacillin-tazobactam  3,375 mg IntraVENous Q8H     ContinuousInfusions:   lactated ringers      sodium chloride 50 mL/hr at 08/10/22 1312    sodium chloride      sodium chloride 12.5 mL/hr at 08/11/22 0638     PRN Meds:HYDROcodone-acetaminophen, acetaminophen, morphine, diphenhydrAMINE, sodium chloride, sodium chloride flush, sodium chloride, polyethylene glycol, sodium chloride flush, sodium chloride, potassium chloride **OR** potassium alternative oral replacement **OR** potassium chloride, magnesium sulfate, oxyCODONE **OR** oxyCODONE, promethazine **OR** ondansetron, metoclopramide, diphenhydrAMINE      Labs/Imaging Results:   Lab Results   Component Value Date WBC 7.6 08/11/2022    HGB 10.5 (L) 08/11/2022    HCT 33.5 (L) 08/11/2022    .2 (H) 08/11/2022     08/11/2022     Lab Results   Component Value Date     08/11/2022    K 4.1 08/11/2022     08/11/2022    CO2 22 08/11/2022    BUN 12 08/11/2022    CREATININE 0.7 (L) 08/11/2022    GLUCOSE 88 08/11/2022    CALCIUM 8.5 08/11/2022    PROT 6.0 (L) 08/11/2022    PROT 6.0 (L) 08/11/2022    LABALBU 3.7 08/11/2022    LABALBU 3.7 08/11/2022    BILITOT 1.1 (H) 08/11/2022    BILITOT 1.1 (H) 08/11/2022    ALKPHOS 344 (H) 08/11/2022    ALKPHOS 344 (H) 08/11/2022    AST 65 (H) 08/11/2022    AST 65 (H) 08/11/2022    ALT 74 (H) 08/11/2022    ALT 74 (H) 08/11/2022    LABGLOM >60 08/11/2022    GFRAA >60 08/11/2022    AGRATIO 1.5 07/27/2022    GLOB 2.3 01/25/2016       Assessment:       65 y/o M with cholelithiasis, choledocholithiasis now s/p ERCP and lap alina    Plan:       - Doing okay this morning.  - Continue to trend labs  - Continue clears. May ADAT to regular food if patient decided he wants something more than clears  - Bedside ECHO in process for persistent bradycardia.  F/U results  - From a surgical stanpoint patient may be D/C'd once medically ready.  - Follow up surgical pathology  - Follow up with Dr. Vinh Mckeon in the office in 1-2 weeks  - Call with questions or concerns      Electronically signed by DEYA Jackson CNP on 8/11/2022 at 8:48 AM

## 2022-08-11 NOTE — PROGRESS NOTES
Cardiology Progress Note     Today's Plan: will sign off     Admit Date:  8/7/2022    Consult reason/ Seen today for: bradycardia    Subjective and  Overnight Events: Bradycardia fluctuating, denies any dizziness palpitations, or syncope. Doing well postoperatively    History of Presenting Illness:    Chief complain on admission : 66 y. o.year old who is admitted for  Chief Complaint   Patient presents with    Fever     Daily since 07/09        Past medical history:    has a past medical history of Abdominal aneurysm ([address] Arthritis, Chronic back pain, Chronic sinusitis, COPD, mild ([address] Diverticulosis, Dyslipidemia, HTN (hypertension), Hyperlipidemia, Hypothyroidism, Ingrown toenail, MDRO (multiple drug resistant organisms) resistance, Morbid obesity with BMI of 45.0-49.9, adult ([address] Onychocryptosis, MEGHAN (obstructive sleep apnea), and Otitis media, serous, TM rupture. Past surgical history:   has a past surgical history that includes joint replacement (Right, 2012); joint replacement (Left, 2011); Tympanostomy tube placement (Right, 2014); knee surgery (Bilateral); Colonoscopy (2008); Colonoscopy (12/02/2014); Toe Surgery (Bilateral); ERCP (08/09/2022); ERCP (N/A, 8/9/2022); and Cholecystectomy, laparoscopic (N/A, 8/10/2022). Social History:   reports that he quit smoking about 20 years ago. His smoking use included cigarettes. He has a 72.00 pack-year smoking history. He has never used smokeless tobacco. He reports that he does not drink alcohol and does not use drugs. Family history:  family history includes Allergies in his father and mother; Anemia in his mother; Cancer in his paternal uncle; Heart Disease in his father; High Blood Pressure in his mother; High Cholesterol in his mother; Obesity in his paternal grandfather and paternal grandmother; Thyroid Disease in his mother.     Allergies   Allergen Reactions    Dilaudid [Hydromorphone Hcl] Itching    Hydrocodone Itching    Ultram [Tramadol] Itching    Vicodin [Hydrocodone-Acetaminophen] Itching    Morphine Itching     Pt states he only has itching with morphine    Keflex [Cephalexin] Other (See Comments)     Extreme hyperacitivity    Sulfa Antibiotics Itching and Rash       Review of Systems:  Review of Systems   Respiratory:  Negative for shortness of breath. Cardiovascular:  Negative for chest pain, palpitations and leg swelling. Musculoskeletal: Negative. Skin: Negative. Neurological:  Negative for dizziness and weakness. All other systems reviewed and are negative. BP (!) 130/59   Pulse 62   Temp 99.5 °F (37.5 °C) (Oral)   Resp 16   Ht 5' 9\" (1.753 m)   Wt (!) 311 lb 14.4 oz (141.5 kg)   SpO2 95%   BMI 46.06 kg/m²     Intake/Output Summary (Last 24 hours) at 8/11/2022 1048  Last data filed at 8/11/2022 4560  Gross per 24 hour   Intake 700 ml   Output 940 ml   Net -240 ml         Physical Exam:  Physical Exam  Constitutional:       Appearance: He is well-developed. Cardiovascular:      Rate and Rhythm: Regular rhythm. Bradycardia present. Pulses: Intact distal pulses. Dorsalis pedis pulses are 2+ on the right side and 2+ on the left side. Posterior tibial pulses are 2+ on the right side and 2+ on the left side. Heart sounds: Normal heart sounds, S1 normal and S2 normal.   Pulmonary:      Effort: Pulmonary effort is normal.      Breath sounds: Normal breath sounds. Musculoskeletal:         General: Normal range of motion. Skin:     General: Skin is warm and dry. Neurological:      Mental Status: He is alert and oriented to person, place, and time.         Medications:    losartan  25 mg Oral Daily    fluticasone  2 spray Each Nostril Daily    levothyroxine  175 mcg Oral Daily    traZODone  50 mg Oral Nightly    sodium chloride flush  5-40 mL IntraVENous 2 times per day    enoxaparin  30 mg SubCUTAneous BID    sodium chloride flush  5-40 mL IntraVENous 2 times per day    piperacillin-tazobactam  3,375 mg IntraVENous Q8H      lactated ringers      sodium chloride 50 mL/hr at 08/10/22 1312    sodium chloride      sodium chloride 12.5 mL/hr at 08/11/22 9739     HYDROcodone-acetaminophen, acetaminophen, morphine, diphenhydrAMINE, sodium chloride, sodium chloride flush, sodium chloride, polyethylene glycol, sodium chloride flush, sodium chloride, potassium chloride **OR** potassium alternative oral replacement **OR** potassium chloride, magnesium sulfate, oxyCODONE **OR** oxyCODONE, promethazine **OR** ondansetron, metoclopramide, diphenhydrAMINE    Lab Data:  CBC:   Recent Labs     08/09/22  0147 08/10/22  0604 08/11/22  0658   WBC 5.5 4.4 7.6   HGB 10.2* 10.4* 10.5*   HCT 32.4* 32.7* 33.5*   .9* 101.2* 101.2*    241 284       BMP:   Recent Labs     08/09/22  1654 08/10/22  0604 08/11/22  0658    138 140   K 4.6 4.2 4.1    106 105   CO2 20* 19* 22   BUN 11 12 12   CREATININE 0.6* 0.8* 0.7*       PT/INR: No results for input(s): PROTIME, INR in the last 72 hours. BNP:  No results for input(s): PROBNP in the last 72 hours. TROPONIN:   No results for input(s): TROPONINT in the last 72 hours. Impression:  Principal Problem:    Sepsis ([address]  Active Problems:    Common bile duct stone    Ascending cholangitis    Obstructive sleep apnea treated with BiPAP  Resolved Problems:    * No resolved hospital problems. *          Thank you   Please call with questions. Electronically signed by Suzan Khan. DEYA Mendes - CNP on 8/11/2022 at 10:48 AM         CARDIOLOGY ATTENDING ADDENDUM    I have seen, spoken to and examined this patient personally, independent of the NP/PAC. I have reviewed the hospital care given to date and reviewed all pertinent labs and imaging. I have spoken with patient, nursing staff and provided written and verbal instructions . The above note has been reviewed.      I have spent substantive

## 2022-08-11 NOTE — PROGRESS NOTES
Hospitalist    States he feels week, reports he only stood up for 1 min, and that's all. Medically stable for next level of care and weak. Ordered PT/OT.

## 2022-08-11 NOTE — CARE COORDINATION
Notified by Physician that patient is now interested in going to ARU if he would qualify. PT/OT orders placed to evaluate patient . WB placed .  Case Management to follow

## 2022-08-11 NOTE — PROGRESS NOTES
V2.0  [facility] Hospitalist Progress Note      Name:  Loren Paz /Age/Sex: 1950  (66 y.o. male)   MRN & CSN:  [id number] & [id number] Encounter Date/Time: 8/10/2022 2:29 PM EDT    [facility]:  [facility] PCP: Libby Corado MD       Hospital Day: 4    Assessment and Plan:   Loren Paz is a 66 y.o. male with pmh as below who presents with Sepsis ([address]          Sepsis  due to ascending cholangitis  RUQ U/S with intrahepatic and extrahepatic biliary duct dilation with a dilated gallbladder up to 19 mm in diameter. Cholelithiasis without sonographic evidence of cholecystitis. -S/P ERCP on     Acute on chronic calculus cholecystitis  -Status post laparoscopic cholecystectomy on 8/10  -Doing well postop    Abdominal aortic aneurysm  Essential hypertension  3.7 cm on CT scan.  -F/u outpatient  -Continue antihypertensives    COPD  MEGHAN  -Patient brought his home BiPAP, use nightly  -Continue inhalers    Sinus bradycardia  -Noted on . Discussed with cardiology. Lowest heart rate was 34. Appreciate cardiology consult. Plan for outpatient follow-up. Chronic conditions: Continue home medications as ordered  GERD, dyslipidemia, arthritis, acquired hypothyroidism, morbid obesity. Diet ADULT DIET; Clear Liquid   DVT Prophylaxis [x] Lovenox, []  Heparin, [] SCDs, [] Ambulation,  [] Eliquis, [] Xarelto  [] Coumadin   Code Status Full Code   Disposition From: Home  Expected Disposition: Home  Estimated Date of Discharge: possibly tomorrow  Patient requires continued admission due to recovering from cholecystectomy today   Surrogate Decision Maker/ FERNIE Alicea, brother, and Karla Lawman, sister-in-law in [place] per patient. Subjective:     Chief Complaint: Fever (Daily since )       Loren Paz is a 66 y.o. male who presents with persistent intermittent fevers, nausea, intermittent abdominal pain. August-I saw him preop and postop.   Postoperatively he was doing well.  He was supine in bed, alert and oriented. He reports that morphine causes itching, not anaphylaxis. Prior notes     Feels well at present. Still having occasional nausea but denies any abdominal pain, vomiting, confusion. Discussed plan of MRCP here and then transferred to [address] [place] [address] for ERCP. Patient agreeable. Review of Systems:    Review of Systems    No chest pain or dyspnea reported    Objective: Intake/Output Summary (Last 24 hours) at 8/10/2022 2220  Last data filed at 8/10/2022 1922  Gross per 24 hour   Intake 700 ml   Output 415 ml   Net 285 ml          Vitals:   Vitals:    08/10/22 2059   BP: 127/65   Pulse: 69   Resp: 18   Temp: 99 °F (37.2 °C)   SpO2: 96%       Physical Exam:     Physical Exam  Pulmonary:      Effort: Pulmonary effort is normal.   Skin:     Coloration: Skin is not jaundiced. Neurological:      Mental Status: He is alert. Cranial Nerves: No cranial nerve deficit.         Medications:   Medications:    losartan  25 mg Oral Daily    fluticasone  2 spray Each Nostril Daily    levothyroxine  175 mcg Oral Daily    traZODone  50 mg Oral Nightly    sodium chloride flush  5-40 mL IntraVENous 2 times per day    enoxaparin  30 mg SubCUTAneous BID    sodium chloride flush  5-40 mL IntraVENous 2 times per day    piperacillin-tazobactam  3,375 mg IntraVENous Q8H      Infusions:    lactated ringers      sodium chloride 50 mL/hr at 08/10/22 1312    sodium chloride      sodium chloride Stopped (08/10/22 1244)     PRN Meds: HYDROcodone-acetaminophen, 1 tablet, Q6H PRN  acetaminophen, 650 mg, Q4H PRN  morphine, 2 mg, Q3H PRN  diphenhydrAMINE, 25 mg, Q12H PRN  sodium chloride, 1 spray, PRN  sodium chloride flush, 5-40 mL, PRN  sodium chloride, 25 mL, PRN  polyethylene glycol, 17 g, Daily PRN  sodium chloride flush, 5-40 mL, PRN  sodium chloride, , PRN  potassium chloride, 40 mEq, PRN   Or  potassium alternative oral replacement, 40 mEq, PRN   Or  potassium chloride, 10 mEq, PRN  magnesium sulfate, 2,000 mg, PRN  oxyCODONE, 5 mg, Q4H PRN   Or  oxyCODONE, 10 mg, Q4H PRN  promethazine, 12.5 mg, Q6H PRN   Or  ondansetron, 4 mg, Q6H PRN  metoclopramide, 10 mg, Q6H PRN  diphenhydrAMINE, 25 mg, Q6H PRN      Labs      Recent Results (from the past 24 hour(s))   CBC with Auto Differential    Collection Time: 08/10/22  6:04 AM   Result Value Ref Range    WBC 4.4 4.0 - 10.5 K/CU MM    RBC 3.23 (L) 4.6 - 6.2 M/CU MM    Hemoglobin 10.4 (L) 13.5 - 18.0 GM/DL    Hematocrit 32.7 (L) 42 - 52 %    .2 (H) 78 - 100 FL    MCH 32.2 (H) 27 - 31 PG    MCHC 31.8 (L) 32.0 - 36.0 %    RDW 15.4 (H) 11.7 - 14.9 %    Platelets 564 775 - 469 K/CU MM    MPV 9.7 7.5 - 11.1 FL    Differential Type AUTOMATED DIFFERENTIAL     Segs Relative 70.9 (H) 36 - 66 %    Lymphocytes % 21.9 (L) 24 - 44 %    Monocytes % 6.3 (H) 0 - 4 %    Eosinophils % 0.2 0 - 3 %    Basophils % 0.0 0 - 1 %    Segs Absolute 3.1 K/CU MM    Lymphocytes Absolute 1.0 K/CU MM    Monocytes Absolute 0.3 K/CU MM    Eosinophils Absolute 0.0 K/CU MM    Basophils Absolute 0.0 K/CU MM    Nucleated RBC % 0.0 %    Total Nucleated RBC 0.0 K/CU MM    Total Immature Neutrophil 0.03 K/CU MM    Immature Neutrophil % 0.7 (H) 0 - 0.43 %   Comprehensive Metabolic Panel    Collection Time: 08/10/22  6:04 AM   Result Value Ref Range    Sodium 138 135 - 145 MMOL/L    Potassium 4.2 3.5 - 5.1 MMOL/L    Chloride 106 99 - 110 mMol/L    CO2 19 (L) 21 - 32 MMOL/L    BUN 12 6 - 23 MG/DL    Creatinine 0.8 (L) 0.9 - 1.3 MG/DL    Glucose 84 70 - 99 MG/DL    Calcium 8.4 8.3 - 10.6 MG/DL    Albumin 3.3 (L) 3.4 - 5.0 GM/DL    Total Protein 5.6 (L) 6.4 - 8.2 GM/DL    Total Bilirubin 1.0 0.0 - 1.0 MG/DL    ALT 81 (H) 10 - 40 U/L    AST 55 (H) 15 - 37 IU/L    Alkaline Phosphatase 400 (H) 40 - 128 IU/L    GFR Non-African American >60 >60 mL/min/1.73m2    GFR African American >60 >60 mL/min/1.73m2    Anion Gap 13 4 - 16   Hepatic Function Panel    Collection Time: 08/10/22  6:04 AM Result Value Ref Range    Albumin 3.3 (L) 3.4 - 5.0 GM/DL    Total Bilirubin 1.0 0.0 - 1.0 MG/DL    Bilirubin, Direct 0.6 (H) 0.0 - 0.3 MG/DL    Bilirubin, Indirect 0.4 0 - 0.7 MG/DL    Alkaline Phosphatase 400 (H) 40 - 129 IU/L    AST 55 (H) 15 - 37 IU/L    ALT 81 (H) 10 - 40 U/L    Total Protein 5.6 (L) 6.4 - 8.2 GM/DL   Lipase    Collection Time: 08/10/22  6:04 AM   Result Value Ref Range    Lipase 30 13 - 60 IU/L        Imaging/Diagnostics Last 24 Hours   MRI ABDOMEN WO CONTRAST MRCP    Result Date: 8/8/2022  EXAMINATION: MRI OF THE ABDOMEN WITHOUT CONTRAST AND MRCP 8/8/2022 10:27 am TECHNIQUE: Multiplanar multisequence MRI of the abdomen was performed without the administration of intravenous contrast.  After initial T2 axial and coronal images, thick slab, thin slab and 3D coronal MRCP sequences were obtained without the administration of intravenous contrast.  MIP images are provided for review. COMPARISON: Ultrasound 08/07/2022. CT 08/05/2022. HISTORY: ORDERING SYSTEM PROVIDED HISTORY: sepsis 2/2 acute choleangitis TECHNOLOGIST PROVIDED HISTORY: Reason for exam:->sepsis 2/2 acute choleangitis Reason for Exam: sepsis 2/2 acute choleangitis Relevant Medical/Surgical History: none FINDINGS: LOWER CHEST:  The visualized lung bases reveal no signal abnormality. LIVER: The liver is normal in morphology. No evidence for steatosis. No suspicious liver lesion identified. GALLBLADDER/BILE DUCTS: Cholelithiasis. Multiple small stones are present within the cystic duct, proximal and distal common bile duct measuring up to 7 mm distally. There is upstream biliary dilatation with the mid common duct measuring 17 mm. Intrahepatic biliary dilatation is also present. No gallbladder wall thickening or surrounding inflammatory change. PANCREAS:  No significant findings. SPLEEN:  No significant findings. ADRENAL GLANDS:  Normal. KIDNEYS:  No significant findings.   Subcentimeter simple cysts are noted, for which no routine follow-up is necessary. GI/BOWEL: The visualized bowel is normal in caliber. PERITONEUM/RETROPERITONEUM:  No abdominal lymphadenopathy. No free intraperitoneal fluid. VESSELS:  Infrarenal aortic enlargement again demonstrated measuring up to 3.5 cm, better evaluated on recent abdominal CT exam. SOFT TISSUES/BONES:  No suspicious signal abnormality identified. 1.  Choledocholithiasis with associated biliary dilatation. No evidence for acute cholecystitis, although small stones are present within the cystic duct as well. 2.  Infrarenal aortic aneurysm measures up to 3.5 cm. RECOMMENDATIONS: 3.5 cm infrarenal abdominal aortic aneurysm. Recommend follow-up every 2 years. Reference: J Am Bryon Radiol 3324;32:[id number]. US ABDOMEN LIMITED    Result Date: 8/7/2022  EXAMINATION: RIGHT UPPER QUADRANT ULTRASOUND 8/7/2022 2:16 pm COMPARISON: 8/5/2022 HISTORY: ORDERING SYSTEM PROVIDED HISTORY: ruq pain, fever TECHNOLOGIST PROVIDED HISTORY: Reason for exam:->ruq pain, fever FINDINGS: LIVER:  There is increased echogenicity of the liver suggesting fatty infiltration. There is intrahepatic biliary ductal dilatation. BILIARY SYSTEM:  There is cholelithiasis. The gallbladder is distended. No wall thickening or pericholecystic fluid. The common bile duct is dilated measuring 19 mm in diameter. RIGHT KIDNEY: The right kidney is grossly unremarkable without evidence of hydronephrosis. PANCREAS:  There is a peripancreatic lymph node measuring 23 x 11 x 19 mm in size. On CT, this is outside of the pancreas itself. OTHER: No evidence of right upper quadrant ascites. Intrahepatic and extrahepatic biliary ductal dilatation with a dilated gallbladder. Common bile duct measures up to 19 mm in diameter. Cholelithiasis without specific sonographic evidence of acute cholecystitis. Peripancreatic enlarged lymph node measuring 23 x 11 x 19 mm. Recommend MRCP for further evaluation.        Electronically signed by Kristopher Oleary Yana Cantrell MD on 8/10/2022 at 10:20 PM

## 2022-08-12 ENCOUNTER — HOSPITAL ENCOUNTER (INPATIENT)
Age: 72
LOS: 6 days | Discharge: HOME HEALTH | DRG: 949 | End: 2022-08-18
Attending: PHYSICAL MEDICINE & REHABILITATION | Admitting: PHYSICAL MEDICINE & REHABILITATION
Payer: MEDICARE

## 2022-08-12 VITALS
BODY MASS INDEX: 46.2 KG/M2 | OXYGEN SATURATION: 96 % | SYSTOLIC BLOOD PRESSURE: 130 MMHG | TEMPERATURE: 98.8 F | RESPIRATION RATE: 20 BRPM | WEIGHT: 311.9 LBS | HEIGHT: 69 IN | HEART RATE: 58 BPM | DIASTOLIC BLOOD PRESSURE: 70 MMHG

## 2022-08-12 LAB
ALBUMIN SERPL-MCNC: 3.6 GM/DL (ref 3.4–5)
ALP BLD-CCNC: 458 IU/L (ref 40–129)
ALT SERPL-CCNC: 102 U/L (ref 10–40)
AST SERPL-CCNC: 105 IU/L (ref 15–37)
BASOPHILS ABSOLUTE: 0 K/CU MM
BASOPHILS RELATIVE PERCENT: 0.4 % (ref 0–1)
BILIRUB SERPL-MCNC: 1.5 MG/DL (ref 0–1)
BILIRUBIN DIRECT: 1 MG/DL (ref 0–0.3)
BILIRUBIN, INDIRECT: 0.5 MG/DL (ref 0–0.7)
CULTURE: NORMAL
CULTURE: NORMAL
DIFFERENTIAL TYPE: ABNORMAL
EOSINOPHILS ABSOLUTE: 0 K/CU MM
EOSINOPHILS RELATIVE PERCENT: 0.4 % (ref 0–3)
HCT VFR BLD CALC: 32.1 % (ref 42–52)
HEMOGLOBIN: 10.3 GM/DL (ref 13.5–18)
IMMATURE NEUTROPHIL %: 0.6 % (ref 0–0.43)
LIPASE: 23 IU/L (ref 13–60)
LYMPHOCYTES ABSOLUTE: 0.9 K/CU MM
LYMPHOCYTES RELATIVE PERCENT: 13.2 % (ref 24–44)
Lab: NORMAL
Lab: NORMAL
MCH RBC QN AUTO: 32 PG (ref 27–31)
MCHC RBC AUTO-ENTMCNC: 32.1 % (ref 32–36)
MCV RBC AUTO: 99.7 FL (ref 78–100)
MONOCYTES ABSOLUTE: 0.8 K/CU MM
MONOCYTES RELATIVE PERCENT: 11.2 % (ref 0–4)
NUCLEATED RBC %: 0 %
PDW BLD-RTO: 15.2 % (ref 11.7–14.9)
PLATELET # BLD: 238 K/CU MM (ref 140–440)
PMV BLD AUTO: 9.4 FL (ref 7.5–11.1)
RBC # BLD: 3.22 M/CU MM (ref 4.6–6.2)
SARS-COV-2, NAAT: NOT DETECTED
SEGMENTED NEUTROPHILS ABSOLUTE COUNT: 5.1 K/CU MM
SEGMENTED NEUTROPHILS RELATIVE PERCENT: 74.2 % (ref 36–66)
SOURCE: NORMAL
SPECIMEN: NORMAL
SPECIMEN: NORMAL
TOTAL IMMATURE NEUTOROPHIL: 0.04 K/CU MM
TOTAL NUCLEATED RBC: 0 K/CU MM
TOTAL PROTEIN: 5.9 GM/DL (ref 6.4–8.2)
WBC # BLD: 6.8 K/CU MM (ref 4–10.5)

## 2022-08-12 PROCEDURE — 80048 BASIC METABOLIC PNL TOTAL CA: CPT

## 2022-08-12 PROCEDURE — 2580000003 HC RX 258: Performed by: SURGERY

## 2022-08-12 PROCEDURE — 1280000000 HC REHAB R&B

## 2022-08-12 PROCEDURE — 6370000000 HC RX 637 (ALT 250 FOR IP): Performed by: SURGERY

## 2022-08-12 PROCEDURE — 97535 SELF CARE MNGMENT TRAINING: CPT

## 2022-08-12 PROCEDURE — 6360000002 HC RX W HCPCS: Performed by: SURGERY

## 2022-08-12 PROCEDURE — 97530 THERAPEUTIC ACTIVITIES: CPT

## 2022-08-12 PROCEDURE — 87635 SARS-COV-2 COVID-19 AMP PRB: CPT

## 2022-08-12 PROCEDURE — 6370000000 HC RX 637 (ALT 250 FOR IP): Performed by: STUDENT IN AN ORGANIZED HEALTH CARE EDUCATION/TRAINING PROGRAM

## 2022-08-12 PROCEDURE — 83690 ASSAY OF LIPASE: CPT

## 2022-08-12 PROCEDURE — 99223 1ST HOSP IP/OBS HIGH 75: CPT | Performed by: PHYSICAL MEDICINE & REHABILITATION

## 2022-08-12 PROCEDURE — 97166 OT EVAL MOD COMPLEX 45 MIN: CPT

## 2022-08-12 PROCEDURE — 85025 COMPLETE CBC W/AUTO DIFF WBC: CPT

## 2022-08-12 PROCEDURE — 80076 HEPATIC FUNCTION PANEL: CPT

## 2022-08-12 PROCEDURE — 97116 GAIT TRAINING THERAPY: CPT

## 2022-08-12 PROCEDURE — 97162 PT EVAL MOD COMPLEX 30 MIN: CPT

## 2022-08-12 PROCEDURE — 36415 COLL VENOUS BLD VENIPUNCTURE: CPT

## 2022-08-12 PROCEDURE — 94761 N-INVAS EAR/PLS OXIMETRY MLT: CPT

## 2022-08-12 RX ORDER — SODIUM CHLORIDE 0.9 % (FLUSH) 0.9 %
5-40 SYRINGE (ML) INJECTION PRN
Status: DISCONTINUED | OUTPATIENT
Start: 2022-08-12 | End: 2022-08-15

## 2022-08-12 RX ORDER — HYDROCODONE BITARTRATE AND ACETAMINOPHEN 5; 325 MG/1; MG/1
1 TABLET ORAL EVERY 6 HOURS PRN
Status: DISCONTINUED | OUTPATIENT
Start: 2022-08-12 | End: 2022-08-18

## 2022-08-12 RX ORDER — ACETAMINOPHEN 325 MG/1
650 TABLET ORAL EVERY 4 HOURS PRN
Status: CANCELLED | OUTPATIENT
Start: 2022-08-12

## 2022-08-12 RX ORDER — SODIUM CHLORIDE 0.9 % (FLUSH) 0.9 %
5-40 SYRINGE (ML) INJECTION EVERY 12 HOURS SCHEDULED
Status: CANCELLED | OUTPATIENT
Start: 2022-08-12

## 2022-08-12 RX ORDER — OXYCODONE HYDROCHLORIDE 10 MG/1
10 TABLET ORAL EVERY 8 HOURS PRN
Qty: 9 TABLET | Refills: 0 | Status: ON HOLD | OUTPATIENT
Start: 2022-08-12 | End: 2022-08-18 | Stop reason: HOSPADM

## 2022-08-12 RX ORDER — POTASSIUM CHLORIDE 7.45 MG/ML
10 INJECTION INTRAVENOUS PRN
Status: DISCONTINUED | OUTPATIENT
Start: 2022-08-12 | End: 2022-08-12

## 2022-08-12 RX ORDER — FLUTICASONE PROPIONATE 50 MCG
2 SPRAY, SUSPENSION (ML) NASAL DAILY
Status: DISCONTINUED | OUTPATIENT
Start: 2022-08-12 | End: 2022-08-12 | Stop reason: SDUPTHER

## 2022-08-12 RX ORDER — POTASSIUM CHLORIDE 20 MEQ/1
40 TABLET, EXTENDED RELEASE ORAL PRN
Status: DISCONTINUED | OUTPATIENT
Start: 2022-08-12 | End: 2022-08-12

## 2022-08-12 RX ORDER — LOSARTAN POTASSIUM 25 MG/1
25 TABLET ORAL NIGHTLY
Status: DISCONTINUED | OUTPATIENT
Start: 2022-08-13 | End: 2022-08-12 | Stop reason: SDUPTHER

## 2022-08-12 RX ORDER — SODIUM CHLORIDE, SODIUM LACTATE, POTASSIUM CHLORIDE, CALCIUM CHLORIDE 600; 310; 30; 20 MG/100ML; MG/100ML; MG/100ML; MG/100ML
INJECTION, SOLUTION INTRAVENOUS CONTINUOUS
Status: DISCONTINUED | OUTPATIENT
Start: 2022-08-12 | End: 2022-08-12 | Stop reason: ALTCHOICE

## 2022-08-12 RX ORDER — ALBUTEROL SULFATE 90 UG/1
1 AEROSOL, METERED RESPIRATORY (INHALATION) EVERY 4 HOURS PRN
Status: DISCONTINUED | OUTPATIENT
Start: 2022-08-12 | End: 2022-08-18 | Stop reason: HOSPADM

## 2022-08-12 RX ORDER — FLUTICASONE PROPIONATE 50 MCG
1 SPRAY, SUSPENSION (ML) NASAL 2 TIMES DAILY
Status: DISCONTINUED | OUTPATIENT
Start: 2022-08-12 | End: 2022-08-18 | Stop reason: HOSPADM

## 2022-08-12 RX ORDER — METOCLOPRAMIDE HYDROCHLORIDE 5 MG/ML
10 INJECTION INTRAMUSCULAR; INTRAVENOUS EVERY 6 HOURS PRN
Status: DISCONTINUED | OUTPATIENT
Start: 2022-08-12 | End: 2022-08-12

## 2022-08-12 RX ORDER — MAGNESIUM SULFATE IN WATER 40 MG/ML
2000 INJECTION, SOLUTION INTRAVENOUS PRN
Status: DISCONTINUED | OUTPATIENT
Start: 2022-08-12 | End: 2022-08-12

## 2022-08-12 RX ORDER — HYDROCODONE BITARTRATE AND ACETAMINOPHEN 5; 325 MG/1; MG/1
1 TABLET ORAL EVERY 6 HOURS PRN
Status: CANCELLED | OUTPATIENT
Start: 2022-08-12

## 2022-08-12 RX ORDER — SODIUM CHLORIDE 0.9 % (FLUSH) 0.9 %
5-40 SYRINGE (ML) INJECTION EVERY 12 HOURS SCHEDULED
Status: DISCONTINUED | OUTPATIENT
Start: 2022-08-13 | End: 2022-08-15

## 2022-08-12 RX ORDER — AMOXICILLIN AND CLAVULANATE POTASSIUM 875; 125 MG/1; MG/1
1 TABLET, FILM COATED ORAL 2 TIMES DAILY
Qty: 5 TABLET | Refills: 0 | Status: ON HOLD
Start: 2022-08-12 | End: 2022-08-18 | Stop reason: HOSPADM

## 2022-08-12 RX ORDER — SODIUM CHLORIDE 0.9 % (FLUSH) 0.9 %
5-40 SYRINGE (ML) INJECTION PRN
Status: CANCELLED | OUTPATIENT
Start: 2022-08-12

## 2022-08-12 RX ORDER — LOSARTAN POTASSIUM 25 MG/1
25 TABLET ORAL NIGHTLY
Status: CANCELLED | OUTPATIENT
Start: 2022-08-13

## 2022-08-12 RX ORDER — LOSARTAN POTASSIUM 25 MG/1
25 TABLET ORAL DAILY
Status: CANCELLED | OUTPATIENT
Start: 2022-08-13

## 2022-08-12 RX ORDER — SODIUM CHLORIDE 9 MG/ML
INJECTION, SOLUTION INTRAVENOUS PRN
Status: CANCELLED | OUTPATIENT
Start: 2022-08-12

## 2022-08-12 RX ORDER — TRAZODONE HYDROCHLORIDE 50 MG/1
50 TABLET ORAL NIGHTLY
Status: CANCELLED | OUTPATIENT
Start: 2022-08-12

## 2022-08-12 RX ORDER — POLYETHYLENE GLYCOL 3350 17 G/17G
17 POWDER, FOR SOLUTION ORAL DAILY PRN
Status: DISCONTINUED | OUTPATIENT
Start: 2022-08-12 | End: 2022-08-12

## 2022-08-12 RX ORDER — ONDANSETRON 2 MG/ML
4 INJECTION INTRAMUSCULAR; INTRAVENOUS EVERY 6 HOURS PRN
Status: DISCONTINUED | OUTPATIENT
Start: 2022-08-12 | End: 2022-08-15

## 2022-08-12 RX ORDER — ACETAMINOPHEN 325 MG/1
650 TABLET ORAL EVERY 4 HOURS PRN
Status: DISCONTINUED | OUTPATIENT
Start: 2022-08-12 | End: 2022-08-12

## 2022-08-12 RX ORDER — ONDANSETRON 2 MG/ML
4 INJECTION INTRAMUSCULAR; INTRAVENOUS EVERY 6 HOURS PRN
Status: CANCELLED | OUTPATIENT
Start: 2022-08-12

## 2022-08-12 RX ORDER — SODIUM CHLORIDE 9 MG/ML
25 INJECTION, SOLUTION INTRAVENOUS PRN
Status: DISCONTINUED | OUTPATIENT
Start: 2022-08-12 | End: 2022-08-13

## 2022-08-12 RX ORDER — ENOXAPARIN SODIUM 100 MG/ML
30 INJECTION SUBCUTANEOUS 2 TIMES DAILY
Status: CANCELLED | OUTPATIENT
Start: 2022-08-12

## 2022-08-12 RX ORDER — POTASSIUM CHLORIDE 7.45 MG/ML
10 INJECTION INTRAVENOUS PRN
Status: CANCELLED | OUTPATIENT
Start: 2022-08-12

## 2022-08-12 RX ORDER — AMOXICILLIN AND CLAVULANATE POTASSIUM 875; 125 MG/1; MG/1
1 TABLET, FILM COATED ORAL 2 TIMES DAILY
Status: COMPLETED | OUTPATIENT
Start: 2022-08-12 | End: 2022-08-15

## 2022-08-12 RX ORDER — PROMETHAZINE HYDROCHLORIDE 25 MG/1
12.5 TABLET ORAL EVERY 6 HOURS PRN
Status: DISCONTINUED | OUTPATIENT
Start: 2022-08-12 | End: 2022-08-18

## 2022-08-12 RX ORDER — SODIUM CHLORIDE 9 MG/ML
25 INJECTION, SOLUTION INTRAVENOUS PRN
Status: CANCELLED | OUTPATIENT
Start: 2022-08-12

## 2022-08-12 RX ORDER — SODIUM CHLORIDE 0.9 % (FLUSH) 0.9 %
5-40 SYRINGE (ML) INJECTION PRN
Status: DISCONTINUED | OUTPATIENT
Start: 2022-08-12 | End: 2022-08-12 | Stop reason: SDUPTHER

## 2022-08-12 RX ORDER — FLUTICASONE PROPIONATE 50 MCG
1 SPRAY, SUSPENSION (ML) NASAL 2 TIMES DAILY
Status: CANCELLED | OUTPATIENT
Start: 2022-08-12

## 2022-08-12 RX ORDER — SODIUM CHLORIDE 0.9 % (FLUSH) 0.9 %
5-40 SYRINGE (ML) INJECTION PRN
Status: DISCONTINUED | OUTPATIENT
Start: 2022-08-12 | End: 2022-08-12 | Stop reason: ALTCHOICE

## 2022-08-12 RX ORDER — SODIUM CHLORIDE 9 MG/ML
INJECTION, SOLUTION INTRAVENOUS PRN
Status: DISCONTINUED | OUTPATIENT
Start: 2022-08-12 | End: 2022-08-15

## 2022-08-12 RX ORDER — POTASSIUM CHLORIDE 20 MEQ/1
40 TABLET, EXTENDED RELEASE ORAL PRN
Status: CANCELLED | OUTPATIENT
Start: 2022-08-12

## 2022-08-12 RX ORDER — MAGNESIUM SULFATE IN WATER 40 MG/ML
2000 INJECTION, SOLUTION INTRAVENOUS PRN
Status: CANCELLED | OUTPATIENT
Start: 2022-08-12

## 2022-08-12 RX ORDER — DIPHENHYDRAMINE HCL 25 MG
25 TABLET ORAL EVERY 12 HOURS PRN
Status: CANCELLED | OUTPATIENT
Start: 2022-08-12

## 2022-08-12 RX ORDER — ENOXAPARIN SODIUM 100 MG/ML
30 INJECTION SUBCUTANEOUS 2 TIMES DAILY
Status: DISCONTINUED | OUTPATIENT
Start: 2022-08-13 | End: 2022-08-18

## 2022-08-12 RX ORDER — ACETAMINOPHEN 325 MG/1
650 TABLET ORAL EVERY 4 HOURS PRN
Status: DISCONTINUED | OUTPATIENT
Start: 2022-08-12 | End: 2022-08-18 | Stop reason: HOSPADM

## 2022-08-12 RX ORDER — PROMETHAZINE HYDROCHLORIDE 25 MG/1
12.5 TABLET ORAL EVERY 6 HOURS PRN
Status: CANCELLED | OUTPATIENT
Start: 2022-08-12

## 2022-08-12 RX ORDER — FLUTICASONE PROPIONATE 50 MCG
2 SPRAY, SUSPENSION (ML) NASAL DAILY
Status: CANCELLED | OUTPATIENT
Start: 2022-08-12

## 2022-08-12 RX ORDER — TRAZODONE HYDROCHLORIDE 50 MG/1
50 TABLET ORAL NIGHTLY
Status: DISCONTINUED | OUTPATIENT
Start: 2022-08-12 | End: 2022-08-12 | Stop reason: SDUPTHER

## 2022-08-12 RX ORDER — SODIUM CHLORIDE 0.9 % (FLUSH) 0.9 %
5-40 SYRINGE (ML) INJECTION EVERY 12 HOURS SCHEDULED
Status: DISCONTINUED | OUTPATIENT
Start: 2022-08-13 | End: 2022-08-13

## 2022-08-12 RX ORDER — AMOXICILLIN AND CLAVULANATE POTASSIUM 875; 125 MG/1; MG/1
1 TABLET, FILM COATED ORAL 2 TIMES DAILY
Status: CANCELLED | OUTPATIENT
Start: 2022-08-12 | End: 2022-08-16

## 2022-08-12 RX ORDER — POLYETHYLENE GLYCOL 3350 17 G/17G
17 POWDER, FOR SOLUTION ORAL DAILY PRN
Status: CANCELLED | OUTPATIENT
Start: 2022-08-12

## 2022-08-12 RX ORDER — SODIUM CHLORIDE 9 MG/ML
25 INJECTION, SOLUTION INTRAVENOUS PRN
Status: DISCONTINUED | OUTPATIENT
Start: 2022-08-12 | End: 2022-08-12 | Stop reason: ALTCHOICE

## 2022-08-12 RX ORDER — METOCLOPRAMIDE HYDROCHLORIDE 5 MG/ML
10 INJECTION INTRAMUSCULAR; INTRAVENOUS EVERY 6 HOURS PRN
Status: CANCELLED | OUTPATIENT
Start: 2022-08-12

## 2022-08-12 RX ORDER — SODIUM CHLORIDE 0.9 % (FLUSH) 0.9 %
5-40 SYRINGE (ML) INJECTION EVERY 12 HOURS SCHEDULED
Status: DISCONTINUED | OUTPATIENT
Start: 2022-08-12 | End: 2022-08-12 | Stop reason: ALTCHOICE

## 2022-08-12 RX ORDER — DIPHENHYDRAMINE HYDROCHLORIDE 50 MG/ML
25 INJECTION INTRAMUSCULAR; INTRAVENOUS EVERY 6 HOURS PRN
Status: CANCELLED | OUTPATIENT
Start: 2022-08-12

## 2022-08-12 RX ORDER — DIPHENHYDRAMINE HYDROCHLORIDE 50 MG/ML
25 INJECTION INTRAMUSCULAR; INTRAVENOUS EVERY 6 HOURS PRN
Status: DISCONTINUED | OUTPATIENT
Start: 2022-08-12 | End: 2022-08-12

## 2022-08-12 RX ORDER — TRAZODONE HYDROCHLORIDE 50 MG/1
50 TABLET ORAL NIGHTLY
Status: DISCONTINUED | OUTPATIENT
Start: 2022-08-12 | End: 2022-08-18 | Stop reason: HOSPADM

## 2022-08-12 RX ORDER — DIPHENHYDRAMINE HCL 25 MG
25 TABLET ORAL EVERY 12 HOURS PRN
Status: DISCONTINUED | OUTPATIENT
Start: 2022-08-12 | End: 2022-08-18 | Stop reason: HOSPADM

## 2022-08-12 RX ORDER — POLYETHYLENE GLYCOL 3350 17 G/17G
17 POWDER, FOR SOLUTION ORAL DAILY PRN
Status: DISCONTINUED | OUTPATIENT
Start: 2022-08-12 | End: 2022-08-18 | Stop reason: HOSPADM

## 2022-08-12 RX ORDER — LOSARTAN POTASSIUM 25 MG/1
25 TABLET ORAL DAILY
Status: DISCONTINUED | OUTPATIENT
Start: 2022-08-13 | End: 2022-08-18 | Stop reason: HOSPADM

## 2022-08-12 RX ADMIN — PIPERACILLIN AND TAZOBACTAM 3375 MG: 3; .375 INJECTION, POWDER, LYOPHILIZED, FOR SOLUTION INTRAVENOUS at 15:00

## 2022-08-12 RX ADMIN — OXYCODONE HYDROCHLORIDE 10 MG: 10 TABLET ORAL at 20:21

## 2022-08-12 RX ADMIN — FLUTICASONE PROPIONATE 1 SPRAY: 50 SPRAY, METERED NASAL at 23:23

## 2022-08-12 RX ADMIN — LOSARTAN POTASSIUM 25 MG: 25 TABLET, FILM COATED ORAL at 08:43

## 2022-08-12 RX ADMIN — OXYCODONE HYDROCHLORIDE 10 MG: 10 TABLET ORAL at 14:57

## 2022-08-12 RX ADMIN — ENOXAPARIN SODIUM 30 MG: 100 INJECTION SUBCUTANEOUS at 08:43

## 2022-08-12 RX ADMIN — LEVOTHYROXINE SODIUM 175 MCG: 0.1 TABLET ORAL at 05:53

## 2022-08-12 RX ADMIN — SODIUM CHLORIDE, PRESERVATIVE FREE 10 ML: 5 INJECTION INTRAVENOUS at 08:44

## 2022-08-12 RX ADMIN — AMOXICILLIN AND CLAVULANATE POTASSIUM 1 TABLET: 875; 125 TABLET, FILM COATED ORAL at 23:22

## 2022-08-12 RX ADMIN — SODIUM CHLORIDE, PRESERVATIVE FREE 5 ML: 5 INJECTION INTRAVENOUS at 09:31

## 2022-08-12 RX ADMIN — OXYCODONE HYDROCHLORIDE 10 MG: 10 TABLET ORAL at 08:43

## 2022-08-12 RX ADMIN — TRAZODONE HYDROCHLORIDE 50 MG: 50 TABLET ORAL at 23:22

## 2022-08-12 RX ADMIN — SODIUM CHLORIDE, PRESERVATIVE FREE 10 ML: 5 INJECTION INTRAVENOUS at 20:23

## 2022-08-12 RX ADMIN — PIPERACILLIN AND TAZOBACTAM 3375 MG: 3; .375 INJECTION, POWDER, LYOPHILIZED, FOR SOLUTION INTRAVENOUS at 05:53

## 2022-08-12 RX ADMIN — SALINE NASAL SPRAY 1 SPRAY: 1.5 SOLUTION NASAL at 23:25

## 2022-08-12 RX ADMIN — ENOXAPARIN SODIUM 30 MG: 100 INJECTION SUBCUTANEOUS at 20:23

## 2022-08-12 ASSESSMENT — PAIN DESCRIPTION - ORIENTATION
ORIENTATION: LEFT;LOWER
ORIENTATION: ANTERIOR;LEFT

## 2022-08-12 ASSESSMENT — LIFESTYLE VARIABLES
HOW MANY STANDARD DRINKS CONTAINING ALCOHOL DO YOU HAVE ON A TYPICAL DAY: PATIENT DOES NOT DRINK
HOW OFTEN DO YOU HAVE A DRINK CONTAINING ALCOHOL: NEVER

## 2022-08-12 ASSESSMENT — PAIN SCALES - GENERAL
PAINLEVEL_OUTOF10: 8
PAINLEVEL_OUTOF10: 0
PAINLEVEL_OUTOF10: 5
PAINLEVEL_OUTOF10: 7

## 2022-08-12 ASSESSMENT — ENCOUNTER SYMPTOMS
VOMITING: 0
SHORTNESS OF BREATH: 0
WHEEZING: 0
COUGH: 0
BACK PAIN: 0
NAUSEA: 0
SORE THROAT: 0

## 2022-08-12 ASSESSMENT — PAIN DESCRIPTION - LOCATION
LOCATION: ABDOMEN
LOCATION: ABDOMEN

## 2022-08-12 ASSESSMENT — PAIN - FUNCTIONAL ASSESSMENT
PAIN_FUNCTIONAL_ASSESSMENT: PREVENTS OR INTERFERES WITH MANY ACTIVE NOT PASSIVE ACTIVITIES
PAIN_FUNCTIONAL_ASSESSMENT: ACTIVITIES ARE NOT PREVENTED

## 2022-08-12 ASSESSMENT — PAIN DESCRIPTION - FREQUENCY: FREQUENCY: CONTINUOUS

## 2022-08-12 ASSESSMENT — PAIN DESCRIPTION - DESCRIPTORS
DESCRIPTORS: SHARP
DESCRIPTORS: ACHING

## 2022-08-12 NOTE — DISCHARGE SUMMARY
V2.0  Discharge Summary    Name:  Roselyn Chávez /Age/Sex: 1950 (81 y.o. male)   Admit Date: 2022  Discharge Date: 22    MRN & CSN:  [id number] & [id number] Encounter Date and Time 22 5:20 PM EDT    Attending:  Gregorio Cano MD Discharging Provider: Gregorio Cano MD       Hospital Course:     Brief HPI: Roselyn Chávez is a 66 y.o. male with pmh of obstructive sleep apnea, dyslipidemia, hypertension, hypothyroidism presented the ED today with complaints of persistent fevers over the past several weeks with intermittent abdominal pain. He associates faded for as being ultimately worse with food intake. He was seen by GI, had a CT scan of the abdomen pelvis done yesterday which showed common bile duct dilation, gallbladder distention concerning for possible cholecystitis or cholelithiasis. He has been attempting to manage his pain at home with over-the-counter Tylenol but said his pain got acutely worse overnight prompting him to go to the ER today. He endorses tenderness, nausea in his right upper quadrant. Denies vomiting. Denies diarrhea. Denies any chest pain or shortness of breath. Additional review of symptoms as noted below    Brief Problem Based Course:     Sepsis due to ascending cholangitis with CBD stone present  -Now status post ERCP with sphincterotomy and cholecystectomy.  -Liver enzymes are trending down. Minimal abdominal pain at the incision site. No nausea or vomiting. Diet has been advanced to full liquids.  -We will complete a 7-day course of antibiotics with Augmentin on discharge. His case was discussed with GI attending Dr. Mihai Alas and he was in agreement regarding the course of antibiotics. Acute cholecystitis  -Status post lap cholecystectomy. Stable postop. Abdominal aortic aneurysm  -Found on CT scan. Will need repeat scan as outpatient     COPD, MEGHAN  -Stable chronic exacerbation.  -Continue home CPAP.      Sinus bradycardia  -Cardiology was consulted. Event monitor as outpatient. Morbid obesity due to excess calories Body mass index is 46.06 kg/m². - Complicating assessment and treatment. Placing patient at risk for multiple co-morbidities as well as early death and contributing to the patient's presentation.  on weight loss when appropriate. Chronic conditions: Continue home medications as ordered  GERD, hypertension, dyslipidemia, arthritis, acquired hypothyroidism        The patient expressed appropriate understanding of, and agreement with the discharge recommendations, medications, and plan. Consults this admission:  IP CONSULT TO GI  IP CONSULT TO INTERNAL MEDICINE  IP CONSULT TO HOSPITALIST  IP CONSULT TO RESPIRATORY CARE  IP CONSULT TO GI  IP CONSULT TO GENERAL SURGERY  IP CONSULT TO CARDIOLOGY  IP CONSULT TO GI  IP CONSULT TO INTERNAL MEDICINE  IP CONSULT TO HOSPITALIST  IP CONSULT TO RESPIRATORY CARE  IP CONSULT TO GI    Discharge Diagnosis:   Sepsis ([address]  Acute cholangitis  Acute cholecystitis  Abdominal aortic aneurysm  COPD  Sinus bradycardia  Morbid obesity  Hypertension  Dyslipidemia  Hypothyroidism        Discharge Instruction:   Follow up appointments: PCP and GI  Primary care physician: Krystin Schneider MD within 2 weeks  Diet: cardiac diet   Activity: activity as tolerated  Disposition: Discharged to:   []Home, []C, []SNF, [x]Acute Rehab, []Hospice   Condition on discharge: Stable  Labs and Tests to be Followed up as an outpatient by PCP or Specialist: Get outpatient LFTs to make sure they are trending down. Discharge Medications:        Medication List        START taking these medications      amoxicillin-clavulanate 875-125 MG per tablet  Commonly known as: AUGMENTIN  Take 1 tablet by mouth in the morning and 1 tablet before bedtime. Do all this for 3 days.      oxyCODONE HCl 10 MG immediate release tablet  Commonly known as: OXY-IR  Take 1 tablet by mouth every 8 hours as needed for Pain for up to 3 days.             CONTINUE taking these medications      albuterol-ipratropium  MCG/ACT Aers inhaler  Commonly known as: Combivent Respimat  USE 1 INHALATION TWICE A DAY     ascorbic acid 500 MG tablet  Commonly known as: VITAMIN C     CENTRUM PO     Docusate Sodium 100 MG Tabs     flunisolide 25 MCG/ACT (0.025%) Soln  Commonly known as: NASALIDE     fluticasone 50 MCG/ACT nasal spray  Commonly known as: FLONASE     levothyroxine 25 MCG tablet  Commonly known as: SYNTHROID     losartan 25 MG tablet  Commonly known as: COZAAR  Take 1 tablet by mouth at bedtime To replace hydrochlorothiazide     mometasone 50 MCG/ACT nasal spray  Commonly known as: NASONEX     niacin 500 MG extended release capsule     saline nasal gel Gel     SODIUM FLUORIDE (DENTAL GEL) 1.1 % Crea     traZODone 50 MG tablet  Commonly known as: DESYREL            STOP taking these medications      azelastine HCl 0.15 % Soln     bisacodyl 5 MG EC tablet  Generic drug: bisacodyl     ciprofloxacin-dexamethasone 0.3-0.1 % otic suspension  Commonly known as: CIPRODEX     hydroCHLOROthiazide 25 MG tablet  Commonly known as: HYDRODIURIL     potassium chloride 20 MEQ packet  Commonly known as: KLOR-CON            ASK your doctor about these medications      hydrocortisone 25 MG suppository  Commonly known as: ANUSOL-HC  Place 1 suppository rectally 2 times daily as needed for Hemorrhoids               Where to Get Your Medications        You can get these medications from any pharmacy    Bring a paper prescription for each of these medications  oxyCODONE HCl 10 MG immediate release tablet       Information about where to get these medications is not yet available    Ask your nurse or doctor about these medications  amoxicillin-clavulanate 875-125 MG per tablet        Objective Findings at Discharge:   /75   Pulse 61   Temp 97.9 °F (36.6 °C) (Oral)   Resp 16   Ht 5' 9\" (1.753 m)   Wt (!) 311 lb 14.4 oz (141.5 kg)   SpO2 97%   BMI 46.06 kg/m²       Physical Exam:   General: NAD  Eyes: EOMI  ENT: neck supple  Cardiovascular: Regular rate. Respiratory: Clear to auscultation  Gastrointestinal: Soft, non tender  Genitourinary: no suprapubic tenderness  Musculoskeletal: No edema  Skin: warm, dry  Neuro: Alert. Psych: Mood appropriate. Labs and Imaging   Echocardiogram complete 2D with doppler with color    Result Date: 8/11/2022  Transthoracic Echocardiography Report (TTE)  Demographics   Patient Name      NICOLAS DAS  Date of Study       08/11/2022   Date of Birth     1950         Gender              Male   Age               66 year(s)         Race                   Patient Number    [id number]         Room Number         1103   Visit Number      [id number]   Corporate ID      [id number]   Accession Number  [id number]         JUSTIN Ware   Ordering          Carolyn Ta MD  Physician         MD                 Physician  Procedure Type of Study   TTE procedure:ECHOCARDIOGRAM COMPLETE 2D W DOPPLER W COLOR. Procedure Date Date: 08/11/2022 Start: 08:27 AM Study Location: Portable Technical Quality: Adequate visualization Indications:Bradycardia. Patient Status: Routine Height: 69 inches Weight: 303 pounds BSA: 2.46 m2 BMI: 44.74 kg/m2 Rhythm: Sinus bradycardia HR: 54 bpm BP: 130/59 mmHg  Conclusions   Summary  Left ventricular systolic function is normal.  Ejection fraction is visually estimated at 55-60%. No evidence of any pericardial effusion. No significant valvular disease noted.    Signature   ------------------------------------------------------------------  Electronically signed by Alicia Dejesus MD (Interpreting  physician) on 08/11/2022 at 09:30 AM  ------------------------------------------------------------------   Findings   Left Ventricle  Left ventricular systolic function is normal.  Ejection fraction is visually estimated at 55-60%. No regional wall motion abnormalites. Left ventricle size is normal.  Normal diastolic function. Left Atrium  Essentially normal left atrium. Right Atrium  Essentially normal right atrium. Right Ventricle  Essentially normal right ventricle. Aortic Valve  Sclerotic, but non-stenotic aortic valve. No significant valvular disease noted. Mitral Valve  Structurally normal mitral valve. Trace mitral regurgitation. Tricuspid Valve  Tricuspid valve was not well visualized. No significant valvular disease noted. Pulmonic Valve  The pulmonic valve was not well visualized. No significant valvular disease noted. Pericardial Effusion  No evidence of any pericardial effusion. Pleural Effusion  No evidence of pleural effusion. Miscellaneous  IVC and abdominal aorta are within normal limits.   M-Mode/2D Measurements & Calculations   LV Diastolic Dimension:  LV Systolic Dimension:  LA Dimension: 3.1 cmAO Root  4.9 cm                   3.41 cm                 Dimension: 3.5 cmLA Area:  LV FS:30.4 %             LV Volume Diastolic:    09.4 cm2  LV PW Diastolic: 8.83 cm 538 ml  LV PW Systolic: 2.24 cm  LV Volume Systolic: 59  Septum Diastolic: 9.30   ml  cm                       LV EDV/LV EDV Index:    RV Diastolic Dimension:  Septum Systolic: 9.77 cm 394 SM/32 m2LV ESV/LV   2.53 cm  CO: 4.82 l/min           ESV Index: 59 ml/24 m2  CI: 1.96 l/m*m2          EF Calculated (A4C):    LA/Aorta: 0.89                           52.4 %                  Ascending Aorta: 3.4 cm  LV Area Diastolic: 63.0  EF Calculated (2D):     LA volume/Index: 69 ml  cm2                      57.7 %                  /00F8  LV Area Systolic: 25.4  cm2                      LV Length: 8.8 cm                            LVOT: 2 cm  Doppler Measurements & Calculations   MV Peak E-Wave: 119 cm/s  AV Peak Velocity: 158 cm/s   LVOT Peak Velocity:  MV Peak A-Wave: 99.3 cm/s AV Peak Gradient: 9.99 mmHg  124 cm/s  MV E/A Ratio: 1.2         AV Mean Velocity: 108 cm/s   LVOT Mean Velocity:  MV Peak Gradient: 5.66    AV Mean Gradient: 5 mmHg     83.2 cm/s  mmHg                      AV VTI: 35.4 cm              LVOT Peak Gradient: 6  MV Mean Gradient: 3 mmHg  AV Area (Continuity):2.52    mmHgLVOT Mean  MV Mean Velocity: 73.6    cm2                          Gradient: 3 mmHg  cm/s  MV Deceleration Time: 211 LVOT VTI: 28.4 cm  msec   MV Area (continuity):  2.21 cm2  MV E' Septal Velocity:  9.65 cm/s  MV A' Septal Velocity:  6.8 cm/s  MV E' Lateral Velocity:  9.21 cm/s  MV A' Lateral Velocity:  4.72 cm/s  MV E/E' septal: 12.33  MV E/E' lateral: 12.92      FL LESS THAN 1 HOUR    Result Date: 8/9/2022  EXAMINATION: SPOT FLUOROSCOPIC IMAGES 8/9/2022 3:28 pm TECHNIQUE: Fluoroscopy was provided by the radiology department for procedure. Radiologist was not present during examination. FLUOROSCOPY DOSE AND TYPE OR TIME AND EXPOSURES: 3.6 minutes of fluoroscopy. 2 images. COMPARISON: None HISTORY: ORDERING SYSTEM PROVIDED HISTORY: ERCP TECHNOLOGIST PROVIDED HISTORY: Reason for exam:->ERCP Reason for Exam: ERCP Intraprocedural imaging. FINDINGS: 2 spot images of the right upper quadrant were obtained. Images demonstrate cannulation the biliary system with dilated intra and extrahepatic bile ducts. No filling defect is evident. No extraluminal contrast is seen. Intraprocedural fluoroscopic spot images as above. See separate procedure report for more information.      MRI ABDOMEN WO CONTRAST MRCP    Result Date: 8/8/2022  EXAMINATION: MRI OF THE ABDOMEN WITHOUT CONTRAST AND MRCP 8/8/2022 10:27 am TECHNIQUE: Multiplanar multisequence MRI of the abdomen was performed without the administration of intravenous contrast.  After initial T2 axial and coronal images, thick slab, thin slab and 3D coronal MRCP sequences were obtained without the administration of intravenous contrast.  MIP images are provided for review. COMPARISON: Ultrasound 08/07/2022. CT 08/05/2022. HISTORY: ORDERING SYSTEM PROVIDED HISTORY: sepsis 2/2 acute choleangitis TECHNOLOGIST PROVIDED HISTORY: Reason for exam:->sepsis 2/2 acute choleangitis Reason for Exam: sepsis 2/2 acute choleangitis Relevant Medical/Surgical History: none FINDINGS: LOWER CHEST:  The visualized lung bases reveal no signal abnormality. LIVER: The liver is normal in morphology. No evidence for steatosis. No suspicious liver lesion identified. GALLBLADDER/BILE DUCTS: Cholelithiasis. Multiple small stones are present within the cystic duct, proximal and distal common bile duct measuring up to 7 mm distally. There is upstream biliary dilatation with the mid common duct measuring 17 mm. Intrahepatic biliary dilatation is also present. No gallbladder wall thickening or surrounding inflammatory change. PANCREAS:  No significant findings. SPLEEN:  No significant findings. ADRENAL GLANDS:  Normal. KIDNEYS:  No significant findings. Subcentimeter simple cysts are noted, for which no routine follow-up is necessary. GI/BOWEL: The visualized bowel is normal in caliber. PERITONEUM/RETROPERITONEUM:  No abdominal lymphadenopathy. No free intraperitoneal fluid. VESSELS:  Infrarenal aortic enlargement again demonstrated measuring up to 3.5 cm, better evaluated on recent abdominal CT exam. SOFT TISSUES/BONES:  No suspicious signal abnormality identified. 1.  Choledocholithiasis with associated biliary dilatation. No evidence for acute cholecystitis, although small stones are present within the cystic duct as well. 2.  Infrarenal aortic aneurysm measures up to 3.5 cm. RECOMMENDATIONS: 3.5 cm infrarenal abdominal aortic aneurysm. Recommend follow-up every 2 years. Reference: J Am Bryon Radiol 1626;37:[id number].      US ABDOMEN LIMITED    Result Date: 8/7/2022  EXAMINATION: RIGHT UPPER QUADRANT ULTRASOUND 8/7/2022 2:16 pm COMPARISON: 8/5/2022 HISTORY: ORDERING SYSTEM PROVIDED HISTORY: ruq pain, fever TECHNOLOGIST PROVIDED HISTORY: Reason for exam:->ruq pain, fever FINDINGS: LIVER:  There is increased echogenicity of the liver suggesting fatty infiltration. There is intrahepatic biliary ductal dilatation. BILIARY SYSTEM:  There is cholelithiasis. The gallbladder is distended. No wall thickening or pericholecystic fluid. The common bile duct is dilated measuring 19 mm in diameter. RIGHT KIDNEY: The right kidney is grossly unremarkable without evidence of hydronephrosis. PANCREAS:  There is a peripancreatic lymph node measuring 23 x 11 x 19 mm in size. On CT, this is outside of the pancreas itself. OTHER: No evidence of right upper quadrant ascites. Intrahepatic and extrahepatic biliary ductal dilatation with a dilated gallbladder. Common bile duct measures up to 19 mm in diameter. Cholelithiasis without specific sonographic evidence of acute cholecystitis. Peripancreatic enlarged lymph node measuring 23 x 11 x 19 mm. Recommend MRCP for further evaluation.        CBC:   Recent Labs     08/10/22  0604 08/11/22  0658 08/12/22  0314   WBC 4.4 7.6 6.8   HGB 10.4* 10.5* 10.3*    284 238     BMP:    Recent Labs     08/10/22  0604 08/11/22  0658    140   K 4.2 4.1    105   CO2 19* 22   BUN 12 12   CREATININE 0.8* 0.7*   GLUCOSE 84 88     Hepatic:   Recent Labs     08/10/22  0604 08/11/22  0658 08/12/22  0314   AST 55*  55* 65*  65* 105*   ALT 81*  81* 74*  74* 102*   BILITOT 1.0  1.0 1.1*  1.1* 1.5*   ALKPHOS 400*  400* 344*  344* 458*     Lipids:   Lab Results   Component Value Date/Time    CHOL 143 10/29/2021 08:07 AM    HDL 32 10/29/2021 08:07 AM    TRIG 117 10/29/2021 08:07 AM     Hemoglobin A1C:   Lab Results   Component Value Date/Time    LABA1C 5.1 02/05/2021 12:00 AM     TSH:   Lab Results   Component Value Date/Time    TSH 3.56 07/27/2022 08:52 AM     Troponin:   Lab Results   Component Value Date/Time    TROPONINT <0.010 08/07/2022 12:05 PM     Lactic Acid: No results for input(s): LACTA in the last 72 hours. BNP: No results for input(s): PROBNP in the last 72 hours.   UA:  Lab Results   Component Value Date/Time    NITRU NEGATIVE 08/07/2022 11:29 PM    COLORU YELLOW 08/07/2022 11:29 PM    WBCUA <1 08/07/2022 11:29 PM    RBCUA <1 08/07/2022 11:29 PM    MUCUS RARE 08/07/2022 11:29 PM    TRICHOMONAS NONE SEEN 08/07/2022 11:29 PM    BACTERIA NEGATIVE 08/07/2022 11:29 PM    CLARITYU CLEAR 08/07/2022 11:29 PM    SPECGRAV 1.015 08/07/2022 11:29 PM    LEUKOCYTESUR NEGATIVE 08/07/2022 11:29 PM    UROBILINOGEN 1.0 08/07/2022 11:29 PM    BILIRUBINUR NEGATIVE 08/07/2022 11:29 PM    BLOODU NEGATIVE 08/07/2022 11:29 PM    KETUA TRACE 08/07/2022 11:29 PM       Organism:   Lab Results   Component Value Date/Time    ORG BSGA 07/23/2014 11:00 AM    ORG MRSA 07/23/2014 11:00 AM       Time Spent Discharging patient 35 minutes    Electronically signed by Chantelle Orozco MD on 8/12/2022 at 5:20 PM

## 2022-08-12 NOTE — PROGRESS NOTES
Physical Therapy  Facility/Department: [facility]  Physical Therapy Initial Assessment    Name: Socrates Mckenzie  : 1950  MRN: [id number]  Date of Service: 2022    Discharge Recommendations:   ARU     Past Medical History:  has a past medical history of Abdominal aneurysm ([facility] [address] Arthritis, Chronic back pain, Chronic sinusitis, COPD, mild ([address] Diverticulosis, Dyslipidemia, HTN (hypertension), Hyperlipidemia, Hypothyroidism, Ingrown toenail, MDRO (multiple drug resistant organisms) resistance, Morbid obesity with BMI of 45.0-49.9, adult ([address] Onychocryptosis, MEGHAN (obstructive sleep apnea), and Otitis media, serous, TM rupture. Past Surgical History:  has a past surgical history that includes joint replacement (Right, ); joint replacement (Left, ); Tympanostomy tube placement (Right, ); knee surgery (Bilateral); Colonoscopy (); Colonoscopy (2014); Toe Surgery (Bilateral); ERCP (2022); ERCP (N/A, 2022); and Cholecystectomy, laparoscopic (N/A, 8/10/2022). Assessment   Assessment: Pt is a 66 yro male who presents for PT evaluation following admission to hospital for ascending cholangitis and biliary calculus. Pt is now s/p ERCP and laparoscopic cholecystectomy. See PMH/PSH above. At baseline, pt lives alone at independent [facility] living and is completely independent. Currently, pt is presenting with decreased activity tolerance/endurance limiting functional ambulation, decreased safety awareness, and increased pain affecting OOB mobility. Pt would benefit from continued skilled PT services while in the hospital with recommendation for d/c to ARU for short term stay to improve independence, safety and return to PLOF.    Treatment Diagnosis: decreased functional mobility  Therapy Prognosis: Good  Decision Making: Medium Complexity  Clinical Presentation: evolving  Requires PT Follow-Up: Yes  Activity Tolerance  Activity Tolerance: Patient tolerated evaluation without incident Plan   Plan  Plan: 3-5 times per week  Current Treatment Recommendations: Strengthening, ROM, Balance training, Functional mobility training, Transfer training, Gait training, Endurance training, IADL training, Neuromuscular re-education, Pain management, Home exercise program, Safety education & training, Positioning, Modalities, Equipment evaluation, education, & procurement, Patient/Caregiver education & training, Therapeutic activities  Safety Devices  Type of Devices: All fall risk precautions in place, Call light within reach, Gait belt, Left in chair, Chair alarm in place     Restrictions  Restrictions/Precautions  Restrictions/Precautions: General Precautions, Contact Precautions     Subjective   General  Chart Reviewed: Yes  Patient assessed for rehabilitation services?: Yes  Family / Caregiver Present: No  Follows Commands: Within Functional Limits  General Comment  Comments: Denies pain while in bed, reports pain with coughing in abdomen. 7/10 pain when exacerbated. Subjective: \"So what are we doing. I've had a very long and eventful morning. \"  Communication with other providers: Co treat with Levi BURNETT Social/Functional History  Social/Functional History  Lives With: Alone  Type of [facility]: [facility]  Home Layout: One level  Home Access: Level entry  Bathroom Shower/Tub: Walk-in shower, Shower chair without back  Bathroom Toilet: Handicap height  Bathroom Equipment: Grab bars in shower  Bathroom Accessibility: Accessible  Has the patient had two or more falls in the past year or any fall with injury in the past year?: No  Receives Help From: Other (comment) (Brother is able to help for a few days following d/c to home.  Normally does not receive help from anyone.)  ADL Assistance: [facility] [address]: Independent  Homemaking Responsibilities: Yes  Ambulation Assistance: Independent  Transfer Assistance: Independent  Active : Yes  Mode of Transportation: Car  Occupation: Retired,   Vision/Hearing  Vision  Vision: Impaired  Vision Exceptions: Wears glasses at all times  Hearing  Hearing: Within functional limits    Cognition   Orientation  Overall Orientation Status: Within Normal Limits  Cognition  Overall Cognitive Status: WNL     Objective      Observation/Palpation  Posture: Good  Pt semifowlers in bed with CPAP, peripheral IV, pocket tele, in use. Alert, agreeable, appropriate. Mild edema noted in feet/toes with pt reporting this is consistent for him prior to admission to hospital. 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 Sensation: Intact        AROM RLE (degrees)  RLE AROM: WNL  AROM LLE (degrees)  LLE AROM : WNL  Strength RLE  Comment: Grossly 5/5  Strength LLE  Comment: Grossly 5/5         Therapeutic Activity Training:   Therapeutic activity training was instructed today. Cues were given for safety, sequence, UE/LE placement, awareness, and balance. Activities performed today included bed mobility training, sup-sit, sit-stand, SPT. Bed mobility  Sit to Supine: Stand by assistance  Scooting: Stand by assistance  Comments: HOB elevated, handrail use to perform bed mobility. Transfers  Sit to Stand: SBA; x2 from EOB, x1 from chair, x1 from commode. VC's provided to push up from bed/chair and avoid pulling on FWW. Stand to sit: SBA; x1 to bed, x2 to chair, x1 to commode  Stand Pivot Transfers: Contact guard assistance; cues provided for sequencing and management of IV pole   Comment: Pt refused CGA with sit <> stand transfers stating \"let me do it\". Sitting Balance: Pt sat EOB for ~10 min with supervision with appropriate posture and no LOB observed. UE support intermittently, but not required for postural control. Pt sat on commode to perform toileting for ~5 min with supervision. Independent with personal hygiene.    Standing Balance: Appropriate standing balance and postural control throughout session with no LOB observed with transfers or gait. Gait Training:  Cues were given for safety, sequence, device management, balance, posture, awareness, path. Ambulation  Surface: level tile  Device: Rolling Walker  Assistance: Contact guard assistance  Quality of Gait: Decreased gait speed requiring increased time, wide DEMETRIA, decreased but equal step length bilaterally  Gait Deviations: Decreased step length;Decreased step height; Increased DEMETRIA  Distance: 25 + 5 + 5 feet  Comments: Pt ambulated in hallway reporting pain (5/10) in abdominal incision d/t upright posture. Once returned to room, pt requested to go to the bathroom. Safety  Patient left safely in the chair, with call light/phone in reach with alarm applied. Gait belt and mask were used for transfers and gait. Balance  Posture: Good  Sitting - Static: Good  Sitting - Dynamic: Good  Standing - Static: Good  Standing - Dynamic: Good;-           AM-PAC Score  AM-PAC Inpatient Mobility Raw Score : 20 (08/12/22 1218)  AM-PAC Inpatient T-Scale Score : 47.67 (08/12/22 1218)  Mobility Inpatient CMS 0-100% Score: 35.83 (08/12/22 1218)  Mobility Inpatient CMS G-Code Modifier : CJ (08/12/22 1218)        Goals  Long Term Goals  Time Frame for Long term goals : In one week:  Long term goal 1: Pt will ambulate 150 feet with FWW with SBA. Long term goal 2: Pt will perform all bed mobility with supervision progressing to independently. Long term goal 3: Pt will perform all transfers with supervision progressing to independently. Long term goal 4: Pt will sit EOB with supervision and perform trunk extension x5 with ant pelvic tilt to improve scar mobility and strengthen back extensors. Long term goal 5: Pt will sit EOB with supervision and perform trunk rotations x5/side in controlled manner to improve trunk ROM and scar mobility. Education  Patient Education  Education Given To: Patient  Education Provided: Role of Therapy; ADL Adaptive Strategies; Fall Prevention Strategies; Plan of Care;Transfer Training;Energy Conservation;Equipment;Precautions  Education Method: Demonstration;Verbal  Barriers to Learning: None  Education Outcome: Verbalized understanding;Demonstrated understanding  Pt educated on and demonstrated understanding of importance of regular OOB mobility/ambulation with nursing staff and/or therapy staff throughout remainder of hospital stay. Pt educated on rehab option with pt reporting need for rehab before return to home d/t not feeling safe yet.      Therapy Time  Time In: 8405  Time Out: 1009  Total Treatment Time: 40  Timed Code Treatment Minutes: 97[address] [facility]   This Student PT supervised by licensed PT Chely Lay PT Lic #: [id number]

## 2022-08-12 NOTE — PROGRESS NOTES
Comprehensive Nutrition Assessment    Type and Reason for Visit:  Initial (length of stay)    Nutrition Recommendations/Plan:   Continue to advance from liquid diet as patient tolerates   May offer low calorie, high protein oral nutrition supplement as needed  Will continue to follow up during stay     Malnutrition Assessment:  Malnutrition Status:  No malnutrition (08/12/22 3287)    Context:  Acute Illness       Nutrition Assessment:    Admit to [facility] for sepsis, now s/p ERCP with sphincterotomy, cholecystecomy. Has been on clear liquid diet so far, patient slightly fearful of advancing diet but agreed to try full liquids tonight. Discussed low fat meal plan for discharge. Meals provided by [facility] home, able to select/request lower fat options. Will follow at moderate nutrition risk at this time. Nutrition Related Findings:    sitting up in chair, alert/verbal, eager to discuss food choices Wound Type: Surgical Incision       Current Nutrition Intake & Therapies:    Average Meal Intake: Unable to assess  Average Supplements Intake: None Ordered  ADULT DIET; Full Liquid    Anthropometric Measures:  Height: 5' 9\" (175.3 cm)  Ideal Body Weight (IBW): 160 lbs (73 kg)    Admission Body Weight: 303 lb 9.2 oz (137.7 kg)  Current Body Weight: 311 lb 15.2 oz (141.5 kg), 195 % IBW.  Weight Source: Bed Scale  Current BMI (kg/m2): 46  Usual Body Weight: 325 lb (147.4 kg) (per hx May)  % Weight Change (Calculated): -4  Weight Adjustment For: No Adjustment                 BMI Categories: Obese Class 3 (BMI 40.0 or greater)    Estimated Daily Nutrient Needs:  Energy Requirements Based On: Formula  Weight Used for Energy Requirements: Current  Energy (kcal/day): 7291-4315  Weight Used for Protein Requirements: Ideal  Protein (g/day): 73-87 (1-1.2 g/kg)  Method Used for Fluid Requirements: 1 ml/kcal  Fluid (ml/day): 2100    Nutrition Diagnosis:   Predicted inadequate energy intake related to pain as evidenced by NPO or clear liquid status due to medical condition    Nutrition Interventions:   Food and/or Nutrient Delivery: Continue Current Diet  Nutrition Education/Counseling: Education initiated  Coordination of Nutrition Care: Continue to monitor while inpatient  Plan of Care discussed with: patient    Goals:     Goals: PO intake 75% or greater, by next RD assessment       Nutrition Monitoring and Evaluation:   Behavioral-Environmental Outcomes: None Identified  Food/Nutrient Intake Outcomes: Food and Nutrient Intake, Diet Advancement/Tolerance  Physical Signs/Symptoms Outcomes: Biochemical Data, Meal Time Behavior, GI Status, Skin, Weight    Discharge Planning:     Too soon to determine     Emmanuel José RD, LD  Contact: [phone]

## 2022-08-12 NOTE — PROGRESS NOTES
well this morning. Reports minimal pain at the incision site from the cholecystectomy. Denies any nausea, vomiting, shortness of breath, chest pain. Tolerating his clear liquids well. He has large has been advanced now to full liquids. Review of Systems:    Review of Systems   Constitutional:  Negative for chills and fever. HENT:  Negative for sore throat. Eyes:  Negative for visual disturbance. Respiratory:  Negative for cough, shortness of breath and wheezing. Cardiovascular:  Negative for palpitations and leg swelling. Gastrointestinal:  Negative for nausea and vomiting. Endocrine: Negative for polyuria. Genitourinary:  Negative for dysuria. Musculoskeletal:  Negative for back pain. Skin:  Negative for wound. Neurological:  Negative for dizziness and weakness. Psychiatric/Behavioral:  Negative for confusion. Objective: Intake/Output Summary (Last 24 hours) at 8/12/2022 1054  Last data filed at 8/12/2022 0401  Gross per 24 hour   Intake --   Output 1150 ml   Net -1150 ml        Vitals:   Vitals:    08/12/22 0913   BP:    Pulse:    Resp: 16   Temp:    SpO2:        Physical Exam:   Physical Exam  Constitutional:       General: He is not in acute distress. Appearance: He is obese. HENT:      Mouth/Throat:      Mouth: Mucous membranes are moist.      Pharynx: No posterior oropharyngeal erythema. Eyes:      General: No scleral icterus. Extraocular Movements: Extraocular movements intact. Pupils: Pupils are equal, round, and reactive to light. Cardiovascular:      Rate and Rhythm: Normal rate and regular rhythm. Pulses: Normal pulses. Heart sounds: No murmur heard. Pulmonary:      Effort: Pulmonary effort is normal.      Breath sounds: No wheezing or rales. Abdominal:      General: Bowel sounds are normal. There is no distension. Palpations: Abdomen is soft. Tenderness: There is no abdominal tenderness.       Comments: Incision scars are clean and dry. Musculoskeletal:         General: Normal range of motion. Right lower leg: No edema. Left lower leg: No edema. Skin:     General: Skin is warm. Findings: No rash. Neurological:      General: No focal deficit present. Mental Status: He is alert and oriented to person, place, and time. Cranial Nerves: No cranial nerve deficit. Sensory: No sensory deficit. Motor: No weakness.    Psychiatric:         Mood and Affect: Mood normal.       Medications:   Medications:    losartan  25 mg Oral Daily    fluticasone  2 spray Each Nostril Daily    levothyroxine  175 mcg Oral Daily    traZODone  50 mg Oral Nightly    sodium chloride flush  5-40 mL IntraVENous 2 times per day    enoxaparin  30 mg SubCUTAneous BID    sodium chloride flush  5-40 mL IntraVENous 2 times per day    piperacillin-tazobactam  3,375 mg IntraVENous Q8H      Infusions:    sodium chloride      sodium chloride 12.5 mL/hr at 08/11/22 0638     PRN Meds: HYDROcodone-acetaminophen, 1 tablet, Q6H PRN  acetaminophen, 650 mg, Q4H PRN  morphine, 2 mg, Q3H PRN  diphenhydrAMINE, 25 mg, Q12H PRN  sodium chloride, 1 spray, PRN  sodium chloride flush, 5-40 mL, PRN  sodium chloride, 25 mL, PRN  polyethylene glycol, 17 g, Daily PRN  sodium chloride flush, 5-40 mL, PRN  sodium chloride, , PRN  potassium chloride, 40 mEq, PRN   Or  potassium alternative oral replacement, 40 mEq, PRN   Or  potassium chloride, 10 mEq, PRN  magnesium sulfate, 2,000 mg, PRN  oxyCODONE, 5 mg, Q4H PRN   Or  oxyCODONE, 10 mg, Q4H PRN  promethazine, 12.5 mg, Q6H PRN   Or  ondansetron, 4 mg, Q6H PRN  metoclopramide, 10 mg, Q6H PRN  diphenhydrAMINE, 25 mg, Q6H PRN        Labs      Recent Results (from the past 24 hour(s))   Hepatic Function Panel    Collection Time: 08/12/22  3:14 AM   Result Value Ref Range    Albumin 3.6 3.4 - 5.0 GM/DL    Total Bilirubin 1.5 (H) 0.0 - 1.0 MG/DL    Bilirubin, Direct 1.0 (H) 0.0 - 0.3 MG/DL    Bilirubin, Indirect 0.5 0 - 0.7 MG/DL    Alkaline Phosphatase 458 (H) 40 - 129 IU/L     (H) 15 - 37 IU/L     (H) 10 - 40 U/L    Total Protein 5.9 (L) 6.4 - 8.2 GM/DL   Lipase    Collection Time: 08/12/22  3:14 AM   Result Value Ref Range    Lipase 23 13 - 60 IU/L   CBC with Auto Differential    Collection Time: 08/12/22  3:14 AM   Result Value Ref Range    WBC 6.8 4.0 - 10.5 K/CU MM    RBC 3.22 (L) 4.6 - 6.2 M/CU MM    Hemoglobin 10.3 (L) 13.5 - 18.0 GM/DL    Hematocrit 32.1 (L) 42 - 52 %    MCV 99.7 78 - 100 FL    MCH 32.0 (H) 27 - 31 PG    MCHC 32.1 32.0 - 36.0 %    RDW 15.2 (H) 11.7 - 14.9 %    Platelets 109 723 - 039 K/CU MM    MPV 9.4 7.5 - 11.1 FL    Differential Type AUTOMATED DIFFERENTIAL     Segs Relative 74.2 (H) 36 - 66 %    Lymphocytes % 13.2 (L) 24 - 44 %    Monocytes % 11.2 (H) 0 - 4 %    Eosinophils % 0.4 0 - 3 %    Basophils % 0.4 0 - 1 %    Segs Absolute 5.1 K/CU MM    Lymphocytes Absolute 0.9 K/CU MM    Monocytes Absolute 0.8 K/CU MM    Eosinophils Absolute 0.0 K/CU MM    Basophils Absolute 0.0 K/CU MM    Nucleated RBC % 0.0 %    Total Nucleated RBC 0.0 K/CU MM    Total Immature Neutrophil 0.04 K/CU MM    Immature Neutrophil % 0.6 (H) 0 - 0.43 %        Imaging/Diagnostics Last 24 Hours   Echocardiogram complete 2D with doppler with color    Result Date: 8/11/2022  Transthoracic Echocardiography Report (TTE)  Demographics   Patient Name      NICOLAS DAS  Date of Study       08/11/2022   Date of Birth     1950         Gender              Male   Age               66 year(s)         Race                   Patient Number    [id number]         Room Number         5359   Visit Number      [id number]   Corporate ID      [id number]   Accession Number  [id number]         JUSTIN Dutta   Ordering          Chen Campbell MD  Physician         MD Physician  Procedure Type of Study   TTE procedure:ECHOCARDIOGRAM COMPLETE 2D W DOPPLER W COLOR. Procedure Date Date: 08/11/2022 Start: 08:27 AM Study Location: Portable Technical Quality: Adequate visualization Indications:Bradycardia. Patient Status: Routine Height: 69 inches Weight: 303 pounds BSA: 2.46 m2 BMI: 44.74 kg/m2 Rhythm: Sinus bradycardia HR: 54 bpm BP: 130/59 mmHg  Conclusions   Summary  Left ventricular systolic function is normal.  Ejection fraction is visually estimated at 55-60%. No evidence of any pericardial effusion. No significant valvular disease noted. Signature   ------------------------------------------------------------------  Electronically signed by Jt Keenan MD (Interpreting  physician) on 08/11/2022 at 09:30 AM  ------------------------------------------------------------------   Findings   Left Ventricle  Left ventricular systolic function is normal.  Ejection fraction is visually estimated at 55-60%. No regional wall motion abnormalites. Left ventricle size is normal.  Normal diastolic function. Left Atrium  Essentially normal left atrium. Right Atrium  Essentially normal right atrium. Right Ventricle  Essentially normal right ventricle. Aortic Valve  Sclerotic, but non-stenotic aortic valve. No significant valvular disease noted. Mitral Valve  Structurally normal mitral valve. Trace mitral regurgitation. Tricuspid Valve  Tricuspid valve was not well visualized. No significant valvular disease noted. Pulmonic Valve  The pulmonic valve was not well visualized. No significant valvular disease noted. Pericardial Effusion  No evidence of any pericardial effusion. Pleural Effusion  No evidence of pleural effusion. Miscellaneous  IVC and abdominal aorta are within normal limits.   M-Mode/2D Measurements & Calculations   LV Diastolic Dimension:  LV Systolic Dimension:  LA Dimension: 3.1 cmAO Root  4.9 cm                   3.41 cm Dimension: 3.5 cmLA Area:  LV FS:30.4 %             LV Volume Diastolic:    92.2 cm2  LV PW Diastolic: 5.44 cm 800 ml  LV PW Systolic: 4.04 cm  LV Volume Systolic: 59  Septum Diastolic: 2.72   ml  cm                       LV EDV/LV EDV Index:    RV Diastolic Dimension:  Septum Systolic: 7.22 cm 291 XR/63 m2LV ESV/LV   2.53 cm  CO: 4.82 l/min           ESV Index: 59 ml/24 m2  CI: 1.96 l/m*m2          EF Calculated (A4C):    LA/Aorta: 0.89                           52.4 %                  Ascending Aorta: 3.4 cm  LV Area Diastolic: 20.2  EF Calculated (2D):     LA volume/Index: 69 ml  cm2                      57.7 %                  /11C8  LV Area Systolic: 24.2  cm2                      LV Length: 8.8 cm                            LVOT: 2 cm  Doppler Measurements & Calculations   MV Peak E-Wave: 119 cm/s  AV Peak Velocity: 158 cm/s   LVOT Peak Velocity:  MV Peak A-Wave: 99.3 cm/s AV Peak Gradient: 9.99 mmHg  124 cm/s  MV E/A Ratio: 1.2         AV Mean Velocity: 108 cm/s   LVOT Mean Velocity:  MV Peak Gradient: 5.66    AV Mean Gradient: 5 mmHg     83.2 cm/s  mmHg                      AV VTI: 35.4 cm              LVOT Peak Gradient: 6  MV Mean Gradient: 3 mmHg  AV Area (Continuity):2.52    mmHgLVOT Mean  MV Mean Velocity: 73.6    cm2                          Gradient: 3 mmHg  cm/s  MV Deceleration Time: 211 LVOT VTI: 28.4 cm  msec   MV Area (continuity):  2.21 cm2  MV E' Septal Velocity:  9.65 cm/s  MV A' Septal Velocity:  6.8 cm/s  MV E' Lateral Velocity:  9.21 cm/s  MV A' Lateral Velocity:  4.72 cm/s  MV E/E' septal: 12.33  MV E/E' lateral: 12.92        Electronically signed by Main Barger MD on 8/12/2022 at 10:54 AM

## 2022-08-12 NOTE — PROGRESS NOTES
Occupational Therapy    Formerly [facility] ACUTE CARE OCCUPATIONAL THERAPY EVALUATION    Johnnie Lincoln, 1950, 1103/1103-A, 8/12/2022    Discharge Recommendation: Inpatient Rehabilitation vs. Return to [address] living with Home Health OT services      History:  [place]:  The primary encounter diagnosis was Ascending cholangitis. A diagnosis of Biliary calculus of other site without obstruction was also pertinent to this visit. Subjective:  Patient states: \"It's been a rough morning! My IV busted and I've had two bouts of diarrhea! \"   Pain: Pt reported 5/10 surgical pain abdomen  Communication with other providers: PT Zainab Wright and student PT Kingsley John RN [facility]  Restrictions: General Precautions, Low Fall Risk, Contact Isolation, IV, Pocket Telemetry, Bed/chair alarm    Home Setup/Prior level of function:  Social/Functional History  Lives With: Alone  Type of Home: [facility] independent living)  Home Layout: One level  Home Access: Level entry  Bathroom Shower/Tub: Walk-in shower, Shower chair without back  Bathroom Toilet: Handicap height  Bathroom Equipment: Grab bars in shower  Bathroom Accessibility: Accessible  Has the patient had two or more falls in the past year or any fall with injury in the past year?: No  Receives Help From: (Brother is able to help for a few days following d/c to home. Normally does not receive help from anyone)  ADL Assistance: [facility] [address]: Independent  Homemaking Responsibilities: Yes  Ambulation Assistance: Independent (uses no AD)  Transfer Assistance: Independent  Active : Yes  Mode of Transportation: Car  Occupation: Retired,     Examination:  Observation: Supine in bed upon arrival. Agreeable to evaluation.    Vision: WFL  Hearing: WFL  Vitals: Stable vitals throughout session on room air    Body Systems and functions:  ROM: WNL all joints in BL UEs  Strength: 4+/5 MMT shoulder flexors, elbow flexors, and elbow extensors, 5/5 MMT grasp in BL UEs  Sensation: WFL in BL UEs (See PT evaluation for LE assessment)  Tone: Normal  Coordination: WNL   Perception: WNL    Activities of Daily Living (ADLs):  Feeding: Independent   Grooming: SBA (completed hand hygiene in standing at sink after toileting; min cues for safe RW placement at sink)  UB bathing: SBA   LB bathing: Mod A (reaching distal BL LEs, limited bending this date due to abdominal pain/discomfort)  UB dressing: SBA (donning clean robe seated EOB)  LB dressing: Max A (dependent with donning BL socks, able to manage clothing to hips in standing at SBA level)  Toileting: SBA (pt urinated while seated on regular toilet; able to manage clothing both directions and completed seated hygiene without assist)    Cognitive and Psychosocial Functioning:  Overall cognitive status: WFL  Affect: Normal     Balance:   Sitting: SBA in unsupported sitting EOB and on toilet  Standing: SBA with bariatric RW and with hand hygiene at sink    Functional Mobility:  Bed Mobility: SBA supine to sitting EOB (HBO elevated to 40', min coaching for utilizing log roll technique to minimize abdominal discomfort)  Transfers: SBA to/from bed, recliner, and toilet (min cues for safe hand placement/use of grab bars)  Ambulation: SBA with RW to/from bathroom for toileting + 40 ft in hallway; slow pace but steady throughout, no LOB, good tolerance      AM-PAC 6 click short form for inpatient daily activity:   How much help from another person does the patient currently need. .. Unable  Dep A Lot  Max A A Lot   Mod A A Little  Min A A Little   CGA  SBA None   Mod I  Indep  Sup   1. Putting on and taking off regular lower body clothing? [] 1    [x] 2   [] 2   [] 3   [] 3   [] 4      2. Bathing (including washing, rinsing, drying)? [] 1   [] 2   [] 2 [x] 3 [] 3 [] 4   3. Toileting, which includes using toilet, bedpan, or urinal? [] 1    [] 2   [] 2   [] 3   [x] 3   [] 4     4.  Putting on and taking off regular upper body clothing? [] 1   [] 2   [] 2   [] 3   [x] 3    [] 4      5. Taking care of personal grooming such as brushing teeth? [] 1   [] 2    [] 2 [] 3    [x] 3   [] 4      6. Eating meals? [] 1   [] 2   [] 2   [] 3   [] 3   [x] 4      Raw Score:  18     [24=0% impaired(CH), 23=1-19%(CI), 20-22=20-39%(CJ), 15-19=40-59%(CK), 10-14=60-79%(CL), 7-9=80-99%(CM), 6=100%(CN)]     Treatment:  Therapeutic Activity Training:   Therapeutic activity training was instructed today. Cues were given for safety, sequence, UE/LE placement, awareness, and balance. Activities performed today included bed mobility training using log roll technique, sitting balance/tolerance, safe transfer training, household mobility with RW, education on role of OT, POC, abdominal protection techniques, importance of EOB/OOB activity, d/c planning   Self Care Training:   Cues were given for safety, sequence, UE/LE placement, visual cues, and balance. Activities performed today included UB/LB dressing tasks, toileting, hand hygiene at sink      Safety Measures: Gait belt used, Left in Chair, Alarm in place    Assessment:  Pt is a 66year old male with a past medical history of Abdominal aneurysm (HCC), Arthritis, Chronic back pain, Chronic sinusitis, COPD, mild ([address] Diverticulosis, Dyslipidemia, HTN (hypertension), Hyperlipidemia, Hypothyroidism, Ingrown toenail, MDRO (multiple drug resistant organisms) resistance, Morbid obesity with BMI of 45.0-49.9, adult ([address] Onychocryptosis, MEGHAN (obstructive sleep apnea), and Otitis media, serous, TM rupture. Pt admitted with a fever that lasted for several weeks and diagnosed with ascending cholangitis. Pt underwent ERCP on 8-9 and laparoscopic cholecystectomy on 8-10. Pt lives alone at [facility] independent living and at baseline is independent with ADLs, IADLs, mobility, and driving. Pt currently presents with the above impairments.  Recommend continued OT services in inpatient rehab at discharge vs returning to [place] with [facility] OT services. Complexity: Moderate  Prognosis: Good  Plan: 2+x/week      Goals:  1. Pt will complete all aspects of bed mobility for EOB/OOB ADLs mod I with HOB flat  2. Pt will complete UB/LB bathing SBA with setup using long handled sponge PRN  3. Pt will complete all aspects of LB dressing SBA with setup using AE PRN  4. Pt will complete all functional transfers to and from bed, chair, toilet, shower chair mod I with use of grab bars PRN  5. Pt will ambulate HH distance to bathroom for toileting mod I with RW  6. Pt will complete all aspects of toileting task with supervision  7. Pt will complete oral hygiene/grooming routine in standing at sink with supervision  8.  Pt will complete ther ex/ther act with focus on UE strengthening and functional activity tolerance in standing >10 minutes    Time:   Time in: 930  Time out: 1014  Timed treatment minutes: 29  Total time: 44      Electronically signed by:    JAC Raines/L, [address] [place] [id number]

## 2022-08-12 NOTE — DISCHARGE INSTR - COC
Continuity of Care Form    Patient Name: Rojas Elder   :  1950  MRN:  [id number]    Admit date:  2022  Discharge date:  ***    Code Status Order: Full Code   Advance Directives:   Advance Care Flowsheet Documentation       Date/Time Healthcare Directive Type of Healthcare Directive Copy in [address] Agent's Name Healthcare Agent's Phone Number    [phone] Yes, patient has an advance directive for healthcare treatment Health care treatment directive; Living will No, copy requested from [facility] power of  Mindy Santana [phone]            Admitting Physician:  Yudith Olguin MD  PCP: Sparkle Rajput MD    Discharging Nurse: [facility] Unit/Room#: 1103/1103-A  Discharging Unit Phone Number: ***    Emergency Contact:   Extended Emergency Contact Information  Primary Emergency Contact: Jaylan Shankar Phone: [phone]  Relation: Brother/Sister  Secondary Emergency Contact: [place] [address] Phone: [phone]  Relation: Brother/Sister    Past Surgical History:  Past Surgical History:   Procedure Laterality Date    CHOLECYSTECTOMY, LAPAROSCOPIC N/A 8/10/2022    CHOLECYSTECTOMY LAPAROSCOPIC performed by Tressa Vera MD at [address] [place] [address]  2008    [address]- normal.      COLONOSCOPY  2014    diverticulosis, internal hemorrhoids    ERCP  2022    ERCP SPHINCTER/PAPILLOTOMY performed by Dr. Marco A Martinez at [address] [place] [address]    ERCP N/A 2022    ERCP SPHINCTER/PAPILLOTOMY and sweep and removal of debris, sludge and stones with use of 12-15 extractor balloon performed by Harper Hodgkin, MD at [place] [address] Right 2012    JOINT REPLACEMENT Left     KNEE SURGERY Bilateral     replacement    TOE SURGERY Bilateral     toe nail surgery Dr. Kendra Valiente Right     DR. Rascon        Immunization History:   Immunization History   Administered Date(s) Administered    COVID-19, MODERNA BLUE border, Primary or Immunocompromised, (age 12y+), IM, 100 mcg/0.5mL 01/12/2021, 02/08/2021, 01/19/2022    Hepatitis A 03/07/2019    Hepatitis A Vaccine 03/07/2019    Hepatitis A/Hepatitis B (Twinrix) 03/07/2019, 04/05/2019, 09/06/2019    Hepatitis B 03/07/2019    Hepatitis B vaccine 03/07/2019    Influenza 10/01/2015    Influenza A (S6A2-13) Vaccine IM 11/13/2009    Influenza A (N1X7-98) Vaccine PF IM 12/10/2009    Influenza Vaccine, unspecified formulation 10/15/1996, 10/18/2005, 11/05/2007, 10/20/2008, 09/28/2009, 11/01/2010, 09/27/2011, 10/09/2012, 10/04/2013, 10/14/2014, 11/02/2015    Influenza Virus Vaccine 10/15/1996, 10/27/2003, 11/05/2007, 10/20/2008, 09/28/2009, 11/01/2010, 09/27/2011, 10/09/2012, 10/04/2013, 10/14/2014    Influenza Whole 10/14/2014    Influenza, High Dose (Fluzone 65 yrs and older) 10/19/2015, 10/14/2016, 09/12/2017, 09/11/2018, 09/25/2020, 10/29/2021    Influenza, High-dose, Keyla Peguero, 65 yrs +, IM (Fluzone) 09/25/2020    Influenza, MDCK Quadv, with preserv IM (Flucelvax 2 yrs and older) 06/11/2019, 08/20/2019    Influenza, Quadv, adjuvanted, 65 yrs +, IM, PF (Fluad) 10/29/2021    Influenza, Triv, inactivated, subunit, adjuvanted, IM (Fluad 65 yrs and older) 09/17/2019    Pneumococcal Conjugate 13-valent ([id number]) 01/25/2016, 01/27/2016    Pneumococcal Polysaccharide (Pkauilduo52) 01/06/2015    Td (Adult), 5 Lf Tetanus Toxoid, Pf (Tenivac, Decavac) 08/27/2012    Td vaccine (adult) 09/19/2003, 08/27/2012    Td, unspecified formulation 09/19/2003, 08/27/2012    Tdap (Boostrix, Adacel) 11/02/2015, 11/05/2015    Zoster Live (Zostavax) 03/16/2009, 01/06/2015    Zoster Recombinant (Shingrix) 06/11/2019, 08/20/2019       Active Problems:  Patient Active Problem List   Diagnosis Code    MEGHAN (obstructive sleep apnea)- on BIPAP G47.33    COPD, mild (HCC) J44.9    Arthritis M19.90    Chronic sinusitis J32.9    Acquired hypothyroidism E03.9    Dyslipidemia E78.5 HTN (hypertension) I10    Anemia D64.9    NSAID long-term use Z79.1    GERD (gastroesophageal reflux disease)- due to chronic NSAID use K21.9    Elevated fasting glucose R73.01    External hemorrhoids K64.4    AAA (abdominal aortic aneurysm) without rupture ([facility]- stable , due  I71.4    Primary insomnia F51.01    History of recurrent ear infection Z86.69    Positive FIT (fecal immunochemical test) R19.5    Sepsis ([facility] A41.9    Common bile duct stone K80.50    Ascending cholangitis K83.09    Obstructive sleep apnea treated with BiPAP G47.33       Isolation/Infection:   Isolation            Contact          Patient Infection Status       Infection Onset Added Last Indicated Last Indicated By Review Planned Expiration Resolved Resolved By    MRSA  14 John Mayo RN        Wound 14    Resolved    COVID-19 (Rule Out) 22 COVID-19, Rapid (Ordered)   22 Rule-Out Test Resulted            Nurse Assessment:  Last Vital Signs: /75   Pulse 61   Temp 97.9 °F (36.6 °C) (Oral)   Resp 16   Ht 5' 9\" (1.753 m)   Wt (!) 311 lb 14.4 oz (141.5 kg)   SpO2 97%   BMI 46.06 kg/m²     Last documented pain score (0-10 scale): Pain Level: 0  Last Weight:   Wt Readings from Last 1 Encounters:   22 (!) 311 lb 14.4 oz (141.5 kg)     Mental Status:  {IP PT MENTAL STATUS:35463}    IV Access:  {[facility] IV ACCESS:872937516}    Nursing Mobility/ADLs:  Walking   {[facility] DME AWJS:024287597}  Transfer  {[facility] DME MGVT:639528603}  Bathing  {[facility] DME VALDEZ:395309646}  Dressing  {[facility] DME HQIS:022616454}  Toileting  {[facility] DME GMDS:498026547}  Feeding  {[facility] DME QVX}  Med Admin  {[facility] DME YVNJ:697320570}  Med Delivery   {[facility] MED Delivery:001419064}    Wound Care Documentation and Therapy:  Incision 08/10/22 Abdomen Medial;Upper (Active)   Dressing Status Clean;Dry; Intact 22 08   Dressing/Treatment Surgical glue 22   Closure Surgical glue 22 Manager/ signature: {Esignature:131425667}    PHYSICIAN SECTION    Prognosis: Good    Condition at Discharge: Stable    Rehab Potential (if transferring to Rehab): Good    Recommended Labs or Other Treatments After Discharge: Finish Augmentin as prescribed for treatment of acute cholangitis. Keep abdominal wounds clean and dry. Physician Certification: I certify the above information and transfer of Marietta Mahmood  is necessary for the continuing treatment of the diagnosis listed and that he requires Acute Rehab for less 30 days. Update Admission H&P: Changes in H&P as follows -      status postcholecystectomy, status post ERCP plus IV antibiotics.     PHYSICIAN SIGNATURE:  Electronically signed by Gee Vizcaino MD on 8/12/22 at 5:23 PM EDT

## 2022-08-12 NOTE — PROGRESS NOTES
V2.0  [facility] Hospitalist Progress Note      Name:  Gabriel Yang /Age/Sex: 1950  (66 y.o. male)   MRN & CSN:  [id number] & [id number] Encounter Date/Time: 2022 2:29 PM EDT    Location:  [facility] PCP: Jing Bailey MD         Assessment and Plan:   Gabriel Yang is a 66 y.o. male with pmh as below who presents with Sepsis ([facility] [address]          Sepsis due to ascending cholangitis with CBD stone present  -RUQ U/S with intrahepatic and extrahepatic biliary duct dilation with a dilated gallbladder up to 19 mm in diameter. -S/P ERCP and sphincterotomy/papillotomy on   -IV Zosyn  -Continue to trend liver enzymes  -Doing better, but due to sepsis is weak. On  the patient mentioned that he may some rehab, and he inquired about the acute rehab unit. PT/OT consulted. Acute on chronic calculus cholecystitis  -Status post laparoscopic cholecystectomy on 8/10  -Doing well postop    Abdominal aortic aneurysm  Essential hypertension  3.7 cm on CT scan.  -F/u outpatient  -Continue antihypertensives    COPD  MEGHAN  -Patient brought his home BiPAP, use nightly  -Continue inhalers    Sinus bradycardia  -Noted on . Discussed with cardiology. Lowest heart rate was 34. Appreciate cardiology consult. Plan for outpatient follow-up for an event monitor and an ischemic work-up. Cardiology signed off on . Morbid obesity with BMI 46    Chronic conditions: Continue home medications as ordered  GERD, hypertension, dyslipidemia, arthritis, acquired hypothyroidism, morbid obesity. Diet ADULT DIET; Clear Liquid   DVT Prophylaxis [x] Lovenox, []  Heparin, [] SCDs, [] Ambulation,  [] Eliquis, [] Xarelto  [] Coumadin   Code Status Full Code   Disposition From: Home  Expected Disposition: PT/OT eval requested on   Estimated Date of Discharge: Cleared for discharge by surgery and cardiology. Needs therapy evaluation.   Patient requires continued admission due to recovering from cholecystectomy and sepsis. Plan as above. Surrogate Decision Maker/ POA Cornell Haider, brother, and Fuad Olguin, sister-in-law in [facility] per patient. Subjective:     Chief Complaint: Fever (Daily since 07/09)       Gerry Chung is a 66 y.o. male who presents with persistent intermittent fevers, nausea, intermittent abdominal pain. August 10 -doing better today, but complains of being very weak. Review of Systems:    Review of Systems    No chest pain or dyspnea reported. Objective: Intake/Output Summary (Last 24 hours) at 8/12/2022 0038  Last data filed at 8/11/2022 1442  Gross per 24 hour   Intake --   Output 975 ml   Net -975 ml          Vitals:   Vitals:    08/11/22 2045   BP: 127/74   Pulse: 60   Resp:    Temp: 98.2 °F (36.8 °C)   SpO2: 98%       Physical Exam:     Physical Exam  Pulmonary:      Effort: Pulmonary effort is normal.   Skin:     Coloration: Skin is not jaundiced. Neurological:      Mental Status: He is alert. Cranial Nerves: No cranial nerve deficit.         Medications:   Medications:    losartan  25 mg Oral Daily    fluticasone  2 spray Each Nostril Daily    levothyroxine  175 mcg Oral Daily    traZODone  50 mg Oral Nightly    sodium chloride flush  5-40 mL IntraVENous 2 times per day    enoxaparin  30 mg SubCUTAneous BID    sodium chloride flush  5-40 mL IntraVENous 2 times per day    piperacillin-tazobactam  3,375 mg IntraVENous Q8H      Infusions:    lactated ringers      sodium chloride 50 mL/hr at 08/10/22 1312    sodium chloride      sodium chloride 12.5 mL/hr at 08/11/22 0638     PRN Meds: HYDROcodone-acetaminophen, 1 tablet, Q6H PRN  acetaminophen, 650 mg, Q4H PRN  morphine, 2 mg, Q3H PRN  diphenhydrAMINE, 25 mg, Q12H PRN  sodium chloride, 1 spray, PRN  sodium chloride flush, 5-40 mL, PRN  sodium chloride, 25 mL, PRN  polyethylene glycol, 17 g, Daily PRN  sodium chloride flush, 5-40 mL, PRN  sodium chloride, , PRN  potassium chloride, 40 mEq, PRN Or  potassium alternative oral replacement, 40 mEq, PRN   Or  potassium chloride, 10 mEq, PRN  magnesium sulfate, 2,000 mg, PRN  oxyCODONE, 5 mg, Q4H PRN   Or  oxyCODONE, 10 mg, Q4H PRN  promethazine, 12.5 mg, Q6H PRN   Or  ondansetron, 4 mg, Q6H PRN  metoclopramide, 10 mg, Q6H PRN  diphenhydrAMINE, 25 mg, Q6H PRN      Labs      Recent Results (from the past 24 hour(s))   Comprehensive Metabolic Panel    Collection Time: 08/11/22  6:58 AM   Result Value Ref Range    Sodium 140 135 - 145 MMOL/L    Potassium 4.1 3.5 - 5.1 MMOL/L    Chloride 105 99 - 110 mMol/L    CO2 22 21 - 32 MMOL/L    BUN 12 6 - 23 MG/DL    Creatinine 0.7 (L) 0.9 - 1.3 MG/DL    Glucose 88 70 - 99 MG/DL    Calcium 8.5 8.3 - 10.6 MG/DL    Albumin 3.7 3.4 - 5.0 GM/DL    Total Protein 6.0 (L) 6.4 - 8.2 GM/DL    Total Bilirubin 1.1 (H) 0.0 - 1.0 MG/DL    ALT 74 (H) 10 - 40 U/L    AST 65 (H) 15 - 37 IU/L    Alkaline Phosphatase 344 (H) 40 - 129 IU/L    GFR Non-African American >60 >60 mL/min/1.73m2    GFR African American >60 >60 mL/min/1.73m2    Anion Gap 13 4 - 16   Hepatic Function Panel    Collection Time: 08/11/22  6:58 AM   Result Value Ref Range    Albumin 3.7 3.4 - 5.0 GM/DL    Total Bilirubin 1.1 (H) 0.0 - 1.0 MG/DL    Bilirubin, Direct 0.6 (H) 0.0 - 0.3 MG/DL    Bilirubin, Indirect 0.5 0 - 0.7 MG/DL    Alkaline Phosphatase 344 (H) 40 - 129 IU/L    AST 65 (H) 15 - 37 IU/L    ALT 74 (H) 10 - 40 U/L    Total Protein 6.0 (L) 6.4 - 8.2 GM/DL   Lipase    Collection Time: 08/11/22  6:58 AM   Result Value Ref Range    Lipase 32 13 - 60 IU/L   CBC with Auto Differential    Collection Time: 08/11/22  6:58 AM   Result Value Ref Range    WBC 7.6 4.0 - 10.5 K/CU MM    RBC 3.31 (L) 4.6 - 6.2 M/CU MM    Hemoglobin 10.5 (L) 13.5 - 18.0 GM/DL    Hematocrit 33.5 (L) 42 - 52 %    .2 (H) 78 - 100 FL    MCH 31.7 (H) 27 - 31 PG    MCHC 31.3 (L) 32.0 - 36.0 %    RDW 15.8 (H) 11.7 - 14.9 %    Platelets 034 125 - 345 K/CU MM    MPV 9.6 7.5 - 11.1 FL Differential Type AUTOMATED DIFFERENTIAL     Segs Relative 70.4 (H) 36 - 66 %    Lymphocytes % 16.8 (L) 24 - 44 %    Monocytes % 12.2 (H) 0 - 4 %    Eosinophils % 0.1 0 - 3 %    Basophils % 0.1 0 - 1 %    Segs Absolute 5.3 K/CU MM    Lymphocytes Absolute 1.3 K/CU MM    Monocytes Absolute 0.9 K/CU MM    Eosinophils Absolute 0.0 K/CU MM    Basophils Absolute 0.0 K/CU MM    Nucleated RBC % 0.0 %    Total Nucleated RBC 0.0 K/CU MM    Total Immature Neutrophil 0.03 K/CU MM    Immature Neutrophil % 0.4 0 - 0.43 %        Imaging/Diagnostics Last 24 Hours   MRI ABDOMEN WO CONTRAST MRCP    Result Date: 8/8/2022  EXAMINATION: MRI OF THE ABDOMEN WITHOUT CONTRAST AND MRCP 8/8/2022 10:27 am TECHNIQUE: Multiplanar multisequence MRI of the abdomen was performed without the administration of intravenous contrast.  After initial T2 axial and coronal images, thick slab, thin slab and 3D coronal MRCP sequences were obtained without the administration of intravenous contrast.  MIP images are provided for review. COMPARISON: Ultrasound 08/07/2022. CT 08/05/2022. HISTORY: ORDERING SYSTEM PROVIDED HISTORY: sepsis 2/2 acute choleangitis TECHNOLOGIST PROVIDED HISTORY: Reason for exam:->sepsis 2/2 acute choleangitis Reason for Exam: sepsis 2/2 acute choleangitis Relevant Medical/Surgical History: none FINDINGS: LOWER CHEST:  The visualized lung bases reveal no signal abnormality. LIVER: The liver is normal in morphology. No evidence for steatosis. No suspicious liver lesion identified. GALLBLADDER/BILE DUCTS: Cholelithiasis. Multiple small stones are present within the cystic duct, proximal and distal common bile duct measuring up to 7 mm distally. There is upstream biliary dilatation with the mid common duct measuring 17 mm. Intrahepatic biliary dilatation is also present. No gallbladder wall thickening or surrounding inflammatory change. PANCREAS:  No significant findings. SPLEEN:  No significant findings.  ADRENAL GLANDS:  Normal. KIDNEYS:  No significant findings. Subcentimeter simple cysts are noted, for which no routine follow-up is necessary. GI/BOWEL: The visualized bowel is normal in caliber. PERITONEUM/RETROPERITONEUM:  No abdominal lymphadenopathy. No free intraperitoneal fluid. VESSELS:  Infrarenal aortic enlargement again demonstrated measuring up to 3.5 cm, better evaluated on recent abdominal CT exam. SOFT TISSUES/BONES:  No suspicious signal abnormality identified. 1.  Choledocholithiasis with associated biliary dilatation. No evidence for acute cholecystitis, although small stones are present within the cystic duct as well. 2.  Infrarenal aortic aneurysm measures up to 3.5 cm. RECOMMENDATIONS: 3.5 cm infrarenal abdominal aortic aneurysm. Recommend follow-up every 2 years. Reference: J Am Bryon Radiol 9556;77:054-685. US ABDOMEN LIMITED    Result Date: 8/7/2022  EXAMINATION: RIGHT UPPER QUADRANT ULTRASOUND 8/7/2022 2:16 pm COMPARISON: 8/5/2022 HISTORY: ORDERING SYSTEM PROVIDED HISTORY: ruq pain, fever TECHNOLOGIST PROVIDED HISTORY: Reason for exam:->ruq pain, fever FINDINGS: LIVER:  There is increased echogenicity of the liver suggesting fatty infiltration. There is intrahepatic biliary ductal dilatation. BILIARY SYSTEM:  There is cholelithiasis. The gallbladder is distended. No wall thickening or pericholecystic fluid. The common bile duct is dilated measuring 19 mm in diameter. RIGHT KIDNEY: The right kidney is grossly unremarkable without evidence of hydronephrosis. PANCREAS:  There is a peripancreatic lymph node measuring 23 x 11 x 19 mm in size. On CT, this is outside of the pancreas itself. OTHER: No evidence of right upper quadrant ascites. Intrahepatic and extrahepatic biliary ductal dilatation with a dilated gallbladder. Common bile duct measures up to 19 mm in diameter. Cholelithiasis without specific sonographic evidence of acute cholecystitis.  Peripancreatic enlarged lymph node measuring 23 x 11 x 19 mm. Recommend MRCP for further evaluation.

## 2022-08-12 NOTE — CARE COORDINATION
Received referral for ARU. Will review patients clinicals and PT/OT notes. Thank you for the referral.      Met with patient and discussed ARU. Explained to patient the required 3 hours of therapy a day. Also explained the average length of stay is 11 days, could be longer or shorter depending on recommendations of therapy and Dr. Charles Valencia. Patient expresses his understanding and states he's agreeable to admit to ARU. Per patient his goal is [place] at discharge from ARU. Presented clinicals and therapy notes to Dr. Charles Valencia. Awaiting determination. 1610:  Patient meets criteria and is approved to come to ARU. Patient able to admit once medically stable and after ARU Medical Director and  sign the pre-admission screen (PAS), pending rapid COVID results. Per MD request placed rapid COVID order.      Notified MD and CM of approval.

## 2022-08-13 PROBLEM — R52 UNCONTROLLED PAIN: Status: ACTIVE | Noted: 2022-08-13

## 2022-08-13 PROBLEM — R00.1 SINUS BRADYCARDIA: Status: ACTIVE | Noted: 2022-08-13

## 2022-08-13 PROBLEM — R26.9 GAIT DISTURBANCE: Status: ACTIVE | Noted: 2022-08-13

## 2022-08-13 PROBLEM — K80.01 CALCULUS OF GALLBLADDER WITH ACUTE CHOLECYSTITIS AND OBSTRUCTION: Status: ACTIVE | Noted: 2022-08-13

## 2022-08-13 PROBLEM — R53.1 GENERALIZED WEAKNESS: Status: ACTIVE | Noted: 2022-08-13

## 2022-08-13 PROBLEM — E78.2 MIXED HYPERLIPIDEMIA: Status: ACTIVE | Noted: 2022-08-13

## 2022-08-13 PROCEDURE — 97535 SELF CARE MNGMENT TRAINING: CPT

## 2022-08-13 PROCEDURE — 1280000000 HC REHAB R&B

## 2022-08-13 PROCEDURE — 97530 THERAPEUTIC ACTIVITIES: CPT

## 2022-08-13 PROCEDURE — 6360000002 HC RX W HCPCS: Performed by: STUDENT IN AN ORGANIZED HEALTH CARE EDUCATION/TRAINING PROGRAM

## 2022-08-13 PROCEDURE — 97116 GAIT TRAINING THERAPY: CPT

## 2022-08-13 PROCEDURE — 97166 OT EVAL MOD COMPLEX 45 MIN: CPT

## 2022-08-13 PROCEDURE — 6370000000 HC RX 637 (ALT 250 FOR IP): Performed by: STUDENT IN AN ORGANIZED HEALTH CARE EDUCATION/TRAINING PROGRAM

## 2022-08-13 PROCEDURE — 94664 DEMO&/EVAL PT USE INHALER: CPT

## 2022-08-13 PROCEDURE — 94150 VITAL CAPACITY TEST: CPT

## 2022-08-13 PROCEDURE — 94761 N-INVAS EAR/PLS OXIMETRY MLT: CPT

## 2022-08-13 PROCEDURE — 2580000003 HC RX 258: Performed by: STUDENT IN AN ORGANIZED HEALTH CARE EDUCATION/TRAINING PROGRAM

## 2022-08-13 PROCEDURE — 97163 PT EVAL HIGH COMPLEX 45 MIN: CPT

## 2022-08-13 RX ADMIN — TRAZODONE HYDROCHLORIDE 50 MG: 50 TABLET ORAL at 22:36

## 2022-08-13 RX ADMIN — SODIUM CHLORIDE, PRESERVATIVE FREE 10 ML: 5 INJECTION INTRAVENOUS at 09:33

## 2022-08-13 RX ADMIN — FLUTICASONE PROPIONATE 1 SPRAY: 50 SPRAY, METERED NASAL at 22:38

## 2022-08-13 RX ADMIN — SODIUM CHLORIDE, PRESERVATIVE FREE 10 ML: 5 INJECTION INTRAVENOUS at 22:36

## 2022-08-13 RX ADMIN — LOSARTAN POTASSIUM 25 MG: 25 TABLET, FILM COATED ORAL at 08:31

## 2022-08-13 RX ADMIN — ENOXAPARIN SODIUM 30 MG: 100 INJECTION SUBCUTANEOUS at 22:36

## 2022-08-13 RX ADMIN — AMOXICILLIN AND CLAVULANATE POTASSIUM 1 TABLET: 875; 125 TABLET, FILM COATED ORAL at 08:31

## 2022-08-13 RX ADMIN — AMOXICILLIN AND CLAVULANATE POTASSIUM 1 TABLET: 875; 125 TABLET, FILM COATED ORAL at 22:36

## 2022-08-13 RX ADMIN — ENOXAPARIN SODIUM 30 MG: 100 INJECTION SUBCUTANEOUS at 08:31

## 2022-08-13 RX ADMIN — HYDROCODONE BITARTRATE AND ACETAMINOPHEN 1 TABLET: 5; 325 TABLET ORAL at 16:03

## 2022-08-13 RX ADMIN — LEVOTHYROXINE SODIUM 175 MCG: 0.12 TABLET ORAL at 05:04

## 2022-08-13 RX ADMIN — HYDROCODONE BITARTRATE AND ACETAMINOPHEN 1 TABLET: 5; 325 TABLET ORAL at 08:36

## 2022-08-13 RX ADMIN — SALINE NASAL SPRAY 1 SPRAY: 1.5 SOLUTION NASAL at 22:39

## 2022-08-13 ASSESSMENT — PAIN DESCRIPTION - DESCRIPTORS
DESCRIPTORS: SHARP;SHOOTING
DESCRIPTORS: SHOOTING;SHARP

## 2022-08-13 ASSESSMENT — PAIN DESCRIPTION - LOCATION
LOCATION: ABDOMEN

## 2022-08-13 ASSESSMENT — PAIN - FUNCTIONAL ASSESSMENT
PAIN_FUNCTIONAL_ASSESSMENT: ACTIVITIES ARE NOT PREVENTED
PAIN_FUNCTIONAL_ASSESSMENT: ACTIVITIES ARE NOT PREVENTED

## 2022-08-13 ASSESSMENT — PAIN SCALES - GENERAL
PAINLEVEL_OUTOF10: 1
PAINLEVEL_OUTOF10: 1
PAINLEVEL_OUTOF10: 0
PAINLEVEL_OUTOF10: 5
PAINLEVEL_OUTOF10: 0
PAINLEVEL_OUTOF10: 7

## 2022-08-13 ASSESSMENT — PAIN DESCRIPTION - ORIENTATION
ORIENTATION: RIGHT;LEFT
ORIENTATION: LEFT
ORIENTATION: RIGHT;LEFT

## 2022-08-13 NOTE — PLAN OF CARE
Problem: Discharge Planning  Goal: Discharge to home or other facility with appropriate resources  8/13/2022 0020 by Gregory Carmen RN  Outcome: Progressing  8/13/2022 0019 by Gregory Carmen, RN  Outcome: Progressing  Flowsheets (Taken 8/12/2022 2300)  Discharge to home or other facility with appropriate resources:   Identify barriers to discharge with patient and caregiver   Identify discharge learning needs (meds, wound care, etc)     Problem: Safety - Adult  Goal: Free from fall injury  8/13/2022 0020 by Gregory Carmen, RN  Outcome: Progressing  8/13/2022 0019 by Gregory Carmen, RN  Outcome: Progressing

## 2022-08-13 NOTE — PROGRESS NOTES
Physical Therapy    [facility] ARU PHYSICAL THERAPY EVALUATION    Chart Review:  Past Medical History:   Diagnosis Date    Abdominal aneurysm ([facility] 2012    4 cm x 3.6 cm followed at [place]    Arthritis 1/21/2014    [place]     [facility]    Chronic sinusitis 1/21/2014    COPD, mild ([facility] 1/21/2014    Diverticulosis 2014    Dr. 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 Nadya Turner    MEGHAN (obstructive sleep apnea) 1/21/2014    CPAP changed to bipap (July 2014)    Otitis media, serous, TM rupture 1/21/2014    ENT x 2 s/p PE tubes bilaterally, cipro Otic     Past Surgical History:   Procedure Laterality Date    CHOLECYSTECTOMY, LAPAROSCOPIC N/A 8/10/2022    CHOLECYSTECTOMY LAPAROSCOPIC performed by Swati Lees MD at [address]      COLONOSCOPY  12/02/2014    diverticulosis, internal hemorrhoids    ERCP  08/09/2022    ERCP SPHINCTER/PAPILLOTOMY performed by Dr. Noe Moffett at [facility]    ERCP N/A 8/9/2022    ERCP SPHINCTER/PAPILLOTOMY and sweep and removal of debris, sludge and stones with use of 12-15 extractor balloon performed by Livan Rosa MD at [address] [facility] [address] Right 2012    JOINT REPLACEMENT Left 2011    KNEE SURGERY Bilateral     replacement    TOE SURGERY Bilateral     toe nail surgery Dr. Janki Edwards Right 2014    DR. Rascon      Fall History: none   Social History:  Social/Functional History  Lives With: Alone  Type of Home: Condo  Home Layout: One level  Home Access: Level entry  Bathroom Shower/Tub: Walk-in shower, Shower chair without back  Bathroom Toilet: Handicap height  Bathroom Equipment: Grab bars in shower  Bathroom Accessibility: Accessible  Home Equipment:  (C-pap)  Has the patient had two or more falls in the past year or any fall with injury in the past year?: No  Receives Help From: Other (comment)  ADL Assistance: Independent  Homemaking Assistance: Independent  Homemaking Responsibilities: Yes  Ambulation Assistance: Independent  Transfer Assistance: Independent  Active : Yes  Mode of Transportation: Car  Occupation: Retired,   Leisure & Hobbies: Pt does a lot of volunteer work  Additional Comments: has a sleep number bed due to back problems, Hx of LLD (RLE shorter than LLE)    Restrictions:  Restrictions/Precautions  Restrictions/Precautions: Fall Risk, General Precautions  Position Activity Restriction  Other position/activity restrictions: high gait belt placement due to recent abdominal surgery    Subjective: Pt in recliner, alert and pleasant, using shaver on head, very talkative.     Pain Level: 0/10 at rest prior to mobility tasks; 6-8/10 with movement and coughing   Pain Location: Abdomen (L incision site)     Objective:  Orientation  Overall Orientation Status: Within Functional Limits  Orientation Level: Oriented X4        Vision  Vision Exceptions: Wears glasses at all times, Cataracts (Cataracts in both eyes)  Hearing  Hearing: Within functional limits (holes in both ear drums, but doesn't affect hearing)    Sensation:  Sensation  Overall Sensation Status: WFL    Observation:   Observation/Palpation  Posture: Good    ROM:      AROM RLE (degrees)  RLE AROM: WFL     AROM LLE (degrees)  LLE AROM : WFL                 Strength:    Strength RLE  Strength RLE: WFL  Comment: grossly 4/5  Strength LLE  Strength LLE: WFL  Comment: grossly 4/5              Bed Mobility:   Lying to Sitting on Side of Bed  Assistance Needed: Supervision or touching assistance  Comment: SBA with heavy use of bed rail to assist upper body (movements were slow and cautious)  CARE Score: 4  Discharge Goal: Independent  Roll Left and Right  Comment: SBA to roll to R (required use of bed rail to assist upper body and for safety); pt refused to attempt rolling to L even with use of bed rail due to anticipated increase of pain on L side of abdominal area  Reason if not Attempted: Not attempted due to medical condition or safety concerns  CARE Score: 88  Discharge Goal: Independent  Sit to Lying  Assistance Needed: Supervision or touching assistance  Comment: SBA (required use of bed features due to anticipated aggravation of abdominal pain (especially on L side incision) with transition of movements/position; movements were slow and very guarded)  CARE Score: 4  Discharge Goal: Independent    Transfers:    Sit to Stand  Assistance Needed: Supervision or touching assistance  Comment: SBA with/without 2WW (pt tends to pull onto AD); movements were slow and guarded due to aggravation of abdominal pain especially on the L side  CARE Score: 4  Discharge Goal: Independent  Chair/Bed-to-Chair Transfer  Assistance Needed: Supervision or touching assistance  Comment: SBA with/without 2WW (pt keeps hand on AD); movements were slow and guarded due to aggravation of abdominal pain especially on the L side  CARE Score: 4  Discharge Goal: Independent  Toilet Transfer  Assistance needed: Supervision or touching assistance  Comment: SBA with R grab bar and 2WW  CARE Score: 4  Car Transfer  Comment: pt declined to attempt in training car stating \"I won't fit in there. I guarantee. \"; pt willing to perform this transfer in his personal vehicle (SUV)  Reason if not Attempted: Not attempted due to environmental limitations  CARE Score: 10  Discharge Goal: Independent    Ambulation:   Device used PTA: none    Walking Ability  Does the Patient Walk?: Yes     Walk 10 Feet  Assistance Needed: Supervision or touching assistance  Comment: SBA with 2WW  CARE Score: 4  Discharge Goal: Independent     Walk 50 Feet with Two Turns  Assistance Needed: Supervision or touching assistance  Comment: SBA with 2WW  CARE Score: 4  Discharge Goal: Independent     Walk 150 Feet  Assistance Needed: Supervision or touching assistance  Comment: SBA with 2WW  CARE Score: 4  Discharge Goal: Independent     Walking 10 Feet on Uneven Surfaces  Assistance Needed: Supervision or touching assistance  Comment: CGA using 2WW; pt very guarded with descent on ramp due to depth perception deficit  CARE Score: 4  Discharge Goal: Independent     1 Step (Curb)  Assistance Needed: Supervision or touching assistance  Comment: CGA with 2WW  CARE Score: 4  Discharge Goal: Independent     4 Steps  Assistance Needed: Supervision or touching assistance  Comment: CGA using railings on 4\"/6\" step heights (pt performed variable stepping pattern depending on step height)  CARE Score: 4  Discharge Goal: Independent     12 Steps  Assistance Needed: Supervision or touching assistance  Comment: CGA using railings on 4\"/6\" step heights (pt performed variable stepping pattern depending on step height)  CARE Score: 4  Discharge Goal: Independent    Gait Deviations: []None []Slow alexander  [] Increased DEMETRIA  [] Staggers []Deviated Path  [] Decreased step length  [] Decreased step height  []Decreased arm swing  [] Shuffles  [] Decreased head and trunk rotation  []other:        Wheelchair:  w/c Ability: Wheelchair Ability  Uses a Wheelchair and/or Scooter?: No                Balance:        Object: Picking Up Object  Assistance Needed: Supervision or touching assistance  Comment: CGA with 2WW and reacher  CARE Score: 4  Discharge Goal: Independent               Assessment:   The patient is a 66year old male admitted onto ARU after hospitalization for persistent fevers in the past several weeks with intermittent abdominal pain.  CT revealed bile duct dilation, gall bladder distention concerning for possible cholecystitis or cholelithiasis S/P laparoscopic cholecystectomy. Pt also require medical management for sepsis and pain. Pt with Hx of obstructive sleep apnea, HTN, dyslipidemia, hypothyroidism, bilat knee replacements with LLD (RLE shorter than LLE), bilat traumatic injuries on lower legs, chronic back pain, and COPD. Pt was alert and oriented x 4, very talkative requiring redirection to required tasks, denied pain at rest but had variable pain on abdominal incision sites especially the one of the L side that resulted in slow and guarded movements during transfers and poor tolerance to attempt rolling to the L, required use of bed rail to compensate for his abdominal pain that was aggravated by movement and coughing, and required use of 2WW to safely perform ambulation due to his strength and balance deficits. It is anticipated that pt will progress quickly based on his cognition and motivation provided that his pain is under control and his overall medical status continue to improve and remain stable. Body Structures, Functions, Activity Limitations Requiring Skilled Therapeutic Intervention: Decreased functional mobility , Decreased balance, Decreased endurance, Decreased high-level IADLs, Increased pain     Therapy Prognosis: Good  Decision Making: Medium Complexity  Clinical Presentation: evolving with changing characteristics      Patient education:   ARU schedule, ARU expectations for participation, plan of care  Treatment Initiated:  Functional mobility training, gait training, patient education  Barriers to Improvement:  pain   Discharge Recommendations:  [facility] PT   Equipment Recommendations:  heavy duty 2WW     Goals:  Patient Goals   Patient goals : pain control and regain independence and mobility  Short Term Goals  Time Frame for Short term goals: 5-7 tx days:  Short term goal 1: Pt will complete bed mobility (scooting, rolling R/L, and sup<->sit) Ind. Short term goal 2: Pt will complete OOB transfers using 2WW Mod Ind.   Short term goal 3: Pt will ambulate >/=150 ft over level surface and at least 10 ft of uneven surface using 2WW Mod Ind. Short term goal 4: Pt will ascend/descend curb step using 2WW and 1 flight of stairs using railings Mod Ind. Short term goal 5: Pt will complete object retrieval from the floor with 2WW and reacher Mod Ind.      Plan:    Requires PT Follow-Up: Yes  Pt will be seen at least 60 minutes per day for a minimum of 5 days per week, plus group therapy as appropriate  Plan  Current Treatment Recommendations: Strengthening, Balance training, Functional mobility training, Transfer training, Endurance training, Gait training, Stair training, Pain management, Home exercise program, Safety education & training, Patient/Caregiver education & training, Equipment evaluation, education, & procurement, Therapeutic activities    PT Individual Minutes  Time In: 1300  Time Out: 6499  Minutes: 97  Variance: +7  Reason: extra time required to complete tasks        Timed Code Treatment Minutes: 82 Minutes    Number of Minutes/Billable Intervention      PT Evaluation 15   Gait Training 45   Therapeutic Exercise    Neuro Re-Ed    Therapeutic Activity 27   Wheelchair Propulsion    Group    Other:    TOTAL 97       Electronically signed by:    Marbella Parada PT  8/13/2022, 14:56

## 2022-08-13 NOTE — PROGRESS NOTES
Occupational Therapy                                                [facility] ARU OCCUPATIONAL THERAPY EVALUATION    Chart Review:  Past Medical History:   Diagnosis Date    Abdominal aneurysm ([facility] [address] 2012    4 cm x 3.6 cm [facility] at [facility]    Arthritis 1/21/2014    Ch[place]     [facility] [place]    Chronic sinusitis 1/21/2014    COPD, mild ([facility] 1/21/2014    Diverticulosis 2014    Dr. 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 1/21/2014    Onychocryptosis     Dr. Say Iyer    MEGHAN (obstructive sleep apnea) 1/21/2014    CPAP changed to bipap (July 2014)    Otitis media, serous, TM rupture 1/21/2014    ENT x 2 s/p PE tubes bilaterally, cipro Otic     Past Surgical History:   Procedure Laterality Date    CHOLECYSTECTOMY, LAPAROSCOPIC N/A 8/10/2022    CHOLECYSTECTOMY LAPAROSCOPIC performed by Sunny Larry MD at [facility]  2008    [facility] [place]- [place].      COLONOSCOPY  12/02/2014    diverticulosis, internal hemorrhoids    ERCP  08/09/2022    ERCP SPHINCTER/PAPILLOTOMY performed by Dr. Ray Tim at [address] [place] [address]    ERCP N/A 8/9/2022    ERCP SPHINCTER/PAPILLOTOMY and sweep and removal of debris, sludge and stones with use of 12-15 extractor balloon performed by Merry Chong MD at [place] [address] Right 2012    JOINT REPLACEMENT Left 2011    KNEE SURGERY Bilateral     replacement    TOE SURGERY Bilateral     toe nail surgery Dr. Neymar Vigil Right 2014    DR. Rascon      Social History:  Social/Functional History  Lives With: Alone  Type of Home: Condo  Home Layout: One level  Home Access: Level entry  Bathroom Shower/Tub: Walk-in shower, Shower chair without back  [facility] Toilet: Handicap height  Bathroom Equipment: Grab bars in shower  Bathroom Accessibility: Accessible  Home Equipment:  (C-pap)  Has the patient had two or more falls in the past year or any fall with injury in the past year?: No  Receives Help From: Other (comment)  ADL Assistance: Independent  Homemaking Assistance: Independent  Homemaking Responsibilities: Yes  Ambulation Assistance: Independent  Transfer Assistance: Independent  Active : Yes  Mode of Transportation: Car  Occupation: Retired,   Leisure & Hobbies: Pt does a lot of volunteer work    Restrictions:  Restrictions/Precautions  Restrictions/Precautions: General Precautions, Contact Precautions                  Pain Level: 1  Pain Location: Abdomen    Objective:  Observation/Palpation  Posture: Good             Vision  Vision Exceptions: Wears glasses at all times, Cataracts (Cataracts in both eyes)  Vision - Basic Assessment  Prior Vision: No visual deficits  Hearing  Hearing: Within functional limits (holes in both ear drums, but doesn't affect hearing)    ROM:      LUE AROM (degrees)  LUE AROM : WFL  LUE General AROM: WFL           RUE AROM (degrees)  RUE AROM : Exceptions  RUE General AROM: WFL - execpt extension  R Shoulder Flexion 0-180: WFL  R Shoulder Extension 0-45: 0-20 degrees  R Shoulder ABduction 0-180: WFL  Right Hand PROM (degrees)  Right Hand PROM: WNL       Strength:    LUE Strength  Gross LUE Strength: WFL  L Hand General: 4+/5  RUE Strength  Gross RUE Strength: WFL  R Hand General: 4+/5    Quality of Movement:   Tone RUE  RUE Tone: Normotonic  Tone LUE  LUE Tone: Normotonic  Coordination  Movements Are Fluid And Coordinated: Yes       Sensation:    Sensation  Overall Sensation Status: WFL     ADLs:  Eating: Eating  Assistance Needed: Independent  Comment: Per pt report no concerns opening containers or feeding  CARE Score: 6  Discharge Goal: Independent       Oral Hygiene: Oral Hygiene  Assistance Needed: Supervision or touching assistance  Comment: S -completed standing  CARE Score: 4  Discharge Goal: Independent    UB/LB Bathing: Shower/Bathe Self  Assistance Needed: Partial/moderate assistance  Comment: Completed UB bathing standing at sink and completed LB bathing seated at sink. Pt required assist to clean below the knees and assist wash for posterior area due to pain when reaching from abdominal incision  CARE Score: 3  Discharge Goal: Independent    UB Dressing: Upper Body Dressing  Assistance Needed: Setup or clean-up assistance  Comment: set-up assist for shirt; completed seated on bed  CARE Score: 5  Discharge Goal: Independent         LB Dressing:   Lower Body Dressing  Assistance Needed: Supervision or touching assistance  Comment: Set-up and SBA for LB dressing; completed seated EOB and stood to complete clothing management  CARE Score: 4  Discharge Goal: Independent    Donning and [place] Footwear: Putting On/Taking Off Footwear  Assistance Needed: Partial/moderate assistance  Comment: Pt able to doff socks using the other foot but unable to reach to feet to don due to incision pain; Pt able to don slip on shoes with set-up assist  CARE Score: 3  Discharge Goal: Independent      Toile[address] needed: Supervision or touching assistance  Comment: SBA to complete clothing management and hygiene; to complete posterior hygien pt leans to 1x side with an outsearched leg  CARE Score: 4  Discharge Goal: Independent      Bed Mobility:    Bed mobility  Sit to Supine: Stand by assistance  Scooting: Stand by assistance    Transfers:    Transfers  Stand Step Transfers: Stand by assistance  Sit Pivot Transfers: Stand by assistance  Sit to stand: Stand by assistance  Stand to sit: Stand by assistance     Shower Transfers  Shower Transfers: Stand by assistance     Toilet Transfer  Assistance needed: Supervision or touching assistance  Comment: transferred to a sandard toilet ; SBA for safety - use of grab bars  CARE Score: 4  Discharge Goal: Independent    Functional Mobility:    Balance  Sitting Balance: Independent  Standing Balance: Stand by assistance  Standing Balance  Time: 13 minutes  Activity: UB bathing and oral hygiene  Comment: No LOB and no reports of dizziness  Functional Mobility  Functional - Mobility Device: Standard Walker  Activity: To/from bathroom, Other  Assist Level: Stand by assistance     Cognition:  Cognition  Overall Cognitive Status: WNL    Perception:  Perception  Overall Perceptual Status: WFL      Assessment:     Performance deficits / Impairments: Decreased functional mobility , Decreased ADL status, Decreased strength, Decreased high-level IADLs, Decreased endurance, Decreased ROM, Decreased balance    Per chart review: The patient is a 68-year-old right-hand-dominant male with a history of hypertension, obstructive sleep apnea and morbid obesity who presented to our ED on 8/7/2022 after an outpatient imaging of his abdomen identified acute cholelithiasis. His outpatient evaluation led to presentation to our ED where follow-up exam confirmed a cholelithiasis. He was febrile with hypotension and malaise. His white blood count was elevated and he was diagnosed with sepsis requiring antibiotics. His abdominal pain has changed to postsurgical pain. Pt demonstrated deficits in endurance and performance in ADLs. Pt is currently primarily limited by abdominal pain at his incision site. Pt required assist for bathing due to not being able to reach down to feet or reach behind to clean due to abdominal pain. Pt would benefit from AE including a reacher, sock aid, and LH sponge. Pt would benefit from OT services to continue to improve endurance, strength, ADL + IADL performance prior to returning home.               Decision Making: Medium Complexity  Clinical Presentation: Changing with evolving characteristics   Patient education:   ARU procotol, Role of O.T., O.T. plan of care   [x] Patient goal was established and reviewed in Rehabtracker with patient and/or family this date.   REQUIRES OT FOLLOW-UP: Yes  Discharge Recommendations:  Home with PRN assist; feel like pt with not need continued OT after ARU   Equipment Recommendations: none     Goals:  Patient Goals   Patient goals : Pt woud like be more strength and more stable on his feet  Short Term Goals  Time Frame for Short term goals: LTGs= STGs  Long Term Goals  Time Frame for Long term goals : 5-7 days or until D/C  Long Term Goal 1: Pt will complete oral hygiene and feeding IND  Long Term Goal 2: Pt will complete UB dressing/ bathing IND  Long Term Goal 3: Pt will complete LB dressing/ bathing IND  Long Term Goal 4: Pt will complete all aspects of toileting IND  Long Term Goal 5: Pt will complete all functional transfers IND  Long Term Goal 6: Pt will complete donning/ doffing of foot waear IND  Long Term Goal 7: Pt will complete an IADL activity IND  Long Term Goal 8: Pt will complete 15 minutes of a stengthening or endurance activity with S    Plan:    Pt will be seen at least 60 minutes per day for a minimum of 5 days per week, plus group therapy as appropriate  Current Treatment Recommendations: Strengthening, Functional mobility training, Endurance training, Pain management, Safety education & training, Patient/Caregiver education & training, Equipment evaluation, education, & procurement, Self-Care / ADL, Home management training    OT Individual Minutes  Time In: 0845  Time Out: 4131  Minutes: 92                Number of Minutes/Billable Intervention      OT Evaluation 30   Therapeutic Exercise    ADL Self-care 62   Neuro Re-Ed    Therapeutic Activity    Group    Other:    TOTAL 92     Electronically signed by:    JAC Titus/RATNA [id number]  8/13/2022, 12:28 PM

## 2022-08-13 NOTE — PROGRESS NOTES
Patient instructed and educated on [employer]. Patient able to do 2500 ml. Vital capacity. Patient's goal is 2950ml.  Electronically signed by Yosi Orozco RCP on 8/13/2022 at 12:14 PM

## 2022-08-13 NOTE — PLAN OF CARE
Problem: Discharge Planning  Goal: Discharge to home or other facility with appropriate resources  8/13/2022 1144 by Rupert Becerra LPN  Outcome: Progressing  8/13/2022 0020 by Nakul Montes RN  Outcome: Progressing  8/13/2022 0019 by Nakul Montes RN  Outcome: Progressing  Flowsheets (Taken 8/12/2022 2300)  Discharge to home or other facility with appropriate resources:   Identify barriers to discharge with patient and caregiver   Identify discharge learning needs (meds, wound care, etc)     Problem: Safety - Adult  Goal: Free from fall injury  8/13/2022 1144 by Rupert Becerra LPN  Outcome: Progressing  8/13/2022 0020 by Nakul Montes RN  Outcome: Progressing  8/13/2022 0019 by Nakul Montes RN  Outcome: Progressing

## 2022-08-13 NOTE — H&P
Brandon Dukes    : 1950  [facility]  MRN: [id number]              History and physical      Admitting diagnosis: Sepsis due to cholelithiasis ( [address])    Comorbid diagnoses impacting rehabilitation: Generalized weakness, gait disturbance, status post cholecystectomy (8/10/2022), uncontrolled pain, essential hypertension, acquired hypothyroidism, obstructive sleep apnea, mixed hyperlipidemia, sinus bradycardia, morbid obesity (BMI 45.6)    Chief complaint: Abdominal pain and generalized weakness. Some nausea without emesis. History of present illness: The patient is a 68-year-old right-hand-dominant male with a history of hypertension, obstructive sleep apnea and morbid obesity who presented to our ED on 2022 after an outpatient imaging of his abdomen identified acute cholelithiasis. He had had low-grade fevers and general malaise with poor oral intake for the previous 2 weeks. His outpatient evaluation led to presentation to our ED where follow-up exam confirmed a cholelithiasis. He was febrile with hypotension and malaise. His white blood count was elevated and he was diagnosed with sepsis requiring antibiotics. He was seen by gastroenterology who performed an ERCP papillotomy on 2022 (Dr. Nish Patel). The next day on 8/10/2022 Dr. Leopold Baas performed a laparoscopic cholecystectomy. The patient has had some improvement in his nausea but oral intake is still inconsistent. His abdominal pain has changed to postsurgical pain. He has had some positional dizziness and has been unable to do his own toileting, transfers and self-care. He requires inpatient rehabilitation to address these issues. Review of systems: Abdominal pain. Minimal nausea. Quick fatigue with activity. Infrequent bowel movements. No dysuria. The remainder of their review of systems was negative except as mentioned in the history of present illness.     Social History: Lives With: Alone  Type of Home: Condo  Home Layout: One level  Home Access: Level entry  Bathroom Shower/Tub: Walk-in shower, Shower chair without back  Bathroom Toilet: Handicap height  Bathroom Equipment: Grab bars in shower  Bathroom Accessibility: Accessible  Has the patient had two or more falls in the past year or any fall with injury in the past year?: No  Receives Help From: Other (comment) (Brother is able to help for a few days following d/c to home. Normally does not receive help from anyone.)  ADL Assistance: [facility]: Independent  Homemaking Responsibilities: Yes  Ambulation Assistance: Independent  Transfer Assistance: Independent  Active : Yes  Mode of Transportation: Car  Occupation: Retired,     He reports that he quit smoking about 20 years ago. His smoking use included cigarettes. He has a 72.00 pack-year smoking history. He has never used smokeless tobacco. He reports that he does not drink alcohol and does not use drugs. Prior (baseline) level of function: Independent with mobility and self-care. Current level of function: Moderate physical assistance needed for mobility and self-care.     Allergies:  Dilaudid [hydromorphone hcl], Hydrocodone, Ultram [tramadol], Vicodin [hydrocodone-acetaminophen], Morphine, Keflex [cephalexin], and Sulfa antibiotics    Past Medical History:   Past Medical History:   Diagnosis Date    Abdominal aneurysm ([facility] [address] 2012    4 cm x 3.6 cm [facility] at [facility]    Arthritis 1/21/2014    Chronic back pain     [facility]    Chronic sinusitis 1/21/2014    COPD, mild ([facility] [address] 1/21/2014    Diverticulosis 2014    Dr. 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 Brissa Cole    MEGHAN (obstructive sleep apnea) 1/21/2014    CPAP changed to bipap (July 2014)    Otitis media, serous, TM rupture 1/21/2014    ENT x 2 s/p PE tubes bilaterally, cipro Otic        Past Surgical History:     Past Surgical History:   Procedure Laterality Date    CHOLECYSTECTOMY, LAPAROSCOPIC N/A 8/10/2022    CHOLECYSTECTOMY LAPAROSCOPIC performed by Kusum Sorto MD at [facility]  2008    [facility]- normal.      COLONOSCOPY  12/02/2014    diverticulosis, internal hemorrhoids    ERCP  08/09/2022    ERCP SPHINCTER/PAPILLOTOMY performed by Dr. Noemi Sandra at [facility]    ERCP N/A 8/9/2022    ERCP SPHINCTER/PAPILLOTOMY and sweep and removal of debris, sludge and stones with use of 12-15 extractor balloon performed by Boston Zabala MD at [place] [address] Right 2012    JOINT REPLACEMENT Left 2011    KNEE SURGERY Bilateral     replacement    TOE SURGERY Bilateral     toe nail surgery Dr. Sheila Cordova Right 2014    DR. Rascon        Current Medications:     Current Facility-Administered Medications:     0.9 % sodium chloride infusion, 25 mL, IntraVENous, PRN, Jan Daniels MD    0.9 % sodium chloride infusion, , IntraVENous, PRN, MD Rebeca Hilton Breeding ON 8/13/2022] levothyroxine (SYNTHROID) tablet 175 mcg, 175 mcg, Oral, Daily, Jan Daniels MD    traZODone (DESYREL) tablet 50 mg, 50 mg, Oral, Nightly, Jan Daniels MD    [START ON 8/13/2022] sodium chloride flush 0.9 % injection 5-40 mL, 5-40 mL, IntraVENous, 2 times per day, MD Rebeca Hilton Breeding ON 8/13/2022] enoxaparin Sodium (LOVENOX) injection 30 mg, 30 mg, SubCUTAneous, BID, MD Rebeca Hilton Breeding ON 8/13/2022] sodium chloride flush 0.9 % injection 5-40 mL, 5-40 mL, IntraVENous, 2 times per day, Jan Daniels MD    sodium chloride flush 0.9 % injection 5-40 mL, 5-40 mL, IntraVENous, PRN, Jan Daniels MD    promethazine (PHENERGAN) tablet 12.5 mg, 12.5 mg, Oral, Q6H PRN **OR** ondansetron (ZOFRAN) injection 4 mg, 4 mg, IntraVENous, Q6H PRN, Chantelle Orozco MD    sodium chloride (OCEAN, BABY AYR) 0.65 % nasal spray 1 spray, 1 spray, Each Nostril, Q30 Min PRN, Chantelle Orozco MD    [START ON 8/13/2022] losartan (COZAAR) tablet 25 mg, 25 mg, Oral, Daily, Chantelle Orozco MD    diphenhydrAMINE (BENADRYL) tablet 25 mg, 25 mg, Oral, Q12H PRN, Chantelle Orozco MD    amoxicillin-clavulanate (AUGMENTIN) 875-125 MG per tablet 1 tablet, 1 tablet, Oral, BID, Chantelle Orozco MD    fluticasone (FLONASE) 50 MCG/ACT nasal spray 1 spray, 1 spray, Each Nostril, BID, Chantelle Orozco MD    HYDROcodone-acetaminophen (NORCO) 5-325 MG per tablet 1 tablet, 1 tablet, Oral, Q6H PRN, Chantelle Orozco MD    albuterol sulfate HFA (PROVENTIL;VENTOLIN;PROAIR) 108 (90 Base) MCG/ACT inhaler 1 puff, 1 puff, Inhalation, Q4H PRN **AND** ipratropium (ATROVENT HFA) 17 MCG/ACT inhaler 1 puff, 1 puff, Inhalation, Q4H PRN, Chantelle Orozco MD    acetaminophen (TYLENOL) tablet 650 mg, 650 mg, Oral, Q4H PRN, SHANNON Tellez MD    polyethylene glycol (GLYCOLAX) packet 17 g, 17 g, Oral, Daily PRN, SHANNON Tellez MD    Family History:   Family History   Problem Relation Age of Onset    High Blood Pressure Mother     Allergies Mother     Anemia Mother     High Cholesterol Mother     Thyroid Disease Mother     Heart Disease Father     Allergies Father     Obesity Paternal Grandmother     Obesity Paternal Grandfather     Cancer Paternal Uncle         prostate cancer       Exam:    Blood pressure (!) 150/78, pulse 62, temperature 97.5 °F (36.4 °C), temperature source Oral, resp. rate 18, height 5' 9\" (1.753 m), weight (!) 309 lb 1.4 oz (140.2 kg), SpO2 97 %. General: Patient was seen semirecumbent in bed. He was alert and able to participate in simple conversation. Somewhat hard of hearing. Oriented. HEENT: Neck supple. Visual fields seem full. Rather flat affect but answering questions. MMM. No JVD or adenopathy. Pulmonary: Shallow respirations without wheezes or rales. Cardiac: Regular rate and rhythm. Abdomen: Patient's abdomen was soft and nondistended. Bowel sounds were present throughout. There was no rebound, guarding or masses noted. Spinal exam: Moist skin without skin breakdown. No gross malalignment. Upper extremities: Slow and deliberate with bringing his hands up to meet mine. Fatigues quickly with MMT but has fair coordination and no tremor. Sensation was intact. Lower extremities: Trace edema about his ankles. No signs of DVT.  4 -/5 strength at the knees and ankles. Sensation intact. Sitting balance was fair. Standing balance was poor. Lab Results   Component Value Date    WBC 6.8 08/12/2022    HGB 10.3 (L) 08/12/2022    HCT 32.1 (L) 08/12/2022    MCV 99.7 08/12/2022     08/12/2022     Lab Results   Component Value Date    INR 1.04 08/05/2022    INR 1.05 11/04/2014    PROTIME 13.4 08/05/2022    PROTIME 12.0 11/04/2014     Lab Results   Component Value Date    CREATININE 0.7 (L) 08/11/2022    BUN 12 08/11/2022     08/11/2022    K 4.1 08/11/2022     08/11/2022    CO2 22 08/11/2022     Lab Results   Component Value Date     (H) 08/12/2022     (H) 08/12/2022     (H) 08/05/2022    ALKPHOS 458 (H) 08/12/2022    BILITOT 1.5 (H) 08/12/2022         Impression: 24-year-old male with a history of morbid obesity, MEGHAN, hypertension and hypothyroidism who was suffered a bout of cholelithiasis with sepsis. He is status post ERCP, papillotomy and laparoscopic cholecystectomy. Strengths for the patient: Alertness, independent habits prior to admission and a reasonably accessible home. Limitations/barriers for the patient: His age, he lives alone and he has no significant experience with adaptive equipment. Recommendation: Acute inpatient rehabilitation with occupational and physical therapy 180 minutes 5 out of every 7 days. Will address basic and  advancing mobility with self-care instruction and adaptive equipment training. Caregiver education will be offered. Expected length of stay  prior to a supervised level of function for discharge home with a walker and C OT/PT is 10 to 14 days. Additional recommendation:    Sepsis due to cholelithiasis: Patient requires daily occupational and physical therapy. He is to complete a course of oral antibiotic. His diet has been just upgraded to regular and we must monitor this closely. He needs adaptive equipment training, aggressive pulmonary hygiene measures and DVT prophylaxis. We must provide nutritional support, caregiver education and monitoring of his blood pressure with activity. DVT prophylaxis: Lovenox 30 mg SQ twice daily. I must periodically monitor his hemoglobin and platelet count while on this medication. Weightbearing activities will be pursued daily. GI prophylaxis is available. Uncontrolled pain: Progressive mobilization, acetaminophen and Norco as needed. Bowel intervention while on the analgesics. Hypertension: Blood pressure medications are gradually being reintroduced after his period of low blood pressures initially. Cozaar ordered with parameters. Pain management will be helpful as well. Bradycardia: Vital signs are checked at rest and with activity. Outpatient follow-up with cardiology as planned. MEGHAN: Home use of BiPAP will be continued in the hospital.    I personally performed a history and physical on this patient within 24 hours of admission to the rehab unit. I have reviewed the preadmission screening and concur with its findings without change. A detailed plan of care will be established by hospital day 4 and I attest the patient is appropriate for inpatient rehabilitation at this time.   I have compared the patient's current functional status noted during my history and physical with that of the preadmission screen and I have found no significant differences.

## 2022-08-13 NOTE — PLAN OF CARE
ARU Interdisciplinary Plan of Care (IPOC)  [facility] Dr. Kim Peterson [facility], [address]  [phone]  Fax: [phone]        Clarence Cleveland    : 1950  Acct #: [de-identified]  MRN: [id number]   PHYSICIAN:  Pamela Cervantes MD  Primary Active Problems:   Active Hospital Problems    Diagnosis Date Noted    Calculus of gallbladder with acute cholecystitis and obstruction [K80.01] 2022     Priority: Medium    Generalized weakness [R53.1] 2022     Priority: Medium    Gait disturbance [R26.9] 2022     Priority: Medium    Uncontrolled pain [R52] 2022     Priority: Medium    Mixed hyperlipidemia [E78.2] 2022     Priority: Medium    Sinus bradycardia [R00.1] 2022     Priority: Medium    Obstructive sleep apnea [G47.33] 08/10/2022     Priority: Medium    Sepsis ([facility] [A41.9] 2022     Priority: Medium    Essential hypertension [I10] 2014    Morbid obesity with BMI of 45.0-49.9, adult ([facility] [E66.01, Z68.42] 2014       Rehabilitation Diagnosis:     Sepsis, unspecified organism [A41.9]  Sepsis ([facility] [address] [A41.9]          CARE PLAN     NURSING:  Clarence Cleveland while on this unit will:      Bowel and Bladder   [x] Be continent of bowel and bladder      [x] Have an adequate number of bowel movements   [] Urinate with no urinary retention >300ml in bladder   [] Bladder Scan: (details)   [] Complete bladder protocol with lane removal   [] Initiate Bladder Program to toilet every ___ hours   [] Initiate Bowel Program to toilet every ___hours   [] Bladder training    [] Bowel training  Pulmonary   [x] Maintain O2 SATs at 92% or greater  Pain Management   [x] Have pain managed while on ARU        [] Be pain free by discharge    [x] Medication Management and Education  Maintenance of Skin Integrity/Wound Management   [x] Have no skin breakdown while on ARU   [] Have improved skin integrity via wound object retrieval from the floor with 2WW and reacher Mod Ind. These goals were reviewed with this patient at the time of assessment and Hesham Gilbert is in agreement. Plan of Care: Pt to be seen 5 days per week for a minimum of 60 minutes for 5-7 days. Current Treatment Recommendations: Strengthening, Balance training, Functional mobility training, Transfer training, Endurance training, Gait training, Stair training, Pain management, Home exercise program, Safety education & training, Patient/Caregiver education & training, Equipment evaluation, education, & procurement, Therapeutic activities community reintegration,animal assisted therapy, and concurrent/group therapy.     PT IRF-PERI scores and goals for initial assessment:   Bed Mobility:   Sit to Lying  Assistance Needed: Supervision or touching assistance  Comment: SBA (required use of bed features due to anticipated aggravation of abdominal pain (especially on L side incision) with transition of movements/position; movements were slow and very guarded)  CARE Score: 4  Discharge Goal: Independent  Roll Left and Right  Comment: SBA to roll to R (required use of bed rail to assist upper body and for safety); pt refused to attempt rolling to L even with use of bed rail due to anticipated increase of pain on L side of abdominal area  Reason if not Attempted: Not attempted due to medical condition or safety concerns  CARE Score: 88  Discharge Goal: Independent  Lying to Sitting on Side of Bed  Assistance Needed: Supervision or touching assistance  Comment: SBA with heavy use of bed rail to assist upper body (movements were slow and cautious)  CARE Score: 4  Discharge Goal: Independent    Transfers:    Sit to Stand  Assistance Needed: Supervision or touching assistance  Comment: SBA with/without 2WW (pt tends to pull onto AD); movements were slow and guarded due to aggravation of abdominal pain especially on the L side  CARE Score: 4  Discharge Goal: Independent  Chair/Bed-to-Chair Transfer  Assistance Needed: Supervision or touching assistance  Comment: SBA with/without 2WW (pt keeps hand on AD); movements were slow and guarded due to aggravation of abdominal pain especially on the L side  CARE Score: 4  Discharge Goal: Independent  Toilet Transfer  Assistance needed: Supervision or touching assistance  Comment: SBA with R grab bar and 2WW  CARE Score: 4  Car Transfer  Comment: pt declined to attempt in training car stating \"I won't fit in there. I guarantee. \"; pt willing to perform this transfer in his personal vehicle (SUV)  Reason if not Attempted: Not attempted due to environmental limitations  CARE Score: 10  Discharge Goal: Independent    Ambulation:    Walking Ability  Does the Patient Walk?: Yes     Walk 10 Feet  Assistance Needed: Supervision or touching assistance  Comment: SBA with 2WW  CARE Score: 4  Discharge Goal: Independent     Walk 50 Feet with Two Turns  Assistance Needed: Supervision or touching assistance  Comment: SBA with 2WW  CARE Score: 4  Discharge Goal: Independent     Walk 150 Feet  Assistance Needed: Supervision or touching assistance  Comment: SBA with 2WW  CARE Score: 4  Discharge Goal: Independent     Walking 10 Feet on Uneven Surfaces  Assistance Needed: Supervision or touching assistance  Comment: CGA using 2WW; pt very guarded with descent on ramp due to depth perception deficit  CARE Score: 4  Discharge Goal: Independent     1 Step (Curb)  Assistance Needed: Supervision or touching assistance  Comment: CGA with 2WW  CARE Score: 4  Discharge Goal: Independent     4 Steps  Assistance Needed: Supervision or touching assistance  Comment: CGA using railings on 4\"/6\" step heights (pt performed variable stepping pattern depending on step height)  CARE Score: 4  Discharge Goal: Independent     12 Steps  Assistance Needed: Supervision or touching assistance  Comment: CGA using railings on 4\"/6\" step heights (pt performed variable stepping pattern depending on step height)  CARE Score: 4  Discharge Goal: Independent    Gait Deviations: []None []Slow alexander  [] Increased DEMETRIA  [] Staggers []Deviated Path  [] Decreased step length  [] Decreased step height  []Decreased arm swing  [] Shuffles  [] Decreased head and trunk rotation  []other:        Wheelchair:  w/c Ability: Wheelchair Ability  Uses a Wheelchair and/or Scooter?: No                Balance:        Object: Picking Up Object  Assistance Needed: Supervision or touching assistance  Comment: CGA with 2WW and reacher  CARE Score: 4  Discharge Goal: Independent  OCCUPATIONAL THERAPY:  Goals:             Short Term Goals  Time Frame for Short term goals: LTGs= STGs :  Long Term Goals  Time Frame for Long term goals : 5-7 days or until D/C  Long Term Goal 1: Pt will complete oral hygiene and feeding IND  Long Term Goal 2: Pt will complete UB dressing/ bathing IND  Long Term Goal 3: Pt will complete LB dressing/ bathing IND  Long Term Goal 4: Pt will complete all aspects of toileting IND  Long Term Goal 5: Pt will complete all functional transfers IND  Long Term Goal 6: Pt will complete donning/ doffing of foot waear IND  Long Term Goal 7: Pt will complete an IADL activity IND  Long Term Goal 8: Pt will complete 15 minutes of a stengthening or endurance activity with S :    These goals were reviewed with this patient at the time of assessment and Marietta Mahmood is in agreement    Plan of Care:  Pt to be seen 5 days per week for a minimum of 60 minutes for  5-7 days.           Plan  Times per Day: Daily  Current Treatment Recommendations: Strengthening, Functional mobility training, Endurance training, Pain management, Safety education & training, Patient/Caregiver education & training, Equipment evaluation, education, & procurement, Self-Care / ADL, Home management training         cognitive training, home management, energy conservation training, community reintegration, splint fabrication, patient/caregiver education and training, animal assisted therapy, and concurrent and/or group therapy. OT IRF-PERI scores and goals for initial assessment:    ADLs:    Eating: Eating  Assistance Needed: Independent  Comment: Per pt report no concerns opening containers or feeding  CARE Score: 6  Discharge Goal: Independent       Oral Hygiene: Oral Hygiene  Assistance Needed: Supervision or touching assistance  Comment: S -completed standing  CARE Score: 4  Discharge Goal: Independent    UB/LB Bathing: Shower/Bathe Self  Assistance Needed: Partial/moderate assistance  Comment: Completed UB bathing standing at sink and completed LB bathing seated at sink. Pt required assist to clean below the knees and assist wash for posterior area due to pain when reaching from abdominal incision  CARE Score: 3  Discharge Goal: Independent    UB Dressing: Upper Body Dressing  Assistance Needed: Setup or clean-up assistance  Comment: set-up assist for shirt; completed seated on bed  CARE Score: 5  Discharge Goal: Independent         LB Dressing: Lower Body Dressing  Assistance Needed: Supervision or touching assistance  Comment: Set-up and SBA for LB dressing; completed seated EOB and stood to complete clothing management  CARE Score: 4  Discharge Goal: Independent    Donning and Mears Footwear: Putting On/Taking Off Footwear  Assistance Needed: Partial/moderate assistance  Comment: Pt able to doff socks using the other foot but unable to reach to feet to don due to incision pain; Pt able to don slip on shoes with set-up assist  CARE Score: 3  Discharge Goal: Independent      Toiletin [address] needed: Supervision or touching assistance  Comment: SBA to complete clothing management and hygiene; to complete posterior hygien pt leans to 1x side with an outsearched leg  CARE Score: 4  Discharge Goal: Independent      Toilet Transfers:    Toilet Transfer  Assistance needed: Supervision or touching assistance  Comment: SBA with R grab bar and 2WW  CARE Score: 4  Discharge Goal: Independent      SPEECH THERAPY: (If ordered)  Plan of Care and Goals:   LTG                                                            LTG:                           Treatments may include speech/language/communication therapy, cognitive training, animal assisted therapy, group therapy, education, and/or dysphagia therapy based on the above goals. Co-treats where appropriate with PT or OT to facilitate patient goals in functional tasks. These goals were reviewed with this patient at the time of assessment and Loren Paz is in agreement. CASE MANAGEMENT:  Goals:   Assist patient/family with discharge planning, patient/family counseling, assistance in obtaining recommended equipment and other services, and coordination with insurance during ARU stay. Patient Goals:  Return to maximum level of independent function. Activities Prior to Admit:   Homemaking Responsibilities: Yes  Active : Yes  Mode of Transportation: Car  Occupation: Retired,   Leisure & Hobbies: Pt does a lot of volunteer work         Intensity of [address] will be seen a minimum of 3 hours of therapy per day/a minimum of 5 out of 7 days per week. [] In this rare instance due to the nature of this patient's medical involvement, this patient will be seen 15 hours per week (900 minutes within a 7 day period). Treatments may include therapeutic exercises, gait training, neuromuscular re-ed, transfer training, community reintegration, bed mobility, w/c mobility and training, self care, home mgmt, cognitive training, energy conservation,dysphagia tx, speech/language/communication therapy, group therapy, and patient/family education. In addition, dietician/nutritionist may monitor calorie count as well as intake and collaboratively work with SLP on dietary upgrades.   Neuropsychology/Psychology may evaluate and provide necessary support. Group therapy as appropriate to facilitate improved endurance, STR, COORD, function, safety, transfers, awareness and insight into deficits, problem solving, memory, and social interaction and engagement. Medical issues being managed closely and that require 24 hour availability of a physician:   [] Swallowing Precautions                                     [] Weight bearing precautions   [x] Wound Care                             [x] Infection Prevention   [x] DVT Prophylaxis/assessment              [x] Monitoring for complications    [x] Fall Precautions/Prevention                         [x] Fluid/Electrolyte/Nutrition Balance   [] Voice Protection                           [x] Medication Management   [x] Respiratory                   [x] Pain Mgmt   [x] Bowel/Bladder Fx    Medical Prognosis: [] Good  [x] Fair    [] Guarded   Total expected IRF days 12                                            Physician anticipated functional outcomes:  FWW and HHC OT/PT and supervision.   Rehab Goals:   [] Return to premorbid function of_______________________________.    [] Independent   [] Mod I  [x] Supervision  [] CGA   [] Min A   [] Mod A  Level for ambulation []without assistive device  [x] with assistive device        [] Independent   [] Mod I  [x] Supervision [] CGA   [] Min A   [] Mod A  Level for transfers []without assistive device  [x] with assistive device         [] Independent   [] Mod I  [x] Supervision [] CGA   [] Min A   [] Mod A Level with ADL's []without assistive device   [x] with assistive device     ___________________     Level with cognitive skills requiring [] No assist [x] Supervision  [] Active Assist/Cues     [] Maximize level of mobility and ADL's to decrease burden on caregiver    IPOC brief synthesis of Preadmission Screen, Post-Admission Evaluation, and Therapy Evaluations: Acute inpatient rehabilitation with occupational and physical therapy 180 minutes 5 out of every 7 days. Will address basic and  advancing mobility with self-care instruction and adaptive equipment training. Caregiver education will be offered. Expected length of stay  prior to a supervised level of function for discharge home with a walker and HHC OT/PT is 10 to 14 days. Additional recommendation:     Sepsis due to cholelithiasis: Patient requires daily occupational and physical therapy. He is to complete a course of oral antibiotic. His diet has been just upgraded to regular and we must monitor this closely. He needs adaptive equipment training, aggressive pulmonary hygiene measures and DVT prophylaxis. We must provide nutritional support, caregiver education and monitoring of his blood pressure with activity. DVT prophylaxis: Lovenox 30 mg SQ twice daily. I must periodically monitor his hemoglobin and platelet count while on this medication. Weightbearing activities will be pursued daily. GI prophylaxis is available. Uncontrolled pain: Progressive mobilization, acetaminophen and Norco as needed. Bowel intervention while on the analgesics. Hypertension: Blood pressure medications are gradually being reintroduced after his period of low blood pressures initially. Cozaar ordered with parameters. Pain management will be helpful as well. Bradycardia: Vital signs are checked at rest and with activity. Outpatient follow-up with cardiology as planned. MEGHAN: Home use of BiPAP will be continued in the hospital.  Anticipated discharge destination:    [] Home Independently   [x] Home with supervision    []SNF     [] Other       This plan has been reviewed with me in a language I understand.  I have had the opportunity to include my input with my therapy team.    ________________________________________________   ______________________  Patient/Significant Other      Date    I have reviewed this initial plan of care and agree with its contents:    Title   Name    Date    Time    Physician: Lexie Em MD.td 10:39 AM    Case Mgmt:  Francisco Javier Morfin 8/15/2022 0945    OT: Musa Barboza, [address] [place], OTR/L       8/1/3/22 1036    PT: Stella Restrepo, PT    08/13/2022    14:55    RN: TD Beckman RN    ST:    Dietician:     ADMIT DATE:8/12/2022

## 2022-08-13 NOTE — PROGRESS NOTES
4 Eyes Skin Assessment     NAME:  Hesham Gilbert  YOB: 1950  MEDICAL RECORD NUMBER:  [id number]    The patient is being assess for  Admission    I agree that 2 RN's have performed a thorough Head to Toe Skin Assessment on the patient. ALL assessment sites listed below have been assessed. Areas assessed by both nurses:    Head, Face, Ears, Shoulders, Back, Chest, Arms, Elbows, Hands, Sacrum. Buttock, Coccyx, Ischium, and Legs. Feet and Heels        Does the Patient have a Wound? No noted wound(s)       Huang Prevention initiated:  No   Wound Care Orders initiated:  No    Pressure Injury (Stage 3,4, Unstageable, DTI, NWPT, and Complex wounds) if present place referral/consult order under [de-identified] NA    New and Established Ostomies if present place consult order under : NA      Nurse 1 eSignature: Electronically signed by Nabila Leo RN on 8/12/22 at 11:44 PM EDT    **SHARE this note so that the co-signing nurse is able to place an eSignature**    Nurse 2 eSignature: Electronically signed by Vin Munoz.  YAN Hung, RN on 8/12/22 at 11:45 PM EDT

## 2022-08-14 PROCEDURE — 94761 N-INVAS EAR/PLS OXIMETRY MLT: CPT

## 2022-08-14 PROCEDURE — 6370000000 HC RX 637 (ALT 250 FOR IP): Performed by: STUDENT IN AN ORGANIZED HEALTH CARE EDUCATION/TRAINING PROGRAM

## 2022-08-14 PROCEDURE — 2580000003 HC RX 258: Performed by: STUDENT IN AN ORGANIZED HEALTH CARE EDUCATION/TRAINING PROGRAM

## 2022-08-14 PROCEDURE — 6360000002 HC RX W HCPCS: Performed by: STUDENT IN AN ORGANIZED HEALTH CARE EDUCATION/TRAINING PROGRAM

## 2022-08-14 PROCEDURE — 6370000000 HC RX 637 (ALT 250 FOR IP): Performed by: PHYSICAL MEDICINE & REHABILITATION

## 2022-08-14 PROCEDURE — 1280000000 HC REHAB R&B

## 2022-08-14 PROCEDURE — 94150 VITAL CAPACITY TEST: CPT

## 2022-08-14 RX ORDER — SENNA PLUS 8.6 MG/1
1 TABLET ORAL NIGHTLY
Status: DISCONTINUED | OUTPATIENT
Start: 2022-08-14 | End: 2022-08-15

## 2022-08-14 RX ORDER — DOCUSATE SODIUM 100 MG/1
100 CAPSULE, LIQUID FILLED ORAL DAILY
Status: DISCONTINUED | OUTPATIENT
Start: 2022-08-14 | End: 2022-08-18 | Stop reason: HOSPADM

## 2022-08-14 RX ADMIN — FLUTICASONE PROPIONATE 1 SPRAY: 50 SPRAY, METERED NASAL at 22:22

## 2022-08-14 RX ADMIN — LEVOTHYROXINE SODIUM 175 MCG: 0.12 TABLET ORAL at 05:52

## 2022-08-14 RX ADMIN — HYDROCODONE BITARTRATE AND ACETAMINOPHEN 1 TABLET: 5; 325 TABLET ORAL at 16:07

## 2022-08-14 RX ADMIN — TRAZODONE HYDROCHLORIDE 50 MG: 50 TABLET ORAL at 22:20

## 2022-08-14 RX ADMIN — AMOXICILLIN AND CLAVULANATE POTASSIUM 1 TABLET: 875; 125 TABLET, FILM COATED ORAL at 22:20

## 2022-08-14 RX ADMIN — DOCUSATE SODIUM 100 MG: 100 CAPSULE, LIQUID FILLED ORAL at 10:12

## 2022-08-14 RX ADMIN — LOSARTAN POTASSIUM 25 MG: 25 TABLET, FILM COATED ORAL at 08:19

## 2022-08-14 RX ADMIN — ENOXAPARIN SODIUM 30 MG: 100 INJECTION SUBCUTANEOUS at 08:19

## 2022-08-14 RX ADMIN — SALINE NASAL SPRAY 1 SPRAY: 1.5 SOLUTION NASAL at 22:23

## 2022-08-14 RX ADMIN — ENOXAPARIN SODIUM 30 MG: 100 INJECTION SUBCUTANEOUS at 22:21

## 2022-08-14 RX ADMIN — AMOXICILLIN AND CLAVULANATE POTASSIUM 1 TABLET: 875; 125 TABLET, FILM COATED ORAL at 08:19

## 2022-08-14 RX ADMIN — HYDROCODONE BITARTRATE AND ACETAMINOPHEN 1 TABLET: 5; 325 TABLET ORAL at 08:19

## 2022-08-14 RX ADMIN — SODIUM CHLORIDE, PRESERVATIVE FREE 10 ML: 5 INJECTION INTRAVENOUS at 08:19

## 2022-08-14 ASSESSMENT — PAIN SCALES - GENERAL
PAINLEVEL_OUTOF10: 5
PAINLEVEL_OUTOF10: 7
PAINLEVEL_OUTOF10: 7

## 2022-08-14 ASSESSMENT — PAIN DESCRIPTION - FREQUENCY: FREQUENCY: INTERMITTENT

## 2022-08-14 ASSESSMENT — PAIN - FUNCTIONAL ASSESSMENT: PAIN_FUNCTIONAL_ASSESSMENT: ACTIVITIES ARE NOT PREVENTED

## 2022-08-14 ASSESSMENT — PAIN DESCRIPTION - DESCRIPTORS
DESCRIPTORS: ACHING;DISCOMFORT
DESCRIPTORS: SHOOTING

## 2022-08-14 ASSESSMENT — PAIN DESCRIPTION - ONSET: ONSET: SUDDEN

## 2022-08-14 ASSESSMENT — PAIN DESCRIPTION - LOCATION
LOCATION: ABDOMEN
LOCATION: ABDOMEN

## 2022-08-14 ASSESSMENT — PAIN DESCRIPTION - ORIENTATION
ORIENTATION: LEFT
ORIENTATION: MID

## 2022-08-14 ASSESSMENT — PAIN DESCRIPTION - PAIN TYPE: TYPE: ACUTE PAIN;SURGICAL PAIN

## 2022-08-14 NOTE — PROGRESS NOTES
Pt has been requesting to have IV removed over last few days. Pt is not receiving any medication via IV access. OK was given to remove IV per charge nurse Rhianna Renee. IV removed, pt tolerated well.

## 2022-08-15 PROCEDURE — 1280000000 HC REHAB R&B

## 2022-08-15 PROCEDURE — 97535 SELF CARE MNGMENT TRAINING: CPT

## 2022-08-15 PROCEDURE — 97116 GAIT TRAINING THERAPY: CPT

## 2022-08-15 PROCEDURE — 97110 THERAPEUTIC EXERCISES: CPT

## 2022-08-15 PROCEDURE — 6360000002 HC RX W HCPCS: Performed by: STUDENT IN AN ORGANIZED HEALTH CARE EDUCATION/TRAINING PROGRAM

## 2022-08-15 PROCEDURE — 97530 THERAPEUTIC ACTIVITIES: CPT

## 2022-08-15 PROCEDURE — 6370000000 HC RX 637 (ALT 250 FOR IP): Performed by: STUDENT IN AN ORGANIZED HEALTH CARE EDUCATION/TRAINING PROGRAM

## 2022-08-15 PROCEDURE — 6370000000 HC RX 637 (ALT 250 FOR IP): Performed by: PHYSICAL MEDICINE & REHABILITATION

## 2022-08-15 PROCEDURE — 94664 DEMO&/EVAL PT USE INHALER: CPT

## 2022-08-15 PROCEDURE — 94150 VITAL CAPACITY TEST: CPT

## 2022-08-15 PROCEDURE — 94761 N-INVAS EAR/PLS OXIMETRY MLT: CPT

## 2022-08-15 PROCEDURE — 99232 SBSQ HOSP IP/OBS MODERATE 35: CPT | Performed by: PHYSICAL MEDICINE & REHABILITATION

## 2022-08-15 RX ORDER — SENNA PLUS 8.6 MG/1
1 TABLET ORAL DAILY
Status: DISCONTINUED | OUTPATIENT
Start: 2022-08-16 | End: 2022-08-18 | Stop reason: HOSPADM

## 2022-08-15 RX ADMIN — POLYETHYLENE GLYCOL (3350) 17 G: 17 POWDER, FOR SOLUTION ORAL at 14:31

## 2022-08-15 RX ADMIN — DOCUSATE SODIUM 100 MG: 100 CAPSULE, LIQUID FILLED ORAL at 09:11

## 2022-08-15 RX ADMIN — ENOXAPARIN SODIUM 30 MG: 100 INJECTION SUBCUTANEOUS at 09:11

## 2022-08-15 RX ADMIN — AMOXICILLIN AND CLAVULANATE POTASSIUM 1 TABLET: 875; 125 TABLET, FILM COATED ORAL at 20:43

## 2022-08-15 RX ADMIN — LOSARTAN POTASSIUM 25 MG: 25 TABLET, FILM COATED ORAL at 09:11

## 2022-08-15 RX ADMIN — ACETAMINOPHEN 650 MG: 325 TABLET ORAL at 22:55

## 2022-08-15 RX ADMIN — ENOXAPARIN SODIUM 30 MG: 100 INJECTION SUBCUTANEOUS at 20:44

## 2022-08-15 RX ADMIN — TRAZODONE HYDROCHLORIDE 50 MG: 50 TABLET ORAL at 22:55

## 2022-08-15 RX ADMIN — HYDROCODONE BITARTRATE AND ACETAMINOPHEN 1 TABLET: 5; 325 TABLET ORAL at 06:50

## 2022-08-15 RX ADMIN — LEVOTHYROXINE SODIUM 175 MCG: 0.12 TABLET ORAL at 06:50

## 2022-08-15 RX ADMIN — AMOXICILLIN AND CLAVULANATE POTASSIUM 1 TABLET: 875; 125 TABLET, FILM COATED ORAL at 09:11

## 2022-08-15 RX ADMIN — FLUTICASONE PROPIONATE 1 SPRAY: 50 SPRAY, METERED NASAL at 22:55

## 2022-08-15 ASSESSMENT — PAIN DESCRIPTION - LOCATION
LOCATION: ABDOMEN
LOCATION: ABDOMEN

## 2022-08-15 ASSESSMENT — PAIN DESCRIPTION - DESCRIPTORS
DESCRIPTORS: DULL
DESCRIPTORS: DISCOMFORT

## 2022-08-15 ASSESSMENT — PAIN DESCRIPTION - ORIENTATION
ORIENTATION: LEFT
ORIENTATION: LEFT

## 2022-08-15 ASSESSMENT — PAIN SCALES - GENERAL
PAINLEVEL_OUTOF10: 8
PAINLEVEL_OUTOF10: 2

## 2022-08-15 NOTE — PATIENT CARE CONFERENCE
ACUTE REHAB TEAM CONFERENCE SUMMARY   [address] [facility]    NAME: Daryle Alice  : 1950 ADMIT DATE: 2022    Rehab Admitting Dx:Sepsis, unspecified organism [A41.9]  Sepsis ([facility] [A41.9]  Patient Comorbid Conditions: Active Hospital Problems    Diagnosis Date Noted    Calculus of gallbladder with acute cholecystitis and obstruction [K80.01] 2022     Priority: Medium    Generalized weakness [R53.1] 2022     Priority: Medium    Gait disturbance [R26.9] 2022     Priority: Medium    Uncontrolled pain [R52] 2022     Priority: Medium    Mixed hyperlipidemia [E78.2] 2022     Priority: Medium    Sinus bradycardia [R00.1] 2022     Priority: Medium    Obstructive sleep apnea [G47.33] 08/10/2022     Priority: Medium    Sepsis ([facility] [A41.9] 2022     Priority: Medium    Essential hypertension [I10] 2014    Morbid obesity with BMI of 45.0-49.9, adult ([facility] [E66.01, Z68.42] 2014     Date: 2022    CASE MANAGEMENT  Current issues/needs regarding patient and family discharge status:   patient plans dc to his [address] Thursday. Indep & drive PTA. Brother staying initially. PHYSICAL THERAPY (Updated in QI)  Short Term Goals  Time Frame for Short term goals: 5-7 tx days:  Short term goal 1: Pt will complete bed mobility (scooting, rolling R/L, and sup<->sit) Ind. Short term goal 2: Pt will complete OOB transfers using 2WW Mod Ind. Short term goal 3: Pt will ambulate >/=150 ft over level surface and at least 10 ft of uneven surface using 2WW Mod Ind. Short term goal 4: Pt will ascend/descend curb step using 2WW and 1 flight of stairs using railings Mod Ind. Short term goal 5: Pt will complete object retrieval from the floor with 2WW and reacher Mod Ind. Impairments/deficits, barriers:     Body Structures, Functions, Activity Limitations Requiring Skilled Therapeutic Intervention: Decreased functional mobility , Decreased balance, Decreased endurance, Decreased high-level IADLs, Increased pain     Therapy Prognosis: Good  Decision Making: Medium Complexity  Clinical Presentation: evolving with changing characteristics  Equipment needed at discharge: 2WW      PT IRF-PERI scores since initial assessment  Bed Mobility:   Sit to Lying  Assistance Needed: Independent  Comment: Ind without use of bed features  CARE Score: 6  Discharge Goal: Independent    Roll Left and Right  Assistance Needed: Independent  Comment: Ind without use of bed features; pt with increased tolerance to stay on R/L side  Reason if not Attempted: Not attempted due to medical condition or safety concerns  CARE Score: 6  Discharge Goal: Independent    Lying to Sitting on Side of Bed  Assistance Needed: Independent  Comment: Ind without use of bed features  CARE Score: 6  Discharge Goal: Independent    Transfers:    Sit to Stand  Assistance Needed: Independent  Comment: Mod Ind with 2WW->Ind  CARE Score: 6  Discharge Goal: Independent    Chair/Bed-to-Chair Transfer  Assistance Needed: Independent  Comment: Mod Ind with 2WW->Ind  CARE Score: 6  Discharge Goal: Independent    Toilet Transfer  Assistance needed: Independent  Comment: SBA with R grab bar and 2WW  CARE Score: 6    Car Transfer  Assistance Needed: Independent  Comment: Mod Ind (used car as external support to assist with balance; pt completed in his personal vehicle today)  Reason if not Attempted: Not attempted due to environmental limitations  CARE Score: 6  Discharge Goal: Independent    Ambulation:    Walking Ability  Does the Patient Walk?: Yes     Walk 10 Feet  Assistance Needed: Independent  Comment: Mod Ind with 2WW->Ind  CARE Score: 6  Discharge Goal: Independent     Walk 50 Feet with Two Turns  Assistance Needed: Independent  Comment: Mod Ind with 2WW  CARE Score: 6  Discharge Goal: Independent     Walk 150 Feet  Assistance Needed: Independent  Comment:  Mod Ind with 2WW; pt able to ambulate >500 ft consecutively  CARE Score: 6  Discharge Goal: Independent     Walking 10 Feet on Uneven Surfaces  Assistance Needed: Independent  Comment: Mod Ind with 2WW  CARE Score: 6  Discharge Goal: Independent     1 Step (Curb)  Assistance Needed: Independent  Comment: Mod Ind with 2WW  CARE Score: 6  Discharge Goal: Independent     4 Steps  Assistance Needed: Independent  Comment: Mod Ind with R ascnding/descending rail with consistent step-through pattern on ascent and step-to pattern leading with LLE consistently on descent; pt was consistently steady throughout activity  CARE Score: 6  Discharge Goal: Independent     12 Steps  Assistance Needed: Independent  Comment: Mod Ind with R ascnding/descending rail with consistent step-through pattern on ascent and step-to pattern leading with LLE consistently on descent; pt was consistently steady throughout activity  CARE Score: 6  Discharge Goal: Independent           Wheelchair:  w/c Ability: Wheelchair Ability  Uses a Wheelchair and/or Scooter?: No                        Balance:        Object: Picking Up Object  Assistance Needed: Independent  Comment:  Mod Ind with reacher in unsupported stance  CARE Score: 6  Discharge Goal: Independent    Fall Risk: []  Yes  []  No    OCCUPATIONAL THERAPY  (Updated in QI)  Short Term Goals  Time Frame for Short term goals: LTGs= STGs :   Long Term Goals  Time Frame for Long term goals : 5-7 days or until D/C  Long Term Goal 1: Pt will complete oral hygiene and feeding IND  Long Term Goal 2: Pt will complete UB dressing/ bathing IND  Long Term Goal 3: Pt will complete LB dressing/ bathing IND  Long Term Goal 4: Pt will complete all aspects of toileting IND  Long Term Goal 5: Pt will complete all functional transfers IND  Long Term Goal 6: Pt will complete donning/ doffing of foot waear IND  Long Term Goal 7: Pt will complete an IADL activity IND  Long Term Goal 8: Pt will complete 15 minutes of a stengthening or endurance activity with S :       OT IRF-PERI scores and goals since initial assessment:    ADLs:    Eating: Eating  Assistance Needed: Independent  Comment: X  CARE Score: 6  Discharge Goal: Independent       Oral Hygiene: Oral Hygiene  Assistance Needed: Independent  Comment: Mod I in stance at sink. CARE Score: 6  Discharge Goal: Independent    UB/LB Bathing: Shower/Bathe Self  Assistance Needed: Independent  Comment: Mod I to complete full shower. CARE Score: 6  Discharge Goal: Independent    UB Dressing: Upper Body Dressing  Assistance Needed: Independent  Comment: X  CARE Score: 6  Discharge Goal: Independent         LB Dressing: Lower Body Dressing  Assistance Needed: Independent  Comment: Mod I to doff/don underwear and pants. CARE Score: 6  Discharge Goal: Independent    Donning and [place] Footwear: Putting On/Taking Off Footwear  Assistance Needed: Independent  Comment: Pt Mod I to doff/don slip on shoes. CARE Score: 6  Discharge Goal: Independent      Toileting: Toileting Hygiene  Assistance needed: Independent  Comment: Mod I with episode of urination. CARE Score: 6  Discharge Goal: Independent      Toilet Transfers: Toilet Transfer  Assistance needed: Independent  Comment: Mod I with 2WW and grab bar. CARE Score: 6  Discharge Goal: Independent      Impairments/deficits, barriers:  Decreased R shoulder ROM/strength. Assessment  Performance deficits / Impairments: Decreased functional mobility , Decreased ADL status, Decreased strength, Decreased high-level IADLs, Decreased endurance, Decreased ROM, Decreased balance  Decision Making: Medium Complexity  Discharge Recommendations: Continue to assess pending progress  Equipment needed at discharge:None.       COGNITIVE FUNCTION/SPEECH THERAPY (AS INDICATED)  LTG                                         Nursing Current Medical Status:   [x] Is continent of bowel and bladder     [] Is incontinent of bowel and bladder    [] Has had an adequate number of bowel movements   [] Urinates with no urinary retention >300ml in bladder   [] Targeting bladder protocol with lane removal   [] Maintaining O2 SATs at 92% or greater   [] Has pain managed while on ARU         [] Has had no skin breakdown while on ARU   [] Has improved skin integrity via wound measurements   [] Has no signs/symptoms of infection at the wound site   [] Pressure wounds Stage/Location:    [] Arrived on unit with pressure wound  [] Has been free from injury during hospitalization   [] Has experienced a fall during hospitalization  [] Ongoing education with patient/family with understanding demonstrated for:  [] Receives IV Fluids  [] Other:        NUTRITION  Weight: 300 lb (136.1 kg) / Body mass index is 44.3 kg/m². Current diet: ADULT DIET; Regular  Intake: Tolerating regular diet, meal intake %      Medical improvements/barriers: improved bed mobility, progressing well, independent in room        Team goals for next treatment period/Intervention for current barriers:   [] Pt will increase activity tolerance for daily tasks. [] Pt will improve bed mobility with reduced assist.  [x] Pt will improve safety in fx tasks with reduced cues/assist  [] Pt will improve transfers with reduced assist  [] Pt will improve toileting with reduced assist  [] Pt will improve ADL's with use of adaptive equipment with reduced assist  [] Pt will improve pain mgmt for maximum participation in tx program  [] Pt will improve communication to get basic needs met on unit  [] Pt will improve swallowing for safe diet advancement with use of strategies  []  Plan for discharge to home. Patient Strengths: Motivated, Cooperative, and Pleasant    Justification for Continued Stay  Based on my medical assessment of the patient and review of information from the interdisciplinary team as part of this weekly team conference, the patient continues to meet the following criteria for IRF level of care:   The patient requires active and ongoing intervention of multiple therapy disciplines  The patient requires and intensive rehabilitation therapy program  The patient requires continued physician supervision by a rehabilitation physician  The patient requires 24 hours rehab nursing care  The patient requires an intensive and coordinated interdisciplinary team approach to the delivery of rehabilitative care. Assessment/Plan   [x]  The patient is making good progression towards their long term goals and is actively participating in and has a reasonable expectation to continue to benefit from the intensive rehabilitation therapy program   []  The estimated discharge date has been changed from initial team conference due to:   []  The estimated discharge destination has been changed from initial team conference due to:         Ongoing tx following discharge: []HHC     []OUTPATIENT     [] No Further Treatment     [] Family/Caregiver Training  []  Transitional Living Arrangement   [] Home Assessment (date  )     [] Family Conference   []  Therapeutic Pass       []  Other: (specify)    Estimated Discharge Date: 8/18/22    Estimated Discharge Destination: []home alone   []home alone with assist prn  []Continuous supervision []Return home with s/o/spouse/family   [] Assisted living    []SNF     Team members participating in today's conference.     [x] Shreyas Anderson, Medical Director  [x] Sue Love, DPT,       [] Rossana Peoples, RN Nurse Supervisor     [x]  Maryse Rogers, PT  []  Caitlyn Valencia, PT       [] Elmer Contreras, OT   [x] Johnie Pickard OT  [] Marcela Rodriguez, OT     [x]  Nadine Kirkland, SLP    []  Mayra Terrazas, SLP   [] Nathanael Morejon, SLP      [x]Leanna Ruvalcaba,   []Shauna Badillo MSW, LSW,      [] Marlena Ellington RN   [x] Mode Latham RN    [] Carey Salas RN    [] Shawn Forbes RN    [] Saba Mesa RN   [] Marisa Monaco, ADDIS       I have led this Team Conference and agree with the plan, C Claudio Lane MD, 8/16/2022, 12:40 PM  Goals have been updated to reflect recent status.     Team conference note transcribed this date by: Fabi Diaz MA, [id number] [facility], Therapy Coordinator

## 2022-08-15 NOTE — PLAN OF CARE
Problem: Discharge Planning  Goal: Discharge to home or other facility with appropriate resources  8/15/2022 0951 by Antonio Morales LPN  Outcome: Progressing  8/14/2022 2252 by Jacobo Monzon RN  Outcome: Progressing     Problem: Safety - Adult  Goal: Free from fall injury  8/15/2022 0951 by Antonio Morales LPN  Outcome: Progressing  8/14/2022 2252 by Jacobo Monzon RN  Outcome: Progressing

## 2022-08-15 NOTE — PROGRESS NOTES
Kimmie Bai    : 1950  Acct #: [de-identified]  MRN: [id number]              PM&R Progress Note      Admitting diagnosis: Sepsis due to cholelithiasis ( [address])     Comorbid diagnoses impacting rehabilitation: Generalized weakness, gait disturbance, status post cholecystectomy (8/10/2022), uncontrolled pain, essential hypertension, acquired hypothyroidism, obstructive sleep apnea, mixed hyperlipidemia, sinus bradycardia, morbid obesity (BMI 45.6)     Chief complaint: Persistent but improving abdominal soreness. Anticipating a bowel movement today. No significant nausea. Prior (baseline) level of function: Independent. Current level of function:         Current  IRF-PERI and Goals:   Occupational Therapy:    Short Term Goals  Time Frame for Short term goals: LTGs= STGs :   Long Term Goals  Time Frame for Long term goals : 5-7 days or until D/C  Long Term Goal 1: Pt will complete oral hygiene and feeding IND  Long Term Goal 2: Pt will complete UB dressing/ bathing IND  Long Term Goal 3: Pt will complete LB dressing/ bathing IND  Long Term Goal 4: Pt will complete all aspects of toileting IND  Long Term Goal 5: Pt will complete all functional transfers IND  Long Term Goal 6: Pt will complete donning/ doffing of foot waear IND  Long Term Goal 7: Pt will complete an IADL activity IND  Long Term Goal 8: Pt will complete 15 minutes of a stengthening or endurance activity with S :                                       Eating: Eating  Assistance Needed: Independent  Comment: Per pt report no concerns opening containers or feeding  CARE Score: 6  Discharge Goal: Independent       Oral Hygiene: Oral Hygiene  Assistance Needed: Independent  Comment: Mod I in stance at sink. CARE Score: 6  Discharge Goal: Independent    UB/LB Bathing: Shower/Bathe Self  Assistance Needed: Supervision or touching assistance  Comment: Sup/SBA in stance at sink. Pt utilized LHS to wash back.  OT provided cues for safety when washing BLEs in stance, however Pt continued to wash his own way reaching down to wash,bracing self with wall. Pt was steady when completing, however would benefit from use of LHS in stance vs reaching or sitting to complete. CARE Score: 4  Discharge Goal: Independent    UB Dressing: Upper Body Dressing  Assistance Needed: Independent  Comment: Pt able to retrieve shirt and doff don in stance Mod I.  CARE Score: 6  Discharge Goal: Independent         LB Dressing: Lower Body Dressing  Assistance Needed: Supervision or touching assistance  Comment: Sup; OT provided cues for safety when doffing/donning underwear as Pt completing in stance, however Pt would not follow safety tips. Pt completed donning of shorts with Mod I as he sat to thread BLEs. CARE Score: 4  Discharge Goal: Independent    Donning and [place] Footwear: Putting On/Taking Off Footwear  Assistance Needed: Independent  Comment: Pt refuses socks, Mod I to doff/don slip on shoes. CARE Score: 6  Discharge Goal: Independent      Toiletin [address] needed: Supervision or touching assistance  Comment: SBA to complete clothing management and hygiene; to complete posterior hygien pt leans to 1x side with an outsearched leg  CARE Score: 4  Discharge Goal: Independent      Toilet Transfers: Toilet Transfer  Assistance needed: Supervision or touching assistance  Comment: SBA with R grab bar and 2WW  CARE Score: 4  Discharge Goal: Independent    Physical Therapy:   Short Term Goals  Time Frame for Short term goals: 5-7 tx days:  Short term goal 1: Pt will complete bed mobility (scooting, rolling R/L, and sup<->sit) Ind. Short term goal 2: Pt will complete OOB transfers using 2WW Mod Ind. Short term goal 3: Pt will ambulate >/=150 ft over level surface and at least 10 ft of uneven surface using 2WW Mod Ind. Short term goal 4: Pt will ascend/descend curb step using 2WW and 1 flight of stairs using railings Mod Ind.   Short term goal 5: Pt will complete object retrieval from the floor with 2WW and reacher Mod Ind. Bed Mobility:   Sit to Lying  Assistance Needed: Supervision or touching assistance  Comment: SBA (required use of bed features due to anticipated aggravation of abdominal pain (especially on L side incision) with transition of movements/position; movements were slow and very guarded)  CARE Score: 4  Discharge Goal: Independent  Roll Left and Right  Comment: SBA to roll to R (required use of bed rail to assist upper body and for safety); pt refused to attempt rolling to L even with use of bed rail due to anticipated increase of pain on L side of abdominal area  Reason if not Attempted: Not attempted due to medical condition or safety concerns  CARE Score: 88  Discharge Goal: Independent  Lying to Sitting on Side of Bed  Assistance Needed: Supervision or touching assistance  Comment: SBA with heavy use of bed rail to assist upper body (movements were slow and cautious)  CARE Score: 4  Discharge Goal: Independent    Transfers:    Sit to Stand  Assistance Needed: Independent  Comment: SBA with/without 2WW (pt tends to pull onto AD); movements were slow and guarded due to aggravation of abdominal pain especially on the L side  CARE Score: 6  Discharge Goal: Independent  Chair/Bed-to-Chair Transfer  Assistance Needed: Independent  Comment: SBA with/without 2WW (pt keeps hand on AD); movements were slow and guarded due to aggravation of abdominal pain especially on the L side  CARE Score: 6  Discharge Goal: Independent  Toilet Transfer  Assistance needed: Supervision or touching assistance  Comment: SBA with R grab bar and 2WW  CARE Score: 4  Car Transfer  Comment: pt declined to attempt in training car stating \"I won't fit in there. I guarantee. \"; pt willing to perform this transfer in his personal vehicle (SUV)  Reason if not Attempted: Not attempted due to environmental limitations  CARE Score: 10  Discharge Goal: Independent    Ambulation:    Walking Ability  Does the Patient Walk?: Yes     Walk 10 Feet  Assistance Needed: Independent  Comment: SBA with 2WW  CARE Score: 6  Discharge Goal: Independent     Walk 50 Feet with Two Turns  Assistance Needed: Independent  Comment: SBA with 2WW  CARE Score: 6  Discharge Goal: Independent     Walk 150 Feet  Assistance Needed: Independent  Comment: SBA with 2WW  CARE Score: 6  Discharge Goal: Independent     Walking 10 Feet on Uneven Surfaces  Assistance Needed: Supervision or touching assistance  Comment: CGA using 2WW; pt very guarded with descent on ramp due to depth perception deficit  CARE Score: 4  Discharge Goal: Independent     1 Step (Curb)  Assistance Needed: Supervision or touching assistance  Comment: CGA with 2WW  CARE Score: 4  Discharge Goal: Independent     4 Steps  Assistance Needed: Supervision or touching assistance  Comment: CGA using railings on 4\"/6\" step heights (pt performed variable stepping pattern depending on step height)  CARE Score: 4  Discharge Goal: Independent     12 Steps  Assistance Needed: Supervision or touching assistance  Comment: CGA using railings on 4\"/6\" step heights (pt performed variable stepping pattern depending on step height)  CARE Score: 4  Discharge Goal: Independent       Wheelchair:  w/c Ability: Wheelchair Ability  Uses a Wheelchair and/or Scooter?: No                Balance:        Object: Picking Up Object  Assistance Needed: Supervision or touching assistance  Comment: CGA with 2WW and reacher  CARE Score: 4  Discharge Goal: Independent    I      Exam:    Blood pressure (!) 143/65, pulse 54, temperature 98.2 °F (36.8 °C), temperature source Oral, resp. rate 18, height 5' 9\" (1.753 m), weight 300 lb (136.1 kg), SpO2 95 %. General: Sitting up in a bedside chair. Quite talkative. Alert and oriented. Fair insight and reasoning. HEENT: Gazing right and left. Clear speech. Tongue was slightly dry. Neck supple.   No JVD or adenopathy. Pulmonary: Respirations are a bit labored under his girth. No wheezes or rales noted. Cardiac: Regular rate and rhythm. Abdomen: Patient's abdomen is soft and nondistended. Bowel sounds were present throughout. There was no rebound, guarding or masses noted. Upper extremities: Deliberately moves his upper limbs though functional range of motion with normal tone and strength. Lower extremities: Give way with hip and knee active movements with abdominal pain. No deep tendon reflexes. Trace edema at the ankles. Heels are clear. No signs of DVT. 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 Sitting balance was good. Standing balance was poor. Lab Results   Component Value Date    WBC 6.8 08/12/2022    HGB 10.3 (L) 08/12/2022    HCT 32.1 (L) 08/12/2022    MCV 99.7 08/12/2022     08/12/2022     Lab Results   Component Value Date    INR 1.04 08/05/2022    INR 1.05 11/04/2014    PROTIME 13.4 08/05/2022    PROTIME 12.0 11/04/2014     Lab Results   Component Value Date    CREATININE 0.7 (L) 08/11/2022    BUN 12 08/11/2022     08/11/2022    K 4.1 08/11/2022     08/11/2022    CO2 22 08/11/2022     Lab Results   Component Value Date     (H) 08/12/2022     (H) 08/12/2022     (H) 08/05/2022    ALKPHOS 458 (H) 08/12/2022    BILITOT 1.5 (H) 08/12/2022       Expected length of stay  prior to a supervised level of function for discharge home with a walker and C OT/PT is 12 days. Recommendations:    Sepsis due to cholelithiasis: We are developing the routine for his daily occupational and physical therapy. Continues to work on a course of oral antibiotics. Although he was anxious about it, he seems to be tolerating a regular diet now. Ongoing adaptive equipment training, aggressive pulmonary hygiene measures and DVT prophylaxis.   We must provide nutritional support, caregiver education and monitoring of his blood pressure with activity. Verbal cues and minimum physical assistance for transfers. DVT prophylaxis: Lovenox 30 mg SQ twice daily. I must periodically monitor his hemoglobin and platelet count while on this medication. Weightbearing activities are pursued daily. GI prophylaxis is available. No signs of acute blood loss. Uncontrolled pain: Progressive mobilization, acetaminophen and Norco as needed. Bowel intervention while on the analgesics he needs to be increased some. Hypertension: Blood pressure medications are gradually being reintroduced after his period of low blood pressures initially. Cozaar ordered with parameters. Pain management has been helpful as well. His blood pressure is just above the target range today. Monitoring closely. Bradycardia: Vital signs are checked at rest and with activity. Outpatient follow-up with cardiology as planned.   MEGHAN: Home use of BiPAP has been continued in the hospital.

## 2022-08-15 NOTE — PROGRESS NOTES
Occupational Therapy    Physical Rehabilitation: OCCUPATIONAL THERAPY     [x] daily progress note       [] discharge       Patient Name:  Marco Hilario   :  1950 MRN: [id number]  Room:  [address] [place] [address] Date of Admission: 2022  Rehabilitation Diagnosis:   Sepsis, unspecified organism [A41.9]  Sepsis ([address] [A41.9]       Date 8/15/2022       Day of ARU Week:  4   Time IN/OUT 0900/1000  1130/1220  1250/1300   Individual Tx Minutes 60 + 50 + 10   Group Tx Minutes    Co-Treat Minutes    Concurrent Tx Minutes    TOTAL Tx Time Mins 120   Variance Time    Variance Time []   Refusal due to:     []   Medical hold/reason:    []   Illness   []   Off Unit for test/procedure  []   Extra time needed to complete task  []   Therapeutic need  []   Other (specify):   Restrictions Restrictions/Precautions: Fall Risk, General Precautions         Communication with other providers: [x]   OK to see per nursing:     []   Spoke with team member regarding:      Subjective observations and cognitive status: 1st Session: Pt sitting in recliner chair with case management in room. Pt discussing wanting to leave by the end of the week. Pt pleasant and agreeable to therapy. OT recommends Pt take full shower covering incision site, however Pt refuses stating he will not be using any covers and will not shower until doctor approves he takes one without cover. Later in session, nurse came in and reported she spoke with Pt's doctor and he is now approved to shower without cover to incision site. 2nd Session: Pt sitting up in recliner completing crossword puzzle upon entrance. Pt pleasant and agreeable to therapy. Pt's lunch came during second session, therefore, OT came back after lunch with Pt agreeable to continue therapy.      Pain level/location:   2.5-3 /10 usual; 7/10 when coughing      Location: incision site to abdomen   Discharge recommendations  Anticipated discharge date:  TBD  Destination: [x]home alone   []home alone w assist prn   [] home w/ family    [] Continuous supervision       []SNF    [] Assisted living     [] Other:   Continued therapy: []HHC OT  []OUTPATIENT  OT   [x] No Further OT  Equipment needs: None. ADLs:    Eating: Eating  Assistance Needed: Independent  Comment: X  CARE Score: 6  Discharge Goal: Independent       Oral Hygiene: Oral Hygiene  Assistance Needed: Independent  Comment: Mod I in stance at sink. CARE Score: 6  Discharge Goal: Independent    UB/LB Bathing: Shower/Bathe Self  Assistance Needed: Supervision or touching assistance  Comment: Sup/SBA in stance at sink. Pt utilized LHS to wash back. OT provided cues for safety when washing BLEs in stance, however Pt continued to wash his own way reaching down to wash,bracing self with wall. Pt was steady when completing, however would benefit from use of LHS in stance vs reaching or sitting to complete. CARE Score: 4  Discharge Goal: Independent    UB Dressing: Upper Body Dressing  Assistance Needed: Independent  Comment: Pt able to retrieve shirt and doff don in stance Mod I.  CARE Score: 6  Discharge Goal: Independent         LB Dressing: Lower Body Dressing  Assistance Needed: Supervision or touching assistance  Comment: Sup; OT provided cues for safety when doffing/donning underwear as Pt completing in stance, however Pt would not follow safety tips. Pt completed donning of shorts with Mod I as he sat to thread BLEs. CARE Score: 4  Discharge Goal: Independent    Donning and [place] Footwear: Putting On/Taking Off Footwear  Assistance Needed: Independent  Comment: Pt refuses socks, Mod I to doff/don slip on shoes. CARE Score: 6  Discharge Goal: Independent      Toileting: Toileting Hygiene  Assistance needed: Independent  Comment: Mod I with episode of urination. CARE Score: 6  Discharge Goal: Independent      Toilet Transfers: Toilet Transfer  Assistance needed: Independent  Comment: Mod I with 2WW and grab bar.   CARE Score: 6  Discharge Goal: Independent  Device Used:    [x]   Standard Toilet         [x]   Grab Bars           []  Bedside Commode       []   Elevated Toilet          []   Other:        Bed Mobility:           [x]   Pt received out of bed       Transfers:    Sit--> Stand: Mod I  Stand --> Sit:   Mod I  Stand-Pivot:   Sup; verbal cues to utilize walker  Other:    Assistive device required for transfer:   2WW      Functional Mobility:    Assistance: Mod I within room and Sup ~ 250 ft in hallway c 2WW and ~ 125 ft in hallway without a device. Device:   [x]   Rolling Walker     []   Standard Flaca Albin []   Wheelchair        []   [facility]       []   4-Wheeled Flaca Albin         []   Cardiac Walker       []   Other:        Homemaking Tasks:   Pt able to retrieve clothing in room to transport to bathroom Mod I. Additional Therapeutic activities/exercises completed this date:     [x]   ADL Training   [x]   Balance/Postural training     [x]   Bed/Transfer Training   [x]   Endurance Training   []   Neuromuscular Re-ed   []   Nu-step:  Time:        Level:         #Steps:       []   Rebounder:    []  Seated     []  Standing        []   Supine Ther Ex (reps/sets):     [x]   Seated Ther Ex (reps/sets):  Pt completed BUE exercises with 5# dumbbell with LUE and 2# dumbbell with RUE, 1 set of 15 reps, including bicep curls, chest press, overhead press, shoulder horizontal abduction, shoulder external/internal rotation, forearm pronation/supination, and wrist flexion/extension. Pt then completed 1 set x10 reps of the following 7 exercises using blue theraband: alternating shoulder presses, chest presses, shoulder horizontal abduction, elbow extension, shoulder extension, biceps curls, and simultaneous shoulder horizontal abduction/adduction. Educated pt on HEP at d/c to increase/maintain BUE endurance, pt verbalized understanding. Pt performed exercise to increase BUE strength and endurance to facilitate safe performance of ADLs and transfers.     [] Standing Ther Ex (reps/sets):     []   Other:      Comments: All intervention performed to increase pt's endurance, ax tolerance, balance, strength, and I c ADLs/IADLs and functional transfers/mobility. Patient/Caregiver Education and Training:   [x]   [employer] Equipment Use - Introduced [employer]  [x]   Bed Mobility/Transfer Technique/Safety  []   Energy Conservation Tips  []   Family training  []   Postural Awareness  [x]   Safety During Functional Activities  []   Reinforced Patient's Precautions   []   Progress was updated and reviewed in Rehabtracker with patient and/or family this         date. Treatment Plan for Next Session: Continue OT POC      Assessment: This pt demonstrated a somewhat positive response to today's treatment as evidenced by increased independence with ADLs, however Pt not receptive to safety cues as Pt firm on doing things how he did previously. The patient is making good progress toward established goals as evidenced by QI scores. Ongoing deficits are observed in the areas of strength and safety awareness and continued focus on this is recommended.        Treatment/Activity Tolerance:   [x] Tolerated treatment with no adverse effects    [] Patient limited by fatigue  [] Patient limited by pain   [] Patient limited by medical complications:    [] Adverse reaction to Tx:   [] Significant change in status    Safety:       []  bed alarm set    []  chair alarm set    []  Pt refused alarms                []  Telesitter activated      [x]  Gait belt used during tx session      [x]other:  Pt received by Kimberlyn PT at the end of first session     Number of Minutes/Billable Intervention  Therapeutic Exercise 50   ADL Self-care 60   Neuro Re-Ed    Therapeutic Activity 10   Group    Other:    TOTAL 120       Social History  Social/Functional History  Lives With: Alone  Type of Home: Condo  Home Layout: One level  Home Access: Level entry  Bathroom Shower/Tub: Walk-in shower, Shower chair without back  Bathroom Toilet: Handicap height  Bathroom Equipment: Grab bars in shower  Bathroom Accessibility: Accessible  Home Equipment:  (C-pap)  Has the patient had two or more falls in the past year or any fall with injury in the past year?: No  Receives Help From: Other (comment)  ADL Assistance: Independent  Homemaking Assistance: Independent  Homemaking Responsibilities: Yes  Ambulation Assistance: Independent  Transfer Assistance: Independent  Active : Yes  Mode of Transportation: Car  Occupation: Retired,   Leisure & Hobbies: Pt does a lot of volunteer work  Additional Comments: has a sleep number bed due to back problems, Hx of LLD (RLE shorter than LLE)    Objective                                                                                    Goals:  (Update in navigator)  Short Term Goals  Time Frame for Short term goals: LTGs= STGs:  Long Term Goals  Time Frame for Long term goals : 5-7 days or until D/C  Long Term Goal 1: Pt will complete oral hygiene and feeding IND  Long Term Goal 2: Pt will complete UB dressing/ bathing IND  Long Term Goal 3: Pt will complete LB dressing/ bathing IND  Long Term Goal 4: Pt will complete all aspects of toileting IND  Long Term Goal 5: Pt will complete all functional transfers IND  Long Term Goal 6: Pt will complete donning/ doffing of foot waear IND  Long Term Goal 7: Pt will complete an IADL activity IND  Long Term Goal 8: Pt will complete 15 minutes of a stengthening or endurance activity with S:        Plan of Care                                                                              Times per week: 5 days per week for a minimum of 60 minutes/day plus group as appropriate for 60 minutes.   Treatment to include Plan  Times per Day: Daily  Current Treatment Recommendations: Strengthening, Functional mobility training, Endurance training, Pain management, Safety education & training, Patient/Caregiver education & training, Equipment evaluation, education, & procurement, Self-Care / ADL, Home management training    Electronically signed by   TABATHA Rogers, OTR/L #[id number]  8/15/2022, 1:40 PM

## 2022-08-15 NOTE — PROGRESS NOTES
Comprehensive Nutrition Assessment    Type and Reason for Visit:  Initial, Consult (oral nutrition supplement)    Nutrition Recommendations/Plan:   Continue regular diet as tolerates  May offer low calorie, high protein oral nutrition supplement as needed  Will continue to follow up during stay     Malnutrition Assessment:  Malnutrition Status:  No malnutrition (08/15/22 1113)    Context:  Acute Illness       Nutrition Assessment:    Admit to acute rehab with hx sepsis with cholelithiaisis now s/p cholecystectomy, weakness. Able to transition back to regular diet, tolerating at this time. Meal itnake %. Have discussed most appropriate food choices, lower fat options. Reasonable intake at this time, will follow at low nutrition risk. Nutrition Related Findings:    usually up in chair, able to self select meals, feed self, willing to follow lower fat meal plan on discharge Wound Type: Surgical Incision       Current Nutrition Intake & Therapies:    Average Meal Intake: %  Average Supplements Intake: None Ordered  ADULT DIET; Regular    Anthropometric Measures:  Height: 5' 9\" (175.3 cm)  Ideal Body Weight (IBW): 160 lbs (73 kg)    Admission Body Weight: 303 lb 9.2 oz (137.7 kg)  Current Body Weight: 300 lb 0.7 oz (136.1 kg), 187.5 % IBW.  Weight Source: Bed Scale  Current BMI (kg/m2): 44.3  Usual Body Weight: 325 lb (147.4 kg) (per hx)  % Weight Change (Calculated): -7.7  Weight Adjustment For: No Adjustment                 BMI Categories: Obese Class 3 (BMI 40.0 or greater)    Estimated Daily Nutrient Needs:  Energy Requirements Based On: Formula  Weight Used for Energy Requirements: Current  Energy (kcal/day): 6703-9546  Weight Used for Protein Requirements: Ideal  Protein (g/day): 73-87 (1-1.2 g/kg)  Method Used for Fluid Requirements: 1 ml/kcal  Fluid (ml/day): 2300    Nutrition Diagnosis:   No nutrition diagnosis at this time related to   as evidenced by      Nutrition Interventions:   Food and/or Nutrient Delivery: Continue Current Diet  Nutrition Education/Counseling: Education completed  Coordination of Nutrition Care: Continue to monitor while inpatient  Plan of Care discussed with: patient    Goals:     Goals: PO intake 75% or greater, by next RD assessment       Nutrition Monitoring and Evaluation:   Behavioral-Environmental Outcomes: None Identified  Food/Nutrient Intake Outcomes: Diet Advancement/Tolerance, Food and Nutrient Intake  Physical Signs/Symptoms Outcomes: Biochemical Data, Meal Time Behavior, Skin, Weight    Discharge Planning:    Continue current diet     Mery Stoddard RD, LD  Contact: [phone]

## 2022-08-15 NOTE — CARE COORDINATION
Case Management Admission Note      Patient:Matthew Shankar      :1950  CHHYAA:[id number]  Rehab Dx/Hx: Sepsis, unspecified organism [A41.9]  Sepsis ([facility] [A41.9]    Chief Complaint:   Past Medical History:   Diagnosis Date    Abdominal aneurysm ([facility]     4 cm x 3.6 cm followed at [place]    Arthritis 2014    [place]     [facility] [place]    Chronic sinusitis 2014    COPD, mild ([facility] 2014    Diverticulosis     Dr. 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 Sherly Liu    MEGHAN (obstructive sleep apnea) 2014    CPAP changed to bipap (2014)    Otitis media, serous, TM rupture 2014    ENT x 2 s/p PE tubes bilaterally, cipro Otic     Past Surgical History:   Procedure Laterality Date    CHOLECYSTECTOMY, LAPAROSCOPIC N/A 8/10/2022    CHOLECYSTECTOMY LAPAROSCOPIC performed by Waqar Bailey MD at [address] [place] [address].      COLONOSCOPY  2014    diverticulosis, internal hemorrhoids    ERCP  2022    ERCP SPHINCTER/PAPILLOTOMY performed by Dr. Domenico Flores at [address] [place] [address]    ERCP N/A 2022    ERCP SPHINCTER/PAPILLOTOMY and sweep and removal of debris, sludge and stones with use of 12-15 extractor balloon performed by Jarret Alatorre MD at [place] [address] Right 2012    JOINT REPLACEMENT Left 2011    KNEE SURGERY Bilateral     replacement    TOE SURGERY Bilateral     toe nail surgery Dr. Arabella Hamm Right 2014    DR. Rascon      Allergies   Allergen Reactions    Dilaudid [Hydromorphone Hcl] Itching    Hydrocodone Itching    Ultram [Tramadol] Itching    Vicodin [Hydrocodone-Acetaminophen] Itching    Morphine Itching     Pt states he only has itching with morphine    Keflex [Cephalexin] Other (See Comments)     Extreme hyperacitivity    Sulfa Antibiotics Itching and Rash     Precautions: falls    Date of Admit: 8/12/2022  Room #: 1011/1011-A      Current functional status at time of admit:        Home Living/DME Available:      Type of Home: Condo  Home Access: Level entry  Bathroom Shower/Tub: Walk-in shower, Shower chair without back  Bathroom Toilet: Handicap height  Bathroom Equipment: Grab bars in shower  Home Equipment:  (C-pap)       IADL Hx:   Homemaking Responsibilities: Yes  Active : Yes  Mode of Transportation: Car  Occupation: Retired,   Leisure & Hobbies: Pt does a lot of volunteer work       Spouse:   Family: brother is currently in town and will stay with patient for a few days    Comments:  patient plans dc 1-level condo at [facility]. He takes meals in the dining room. His brother will stay at discharge and provide dc transport. Car transfer training will need to be scheduled with the patient's vehicle. Patient has a SC & thinks he'll need a RW. Patient has no dc concerns at this time. Whiteboard updated.     Mirian Ferrara, 8/15/2022, 9:45 AM

## 2022-08-15 NOTE — PROGRESS NOTES
Physical Therapy    [x] daily progress note       [] discharge       Patient Name:  Marietta Mahmood   :  1950 MRN: [id number]  Room:  [address] [place] [address] Date of Admission: 2022  Rehabilitation Diagnosis:   Sepsis, unspecified organism [A41.9]  Sepsis ([facility] [address] [A41.9]       Date 8/15/2022       Day of ARU Week:  4   Time IN/OUT 1000/1108   Individual Tx Minutes 68   Group Tx Minutes    Co-Treat Minutes    Concurrent Tx Minutes    TOTAL Tx Time Mins 68   Variance Time +8   Variance Time []   Refusal due to:     []   Medical hold/reason:    []   Illness   []   Off Unit for test/procedure  [x]   Extra time needed to complete tasks  []   Therapeutic need  []   Other (specify):   Restrictions Restrictions/Precautions  Restrictions/Precautions: Fall Risk, General Precautions  Position Activity Restriction  Other position/activity restrictions: high gait belt placement due to recent abdominal surgery   Interdisciplinary communication [x]   Cleared for therapy per nursing     []   RN notified about issues during session  []   RN updated on pt performance  []   Spoke with   []   Spoke with OT  []   Spoke with MD  []   Other:    Subjective observations and cognitive status: Pt resting in recliner, alert, states completing a sink bath in standing today, states he will be discharged very soon based on how he is doing and 's positive feedback, also states his plan on transitioning to less strong pain meds before he leaves, inquired about when he is permitted to drive.        Pain level/location: 1-2/10 at rest; 7-8/10 with coughing      Location: abdominal area (L incision site)    Discharge recommendations  Anticipated discharge date:  TBD  Destination: []home alone   []home alone with assist PRN     [] home w/ family      [] Continuous supervision  []SNF    [] Assisted living     [] Other:  Continued therapy: [x]HHC PT  []OUTPATIENT PT   [] No Further PT  []SNF PT  Caregiver training recommended: []Yes [x] No   Equipment needs: 2WW  Albertine Screws requires the assistance of a front-wheeled walker to successfully ambulate from room to room at home to allow completion of daily living tasks such as: bathing, toileting, dressing and grooming. A wheeled walker is necessary due to the patient's unsteady gait, upper body weakness, inability to  a standard walker. This patient can ambulate only by pushing a walker instead of using a lesser assistive device such as a cane or crutch. PT IRF-PERI scores and goals for discharge assessment:     Sit to Stand  Assistance Needed: Independent  Comment: Mod Ind with 2WW-Ind with bilat hands->1 hand only  CARE Score: 6  Discharge Goal: Independent    Chair/Bed-to-Chair Transfer  Assistance Needed: Independent  Comment: Mod Ind with 2WW->Ind  CARE Score: 6  Discharge Goal: Independent    Car Transfer  Comment: car transfer to be addressed in his personal vehicle tomorrow  Reason if not Attempted: Not attempted due to environmental limitations  CARE Score: 10  Discharge Goal: Independent    Walk 10 Feet? Walk 10 Feet?: Yes    1 Step  1 Step?: Yes    Picking Up Object  Assistance Needed: Independent  Comment: Mod Ind with reacher in unsupported stance  CARE Score: 6  Discharge Goal: Independent    Wheelchair Ability  Uses a Wheelchair and/or Scooter?: No       Walking Ability  Does the Patient Walk?: Yes    Walk 10 Feet  Assistance Needed: Independent  Comment: Mod Ind with 2WW->Ind  CARE Score: 6  Discharge Goal: Independent    Walk 50 Feet with Two Turns  Assistance Needed: Independent  Comment: Mod Ind with 2WW->Ind  CARE Score: 6  Discharge Goal: Independent    Walk 150 Feet  Assistance Needed: Independent  Comment: Mod Ind with 2WW  CARE Score: 6  Discharge Goal: Independent    4 Steps  Assistance Needed: Independent  Comment:  Mod Ind with R ascnding/descending rail with consistent step-through pattern on ascent and step-to pattern leading with LLE consistently on descent; pt was consistently steady throughout activity  CARE Score: 6  Discharge Goal: Independent    12 Steps  Assistance Needed: Independent  Comment: Mod Ind with R ascnding/descending rail with consistent step-through pattern on ascent and step-to pattern leading with LLE consistently on descent; pt was consistently steady throughout activity  CARE Score: 6  Discharge Goal: Independent      Additional Therapeutic activities/exercises completed this date:     []   Nu-step:  Time:        Level:         #Steps:       []   Rebounder:    []  Seated     []  Standing        [x]   Balance training: mobility training without AD with SBA and then walking in obstacle course in slalom pattern without AD with SBA        []   Postural training    []   Supine ther ex (reps/sets):     []   Seated ther ex (reps/sets):     []   Standing ther ex (reps/sets):     []   Other: Toileting activity completed with    []   Other:    Comments:      Patient/Caregiver Education and Training:   []   Role of PT  []   Education about Dx  []   Use of call light for assist   []   HEP provided and explained   [x]   Treatment plan reviewed  []   Home safety  []   Wheelchair mobility/management   []   Body mechanics  []   Bed Mobility/Transfer technique  []   Gait technique/sequencing  []   Proper use of assistive device/adaptive equipment  []   Stair training/Advanced mobility safety and technique  []   Reinforced patient's precautions/mobility while maintaining precautions  []   Postural awareness  []   Family/caregiver training  []   Progress was updated and reviewed in Rehabtracker with patient and/or family this date. [x]   Other: Pt education about care conference process and team goals related to discharge planning, DME recommendations for mobility, and follow-up PT after ARU stay. Treatment Plan for Next Session: bed mobility, car transfer in personal vehicle     Assessment:   This pt demonstrated a positive response to today's treatment as evidenced by increased confidence and stability to progress his mobility without relying consistently on 2WW. The patient is making good progress toward established goals as evidenced by QI scores.      Treatment/Activity Tolerance:   [] Tolerated treatment with no adverse effects    [x] Patient limited by SOB with max exertion  [x] Patient limited by pain   [] Patient limited by medical complications:    [] Adverse reaction to Tx:   [] Significant change in status    Safety:       []  bed alarm set    []  chair alarm set    []  Pt refused alarms                []  Telesitter activated      [x]  Gait belt used during tx session      []other:       Number of Minutes/Billable Intervention  Gait Training 38   Therapeutic Exercise    Neuro Re-Ed    Therapeutic Activity 30   Wheelchair Propulsion    Group    Other:    TOTAL 68     Social History  Social/Functional History  Lives With: Alone  Type of Home: Condo  Home Layout: One level  Home Access: Level entry  Bathroom Shower/Tub: Walk-in shower, Shower chair without back  Bathroom Toilet: Handicap height  Bathroom Equipment: Grab bars in shower  Bathroom Accessibility: Accessible  Home Equipment:  (C-pap)  Has the patient had two or more falls in the past year or any fall with injury in the past year?: No  Receives Help From: Other (comment)  ADL Assistance: Independent  Homemaking Assistance: Independent  Homemaking Responsibilities: Yes  Ambulation Assistance: Independent  Transfer Assistance: Independent  Active : Yes  Mode of Transportation: Car  Occupation: Retired,   Leisure & Hobbies: Pt does a lot of volunteer work  Additional Comments: has a sleep number bed due to back problems, Hx of LLD (RLE shorter than LLE)    Objective                                                                                    Goals:  (Update in navigator)  Short Term Goals  Time Frame for Short term goals: 5-7 tx days:  Short term goal 1: Pt will complete bed mobility (scooting, rolling R/L, and sup<->sit) Ind. Short term goal 2: Pt will complete OOB transfers using 2WW Mod Ind. Short term goal 3: Pt will ambulate >/=150 ft over level surface and at least 10 ft of uneven surface using 2WW Mod Ind. Short term goal 4: Pt will ascend/descend curb step using 2WW and 1 flight of stairs using railings Mod Ind. Short term goal 5: Pt will complete object retrieval from the floor with 2WW and reacher Mod Ind.:   :        Plan of Care                                                                              Times per week: 5 days per week for a minimum of 60 minutes/day plus group as appropriate for 60 minutes.   Treatment to include Current Treatment Recommendations: Strengthening, Balance training, Functional mobility training, Transfer training, Endurance training, Gait training, Stair training, Pain management, Home exercise program, Safety education & training, Patient/Caregiver education & training, Equipment evaluation, education, & procurement, Therapeutic activities    Electronically signed by   Antionette Dumont, PT  8/15/2022, 12:28 PM

## 2022-08-16 LAB
ANION GAP SERPL CALCULATED.3IONS-SCNC: 7 MMOL/L (ref 4–16)
BUN BLDV-MCNC: 7 MG/DL (ref 6–23)
CALCIUM SERPL-MCNC: 9 MG/DL (ref 8.3–10.6)
CHLORIDE BLD-SCNC: 103 MMOL/L (ref 99–110)
CO2: 31 MMOL/L (ref 21–32)
CREAT SERPL-MCNC: 0.6 MG/DL (ref 0.9–1.3)
GFR AFRICAN AMERICAN: >60 ML/MIN/1.73M2
GFR NON-AFRICAN AMERICAN: >60 ML/MIN/1.73M2
GLUCOSE BLD-MCNC: 101 MG/DL (ref 70–99)
HCT VFR BLD CALC: 32.7 % (ref 42–52)
HEMOGLOBIN: 10.3 GM/DL (ref 13.5–18)
MCH RBC QN AUTO: 31.4 PG (ref 27–31)
MCHC RBC AUTO-ENTMCNC: 31.5 % (ref 32–36)
MCV RBC AUTO: 99.7 FL (ref 78–100)
PDW BLD-RTO: 15.2 % (ref 11.7–14.9)
PLATELET # BLD: 288 K/CU MM (ref 140–440)
PMV BLD AUTO: 9.5 FL (ref 7.5–11.1)
POTASSIUM SERPL-SCNC: 4 MMOL/L (ref 3.5–5.1)
RBC # BLD: 3.28 M/CU MM (ref 4.6–6.2)
SODIUM BLD-SCNC: 141 MMOL/L (ref 135–145)
WBC # BLD: 5.4 K/CU MM (ref 4–10.5)

## 2022-08-16 PROCEDURE — 97530 THERAPEUTIC ACTIVITIES: CPT

## 2022-08-16 PROCEDURE — 6370000000 HC RX 637 (ALT 250 FOR IP): Performed by: STUDENT IN AN ORGANIZED HEALTH CARE EDUCATION/TRAINING PROGRAM

## 2022-08-16 PROCEDURE — 6370000000 HC RX 637 (ALT 250 FOR IP): Performed by: PHYSICAL MEDICINE & REHABILITATION

## 2022-08-16 PROCEDURE — 6360000002 HC RX W HCPCS: Performed by: STUDENT IN AN ORGANIZED HEALTH CARE EDUCATION/TRAINING PROGRAM

## 2022-08-16 PROCEDURE — 97116 GAIT TRAINING THERAPY: CPT

## 2022-08-16 PROCEDURE — 94761 N-INVAS EAR/PLS OXIMETRY MLT: CPT

## 2022-08-16 PROCEDURE — 36415 COLL VENOUS BLD VENIPUNCTURE: CPT

## 2022-08-16 PROCEDURE — 97110 THERAPEUTIC EXERCISES: CPT

## 2022-08-16 PROCEDURE — 99233 SBSQ HOSP IP/OBS HIGH 50: CPT | Performed by: PHYSICAL MEDICINE & REHABILITATION

## 2022-08-16 PROCEDURE — 1280000000 HC REHAB R&B

## 2022-08-16 PROCEDURE — 85027 COMPLETE CBC AUTOMATED: CPT

## 2022-08-16 PROCEDURE — 94150 VITAL CAPACITY TEST: CPT

## 2022-08-16 PROCEDURE — 80048 BASIC METABOLIC PNL TOTAL CA: CPT

## 2022-08-16 PROCEDURE — 97535 SELF CARE MNGMENT TRAINING: CPT

## 2022-08-16 RX ADMIN — LEVOTHYROXINE SODIUM 175 MCG: 0.12 TABLET ORAL at 05:07

## 2022-08-16 RX ADMIN — TRAZODONE HYDROCHLORIDE 50 MG: 50 TABLET ORAL at 22:48

## 2022-08-16 RX ADMIN — SENNOSIDES 8.6 MG: 8.6 TABLET, FILM COATED ORAL at 05:07

## 2022-08-16 RX ADMIN — FLUTICASONE PROPIONATE 1 SPRAY: 50 SPRAY, METERED NASAL at 22:49

## 2022-08-16 RX ADMIN — ENOXAPARIN SODIUM 30 MG: 100 INJECTION SUBCUTANEOUS at 08:37

## 2022-08-16 RX ADMIN — LOSARTAN POTASSIUM 25 MG: 25 TABLET, FILM COATED ORAL at 08:37

## 2022-08-16 RX ADMIN — ENOXAPARIN SODIUM 30 MG: 100 INJECTION SUBCUTANEOUS at 21:45

## 2022-08-16 RX ADMIN — DOCUSATE SODIUM 100 MG: 100 CAPSULE, LIQUID FILLED ORAL at 08:37

## 2022-08-16 ASSESSMENT — PAIN DESCRIPTION - DESCRIPTORS: DESCRIPTORS: DULL

## 2022-08-16 ASSESSMENT — PAIN DESCRIPTION - ORIENTATION: ORIENTATION: LEFT

## 2022-08-16 ASSESSMENT — PAIN DESCRIPTION - LOCATION: LOCATION: ABDOMEN

## 2022-08-16 ASSESSMENT — PAIN - FUNCTIONAL ASSESSMENT: PAIN_FUNCTIONAL_ASSESSMENT: ACTIVITIES ARE NOT PREVENTED

## 2022-08-16 ASSESSMENT — PAIN SCALES - GENERAL: PAINLEVEL_OUTOF10: 2

## 2022-08-16 NOTE — PROGRESS NOTES
Physical Therapy    [x] daily progress note       [] discharge       Patient Name:  Gabriel Yang   :  1950 MRN: [id number]  Room:  [address] [place] [address] Date of Admission: 2022  Rehabilitation Diagnosis:   Sepsis, unspecified organism [A41.9]  Sepsis ([facility] [address] [A41.9]       Date 2022       Day of ARU Week:  5   Time IN//1050  1500/1550   Individual Tx Minutes 72+50   Group Tx Minutes    Co-Treat Minutes    Concurrent Tx Minutes    TOTAL Tx Time Mins 122   Variance Time +2   Variance Time []   Refusal due to:     []   Medical hold/reason:    []   Illness   []   Off Unit for test/procedure  [x]   Extra time needed to complete tasks  []   Therapeutic need  []   Other (specify):   Restrictions Restrictions/Precautions  Restrictions/Precautions: Fall Risk, General Precautions  Position Activity Restriction  Other position/activity restrictions: high gait belt placement due to recent abdominal surgery   Interdisciplinary communication [x]   Cleared for therapy per nursing     []   RN notified about issues during session  [x]   OT updated on pt performance and is recommended to be Mod Ind in room   []   Spoke with   []   Spoke with OT  []   Spoke with MD  []   Other:    Subjective observations and cognitive status: (AM) Pt in recliner, alert, brother present in room for scheduled car transfer training in pt's personal vehicle.      Pain level/location:    /10       Location: L incision site on abdomen (varied from 2-4/10 especially when coughing/with certain movements)   Discharge recommendations  Anticipated discharge date:  2022  Destination: [x]home alone   []home alone with assist PRN     [] home w/ family      [] Continuous supervision  []SNF    [] Assisted living     [] Other:  Continued therapy: [x]HHC PT  []OUTPATIENT PT   [] No Further PT  []SNF PT  Caregiver training recommended: []Yes  [] No   Equipment needs: 2WW  Gabriel Yang requires the assistance of a front-wheeled walker to successfully ambulate from room to room at home to allow completion of daily living tasks such as: bathing, toileting, dressing and grooming. A wheeled walker is necessary due to the patient's unsteady gait, upper body weakness, inability to  a standard walker. This patient can ambulate only by pushing a walker instead of using a lesser assistive device such as a cane or crutch. PT IRF-PERI scores and goals for discharge assessment:   Roll Left and Right  Assistance Needed: Independent  Comment: Ind without use of bed features; pt with increased tolerance to stay on R/L side  Reason if not Attempted: Not attempted due to medical condition or safety concerns  CARE Score: 6  Discharge Goal: Independent    Sit to Lying  Assistance Needed: Independent  Comment: Ind without use of bed features  CARE Score: 6  Discharge Goal: Independent    Lying to Sitting on Side of Bed  Assistance Needed: Independent  Comment: Ind without use of bed features  CARE Score: 6  Discharge Goal: Independent    Sit to Stand  Assistance Needed: Independent  Comment: Mod Ind with 2WW->Ind  CARE Score: 6  Discharge Goal: Independent    Chair/Bed-to-Chair Transfer  Assistance Needed: Independent  Comment: Mod Ind with 2WW->Ind  CARE Score: 6  Discharge Goal: Independent    Toilet Transfer  Assistance needed: Independent  Comment: Mod I with 2WW and grab bar. CARE Score: 6  Discharge Goal: Independent    Car Transfer  Assistance Needed: Independent  Comment: Mod Ind (used car as external support to assist with balance; pt completed in his personal vehicle today)  Reason if not Attempted: Not attempted due to environmental limitations  CARE Score: 6  Discharge Goal: Independent    Walk 10 Feet? Walk 10 Feet?: Yes    1 Step  1 Step?: Yes    Picking Up Object  Assistance Needed: Independent  Comment:  Mod Ind with reacher in unsupported stance  CARE Score: 6  Discharge Goal: Independent      Walking Ability  Does the Patient Walk?: Yes    Walk 10 Feet  Assistance Needed: Independent  Comment: Mod Ind with 2WW->Ind  CARE Score: 6  Discharge Goal: Independent    Walk 50 Feet with Two Turns  Assistance Needed: Independent  Comment: Mod Ind with 2WW->Ind  CARE Score: 6  Discharge Goal: Independent    Walk 150 Feet  Assistance Needed: Independent  Comment: Mod Ind with 2WW (usual performance)->Ind  CARE Score: 6  Discharge Goal: Independent    Walking 10 Feet on Uneven Surfaces  Assistance Needed: Independent  Comment: Mod Ind with 2WW  CARE Score: 6  Discharge Goal: Independent    1 Step (Curb)  Assistance Needed: Independent  Comment: Mod Ind with 2WW  CARE Score: 6  Discharge Goal: Independent    4 Steps  Assistance Needed: Independent  Comment: Mod Ind with R ascnding/descending rail with consistent step-through pattern on ascent and step-to pattern leading with LLE consistently on descent; pt was consistently steady throughout activity  CARE Score: 6  Discharge Goal: Independent    12 Steps  Assistance Needed: Independent  Comment: Mod Ind with R ascnding/descending rail with consistent step-through pattern on ascent and step-to pattern leading with LLE consistently on descent; pt was consistently steady throughout activity  CARE Score: 6  Discharge Goal: Independent      Additional Therapeutic activities/exercises completed this date:     []   Nu-step:  Time:        Level:         #Steps:       []   Rebounder:    []  Seated     []  Standing        [x]   Balance training: dual task training: level surface ambulation without AD ~200 ft with turns while simultaneously talking in his mobile phone. Pt was steady throughout task and was able to tolerate walk and talk test without desaturation. []   Postural training    []   Supine ther ex (reps/sets):     []   Seated ther ex (reps/sets):     []   Standing ther ex (reps/sets):     [x]   Other:  Toileting activity completed Ind     [x]   Other:  Six Minute Walk Test alarm set    []  chair alarm set    []  Pt refused alarms                []  Telesitter activated      [x]  Gait belt used during tx session      [x]other:  Pt cleared to be Mod Ind in room      Number of Minutes/Billable Intervention  Gait Training 72   Therapeutic Exercise    Neuro Re-Ed    Therapeutic Activity 50   Wheelchair Propulsion    Group    Other:    TOTAL 122     Social History  Social/Functional History  Lives With: Alone  Type of Home: Condo  Home Layout: One level  Home Access: Level entry  Bathroom Shower/Tub: Walk-in shower, Shower chair without back  Bathroom Toilet: Handicap height  Bathroom Equipment: Grab bars in shower  Bathroom Accessibility: [facility]:  (C-pap)  Has the patient had two or more falls in the past year or any fall with injury in the past year?: No  Receives Help From: Other (comment)  ADL Assistance: [address]: Independent  Homemaking Responsibilities: Yes  Ambulation Assistance: Independent  Transfer Assistance: Independent  Active : Yes  Mode of Transportation: Car  Occupation: Retired,   Leisure & Hobbies: Pt does a lot of volunteer work  Additional Comments: has a sleep number bed due to back problems, Hx of LLD (RLE shorter than LLE)    Objective                                                                                    Goals:  (Update in navigator)  Short Term Goals  Time Frame for Short term goals: 5-7 tx days:  Short term goal 1: Pt will complete bed mobility (scooting, rolling R/L, and sup<->sit) Ind. Short term goal 2: Pt will complete OOB transfers using 2WW Mod Ind. Short term goal 3: Pt will ambulate >/=150 ft over level surface and at least 10 ft of uneven surface using 2WW Mod Ind. Short term goal 4: Pt will ascend/descend curb step using 2WW and 1 flight of stairs using railings Mod Ind.   Short term goal 5: Pt will complete object retrieval from the floor with 2WW and reacher Mod Ind.:   : Plan of Care                                                                              Times per week: 5 days per week for a minimum of 60 minutes/day plus group as appropriate for 60 minutes.   Treatment to include Current Treatment Recommendations: Strengthening, Balance training, Functional mobility training, Transfer training, Endurance training, Gait training, Stair training, Pain management, Home exercise program, Safety education & training, Patient/Caregiver education & training, Equipment evaluation, education, & procurement, Therapeutic activities    Electronically signed by   Lucia Ghosh PT  8/16/2022, 3:45 PM

## 2022-08-16 NOTE — PLAN OF CARE
Problem: Discharge Planning  Goal: Discharge to home or other facility with appropriate resources  8/15/2022 2336 by Janice Doshi RN  Outcome: Progressing  8/15/2022 0951 by Yomaira Boyle LPN  Outcome: Progressing     Problem: Safety - Adult  Goal: Free from fall injury  8/15/2022 2336 by Janice Doshi RN  Outcome: Progressing  8/15/2022 0951 by Yomaira Boyle LPN  Outcome: Progressing

## 2022-08-16 NOTE — PROGRESS NOTES
Occupational Therapy    Physical Rehabilitation: OCCUPATIONAL THERAPY     [x] daily progress note       [] discharge       Patient Name:  Marietta Mahmood   :  1950 MRN: [id number]  Room:  [address] [place] [address] Date of Admission: 2022  Rehabilitation Diagnosis:   Sepsis, unspecified organism [A41.9]  Sepsis ([address] [A41.9]       Date 2022       Day of ARU Week:  5   Time IN/OUT 1105/1205   Individual Tx Minutes 60   Group Tx Minutes    Co-Treat Minutes    Concurrent Tx Minutes    TOTAL Tx Time Mins 60   Variance Time    Variance Time []   Refusal due to:     []   Medical hold/reason:    []   Illness   []   Off Unit for test/procedure  []   Extra time needed to complete task  []   Therapeutic need  []   Other (specify):   Restrictions Restrictions/Precautions: Fall Risk, General Precautions         Communication with other providers: [x]   OK to see per nursing:     [x]   Spoke with Meng from nursing regarding:   Pt now Mod I within his room. This therapist updated whiteboard as well. Subjective observations and cognitive status: Pt sitting in recliner chair upon entrance, pleasant and agreeable to therapy session. Pain level/location:   1.5-2 /10       Location: Incision site to abdomen   Discharge recommendations  Anticipated discharge date:  TBD  Destination: [x]home alone   []home alone w assist prn   [] home w/ family    [] Continuous supervision       []SNF    [] Assisted living     [] Other:   Continued therapy: []HHC OT  []OUTPATIENT  OT   [x] No Further OT  Equipment needs: None. ADLs:    UB/LB Bathing: Shower/Bathe Self  Assistance Needed: Independent  Comment: Mod I to complete full shower. CARE Score: 6  Discharge Goal: Independent    UB Dressing: Upper Body Dressing  Assistance Needed: Independent  Comment: X  CARE Score: 6  Discharge Goal: Independent         LB Dressing: Lower Body Dressing  Assistance Needed: Independent  Comment: Mod I to doff/don underwear and pants.   CARE Score: 6  Discharge Goal: Independent    Donning and [place] Footwear: Putting On/Taking Off Footwear  Assistance Needed: Independent  Comment: Pt Mod I to doff/don slip on shoes. CARE Score: 6  Discharge Goal: Independent      Toilet Transfers: Toilet Transfer  Assistance needed: Independent  Comment: Mod I with 2WW and grab bar. CARE Score: 6  Discharge Goal: Independent  Device Used:    [x]   Standard Toilet         [x]   Grab Bars           []  Bedside Commode       []   Elevated Toilet          []   Other:        Bed Mobility:           [x]   Pt received out of bed       Transfers:    Sit--> Stand: Mod I  Stand --> Sit:   Mod I  Stand-Pivot: Mod I  Other:    Assistive device required for transfer:   2WW      Functional Mobility:    Assistance: Mod I ~ 125 ft x 2. Device:   [x]   Rolling Walker     []   Standard Valentino Sings []   Wheelchair        []   [employer]       []   4-Wheeled Valentino Sings         []   Cardiac Walker       []   Other:        Homemaking Tasks:   Pt Mod I to retrieve clothing from closet and transport to bathroom, draping over 2WW. Additional Therapeutic activities/exercises completed this date:     [x]   ADL Training   [x]   Balance/Postural training     []   Bed/Transfer Training   [x]   Endurance Training   []   Neuromuscular Re-ed   []   Nu-step:  Time:        Level:         #Steps:       []   Rebounder:    []  Seated     []  Standing        []   Supine Ther Ex (reps/sets):     [x]   Seated Ther Ex (reps/sets):  Pt performed 15 mins on arm bike with Mod resistance, averaging 50 rpm. Pt required no rest breaks. Completed to increase BUE strength and endurance for safe performance of ADLs. []   Standing Ther Ex (reps/sets):     []   Other:      Comments: All intervention performed to increase pt's endurance, ax tolerance, balance, strength, and I c ADLs/IADLs and functional transfers/mobility.       Patient/Caregiver Education and Training:   [x]   Adaptive Equipment Use - LHS  []   Bed Mobility/Transfer Technique/Safety  []   Energy Conservation Tips  []   Family training  []   Postural Awareness  [x]   Safety During Functional Activities  []   Reinforced Patient's Precautions   []   Progress was updated and reviewed in Rehabtracker with patient and/or family this         date. Treatment Plan for Next Session: Continue OT POC      Assessment: This pt demonstrated a positive response to today's treatment as evidenced by Mod I with all ADLs. The patient is making excellent progress toward established goals as evidenced by QI scores. Ongoing deficits are observed in the areas of R shoulder pain/ROM and strength and continued focus on this is recommended.        Treatment/Activity Tolerance:   [x] Tolerated treatment with no adverse effects    [] Patient limited by fatigue  [] Patient limited by pain   [] Patient limited by medical complications:    [] Adverse reaction to Tx:   [] Significant change in status    Safety:       []  bed alarm set    []  chair alarm set    []  Pt refused alarms                []  Telesitter activated      [x]  Gait belt used during tx session      []other:       Number of Minutes/Billable Intervention  Therapeutic Exercise 15   ADL Self-care 35   Neuro Re-Ed    Therapeutic Activity 10   Group    Other:    TOTAL 60       Social History  Social/Functional History  Lives With: Alone  Type of Home: Condo  Home Layout: One level  Home Access: Level entry  Bathroom Shower/Tub: Walk-in shower, Shower chair without back  Bathroom Toilet: Handicap height  Bathroom Equipment: Grab bars in shower  Bathroom Accessibility: Accessible  Home Equipment:  (C-pap)  Has the patient had two or more falls in the past year or any fall with injury in the past year?: No  Receives Help From: Other (comment)  ADL Assistance: [facility]: Independent  Homemaking Responsibilities: Yes  Ambulation Assistance: Independent  Transfer Assistance: Independent  Active : Yes  Mode of Transportation: Car  Occupation: Retired,   Leisure & Hobbies: Pt does a lot of volunteer work  Additional Comments: has a sleep number bed due to back problems, Hx of LLD (RLE shorter than LLE)    Objective                                                                                    Goals:  (Update in navigator)  Short Term Goals  Time Frame for Short term goals: LTGs= STGs:  Long Term Goals  Time Frame for Long term goals : 5-7 days or until D/C  Long Term Goal 1: Pt will complete oral hygiene and feeding IND  Long Term Goal 2: Pt will complete UB dressing/ bathing IND  Long Term Goal 3: Pt will complete LB dressing/ bathing IND  Long Term Goal 4: Pt will complete all aspects of toileting IND  Long Term Goal 5: Pt will complete all functional transfers IND  Long Term Goal 6: Pt will complete donning/ doffing of foot waear IND  Long Term Goal 7: Pt will complete an IADL activity IND  Long Term Goal 8: Pt will complete 15 minutes of a stengthening or endurance activity with S:        Plan of Care                                                                              Times per week: 5 days per week for a minimum of 60 minutes/day plus group as appropriate for 60 minutes.   Treatment to include Plan  Times per Day: Daily  Current Treatment Recommendations: Strengthening, Functional mobility training, Endurance training, Pain management, Safety education & training, Patient/Caregiver education & training, Equipment evaluation, education, & procurement, Self-Care / ADL, Home management training    Electronically signed by   TABATHA High, OTR/L #[id number]  8/16/2022, 11:48 AM

## 2022-08-17 PROCEDURE — 97530 THERAPEUTIC ACTIVITIES: CPT

## 2022-08-17 PROCEDURE — 6360000002 HC RX W HCPCS: Performed by: STUDENT IN AN ORGANIZED HEALTH CARE EDUCATION/TRAINING PROGRAM

## 2022-08-17 PROCEDURE — 97110 THERAPEUTIC EXERCISES: CPT

## 2022-08-17 PROCEDURE — 97116 GAIT TRAINING THERAPY: CPT

## 2022-08-17 PROCEDURE — 94150 VITAL CAPACITY TEST: CPT

## 2022-08-17 PROCEDURE — 6370000000 HC RX 637 (ALT 250 FOR IP): Performed by: STUDENT IN AN ORGANIZED HEALTH CARE EDUCATION/TRAINING PROGRAM

## 2022-08-17 PROCEDURE — 6370000000 HC RX 637 (ALT 250 FOR IP): Performed by: PHYSICAL MEDICINE & REHABILITATION

## 2022-08-17 PROCEDURE — 1280000000 HC REHAB R&B

## 2022-08-17 PROCEDURE — 99232 SBSQ HOSP IP/OBS MODERATE 35: CPT | Performed by: PHYSICAL MEDICINE & REHABILITATION

## 2022-08-17 PROCEDURE — 97535 SELF CARE MNGMENT TRAINING: CPT

## 2022-08-17 PROCEDURE — 94761 N-INVAS EAR/PLS OXIMETRY MLT: CPT

## 2022-08-17 RX ADMIN — DOCUSATE SODIUM 100 MG: 100 CAPSULE, LIQUID FILLED ORAL at 07:51

## 2022-08-17 RX ADMIN — ENOXAPARIN SODIUM 30 MG: 100 INJECTION SUBCUTANEOUS at 07:52

## 2022-08-17 RX ADMIN — ENOXAPARIN SODIUM 30 MG: 100 INJECTION SUBCUTANEOUS at 20:36

## 2022-08-17 RX ADMIN — TRAZODONE HYDROCHLORIDE 50 MG: 50 TABLET ORAL at 22:46

## 2022-08-17 RX ADMIN — LOSARTAN POTASSIUM 25 MG: 25 TABLET, FILM COATED ORAL at 07:51

## 2022-08-17 RX ADMIN — SENNOSIDES 8.6 MG: 8.6 TABLET, FILM COATED ORAL at 05:45

## 2022-08-17 RX ADMIN — FLUTICASONE PROPIONATE 1 SPRAY: 50 SPRAY, METERED NASAL at 22:46

## 2022-08-17 RX ADMIN — LEVOTHYROXINE SODIUM 175 MCG: 0.12 TABLET ORAL at 05:45

## 2022-08-17 ASSESSMENT — PAIN DESCRIPTION - ORIENTATION: ORIENTATION: LEFT

## 2022-08-17 ASSESSMENT — PAIN DESCRIPTION - FREQUENCY: FREQUENCY: INTERMITTENT

## 2022-08-17 ASSESSMENT — PAIN SCALES - GENERAL
PAINLEVEL_OUTOF10: 0
PAINLEVEL_OUTOF10: 1

## 2022-08-17 ASSESSMENT — PAIN DESCRIPTION - LOCATION: LOCATION: ABDOMEN

## 2022-08-17 ASSESSMENT — PAIN DESCRIPTION - ONSET: ONSET: SUDDEN

## 2022-08-17 ASSESSMENT — PAIN DESCRIPTION - DESCRIPTORS: DESCRIPTORS: DISCOMFORT

## 2022-08-17 ASSESSMENT — PAIN - FUNCTIONAL ASSESSMENT: PAIN_FUNCTIONAL_ASSESSMENT: ACTIVITIES ARE NOT PREVENTED

## 2022-08-17 ASSESSMENT — PAIN SCALES - WONG BAKER: WONGBAKER_NUMERICALRESPONSE: 0

## 2022-08-17 ASSESSMENT — PAIN DESCRIPTION - PAIN TYPE: TYPE: ACUTE PAIN;SURGICAL PAIN

## 2022-08-17 NOTE — CARE COORDINATION
DME orders faxed to Trena Nixon at [facility]. [facility]

## 2022-08-17 NOTE — DISCHARGE INSTRUCTIONS
Your information:  Name: Nancy Campo  : 1950    Your instructions: You have been referred to [facility]  [phone]  A representative will call you directly to schedule your first visit. Pt will discharge home with medical supplies from   [facility]  [address]   [phone]      What to do after you leave the hospital:    Recommended diet: regular diet    Recommended activity: activity as tolerated        The following personal items were collected during your admission and were returned to you:    Belongings  Dental Appliances: None, Other (Comment) (permanent bridge upper rt; permanent implant left lower)  Vision - Corrective Lenses: Eyeglasses  Hearing Aid: None  Clothing: Undergarments, Footwear, Shirt, Shorts, Hat, [place], [place], At bedside  Jewelry: None  Body Piercings Removed: N/A  Electronic Devices: Cell Phone  Weapons (Notify Protective Services/Security): None  Other Valuables: Cpap/BiPap  Home Medications: None  Valuables Given To: Patient  Provide Name(s) of Who Valuable(s) Were Given To: n/a  Responsible person(s) in the waiting room: n/a  Patient approves for provider to speak to responsible person post operatively: No    Information obtained by:  By signing below, I understand that if any problems occur once I leave the hospital I am to contact my Primary Care Provider. I understand and acknowledge receipt of the instructions indicated above. 9 Things To Do If You've Been Exposed to COVID-19    Stay home. If you've been exposed to the virus but don't have symptoms, you may need to stay in quarantine for 10 full days. But if you are up to date on your COVID-19 vaccines, or if you tested positive for the COVID virus in the last 90 days and have recovered, you may not need to quarantine. Ask your doctor, or go tocdc.gov to use the COVID-19 Quarantine and Isolation Calculator. Get tested.   If you have symptoms, it's important to get tested as soon as possible. And if your test is positive, call your doctor right away. They may have you take a medicine to keep you from getting seriously ill. Treatment works best when it's started early. If you have questions about COVID-19 testing, ask your doctor orgo to cdc.gov to use the COVID-19 Viral Testing Tool. For at least 10 days, wear a mask when you are around other people. And for at least 10 days, don't visit people at high risk for serious illnessfrom COVID. Limit contact with people in your home. If possible, stay in a separate bedroom and use a separate bathroom. Avoid contact with pets and other animals. Cover your mouth and nose with a tissue when you cough or sneeze. Then throw it in the trash right away. Wash your hands often, especially after you cough or sneeze. Use soap and water, and scrub for at least 20 seconds. If soap and wateraren't available, use an alcohol-based hand . Don't share personal household items. These include bedding, towels, cups and glasses, and eating utensils. Clean and disinfect your home every day. Use household  or disinfectant wipes or sprays. Current as of: May 28, 2022               Content Version: 13.3  © 2006-2022 Healthwise, Incorporated. Care instructions adapted under license by [facility] . If you have questions about a medical condition or this instruction, always ask your healthcare professional. [place] [address] any warranty or liability for your use of this information. Learning About Coronavirus [phone])  What is coronavirus (COVID-19)? COVID-19 is a disease caused by a type of coronavirus. This illness was firstfound in December 2019. It has since spread worldwide. Coronaviruses are a large group of viruses. They cause the common cold.  They also cause more serious illnesses like [place] East respiratory syndrome (MERS) and severe acute respiratory syndrome (SARS). COVID-19 is caused by a novelcoronavirus. That means it's a new type that has not been seen in people before. What are the symptoms? COVID-19 symptoms may include:  Fever. Cough. Trouble breathing. Chills or repeated shaking with chills. Muscle and body aches. Headache. Sore throat. New loss of taste or smell. Vomiting. Diarrhea. In severe cases, COVID-19 can cause pneumonia and make it hard to breathewithout help from a machine. It can cause death. How is it diagnosed? COVID-19 is diagnosed with a viral test. This may also be called a PCR test or antigen test. It looks for evidence of the virus in your breathing passages orlungs (respiratory system). The test is most often done on a sample from the nose, throat, or lungs. It's sometimes done on a sample of saliva. One way a sample is collected is byputting a long swab into the back of your nose. If you have questions about COVID-19 testing, ask your doctor or go to cdc.govto use the COVID-19 Viral Testing Tool. How is it treated? Mild cases of COVID-19 can be treated at home. Serious cases need treatment in the hospital. Treatment may include medicines, plus breathing support such as oxygen therapy or a ventilator. Some people may be placed on their belly tohelp their oxygen levels. Treatments that may help people who have COVID-19 include:  Antiviral medicines. These medicines treat viral infections. Immune-based therapy. These medicines help the immune system fight COVID-19. Examples include monoclonal antibodies. Blood thinners. These medicines help prevent blood clots. People with severe illness are at risk for blood clots. How can you protect yourself and others? Stay up to date on your COVID-19 vaccines. Avoid sick people, and stay away from others if you are sick. Stay at least 6 feet away from other people. Avoid crowds, especially inside.   Get tested for COVID-19 before you have an indoor visit with people who don't live with you. Improve the airflow when you spend time indoors with people who don't live with you. If you can, open windows and doors. Or you can use a fan to blow air away from people and out a window. Cover your mouth with a tissue when you cough or sneeze. Wash your hands often, especially after you cough or sneeze. Use soap and water, and scrub for at least 20 seconds. If soap and water aren't available, use an alcohol-based hand . Avoid touching your mouth, nose, and eyes. Check the CDC website at cdc.gov for the most current information on how to protect yourself. And if you have questions, ask your doctor or go to cdc.govto use the COVID-19 Quarantine and Isolation Calculator. Here are some other steps you may need to take. If you are not up to date on your COVID-19 vaccines:  Wear a mask with the best fit, protection, and comfort for you. A mask can protect you even when others aren't wearing one. This might be especially important if you:  Have certain health conditions. Live with someone who has a compromised immune system. Live with someone who is not up to date on their COVID-19 vaccines. If you have been exposed to the virus AND are not up to date on your COVID-19 vaccines:  Talk to your doctor as soon as you can. Your doctor might have you take medicine to help prevent serious illness. Get a COVID-19 test. You may need to be tested more than once. And if your test is positive, follow the instructions below. Stay home. Try to separate from other people where you live. Don't go to school, work, or public areas. Wear a well-fitting mask around other people for a full 10 days. Avoid travel, and stay away from people at high risk for serious illness. Watch for symptoms.   If you have been exposed AND either tested positive for COVID-19 in the last 90 days and have recovered or you are up to date on your COVID-19 vaccines:  Talk to your doctor as soon as you can. Your doctor may have you take medicine to help prevent serious illness. Get a COVID-19 test. Wait 5 days after you were last exposed. You may need to be tested more than once. And if your test is positive, follow the instructions below. Wear a well-fitting mask around other people for a full 10 days. Avoid travel and stay away from people at high risk for serious illness. Watch for symptoms. If you tested positive for COVID-19 in the last 90 days and have not recovered, another COVID-19 test may not be needed. If you're sick or test positive for COVID-19:  Talk to your doctor as soon as you can. Your doctor may have you take medicine to help prevent serious illness. Get a COVID-19 test unless you have already been tested. You may need to be tested more than once. Stay home. Leave only if you need to get medical care. If you were seriously ill or if you have a weakened immune system, you may need to isolate for several weeks. For a full 10 days, wear a well-fitting mask whenever you're around other people. Avoid travel and stay away from people at high risk for serious illness. Limit contact with pets and people in your home. If possible, stay in a separate bedroom and use a separate bathroom. Clean and disinfect your home every day. Use household  and disinfectant wipes or sprays. Take special care to clean things that you touch with your hands. How can you self-isolate when you have COVID-19? If you have COVID-19, there are things you can do to help avoid spreading thevirus to others. Stay home, and avoid contact with other people. Limit contact with people in your home. If possible, stay in a separate bedroom and use a separate bathroom. Wear a well-fitting mask when you are around other people. Avoid contact with pets and other animals. Cover your mouth and nose with a tissue when you cough or sneeze. Then throw it in the trash right away.   Wash your hands often, especially after you cough or sneeze. Use soap and water, and scrub for at least 20 seconds. If soap and water aren't available, use an alcohol-based hand . Don't share personal household items. These include bedding, towels, cups and glasses, and eating utensils. [address] in the warmest water allowed for the fabric type, and dry it completely. It's okay to wash other people's laundry with yours. Clean and disinfect your home. Use household  and disinfectant wipes or sprays. If you have questions, visit cdc.gov to check the Quarantine and IsolationCalculator. When should you call for help? Call 911 anytime you think you may need emergency care. For example, call if you have life-threatening symptoms, such as: You have severe trouble breathing. (You can't talk at all.)     You have constant chest pain or pressure. You are severely dizzy or lightheaded. You are confused or can't think clearly. You have pale, gray, or blue-colored skin or lips. You pass out (lose consciousness) or are very hard to wake up. You have loss of balance or trouble walking. You have trouble seeing out of one or both eyes. You have weakness or drooping on one side of the face. You have weakness or numbness in an arm or a leg. You have trouble speaking. You have a severe headache. You have a seizure. Call your doctor now or seek immediate medical care if:    You have moderate trouble breathing. (You can't speak a full sentence.)     You are coughing up blood. You have signs of low blood pressure. These include feeling lightheaded; being too weak to stand; and having cold, pale, clammy skin. Watch closely for changes in your health, and be sure to contact your doctor if:    Your symptoms get worse. You are not getting better as expected. You have new or worse symptoms of anxiety, depression, nightmares, or flashbacks. Call before you go to the doctor's office.  Follow their instructions. And wear a mask. Where can you learn more? Go to [email or web address] and sign in to your [employer] account. Enter C008 in the [facility] box to learn more about \"Learning About Coronavirus (COVID-19). \"     If you do not have an account, please click on the \"Sign Up Now\" link. Current as of: May 28, 2022               Content Version: 13.3  © 2006-2022 [employer]. Care instructions adapted under license by [facility] ([facility]). If you have questions about a medical condition or this instruction, always ask your healthcare professional. [place] [address] any warranty or liability for your use of this information. Sepsis: Care Instructions  Overview     Sepsis is a serious reaction to an infection. It causes inflammation across large areas of the body and can damage tissue and organs. It can lead toextremely low blood pressure. Infections that can lead to sepsis include:  A skin infection such as from a cut. A lung infection like pneumonia. A urinary tract infection. A gut infection such as E. coli. Sepsis is treated with antibiotics. Your doctor will try to find the infection that led to sepsis. Damaso Sack also get fluids through a vein (IV). Machines will track your vital signs, including temperature, blood pressure, breathing rate,and pulse rate. The physical and mental effects of sepsis may not be seen for several weeksafter treatment. And they may last long after the infection is gone. Physical problems may include:  Feeling weak and tired. Feeling out of breath. Aches and pains. Problems with getting around. Trouble falling asleep or staying asleep. Dry and itchy skin, brittle nails, and hair loss. Some of these effects can lead to problems with your organs or your feet, legs,hands, or arms. Sepsis can also affect your mind and emotions. Problems may include:  Self-doubt. Anxiety. Nightmares.   Depression and mood problems. Wanting to avoid other people. Confusion. Flashbacks and bad memories of your illness. It's important to care for yourself and try to avoid infections. This may loweryour risk of getting sepsis again. Follow-up care is a key part of your treatment and safety. Be sure to make and go to all appointments, and call your doctor if you are having problems. It's also a good idea to know your test results and keep alist of the medicines you take. How can you care for yourself at home? Be safe with medicines. Take your medicines exactly as prescribed. Call your doctor if you think you are having a problem with your medicine. If your doctor prescribed antibiotics, take them as directed. Do not stop taking them just because you feel better. You need to take the full course of antibiotics. Help prevent future infections. Try to avoid colds and flu. If you must be around people who have a cold or the flu, wash your hands often. And get a flu vaccine every year. Ask your doctor if you need a pneumococcal vaccine (to prevent pneumonia, meningitis, and other infections). If you have had one before, ask your doctor if you need another dose. Clean any wounds or scrapes. Drink plenty of fluids to prevent dehydration. Eat a healthy diet. Include fruits, vegetables, and whole grains in your diet every day. If your doctor recommends it, try doing some physical activity. When should you call for help? Call 911  anytime you think you may need emergency care. For example, call if:    You passed out (lost consciousness). Call your doctor now or seek immediate medical care if:    You have symptoms such as:  Shortness of breath. Feeling very sick. Severe pain. A fast heart rate. Cool, pale, or clammy skin. Feeling confused. Feeling very sleepy, or you are hard to wake up. You are dizzy or lightheaded, or you feel like you may faint. You have a fever or chills.    Watch closely for changes in your Phigital, and be sure to contact your doctor if:    You do not get better as expected. Where can you learn more? Go to [email or web address] org and sign in to your [employer] account. Enter W227 in the [facility] box to learn more about \"Sepsis: Care Instructions. \"     If you do not have an account, please click on the \"Sign Up Now\" link. Current as of: February 9, 2022               Content Version: 13.3  © 2006-2022 Healthwise, Incorporated. Care instructions adapted under license by [facility] ([facility] If you have questions about a medical condition or this instruction, always ask your healthcare professional. [address] any warranty or liability for your use of this information.

## 2022-08-17 NOTE — CARE COORDINATION
Patient reviewed at today's care conference. Patient will dc home to [facility] 8/18 (possibly 8/17). [facility] pt recommended. Patient will need RW. Patient mod I in his room. Case mgt updated patient in room. Today's car transfer with his Judah Sanz was successful. His brother will provide dc transport and stay with the patient several days. Confirmed patient needs RW. Agreeable to [facility] pt - Mika's preferred provider. Whiteboard updated. Patient doesn't think he's ready to dc 8/17 and would prefer 8/18.

## 2022-08-17 NOTE — PROGRESS NOTES
Physical Rehabilitation: OCCUPATIONAL THERAPY     [x] daily progress note       [] discharge       Patient Name:  Marco Hilario   :  1950 MRN: [id number]  Room:  [address] [place] [address] Date of Admission: 2022  Rehabilitation Diagnosis:   Sepsis, unspecified organism [A41.9]  Sepsis ([facility] [address] [A41.9]       Date 2022       Day of ARU Week:  6   Time IN//755; 805/845; 1300/1325   Individual Tx Minutes 55 + 40 + 25   Group Tx Minutes    Co-Treat Minutes    Concurrent Tx Minutes    TOTAL Tx Time Mins 120   Variance Time    Variance Time []   Refusal due to:     []   Medical hold/reason:    []   Illness   []   Off Unit for test/procedure  []   Extra time needed to complete task  []   Therapeutic need  []   Other (specify):   Restrictions Restrictions/Precautions: Fall Risk, General Precautions         Communication with other providers: [x]   OK to see per nursing:     []   Spoke with team member regarding:      Subjective observations and cognitive status: Patient sitting up in recliner, pleasant and agreeable to therapy      Pain level/location:    /10       Location: per patient no pain noted    Discharge recommendations  Anticipated discharge date:    Destination: [x]home alone   []home alone w assist prn   [] home w/ family    [] Continuous supervision       []SNF    [] Assisted living     [] Other:   Continued therapy: [x]HHC OT  []OUTPATIENT  OT   [] No Further OT  Equipment needs: None        ADLs:    Eating: Eating  Assistance Needed: Independent  Comment: X  CARE Score: 6  Discharge Goal: Independent       Oral Hygiene: Oral Hygiene  Assistance Needed: Independent  Comment: X  CARE Score: 6  Discharge Goal: Independent    UB/LB Bathing: Shower/Bathe Self  Assistance Needed: Independent  Comment: X  CARE Score: 6  Discharge Goal: Independent    UB Dressing: Upper Body Dressing  Assistance Needed: Independent  Comment: X  CARE Score: 6  Discharge Goal: Independent         LB Dressing: Lower Body Dressing  Assistance Needed: Independent  Comment: X  CARE Score: 6  Discharge Goal: Independent    Donning and [place] Footwear: Putting On/Taking Off Footwear  Assistance Needed: Independent  Comment: X  CARE Score: 6  Discharge Goal: Independent      Toiletin [address] needed: Independent  Comment: X  CARE Score: 6  Discharge Goal: Independent      Toilet Transfers: Toilet Transfer  Assistance needed: Independent  Comment: X  CARE Score: 6  Discharge Goal: Independent  Device Used:    [x]   Standard Toilet         [x]   Grab Bars           []  Bedside Commode       []   Elevated Toilet          []   Other:        Bed Mobility:           [x]   Pt received out of bed   Rolling R/L:    Scooting:    Supine --> Sit:    Sit --> Supine:      Transfers:    Sit--> Stand: Mod I   Stand --> Sit:   Mod I   Stand-Pivot: Mod I   Other:    Assistive device required for transfer:   RW      Functional Mobility:  throughout room/bathroom; throughout ARU   Assistance:   Mod I   Device:   [x]   Rolling Walker     []   Standard Walker []   Wheelchair        []   [place]       []   4-Wheeled Maritza Chingley         []   Cardiac Walker       []   Other:        Homemaking Tasks:   Patient retrieves clothing and toiletries and transports to bathroom at [employer] level       Additional Therapeutic activities/exercises completed this date:     [x]   ADL Training   [x]   Balance/Postural training     [x]   Bed/Transfer Training   [x]   Endurance Training patient instructed in bilateral reciprocal patterned movement for 10 minutes while seated with mod/max resistance to in crease endurance for facilitation of ADL/IADL and mobility tasks at [employer] reentry levels    []   Neuromuscular Re-ed   []   Nu-step:  Time:        Level:         #Steps:       [x]   Rebounder:    []  Seated     [x]  Standing        []   Supine Ther Ex (reps/sets):     [x]   Seated Ther Ex (reps/sets):  Patient instructed in BUE exercises with blue theraband and  4# dowel all planes and all joints to increase strength and endurance  facilitating ADL and mobility    []   Standing Ther Ex (reps/sets):     [x]   Other: therapist gives patient handouts of BUE exercises, walker safety, home barriers/checklist, patient acknowledges and understands as we go over. Comments:      Patient/Caregiver Education and Training:   [x]   Adaptive Equipment Use  [x]   Bed Mobility/Transfer Technique/Safety  [x]   Energy Conservation Tips  [x]   Family training  [x]   Postural Awareness  [x]   Safety During Functional Activities  []   Reinforced Patient's Precautions   []   Progress was updated and reviewed in Rehabtracker with patient and/or family this         date.     Treatment Plan for Next Session: POC to continue as tolerated         Treatment/Activity Tolerance:   [x] Tolerated treatment with no adverse effects    [] Patient limited by fatigue  [] Patient limited by pain   [] Patient limited by medical complications:    [] Adverse reaction to Tx:   [] Significant change in status    Safety:       []  bed alarm set    []  chair alarm set    []  Pt refused alarms                []  Telesitter activated      []  Gait belt used during tx session      [x]other: patient Mod I in room       Number of Minutes/Billable Intervention  Therapeutic Exercise 25   ADL Self-care 55   Neuro Re-Ed    Therapeutic Activity 40   Group    Other:    TOTAL 120       Social History  Social/Functional History  Lives With: Alone  Type of Home: Condo  Home Layout: One level  Home Access: Level entry  Bathroom Shower/Tub: Walk-in shower, Shower chair without back  Bathroom Toilet: Handicap height  Bathroom Equipment: Grab bars in shower  Bathroom Accessibility: Accessible  Home Equipment:  (C-pap)  Has the patient had two or more falls in the past year or any fall with injury in the past year?: No  Receives Help From: Other (comment)  ADL Assistance: [facility] [address]: Independent  Homemaking Responsibilities: Yes  Ambulation Assistance: Independent  Transfer Assistance: Independent  Active : Yes  Mode of Transportation: Car  Occupation: Retired,   Leisure & Hobbies: Pt does a lot of volunteer work  Additional Comments: has a sleep number bed due to back problems, Hx of LLD (RLE shorter than LLE)    Objective                                                                                    Goals:  (Update in navigator)  Short Term Goals  Time Frame for Short term goals: LTGs= STGs:  Long Term Goals  Time Frame for Long term goals : 5-7 days or until D/C  Long Term Goal 1: Pt will complete oral hygiene and feeding IND  Long Term Goal 2: Pt will complete UB dressing/ bathing IND  Long Term Goal 3: Pt will complete LB dressing/ bathing IND  Long Term Goal 4: Pt will complete all aspects of toileting IND  Long Term Goal 5: Pt will complete all functional transfers IND  Long Term Goal 6: Pt will complete donning/ doffing of foot waear IND  Long Term Goal 7: Pt will complete an IADL activity IND  Long Term Goal 8: Pt will complete 15 minutes of a stengthening or endurance activity with S:        Plan of Care                                                                              Times per week: 5 days per week for a minimum of 60 minutes/day plus group as appropriate for 60 minutes.   Treatment to include Plan  Times per Day: Daily  Current Treatment Recommendations: Strengthening, Functional mobility training, Endurance training, Pain management, Safety education & training, Patient/Caregiver education & training, Equipment evaluation, education, & procurement, Self-Care / ADL, Home management training    Electronically signed by   ARMAAN Chao,  8/17/2022, 7:26 AM

## 2022-08-17 NOTE — PROGRESS NOTES
Physical Therapy    [x] daily progress note       [x] discharge       Patient Name:  Jessenia Nicholson   :  1950 MRN: [id number]  Room:  [address] [place] [address] Date of Admission: 2022  Rehabilitation Diagnosis:   Sepsis, unspecified organism [A41.9]  Sepsis ([facility] [address] [A41.9]       Date 2022       Day of ARU Week:  6   Time IN/OUT 1000/1100   Individual Tx Minutes 60   Group Tx Minutes    Co-Treat Minutes    Concurrent Tx Minutes    TOTAL Tx Time Mins 60   Variance Time    Variance Time []   Refusal due to:     []   Medical hold/reason:    []   Illness   []   Off Unit for test/procedure  []   Extra time needed to complete task  []   Therapeutic need  []   Other (specify):   Restrictions Restrictions/Precautions  Restrictions/Precautions: Fall Risk, General Precautions  Position Activity Restriction  Other position/activity restrictions: high gait belt placement due to recent abdominal surgery   Interdisciplinary communication [x]   Cleared for therapy per nursing     []   RN notified about issues during session  []   RN updated on pt performance  []   Spoke with   []   Spoke with OT  []   Spoke with MD  []   Other:    Subjective observations and cognitive status: Pt resting in recliner with BLE elevated, reports episode of BM earlier today and feels better, aware of discharge plan tomorrow.       Pain level/location:  0/10 at rest, 4-5/10 with coughing       Location: L incision site on abdomen   Discharge recommendations  Anticipated discharge date:  2022  Destination: [x]home alone   []home alone with assist PRN     [] home w/ family      [] Continuous supervision  []SNF    [] Assisted living     [] Other:  Continued therapy: [x]HHC PT  []OUTPATIENT PT  [] No Further PT  []SNF PT  Caregiver training recommended: []Yes  [] No  Equipment needs: 2WW  Jessenia Nicholson requires the assistance of a front-wheeled walker to successfully ambulate from room to room at home to allow completion of daily living tasks such as: bathing, toileting, dressing and grooming. A wheeled walker is necessary due to the patient's unsteady gait, upper body weakness, inability to  a standard walker. This patient can ambulate only by pushing a walker instead of using a lesser assistive device such as a cane or crutch. PT IRF-PERI scores and goals for discharge assessment:   Roll Left and Right  Assistance Needed: Independent  Comment: Ind without use of bed features  Reason if not Attempted: Not attempted due to medical condition or safety concerns  CARE Score: 6  Discharge Goal: Independent    Sit to Lying  Assistance Needed: Independent  Comment: Ind without use of bed features  CARE Score: 6  Discharge Goal: Independent    Lying to Sitting on Side of Bed  Assistance Needed: Independent  Comment: Ind without use of bed features  CARE Score: 6  Discharge Goal: Independent    Sit to Stand  Assistance Needed: Independent  Comment: Mod Ind with 2WW->Ind  CARE Score: 6  Discharge Goal: Independent    Chair/Bed-to-Chair Transfer  Assistance Needed: Independent  Comment: Mod Ind with 2WW->Ind  CARE Score: 6  Discharge Goal: Independent    Car Transfer  Assistance Needed: Independent  Comment: Mod Ind as performed in his personal vehicle on 08/16/2022  Reason if not Attempted: Not attempted due to environmental limitations  CARE Score: 6  Discharge Goal: Independent    Walk 10 Feet? Walk 10 Feet?: Yes    1 Step  1 Step?: Yes    Picking Up Object  Assistance Needed: Independent  Comment: Mod Ind with reacher and 2WW  CARE Score: 6  Discharge Goal: Independent    Wheelchair Ability  Uses a Wheelchair and/or Scooter?: No       Walking Ability  Does the Patient Walk?: Yes    Walk 10 Feet  Assistance Needed: Independent  Comment: Mod Ind with 2WW  CARE Score: 6  Discharge Goal: Independent    Walk 50 Feet with Two Turns  Assistance Needed: Independent  Comment:  Mod Ind with 2WW  CARE Score: 6  Discharge Goal: Independent    Walk 150 Feet  Assistance Needed: Independent  Comment: Mod Ind with 2WW; pt was able to ambulate up to 893 ft during 6MWT  CARE Score: 6  Discharge Goal: Independent    Walking 10 Feet on Uneven Surfaces  Assistance Needed: Independent  Comment: Mod Ind with 2WW  CARE Score: 6  Discharge Goal: Independent    1 Step (Curb)  Assistance Needed: Independent  Comment: Mod Ind with 2WW  CARE Score: 6  Discharge Goal: Independent    4 Steps  Assistance Needed: Independent  Comment: Mod Ind with R ascnding/descending rail with consistent step-through pattern on ascent and step-to pattern leading with LLE consistently on descent; pt was consistently steady throughout activity  CARE Score: 6  Discharge Goal: Independent    12 Steps  Assistance Needed: Independent  Comment: Mod Ind with R ascnding/descending rail with consistent step-through pattern on ascent and step-to pattern leading with LLE consistently on descent; pt was consistently steady throughout activity  CARE Score: 6  Discharge Goal: Independent      Additional Therapeutic activities/exercises completed this date:     []   Nu-step:  Time:        Level:         #Steps:       []   Rebounder:    []  Seated     []  Standing        []   Balance training         []   Postural training    []   Supine ther ex (reps/sets):     []   Seated ther ex (reps/sets):     []   Standing ther ex (reps/sets):     []   Other: Toileting activity completed with    [x]   Other:  Six Minute Walk Test (6MWT)    Supplemental Oxygen during the test:  [x]  No  []  Yes, flow _____L/min, type: ____________     BASELINE END OF TEST   /72 141/66   Heart Rate 57 61   SpO2 97% 98%     Stopped or paused before 6 minutes?    [x] No     [] Yes, reason: ______________  Other symptoms at end of exercise: [] angina   []   Dizziness                   [] hip, leg, or calf pain  Total Distance walked in 6 minutes: 893 ft      Comments:      Patient/Caregiver Education and Training:   []   Role of PT  [] Education about Dx  []   Use of call light for assist   []   HEP provided and explained   []   Treatment plan reviewed  []   Home safety  []   Wheelchair mobility/management   []   Body mechanics  []   Bed Mobility/Transfer technique  []   Gait technique/sequencing  []   Proper use of assistive device/adaptive equipment  []   Stair training/Advanced mobility safety and technique  []   Reinforced patient's precautions/mobility while maintaining precautions  []   Postural awareness  []   Family/caregiver training  [x]   Progress was updated and reviewed with patient  this date. []   Other:    Treatment Plan for Next Session: discharge to home     Assessment: This pt has met all established PT goals and his current functional mobility is safe for return to home.     Treatment/Activity Tolerance:   [x] Tolerated treatment with no adverse effects    [] Patient limited by fatigue  [] Patient limited by pain   [] Patient limited by medical complications:    [] Adverse reaction to Tx:   [] Significant change in status    Safety:       []  bed alarm set    []  chair alarm set    []  Pt refused alarms                []  Telesitter activated       [x] Gait belt used during tx session      [x]other:   Pt cleared to be Mod Ind in room as of 08/16/2022    Number of Minutes/Billable Intervention  Gait Training 35   Therapeutic Exercise    Neuro Re-Ed    Therapeutic Activity 25   Wheelchair Propulsion    Group    Other:    TOTAL 60     Social History  Social/Functional History  Lives With: Alone  Type of Home: Condo  Home Layout: One level  Home Access: Level entry  Bathroom Shower/Tub: Walk-in shower, Shower chair without back  Bathroom Toilet: Handicap height  Bathroom Equipment: Grab bars in shower  Bathroom Accessibility: John Gonzalez:  (C-pap)  Has the patient had two or more falls in the past year or any fall with injury in the past year?: No  Receives Help From: Other (comment)  ADL Assistance: Independent  Homemaking Assistance: Independent  Homemaking Responsibilities: Yes  Ambulation Assistance: Independent  Transfer Assistance: Independent  Active : Yes  Mode of Transportation: Car  Occupation: Retired,   Leisure & Hobbies: Pt does a lot of volunteer work  Additional Comments: has a sleep number bed due to back problems, Hx of LLD (RLE shorter than LLE)    Objective                                                                                    Goals:  (Update in navigator)  Short Term Goals  Time Frame for Short term goals: 5-7 tx days:  Short term goal 1: Pt will complete bed mobility (scooting, rolling R/L, and sup<->sit) Ind. Short term goal 2: Pt will complete OOB transfers using 2WW Mod Ind. Short term goal 3: Pt will ambulate >/=150 ft over level surface and at least 10 ft of uneven surface using 2WW Mod Ind. Short term goal 4: Pt will ascend/descend curb step using 2WW and 1 flight of stairs using railings Mod Ind. Short term goal 5: Pt will complete object retrieval from the floor with 2WW and reacher Mod Ind.:   :        Plan of Care                                                                              Times per week: 5 days per week for a minimum of 60 minutes/day plus group as appropriate for 60 minutes.   Treatment to include Current Treatment Recommendations: Strengthening, Balance training, Functional mobility training, Transfer training, Endurance training, Gait training, Stair training, Pain management, Home exercise program, Safety education & training, Patient/Caregiver education & training, Equipment evaluation, education, & procurement, Therapeutic activities    Electronically signed by   Eusebio Jackson PT  8/17/2022, 11:58 AM

## 2022-08-17 NOTE — PROGRESS NOTES
Jessica Hayes    : 1950  Acct #: [de-identified]  MRN: [id number]              PM&R Progress Note      Admitting diagnosis: Sepsis due to cholelithiasis ( [address])     Comorbid diagnoses impacting rehabilitation: Generalized weakness, gait disturbance, status post cholecystectomy (8/10/2022), uncontrolled pain, essential hypertension, acquired hypothyroidism, obstructive sleep apnea, mixed hyperlipidemia, sinus bradycardia, morbid obesity (BMI 45.6)    Chief complaint: Experiencing very little pain. Occasional bowel movement. No nausea with eating. Prior (baseline) level of function: Independent. Current level of function:         Current  IRF-PERI and Goals:   Occupational Therapy:    Short Term Goals  Time Frame for Short term goals: LTGs= STGs :   Long Term Goals  Time Frame for Long term goals : 5-7 days or until D/C  Long Term Goal 1: Pt will complete oral hygiene and feeding IND  Long Term Goal 2: Pt will complete UB dressing/ bathing IND  Long Term Goal 3: Pt will complete LB dressing/ bathing IND  Long Term Goal 4: Pt will complete all aspects of toileting IND  Long Term Goal 5: Pt will complete all functional transfers IND  Long Term Goal 6: Pt will complete donning/ doffing of foot waear IND  Long Term Goal 7: Pt will complete an IADL activity IND  Long Term Goal 8: Pt will complete 15 minutes of a stengthening or endurance activity with S :                                       Eating: Eating  Assistance Needed: Independent  Comment: X  CARE Score: 6  Discharge Goal: Independent       Oral Hygiene: Oral Hygiene  Assistance Needed: Independent  Comment: Mod I in stance at sink. CARE Score: 6  Discharge Goal: Independent    UB/LB Bathing: Shower/Bathe Self  Assistance Needed: Independent  Comment: Mod I to complete full shower.   CARE Score: 6  Discharge Goal: Independent    UB Dressing: Upper Body Dressing  Assistance Needed: Independent  Comment: X  CARE Score: 6  Discharge Goal: Independent LB Dressing: Lower Body Dressing  Assistance Needed: Independent  Comment: Mod I to doff/don underwear and pants. CARE Score: 6  Discharge Goal: Independent    Donning and [place] Footwear: Putting On/Taking Off Footwear  Assistance Needed: Independent  Comment: Pt Mod I to doff/don slip on shoes. CARE Score: 6  Discharge Goal: Independent      Toileting: Toileting Hygiene  Assistance needed: Independent  Comment: Mod I with episode of urination. CARE Score: 6  Discharge Goal: Independent      Toilet Transfers: Toilet Transfer  Assistance needed: Independent  Comment: Mod I with 2WW and grab bar. CARE Score: 6  Discharge Goal: Independent    Physical Therapy:   Short Term Goals  Time Frame for Short term goals: 5-7 tx days:  Short term goal 1: Pt will complete bed mobility (scooting, rolling R/L, and sup<->sit) Ind. Short term goal 2: Pt will complete OOB transfers using 2WW Mod Ind. Short term goal 3: Pt will ambulate >/=150 ft over level surface and at least 10 ft of uneven surface using 2WW Mod Ind. Short term goal 4: Pt will ascend/descend curb step using 2WW and 1 flight of stairs using railings Mod Ind. Short term goal 5: Pt will complete object retrieval from the floor with 2WW and reacher Mod Ind. Bed Mobility:   Sit to Lying  Assistance Needed: Independent  Comment: Ind without use of bed features  CARE Score: 6  Discharge Goal: Independent  Roll Left and Right  Assistance Needed: Independent  Comment: Ind without use of bed features; pt with increased tolerance to stay on R/L side  Reason if not Attempted: Not attempted due to medical condition or safety concerns  CARE Score: 6  Discharge Goal: Independent  Lying to Sitting on Side of Bed  Assistance Needed: Independent  Comment: Ind without use of bed features  CARE Score: 6  Discharge Goal: Independent    Transfers:    Sit to Stand  Assistance Needed: Independent  Comment:  Mod Ind with 2WW->Ind  CARE Score: 6  Discharge Goal: Independent  Chair/Bed-to-Chair Transfer  Assistance Needed: Independent  Comment: Mod Ind with 2WW->Ind  CARE Score: 6  Discharge Goal: Independent  Toilet Transfer  Assistance needed: Independent  Comment: SBA with R grab bar and 2WW  CARE Score: 6  Car Transfer  Assistance Needed: Independent  Comment: Mod Ind (used car as external support to assist with balance; pt completed in his personal vehicle today)  Reason if not Attempted: Not attempted due to environmental limitations  CARE Score: 6  Discharge Goal: Independent    Ambulation:    Walking Ability  Does the Patient Walk?: Yes     Walk 10 Feet  Assistance Needed: Independent  Comment: Mod Ind with 2WW->Ind  CARE Score: 6  Discharge Goal: Independent     Walk 50 Feet with Two Turns  Assistance Needed: Independent  Comment: Mod Ind with 2WW->Ind  CARE Score: 6  Discharge Goal: Independent     Walk 150 Feet  Assistance Needed: Independent  Comment: Mod Ind with 2WW (usual performance)->Ind  CARE Score: 6  Discharge Goal: Independent     Walking 10 Feet on Uneven Surfaces  Assistance Needed: Independent  Comment: Mod Ind with 2WW  CARE Score: 6  Discharge Goal: Independent     1 Step (Curb)  Assistance Needed: Independent  Comment: Mod Ind with 2WW  CARE Score: 6  Discharge Goal: Independent     4 Steps  Assistance Needed: Independent  Comment: Mod Ind with R ascnding/descending rail with consistent step-through pattern on ascent and step-to pattern leading with LLE consistently on descent; pt was consistently steady throughout activity  CARE Score: 6  Discharge Goal: Independent     12 Steps  Assistance Needed: Independent  Comment:  Mod Ind with R ascnding/descending rail with consistent step-through pattern on ascent and step-to pattern leading with LLE consistently on descent; pt was consistently steady throughout activity  CARE Score: 6  Discharge Goal: Independent       Wheelchair:  w/c Ability: Wheelchair Ability  Uses a Wheelchair and/or Scooter?: No Balance:        Object: Picking Up Object  Assistance Needed: Independent  Comment: Mod Ind with reacher in unsupported stance  CARE Score: 6  Discharge Goal: Independent    I      Exam:    Blood pressure (!) 142/63, pulse 51, temperature 98.7 °F (37.1 °C), temperature source Oral, resp. rate 16, height 5' 9\" (1.753 m), weight 300 lb (136.1 kg), SpO2 97 %. General: Observed sitting up in a chair and physical therapy and then coming to standing on a walker. Alert and talkative. Easily distracted, however. HEENT: MMM. Neck supple. Full visual field. Pulmonary: Unlabored and clear. Cardiac: Regular rate and rhythm. Abdomen: Patient's abdomen is soft and nondistended. Bowel sounds were present throughout. There was no rebound, guarding or masses noted. Upper extremities: He was able to manipulate the walker confidently with his hands. No significant bruising. Lower extremities: Trace edema. Calf soft. Disfigurement from the past trauma. Sitting balance was good. Standing balance was fair-.    Lab Results   Component Value Date    WBC 5.4 08/16/2022    HGB 10.3 (L) 08/16/2022    HCT 32.7 (L) 08/16/2022    MCV 99.7 08/16/2022     08/16/2022     Lab Results   Component Value Date    INR 1.04 08/05/2022    INR 1.05 11/04/2014    PROTIME 13.4 08/05/2022    PROTIME 12.0 11/04/2014     Lab Results   Component Value Date    CREATININE 0.6 (L) 08/16/2022    BUN 7 08/16/2022     08/16/2022    K 4.0 08/16/2022     08/16/2022    CO2 31 08/16/2022     Lab Results   Component Value Date     (H) 08/12/2022     (H) 08/12/2022     (H) 08/05/2022    ALKPHOS 458 (H) 08/12/2022    BILITOT 1.5 (H) 08/12/2022       Expected length of stay  prior to a supervised level of function for discharge home with a walker and [facility] 76 OT/PT is 8/18/2022.     Recommendations:    Sepsis due to cholelithiasis: He has already demonstrated benefit from participating in the daily occupational and physical therapy. Tolerating the course of oral antibiotics. Also tolerating a regular diet without GI upset at this point. Ongoing adaptive equipment training, aggressive pulmonary hygiene measures and DVT prophylaxis. We must provide nutritional support, caregiver education and monitoring of his blood pressure with activity. Verbal cues and CGA for transfers. DVT prophylaxis: Lovenox 30 mg SQ twice daily. I must periodically monitor his hemoglobin and platelet count while on this medication. Weightbearing activities are significantly improving daily. GI prophylaxis is available. No new bruising or swelling. Uncontrolled pain: Progressive mobilization and acetaminophen are now adequate for control. He intentionally stopped the Norco and requested tramadol but has not required it yet. Bowel intervention while on the analgesics is working. Hypertension: Blood pressure medications are gradually being reintroduced after his period of low blood pressures initially. Cozaar ordered with parameters. Pain management has been helpful as well. His blood pressure is minimally above the target range today. Monitoring closely. Bradycardia: Vital signs are checked at rest and with activity. Outpatient follow-up with cardiology as planned. MEGHAN: Home use of BiPAP has been continued in the hospital.         Counseling and Coordination of Care: In care conference today I met with the patient's OT, PT, RN and . We discussed the patient's problems, progress and prognosis. Disposition issues were clarified and plans were established for ongoing rehabilitation efforts beyond the ARU stay. I reviewed this information with the patient during a second distinct visit with the patient. More than half of the total time of 34 minutes spent with the patient involved counseling and coordination of care.

## 2022-08-18 ENCOUNTER — NURSE ONLY (OUTPATIENT)
Dept: CARDIOLOGY CLINIC | Age: 72
End: 2022-08-18

## 2022-08-18 ENCOUNTER — TELEPHONE (OUTPATIENT)
Dept: FAMILY MEDICINE CLINIC | Age: 72
End: 2022-08-18

## 2022-08-18 ENCOUNTER — TELEPHONE (OUTPATIENT)
Dept: CARDIOLOGY CLINIC | Age: 72
End: 2022-08-18

## 2022-08-18 VITALS
RESPIRATION RATE: 22 BRPM | OXYGEN SATURATION: 95 % | BODY MASS INDEX: 42.64 KG/M2 | HEART RATE: 56 BPM | TEMPERATURE: 98.1 F | DIASTOLIC BLOOD PRESSURE: 70 MMHG | WEIGHT: 287.92 LBS | HEIGHT: 69 IN | SYSTOLIC BLOOD PRESSURE: 134 MMHG

## 2022-08-18 DIAGNOSIS — R00.1 SINUS BRADYCARDIA: Primary | ICD-10-CM

## 2022-08-18 PROCEDURE — 6370000000 HC RX 637 (ALT 250 FOR IP): Performed by: PHYSICAL MEDICINE & REHABILITATION

## 2022-08-18 PROCEDURE — 94761 N-INVAS EAR/PLS OXIMETRY MLT: CPT

## 2022-08-18 PROCEDURE — 99239 HOSP IP/OBS DSCHRG MGMT >30: CPT | Performed by: PHYSICAL MEDICINE & REHABILITATION

## 2022-08-18 PROCEDURE — 6370000000 HC RX 637 (ALT 250 FOR IP): Performed by: STUDENT IN AN ORGANIZED HEALTH CARE EDUCATION/TRAINING PROGRAM

## 2022-08-18 RX ORDER — SENNA PLUS 8.6 MG/1
1 TABLET ORAL DAILY
Qty: 30 TABLET | Refills: 0 | COMMUNITY
Start: 2022-08-19 | End: 2022-09-18

## 2022-08-18 RX ADMIN — DOCUSATE SODIUM 100 MG: 100 CAPSULE, LIQUID FILLED ORAL at 08:39

## 2022-08-18 RX ADMIN — SENNOSIDES 8.6 MG: 8.6 TABLET, FILM COATED ORAL at 05:45

## 2022-08-18 RX ADMIN — FLUTICASONE PROPIONATE 1 SPRAY: 50 SPRAY, METERED NASAL at 08:39

## 2022-08-18 RX ADMIN — LOSARTAN POTASSIUM 25 MG: 25 TABLET, FILM COATED ORAL at 08:39

## 2022-08-18 RX ADMIN — LEVOTHYROXINE SODIUM 175 MCG: 0.12 TABLET ORAL at 05:45

## 2022-08-18 ASSESSMENT — PAIN SCALES - GENERAL: PAINLEVEL_OUTOF10: 0

## 2022-08-18 NOTE — PROGRESS NOTES
Silke Rivera    : 1950  Acct #: [de-identified]  MRN: [id number]              PM&R Progress Note      Admitting diagnosis: Sepsis due to cholelithiasis ( [address])     Comorbid diagnoses impacting rehabilitation: Generalized weakness, gait disturbance, status post cholecystectomy (8/10/2022), uncontrolled pain, essential hypertension, acquired hypothyroidism, obstructive sleep apnea, mixed hyperlipidemia, sinus bradycardia, morbid obesity (BMI 45.6)    Chief complaint: Feeling stronger. A little anxious about the pending discharge. No new GI symptoms. Prior (baseline) level of function: Independent.     Current level of function:         Current  IRF-PERI and Goals:   Occupational Therapy:    Short Term Goals  Time Frame for Short term goals: LTGs= STGs :   Long Term Goals  Time Frame for Long term goals : 5-7 days or until D/C  Long Term Goal 1: Pt will complete oral hygiene and feeding IND  Long Term Goal 2: Pt will complete UB dressing/ bathing IND  Long Term Goal 3: Pt will complete LB dressing/ bathing IND  Long Term Goal 4: Pt will complete all aspects of toileting IND  Long Term Goal 5: Pt will complete all functional transfers IND  Long Term Goal 6: Pt will complete donning/ doffing of foot waear IND  Long Term Goal 7: Pt will complete an IADL activity IND  Long Term Goal 8: Pt will complete 15 minutes of a stengthening or endurance activity with S :                                       Eating: Eating  Assistance Needed: Independent  Comment: X  CARE Score: 6  Discharge Goal: Independent       Oral Hygiene: Oral Hygiene  Assistance Needed: Independent  Comment: X  CARE Score: 6  Discharge Goal: Independent    UB/LB Bathing: Shower/Bathe Self  Assistance Needed: Independent  Comment: X  CARE Score: 6  Discharge Goal: Independent    UB Dressing: Upper Body Dressing  Assistance Needed: Independent  Comment: X  CARE Score: 6  Discharge Goal: Independent         LB Dressing: Lower Body Dressing  Assistance Needed: Independent  Comment: X  CARE Score: 6  Discharge Goal: Independent    Donning and [place] Footwear: Putting On/Taking Off Footwear  Assistance Needed: Independent  Comment: X  CARE Score: 6  Discharge Goal: Independent      Toiletin [address] needed: Independent  Comment: X  CARE Score: 6  Discharge Goal: Independent      Toilet Transfers: Toilet Transfer  Assistance needed: Independent  Comment: X  CARE Score: 6  Discharge Goal: Independent    Physical Therapy:   Short Term Goals  Time Frame for Short term goals: 5-7 tx days:  Short term goal 1: Pt will complete bed mobility (scooting, rolling R/L, and sup<->sit) Ind. Short term goal 2: Pt will complete OOB transfers using 2WW Mod Ind. Short term goal 3: Pt will ambulate >/=150 ft over level surface and at least 10 ft of uneven surface using 2WW Mod Ind. Short term goal 4: Pt will ascend/descend curb step using 2WW and 1 flight of stairs using railings Mod Ind. Short term goal 5: Pt will complete object retrieval from the floor with 2WW and reacher Mod Ind. Bed Mobility:   Sit to Lying  Assistance Needed: Independent  Comment: Ind without use of bed features  CARE Score: 6  Discharge Goal: Independent  Roll Left and Right  Assistance Needed: Independent  Comment: Ind without use of bed features  Reason if not Attempted: Not attempted due to medical condition or safety concerns  CARE Score: 6  Discharge Goal: Independent  Lying to Sitting on Side of Bed  Assistance Needed: Independent  Comment: Ind without use of bed features  CARE Score: 6  Discharge Goal: Independent    Transfers:    Sit to Stand  Assistance Needed: Independent  Comment: Mod Ind with 2WW->Ind  CARE Score: 6  Discharge Goal: Independent  Chair/Bed-to-Chair Transfer  Assistance Needed: Independent  Comment:  Mod Ind with 2WW->Ind  CARE Score: 6  Discharge Goal: Independent  Toilet Transfer  Assistance needed: Independent  Comment: SBA with R grab bar and 2WW  CARE Score: 6  Car Transfer  Assistance Needed: Independent  Comment: Mod Ind as performed in his personal vehicle on 08/16/2022  Reason if not Attempted: Not attempted due to environmental limitations  CARE Score: 6  Discharge Goal: Independent    Ambulation:    Walking Ability  Does the Patient Walk?: Yes     Walk 10 Feet  Assistance Needed: Independent  Comment: Mod Ind with 2WW  CARE Score: 6  Discharge Goal: Independent     Walk 50 Feet with Two Turns  Assistance Needed: Independent  Comment: Mod Ind with 2WW  CARE Score: 6  Discharge Goal: Independent     Walk 150 Feet  Assistance Needed: Independent  Comment: Mod Ind with 2WW; pt was able to ambulate up to 893 ft during 6MWT  CARE Score: 6  Discharge Goal: Independent     Walking 10 Feet on Uneven Surfaces  Assistance Needed: Independent  Comment: Mod Ind with 2WW  CARE Score: 6  Discharge Goal: Independent     1 Step (Curb)  Assistance Needed: Independent  Comment: Mod Ind with 2WW  CARE Score: 6  Discharge Goal: Independent     4 Steps  Assistance Needed: Independent  Comment: Mod Ind with R ascnding/descending rail with consistent step-through pattern on ascent and step-to pattern leading with LLE consistently on descent; pt was consistently steady throughout activity  CARE Score: 6  Discharge Goal: Independent     12 Steps  Assistance Needed: Independent  Comment: Mod Ind with R ascnding/descending rail with consistent step-through pattern on ascent and step-to pattern leading with LLE consistently on descent; pt was consistently steady throughout activity  CARE Score: 6  Discharge Goal: Independent       Wheelchair:  w/c Ability: Wheelchair Ability  Uses a Wheelchair and/or Scooter?: No                Balance:        Object: Picking Up Object  Assistance Needed: Independent  Comment:  Mod Ind with reacher and 2WW  CARE Score: 6  Discharge Goal: Independent    I      Exam:    Blood pressure 124/73, pulse 55, temperature 98.4 °F (36.9 °C), temperature source Oral, resp. rate 18, height 5' 9\" (1.753 m), weight 291 lb 14.2 oz (132.4 kg), SpO2 96 %. General: Sitting up in a wheelchair. Came to standing in therapy. Alert. Talkative. Easily distracted though. HEENT: MMM. Neck supple. No JVD. Pulmonary: Faint breath sounds inferiorly. No coughing. Cardiac: RRR. Abdomen: Patient's abdomen is soft and nondistended. Bowel sounds were present throughout. There was no rebound, guarding or masses noted. Upper extremities: Moving both upper limbs with more confidence. Functional  strength. No tremor or new bruising. Lower extremities: Some swelling without signs of new DVT. Heels clear. Sitting balance was good. Standing balance was fair-.    Lab Results   Component Value Date    WBC 5.4 08/16/2022    HGB 10.3 (L) 08/16/2022    HCT 32.7 (L) 08/16/2022    MCV 99.7 08/16/2022     08/16/2022     Lab Results   Component Value Date    INR 1.04 08/05/2022    INR 1.05 11/04/2014    PROTIME 13.4 08/05/2022    PROTIME 12.0 11/04/2014     Lab Results   Component Value Date    CREATININE 0.6 (L) 08/16/2022    BUN 7 08/16/2022     08/16/2022    K 4.0 08/16/2022     08/16/2022    CO2 31 08/16/2022     Lab Results   Component Value Date     (H) 08/12/2022     (H) 08/12/2022     (H) 08/05/2022    ALKPHOS 458 (H) 08/12/2022    BILITOT 1.5 (H) 08/12/2022       Expected length of stay  prior to a supervised level of function for discharge home with a walker and [facility] OT/PT is 8/18/2022. Recommendations:    Sepsis due to cholelithiasis: We will transition to a home therapy plan with nursing after discharge from rehab tomorrow. Follow-up with his general surgeon in 1 week. Continue with the course of oral antibiotics. Also tolerating a regular diet without GI upset at this point. Ongoing adaptive equipment training, aggressive pulmonary hygiene measures and DVT prophylaxis.   We must provide nutritional support, caregiver education and monitoring of his blood pressure with activity. Verbal cues and SBA for transfers. DVT prophylaxis: Lovenox 30 mg SQ twice daily. I must periodically monitor his hemoglobin and platelet count while on this medication. Weightbearing activities are consistently improving daily. GI prophylaxis is available. No clinical signs of acute blood loss. I will DC the Lovenox at discharge. Uncontrolled pain: Progressive mobilization and acetaminophen are now adequate for control. He is intentionally minimizing analgesics which is helping his bowel function. Hypertension: Blood pressure medications are gradually being reintroduced after his period of low blood pressures initially. Cozaar ordered with parameters. Pain management has been helpful as well. His blood pressure is within the target range today. Monitoring closely. Bradycardia: Vital signs are checked at rest and with activity. Outpatient follow-up with cardiology as planned.   MEGHAN: Home use of BiPAP has been continued in the hospital.

## 2022-08-18 NOTE — TELEPHONE ENCOUNTER
Guillaume Maciel from [facility] called pt is being discharged on and 8/18/2022, needs to have orders for PT/OT.   Fax to [phone]

## 2022-08-18 NOTE — PROGRESS NOTES
14 day holter monitor [email or web address]  for sinus bradycardia  Serial # [id number] . Instructed patient on monitor and proper use. Instructed on diary. When to remove and bring it back. Must leave the holter monitor on  without removing for the duration of time ordered. Answered all questions the patient had. Instructed patient to call [facility] at [phone] with any questions or concerns with the monitor.

## 2022-08-18 NOTE — PROGRESS NOTES
Patient verbalized understanding of discharge instructions. He is aware he must stop at [facility] to  his walker. Departed via wheelchair, accompanied by family member.

## 2022-08-19 ENCOUNTER — TELEPHONE (OUTPATIENT)
Dept: CARDIOLOGY CLINIC | Age: 72
End: 2022-08-19

## 2022-08-19 ENCOUNTER — APPOINTMENT (OUTPATIENT)
Dept: GENERAL RADIOLOGY | Age: 72
DRG: 862 | End: 2022-08-19
Payer: MEDICARE

## 2022-08-19 ENCOUNTER — HOSPITAL ENCOUNTER (INPATIENT)
Age: 72
LOS: 5 days | Discharge: HOME | DRG: 862 | End: 2022-08-25
Attending: EMERGENCY MEDICINE | Admitting: STUDENT IN AN ORGANIZED HEALTH CARE EDUCATION/TRAINING PROGRAM
Payer: MEDICARE

## 2022-08-19 ENCOUNTER — APPOINTMENT (OUTPATIENT)
Dept: CT IMAGING | Age: 72
DRG: 862 | End: 2022-08-19
Payer: MEDICARE

## 2022-08-19 ENCOUNTER — TELEPHONE (OUTPATIENT)
Dept: FAMILY MEDICINE CLINIC | Age: 72
End: 2022-08-19

## 2022-08-19 DIAGNOSIS — R65.20 SEVERE SEPSIS (HCC): ICD-10-CM

## 2022-08-19 DIAGNOSIS — A41.9 SEVERE SEPSIS (HCC): ICD-10-CM

## 2022-08-19 DIAGNOSIS — R50.9 FEVER, UNSPECIFIED FEVER CAUSE: Primary | ICD-10-CM

## 2022-08-19 DIAGNOSIS — T81.43XA INFECTION OF ORGAN OR ORGAN SPACE AFTER SURGERY, INITIAL ENCOUNTER: Primary | ICD-10-CM

## 2022-08-19 LAB
ALBUMIN SERPL-MCNC: 3.8 GM/DL (ref 3.4–5)
ALP BLD-CCNC: 205 IU/L (ref 40–129)
ALT SERPL-CCNC: 23 U/L (ref 10–40)
ANION GAP SERPL CALCULATED.3IONS-SCNC: 11 MMOL/L (ref 4–16)
AST SERPL-CCNC: 25 IU/L (ref 15–37)
BACTERIA: NEGATIVE /HPF
BASOPHILS ABSOLUTE: 0.1 K/CU MM
BASOPHILS RELATIVE PERCENT: 0.4 % (ref 0–1)
BILIRUB SERPL-MCNC: 1.1 MG/DL (ref 0–1)
BILIRUBIN URINE: NEGATIVE MG/DL
BLOOD, URINE: NEGATIVE
BUN BLDV-MCNC: 7 MG/DL (ref 6–23)
CALCIUM SERPL-MCNC: 8.9 MG/DL (ref 8.3–10.6)
CHLORIDE BLD-SCNC: 97 MMOL/L (ref 99–110)
CLARITY: CLEAR
CO2: 23 MMOL/L (ref 21–32)
COLOR: YELLOW
CREAT SERPL-MCNC: 0.6 MG/DL (ref 0.9–1.3)
DIFFERENTIAL TYPE: ABNORMAL
EOSINOPHILS ABSOLUTE: 0 K/CU MM
EOSINOPHILS RELATIVE PERCENT: 0.1 % (ref 0–3)
GFR AFRICAN AMERICAN: >60 ML/MIN/1.73M2
GFR NON-AFRICAN AMERICAN: >60 ML/MIN/1.73M2
GLUCOSE BLD-MCNC: 104 MG/DL (ref 70–99)
GLUCOSE, URINE: NEGATIVE MG/DL
HCT VFR BLD CALC: 34.9 % (ref 42–52)
HEMOGLOBIN: 11.6 GM/DL (ref 13.5–18)
IMMATURE NEUTROPHIL %: 0.6 % (ref 0–0.43)
KETONES, URINE: ABNORMAL MG/DL
LACTIC ACID, SEPSIS: 1 MMOL/L (ref 0.5–1.9)
LEUKOCYTE ESTERASE, URINE: NEGATIVE
LYMPHOCYTES ABSOLUTE: 1.5 K/CU MM
LYMPHOCYTES RELATIVE PERCENT: 8.8 % (ref 24–44)
MCH RBC QN AUTO: 32.4 PG (ref 27–31)
MCHC RBC AUTO-ENTMCNC: 33.2 % (ref 32–36)
MCV RBC AUTO: 97.5 FL (ref 78–100)
MONOCYTES ABSOLUTE: 1.9 K/CU MM
MONOCYTES RELATIVE PERCENT: 10.9 % (ref 0–4)
MUCUS: ABNORMAL HPF
NITRITE URINE, QUANTITATIVE: NEGATIVE
NUCLEATED RBC %: 0 %
PDW BLD-RTO: 14.9 % (ref 11.7–14.9)
PH, URINE: 7.5 (ref 5–8)
PLATELET # BLD: 307 K/CU MM (ref 140–440)
PMV BLD AUTO: 9.6 FL (ref 7.5–11.1)
POTASSIUM SERPL-SCNC: 3.6 MMOL/L (ref 3.5–5.1)
PROTEIN UA: ABNORMAL MG/DL
RBC # BLD: 3.58 M/CU MM (ref 4.6–6.2)
RBC URINE: ABNORMAL /HPF (ref 0–3)
SARS-COV-2, NAAT: NOT DETECTED
SEGMENTED NEUTROPHILS ABSOLUTE COUNT: 13.5 K/CU MM
SEGMENTED NEUTROPHILS RELATIVE PERCENT: 79.2 % (ref 36–66)
SODIUM BLD-SCNC: 131 MMOL/L (ref 135–145)
SOURCE: NORMAL
SPECIFIC GRAVITY UA: 1.01 (ref 1–1.03)
TOTAL IMMATURE NEUTOROPHIL: 0.1 K/CU MM
TOTAL NUCLEATED RBC: 0 K/CU MM
TOTAL PROTEIN: 7.1 GM/DL (ref 6.4–8.2)
TRICHOMONAS: ABNORMAL /HPF
UROBILINOGEN, URINE: >8 MG/DL (ref 0.2–1)
WBC # BLD: 17.1 K/CU MM (ref 4–10.5)
WBC UA: <1 /HPF (ref 0–2)

## 2022-08-19 PROCEDURE — 80053 COMPREHEN METABOLIC PANEL: CPT

## 2022-08-19 PROCEDURE — 85025 COMPLETE CBC W/AUTO DIFF WBC: CPT

## 2022-08-19 PROCEDURE — 83605 ASSAY OF LACTIC ACID: CPT

## 2022-08-19 PROCEDURE — 6360000004 HC RX CONTRAST MEDICATION: Performed by: EMERGENCY MEDICINE

## 2022-08-19 PROCEDURE — 74177 CT ABD & PELVIS W/CONTRAST: CPT

## 2022-08-19 PROCEDURE — 87040 BLOOD CULTURE FOR BACTERIA: CPT

## 2022-08-19 PROCEDURE — 71045 X-RAY EXAM CHEST 1 VIEW: CPT

## 2022-08-19 PROCEDURE — 81001 URINALYSIS AUTO W/SCOPE: CPT

## 2022-08-19 PROCEDURE — 87086 URINE CULTURE/COLONY COUNT: CPT

## 2022-08-19 PROCEDURE — 93005 ELECTROCARDIOGRAM TRACING: CPT | Performed by: EMERGENCY MEDICINE

## 2022-08-19 PROCEDURE — 99285 EMERGENCY DEPT VISIT HI MDM: CPT

## 2022-08-19 PROCEDURE — 87635 SARS-COV-2 COVID-19 AMP PRB: CPT

## 2022-08-19 RX ORDER — METRONIDAZOLE 500 MG/100ML
500 INJECTION, SOLUTION INTRAVENOUS EVERY 8 HOURS
Status: DISCONTINUED | OUTPATIENT
Start: 2022-08-19 | End: 2022-08-24

## 2022-08-19 RX ADMIN — IOPAMIDOL 80 ML: 755 INJECTION, SOLUTION INTRAVENOUS at 23:11

## 2022-08-19 NOTE — TELEPHONE ENCOUNTER
Patient going back to the ER today for persistent fevers. Will need to re-evaluate home care after ER evaluation as he may also benefit from RN visits.      Team to please let home care team know and document their phone number so we can given them an update on status next week

## 2022-08-19 NOTE — TELEPHONE ENCOUNTER
LM FOR PT TO CALL OFFICE AND ASK TO SPEAK TO AN MA    I spoke with pt about his monitor not sticking, he states there are no lotions or creams on his chest, there is no scar that the strip is covering.  I advised him to call the company they be able to help him better

## 2022-08-19 NOTE — TELEPHONE ENCOUNTER
Patient reports he feels like he needs to go to ED so he is packing up his stuff and going back to ED

## 2022-08-19 NOTE — DISCHARGE SUMMARY
Patient Name: Andre Saunders  Patient :  1950  Patient MRN:   [id number]      Admission Date:  2022  Discharge Date: 2022    Admitting diagnosis: Sepsis due to cholelithiasis ( [address])     Comorbid diagnoses impacting rehabilitation: Generalized weakness, gait disturbance, status post cholecystectomy (8/10/2022), uncontrolled pain, essential hypertension, acquired hypothyroidism, obstructive sleep apnea, mixed hyperlipidemia, sinus bradycardia, morbid obesity (BMI 45.6)    Discharging diagnosis: Sepsis due to cholelithiasis ( [address])     Comorbid diagnoses impacting rehabilitation: Generalized weakness, gait disturbance, status post cholecystectomy (8/10/2022), uncontrolled pain, essential hypertension, acquired hypothyroidism, obstructive sleep apnea, mixed hyperlipidemia, sinus bradycardia, morbid obesity (BMI 45.6)     History of present illness: The patient is a 76-year-old right-hand-dominant male with a history of hypertension, obstructive sleep apnea and morbid obesity who presented to our ED on 2022 after an outpatient imaging of his abdomen identified acute cholelithiasis. He had had low-grade fevers and general malaise with poor oral intake for the previous 2 weeks. His outpatient evaluation led to presentation to our ED where follow-up exam confirmed a cholelithiasis. He was febrile with hypotension and malaise. His white blood count was elevated and he was diagnosed with sepsis requiring antibiotics. He was seen by gastroenterology who performed an ERCP papillotomy on 2022 (Dr. Octaviano Plaza). The next day on 8/10/2022 Dr. Gareth Jarvis performed a laparoscopic cholecystectomy. The patient had some improvement in his nausea but oral intake was still inconsistent. His abdominal pain changed to postsurgical pain. He had significant positional dizziness      Prior (baseline) level of function: Independent.     Current level of function:         Current  IRF-PERI and Goals:   Occupational Therapy:    Short Term Goals  Time Frame for Short term goals: LTGs= STGs :   Long Term Goals  Time Frame for Long term goals : 5-7 days or until D/C  Long Term Goal 1: Pt will complete oral hygiene and feeding IND  Long Term Goal 2: Pt will complete UB dressing/ bathing IND  Long Term Goal 3: Pt will complete LB dressing/ bathing IND  Long Term Goal 4: Pt will complete all aspects of toileting IND  Long Term Goal 5: Pt will complete all functional transfers IND  Long Term Goal 6: Pt will complete donning/ doffing of foot waear IND  Long Term Goal 7: Pt will complete an IADL activity IND  Long Term Goal 8: Pt will complete 15 minutes of a stengthening or endurance activity with S :                                       Eating: Eating  Assistance Needed: Independent  Comment: X  CARE Score: 6  Discharge Goal: Independent       Oral Hygiene: Oral Hygiene  Assistance Needed: Independent  Comment: X  CARE Score: 6  Discharge Goal: Independent    UB/LB Bathing: Shower/Bathe Self  Assistance Needed: Independent  Comment: X  CARE Score: 6  Discharge Goal: Independent    UB Dressing: Upper Body Dressing  Assistance Needed: Independent  Comment: X  CARE Score: 6  Discharge Goal: Independent         LB Dressing: Lower Body Dressing  Assistance Needed: Independent  Comment: X  CARE Score: 6  Discharge Goal: Independent    Donning and [place] Footwear: Putting On/Taking Off Footwear  Assistance Needed: Independent  Comment: X  CARE Score: 6  Discharge Goal: Independent      Toiletin [address] needed: Independent  Comment: X  CARE Score: 6  Discharge Goal: Independent      Toilet Transfers: Toilet Transfer  Assistance needed: Independent  Comment: X  CARE Score: 6  Discharge Goal: Independent    Physical Therapy:   Short Term Goals  Time Frame for Short term goals: 5-7 tx days:  Short term goal 1: Pt will complete bed mobility (scooting, rolling R/L, and sup<->sit) Ind.   Short term goal 2: Pt will complete OOB transfers using 2WW Mod Ind. Short term goal 3: Pt will ambulate >/=150 ft over level surface and at least 10 ft of uneven surface using 2WW Mod Ind. Short term goal 4: Pt will ascend/descend curb step using 2WW and 1 flight of stairs using railings Mod Ind. Short term goal 5: Pt will complete object retrieval from the floor with 2WW and reacher Mod Ind. Bed Mobility:   Sit to Lying  Assistance Needed: Independent  Comment: Ind without use of bed features  CARE Score: 6  Discharge Goal: Independent  Roll Left and Right  Assistance Needed: Independent  Comment: Ind without use of bed features  Reason if not Attempted: Not attempted due to medical condition or safety concerns  CARE Score: 6  Discharge Goal: Independent  Lying to Sitting on Side of Bed  Assistance Needed: Independent  Comment: Ind without use of bed features  CARE Score: 6  Discharge Goal: Independent    Transfers:    Sit to Stand  Assistance Needed: Independent  Comment: Mod Ind with 2WW->Ind  CARE Score: 6  Discharge Goal: Independent  Chair/Bed-to-Chair Transfer  Assistance Needed: Independent  Comment: Mod Ind with 2WW->Ind  CARE Score: 6  Discharge Goal: Independent  Toilet Transfer  Assistance needed: Independent  Comment: SBA with R grab bar and 2WW  CARE Score: 6  Car Transfer  Assistance Needed: Independent  Comment: Mod Ind as performed in his personal vehicle on 08/16/2022  Reason if not Attempted: Not attempted due to environmental limitations  CARE Score: 6  Discharge Goal: Independent    Ambulation:    Walking Ability  Does the Patient Walk?: Yes     Walk 10 Feet  Assistance Needed: Independent  Comment: Mod Ind with 2WW  CARE Score: 6  Discharge Goal: Independent     Walk 50 Feet with Two Turns  Assistance Needed: Independent  Comment: Mod Ind with 2WW  CARE Score: 6  Discharge Goal: Independent     Walk 150 Feet  Assistance Needed: Independent  Comment:  Mod Ind with 2WW; pt was able to ambulate up to 893 ft during 6MWT  CARE Score: 6  Discharge Goal: Independent     Walking 10 Feet on Uneven Surfaces  Assistance Needed: Independent  Comment: Mod Ind with 2WW  CARE Score: 6  Discharge Goal: Independent     1 Step (Curb)  Assistance Needed: Independent  Comment: Mod Ind with 2WW  CARE Score: 6  Discharge Goal: Independent     4 Steps  Assistance Needed: Independent  Comment: Mod Ind with R ascnding/descending rail with consistent step-through pattern on ascent and step-to pattern leading with LLE consistently on descent; pt was consistently steady throughout activity  CARE Score: 6  Discharge Goal: Independent     12 Steps  Assistance Needed: Independent  Comment: Mod Ind with R ascnding/descending rail with consistent step-through pattern on ascent and step-to pattern leading with LLE consistently on descent; pt was consistently steady throughout activity  CARE Score: 6  Discharge Goal: Independent       Wheelchair:  w/c Ability: Wheelchair Ability  Uses a Wheelchair and/or Scooter?: No                Balance:        Object: Picking Up Object  Assistance Needed: Independent  Comment: Mod Ind with reacher and 2WW  CARE Score: 6  Discharge Goal: Independent    I      Exam:    Blood pressure 134/70, pulse 56, temperature 98.1 °F (36.7 °C), temperature source Oral, resp. rate 22, height 5' 9\" (1.753 m), weight 287 lb 14.7 oz (130.6 kg), SpO2 95 %. General: Sitting up in wheelchair and standing to a walker. Alert and talkative. Easily distracted but in no distress. Oriented. HEENT: Gazing right and left. MMM. Neck supple. No JVD. Pulmonary: No wheezes, rales or coughing. Cardiac: RRR. Abdomen: Patient's abdomen is soft and nondistended. Bowel sounds were present throughout. There was no rebound, guarding or masses noted. Upper extremities: Moving both upper limbs with more confidence. Functional  strength. No tremor or new bruising.     Lower extremities: Some swelling without signs of new DVT.  Heels clear. Sitting balance was good. Standing balance was fair-.    Lab Results   Component Value Date    WBC 5.4 08/16/2022    HGB 10.3 (L) 08/16/2022    HCT 32.7 (L) 08/16/2022    MCV 99.7 08/16/2022     08/16/2022     Lab Results   Component Value Date    INR 1.04 08/05/2022    INR 1.05 11/04/2014    PROTIME 13.4 08/05/2022    PROTIME 12.0 11/04/2014     Lab Results   Component Value Date    CREATININE 0.6 (L) 08/16/2022    BUN 7 08/16/2022     08/16/2022    K 4.0 08/16/2022     08/16/2022    CO2 31 08/16/2022     Lab Results   Component Value Date     (H) 08/12/2022     (H) 08/12/2022     (H) 08/05/2022    ALKPHOS 458 (H) 08/12/2022    BILITOT 1.5 (H) 08/12/2022         The patient presented to the ARU with the above history requiring a multidisciplinary treatment plan including close medical supervision by the [facility] [address] Director. The patient participated in the prescribed therapy treatment plan with reasonable compliance and progressive tolerance. They avoided significant medical complications. By the time of discharge the patient had become safer with adaptive equipment to transfer and toilet with a FWW with the supervision of family/caregivers. The patient was tolerating an oral diet without choking/coughing and was back to their baseline with regards to bowel and bladder control. Discharge instructions were reviewed with the patient. The patient is to follow up with the cardiologist and, the general surgeon and his PCP in 1 week. No driving. [facility] PT/OT/RN will be arranged.        Medication List        START taking these medications      senna 8.6 MG tablet  Commonly known as: SENOKOT  Take 1 tablet by mouth daily            CONTINUE taking these medications      ascorbic acid 500 MG tablet  Commonly known as: VITAMIN C     CENTRUM PO     Docusate Sodium 100 MG Tabs     fluticasone 50 MCG/ACT nasal spray  Commonly known as: FLONASE     levothyroxine 25 MCG tablet  Commonly known as: SYNTHROID     losartan 25 MG tablet  Commonly known as: COZAAR  Take 1 tablet by mouth at bedtime To replace hydrochlorothiazide     niacin 500 MG extended release capsule     saline nasal gel Gel     traZODone 50 MG tablet  Commonly known as: DESYREL            STOP taking these medications      albuterol-ipratropium  MCG/ACT Aers inhaler  Commonly known as: Combivent Respimat     amoxicillin-clavulanate 875-125 MG per tablet  Commonly known as: AUGMENTIN     azelastine HCl 0.15 % Soln     flunisolide 25 MCG/ACT (0.025%) Soln  Commonly known as: NASALIDE     hydrocortisone 25 MG suppository  Commonly known as: ANUSOL-HC     mometasone 50 MCG/ACT nasal spray  Commonly known as: NASONEX     oxyCODONE HCl 10 MG immediate release tablet  Commonly known as: OXY-IR     SODIUM FLUORIDE (DENTAL GEL) 1.1 % Crea               Where to Get Your Medications        You can get these medications from any pharmacy    You don't need a prescription for these medications  senna 8.6 MG tablet         CONDITION ON DISCHARGE: Stable. The prognosis is fair for further improvements in ADL's and safety with adapted gait/transfers. Record review, patient exam, discharge instructions, medication reconciliation and summary for this discharge visit took more than 30 minutes.

## 2022-08-19 NOTE — TELEPHONE ENCOUNTER
Patient called he was put on a 14 day monitor   It wont stay on , he has tried several of the adhesives , also shaved his chest bare  he is asking for a call back   He is unable to drive to come to the office

## 2022-08-20 ENCOUNTER — APPOINTMENT (OUTPATIENT)
Dept: INTERVENTIONAL RADIOLOGY/VASCULAR | Age: 72
DRG: 862 | End: 2022-08-20
Payer: MEDICARE

## 2022-08-20 ENCOUNTER — APPOINTMENT (OUTPATIENT)
Dept: CT IMAGING | Age: 72
DRG: 862 | End: 2022-08-20
Payer: MEDICARE

## 2022-08-20 PROBLEM — K65.1 INTRA-ABDOMINAL ABSCESS (HCC): Status: ACTIVE | Noted: 2022-08-20

## 2022-08-20 LAB
ALBUMIN SERPL-MCNC: 3.6 GM/DL (ref 3.4–5)
ALP BLD-CCNC: 179 IU/L (ref 40–128)
ALT SERPL-CCNC: 21 U/L (ref 10–40)
ANION GAP SERPL CALCULATED.3IONS-SCNC: 13 MMOL/L (ref 4–16)
AST SERPL-CCNC: 21 IU/L (ref 15–37)
BASOPHILS ABSOLUTE: 0 K/CU MM
BASOPHILS RELATIVE PERCENT: 0.2 % (ref 0–1)
BILIRUB SERPL-MCNC: 1 MG/DL (ref 0–1)
BUN BLDV-MCNC: 7 MG/DL (ref 6–23)
CALCIUM SERPL-MCNC: 8.2 MG/DL (ref 8.3–10.6)
CHLORIDE BLD-SCNC: 100 MMOL/L (ref 99–110)
CO2: 24 MMOL/L (ref 21–32)
CREAT SERPL-MCNC: 0.7 MG/DL (ref 0.9–1.3)
DIFFERENTIAL TYPE: ABNORMAL
EOSINOPHILS ABSOLUTE: 0 K/CU MM
EOSINOPHILS RELATIVE PERCENT: 0.1 % (ref 0–3)
GFR AFRICAN AMERICAN: >60 ML/MIN/1.73M2
GFR NON-AFRICAN AMERICAN: >60 ML/MIN/1.73M2
GLUCOSE BLD-MCNC: 96 MG/DL (ref 70–99)
HCT VFR BLD CALC: 31.3 % (ref 42–52)
HEMOGLOBIN: 10.2 GM/DL (ref 13.5–18)
IMMATURE NEUTROPHIL %: 0.7 % (ref 0–0.43)
INR BLD: 1.28 INDEX
LYMPHOCYTES ABSOLUTE: 1.3 K/CU MM
LYMPHOCYTES RELATIVE PERCENT: 9.6 % (ref 24–44)
MAGNESIUM: 1.9 MG/DL (ref 1.8–2.4)
MCH RBC QN AUTO: 32.3 PG (ref 27–31)
MCHC RBC AUTO-ENTMCNC: 32.6 % (ref 32–36)
MCV RBC AUTO: 99.1 FL (ref 78–100)
MONOCYTES ABSOLUTE: 1.6 K/CU MM
MONOCYTES RELATIVE PERCENT: 11.2 % (ref 0–4)
NUCLEATED RBC %: 0 %
PDW BLD-RTO: 15.3 % (ref 11.7–14.9)
PLATELET # BLD: 284 K/CU MM (ref 140–440)
PMV BLD AUTO: 9.8 FL (ref 7.5–11.1)
POTASSIUM SERPL-SCNC: 3.5 MMOL/L (ref 3.5–5.1)
PROTHROMBIN TIME: 16.5 SECONDS (ref 11.7–14.5)
RBC # BLD: 3.16 M/CU MM (ref 4.6–6.2)
SEGMENTED NEUTROPHILS ABSOLUTE COUNT: 10.8 K/CU MM
SEGMENTED NEUTROPHILS RELATIVE PERCENT: 78.2 % (ref 36–66)
SODIUM BLD-SCNC: 137 MMOL/L (ref 135–145)
TOTAL IMMATURE NEUTOROPHIL: 0.09 K/CU MM
TOTAL NUCLEATED RBC: 0 K/CU MM
TOTAL PROTEIN: 5.8 GM/DL (ref 6.4–8.2)
WBC # BLD: 13.8 K/CU MM (ref 4–10.5)

## 2022-08-20 PROCEDURE — 85610 PROTHROMBIN TIME: CPT

## 2022-08-20 PROCEDURE — 83735 ASSAY OF MAGNESIUM: CPT

## 2022-08-20 PROCEDURE — 87186 SC STD MICRODIL/AGAR DIL: CPT

## 2022-08-20 PROCEDURE — 6370000000 HC RX 637 (ALT 250 FOR IP): Performed by: STUDENT IN AN ORGANIZED HEALTH CARE EDUCATION/TRAINING PROGRAM

## 2022-08-20 PROCEDURE — 1200000000 HC SEMI PRIVATE

## 2022-08-20 PROCEDURE — 0D9W30Z DRAINAGE OF PERITONEUM WITH DRAINAGE DEVICE, PERCUTANEOUS APPROACH: ICD-10-PCS | Performed by: STUDENT IN AN ORGANIZED HEALTH CARE EDUCATION/TRAINING PROGRAM

## 2022-08-20 PROCEDURE — 2580000003 HC RX 258: Performed by: STUDENT IN AN ORGANIZED HEALTH CARE EDUCATION/TRAINING PROGRAM

## 2022-08-20 PROCEDURE — 6360000002 HC RX W HCPCS: Performed by: STUDENT IN AN ORGANIZED HEALTH CARE EDUCATION/TRAINING PROGRAM

## 2022-08-20 PROCEDURE — 87205 SMEAR GRAM STAIN: CPT

## 2022-08-20 PROCEDURE — 36415 COLL VENOUS BLD VENIPUNCTURE: CPT

## 2022-08-20 PROCEDURE — 87070 CULTURE OTHR SPECIMN AEROBIC: CPT

## 2022-08-20 PROCEDURE — 2580000003 HC RX 258: Performed by: EMERGENCY MEDICINE

## 2022-08-20 PROCEDURE — 87075 CULTR BACTERIA EXCEPT BLOOD: CPT

## 2022-08-20 PROCEDURE — 6360000002 HC RX W HCPCS: Performed by: EMERGENCY MEDICINE

## 2022-08-20 PROCEDURE — 2709999900 CT ABSCESS DRAINAGE PERITONEAL/PERITONITIS OPEN

## 2022-08-20 PROCEDURE — 2500000003 HC RX 250 WO HCPCS: Performed by: EMERGENCY MEDICINE

## 2022-08-20 PROCEDURE — 94761 N-INVAS EAR/PLS OXIMETRY MLT: CPT

## 2022-08-20 PROCEDURE — 87077 CULTURE AEROBIC IDENTIFY: CPT

## 2022-08-20 PROCEDURE — 80053 COMPREHEN METABOLIC PANEL: CPT

## 2022-08-20 PROCEDURE — 99221 1ST HOSP IP/OBS SF/LOW 40: CPT | Performed by: PHYSICIAN ASSISTANT

## 2022-08-20 PROCEDURE — 85025 COMPLETE CBC W/AUTO DIFF WBC: CPT

## 2022-08-20 RX ORDER — POLYETHYLENE GLYCOL 3350 17 G/17G
17 POWDER, FOR SOLUTION ORAL DAILY PRN
Status: DISCONTINUED | OUTPATIENT
Start: 2022-08-20 | End: 2022-08-25 | Stop reason: HOSPADM

## 2022-08-20 RX ORDER — TRAZODONE HYDROCHLORIDE 50 MG/1
50 TABLET ORAL NIGHTLY PRN
Status: DISCONTINUED | OUTPATIENT
Start: 2022-08-20 | End: 2022-08-21

## 2022-08-20 RX ORDER — 0.9 % SODIUM CHLORIDE 0.9 %
1000 INTRAVENOUS SOLUTION INTRAVENOUS ONCE
Status: COMPLETED | OUTPATIENT
Start: 2022-08-20 | End: 2022-08-20

## 2022-08-20 RX ORDER — SODIUM CHLORIDE 9 MG/ML
INJECTION, SOLUTION INTRAVENOUS CONTINUOUS
Status: DISPENSED | OUTPATIENT
Start: 2022-08-20 | End: 2022-08-20

## 2022-08-20 RX ORDER — OXYCODONE AND ACETAMINOPHEN 7.5; 325 MG/1; MG/1
1 TABLET ORAL EVERY 4 HOURS PRN
Status: DISCONTINUED | OUTPATIENT
Start: 2022-08-20 | End: 2022-08-25 | Stop reason: HOSPADM

## 2022-08-20 RX ORDER — SODIUM CHLORIDE 9 MG/ML
INJECTION, SOLUTION INTRAVENOUS CONTINUOUS
Status: DISCONTINUED | OUTPATIENT
Start: 2022-08-20 | End: 2022-08-20

## 2022-08-20 RX ORDER — MAGNESIUM HYDROXIDE/ALUMINUM HYDROXICE/SIMETHICONE 120; 1200; 1200 MG/30ML; MG/30ML; MG/30ML
30 SUSPENSION ORAL EVERY 6 HOURS PRN
Status: DISCONTINUED | OUTPATIENT
Start: 2022-08-20 | End: 2022-08-25 | Stop reason: HOSPADM

## 2022-08-20 RX ORDER — ACETAMINOPHEN 325 MG/1
650 TABLET ORAL EVERY 4 HOURS PRN
Status: DISCONTINUED | OUTPATIENT
Start: 2022-08-20 | End: 2022-08-25 | Stop reason: HOSPADM

## 2022-08-20 RX ORDER — SODIUM CHLORIDE 0.9 % (FLUSH) 0.9 %
5-40 SYRINGE (ML) INJECTION PRN
Status: DISCONTINUED | OUTPATIENT
Start: 2022-08-20 | End: 2022-08-25 | Stop reason: HOSPADM

## 2022-08-20 RX ORDER — ONDANSETRON 2 MG/ML
4 INJECTION INTRAMUSCULAR; INTRAVENOUS EVERY 6 HOURS PRN
Status: DISCONTINUED | OUTPATIENT
Start: 2022-08-20 | End: 2022-08-25 | Stop reason: HOSPADM

## 2022-08-20 RX ORDER — ONDANSETRON 4 MG/1
4 TABLET, ORALLY DISINTEGRATING ORAL EVERY 8 HOURS PRN
Status: DISCONTINUED | OUTPATIENT
Start: 2022-08-20 | End: 2022-08-25 | Stop reason: HOSPADM

## 2022-08-20 RX ORDER — 0.9 % SODIUM CHLORIDE 0.9 %
1000 INTRAVENOUS SOLUTION INTRAVENOUS ONCE
Status: DISCONTINUED | OUTPATIENT
Start: 2022-08-20 | End: 2022-08-25 | Stop reason: HOSPADM

## 2022-08-20 RX ORDER — ALBUTEROL SULFATE 90 UG/1
1 AEROSOL, METERED RESPIRATORY (INHALATION) EVERY 6 HOURS PRN
Status: DISCONTINUED | OUTPATIENT
Start: 2022-08-20 | End: 2022-08-25 | Stop reason: HOSPADM

## 2022-08-20 RX ORDER — SODIUM CHLORIDE 9 MG/ML
INJECTION, SOLUTION INTRAVENOUS PRN
Status: DISCONTINUED | OUTPATIENT
Start: 2022-08-20 | End: 2022-08-25 | Stop reason: HOSPADM

## 2022-08-20 RX ORDER — SODIUM CHLORIDE 0.9 % (FLUSH) 0.9 %
5-40 SYRINGE (ML) INJECTION EVERY 12 HOURS SCHEDULED
Status: DISCONTINUED | OUTPATIENT
Start: 2022-08-20 | End: 2022-08-25 | Stop reason: HOSPADM

## 2022-08-20 RX ADMIN — METRONIDAZOLE 500 MG: 500 INJECTION, SOLUTION INTRAVENOUS at 06:41

## 2022-08-20 RX ADMIN — METRONIDAZOLE 500 MG: 500 INJECTION, SOLUTION INTRAVENOUS at 23:38

## 2022-08-20 RX ADMIN — METRONIDAZOLE 500 MG: 500 INJECTION, SOLUTION INTRAVENOUS at 01:41

## 2022-08-20 RX ADMIN — CEFEPIME HYDROCHLORIDE 2000 MG: 2 INJECTION, POWDER, FOR SOLUTION INTRAVENOUS at 01:52

## 2022-08-20 RX ADMIN — TRAZODONE HYDROCHLORIDE 50 MG: 50 TABLET ORAL at 22:59

## 2022-08-20 RX ADMIN — CEFEPIME HYDROCHLORIDE 2000 MG: 2 INJECTION, POWDER, FOR SOLUTION INTRAMUSCULAR; INTRAVENOUS at 08:05

## 2022-08-20 RX ADMIN — METRONIDAZOLE 500 MG: 500 INJECTION, SOLUTION INTRAVENOUS at 14:07

## 2022-08-20 RX ADMIN — OXYCODONE AND ACETAMINOPHEN 1 TABLET: 7.5; 325 TABLET ORAL at 15:18

## 2022-08-20 RX ADMIN — CEFEPIME HYDROCHLORIDE 2000 MG: 2 INJECTION, POWDER, FOR SOLUTION INTRAMUSCULAR; INTRAVENOUS at 15:15

## 2022-08-20 RX ADMIN — METRONIDAZOLE 500 MG: 500 INJECTION, SOLUTION INTRAVENOUS at 23:14

## 2022-08-20 RX ADMIN — ACETAMINOPHEN 650 MG: 325 TABLET ORAL at 22:59

## 2022-08-20 RX ADMIN — SODIUM CHLORIDE 1000 ML: 9 INJECTION, SOLUTION INTRAVENOUS at 01:35

## 2022-08-20 RX ADMIN — SODIUM CHLORIDE: 9 INJECTION, SOLUTION INTRAVENOUS at 23:36

## 2022-08-20 RX ADMIN — SODIUM CHLORIDE: 9 INJECTION, SOLUTION INTRAVENOUS at 03:35

## 2022-08-20 RX ADMIN — OXYCODONE AND ACETAMINOPHEN 1 TABLET: 7.5; 325 TABLET ORAL at 19:29

## 2022-08-20 RX ADMIN — ACETAMINOPHEN 650 MG: 325 TABLET ORAL at 14:01

## 2022-08-20 RX ADMIN — LEVOTHYROXINE SODIUM 175 MCG: 0.1 TABLET ORAL at 06:23

## 2022-08-20 ASSESSMENT — PAIN DESCRIPTION - FREQUENCY: FREQUENCY: INTERMITTENT

## 2022-08-20 ASSESSMENT — PAIN SCALES - WONG BAKER
WONGBAKER_NUMERICALRESPONSE: 0

## 2022-08-20 ASSESSMENT — ENCOUNTER SYMPTOMS
STRIDOR: 0
EYE REDNESS: 0
NAUSEA: 0
CHOKING: 0
COUGH: 0
COLOR CHANGE: 0
APNEA: 0
BACK PAIN: 0
PHOTOPHOBIA: 0
ANAL BLEEDING: 0
VOMITING: 0
SHORTNESS OF BREATH: 0
CONSTIPATION: 0
EYE ITCHING: 0
RECTAL PAIN: 0
WHEEZING: 0
SORE THROAT: 0

## 2022-08-20 ASSESSMENT — PAIN DESCRIPTION - LOCATION
LOCATION: ABDOMEN

## 2022-08-20 ASSESSMENT — PAIN SCALES - GENERAL
PAINLEVEL_OUTOF10: 6
PAINLEVEL_OUTOF10: 4
PAINLEVEL_OUTOF10: 3
PAINLEVEL_OUTOF10: 4
PAINLEVEL_OUTOF10: 0
PAINLEVEL_OUTOF10: 6
PAINLEVEL_OUTOF10: 0

## 2022-08-20 ASSESSMENT — PAIN - FUNCTIONAL ASSESSMENT: PAIN_FUNCTIONAL_ASSESSMENT: PREVENTS OR INTERFERES SOME ACTIVE ACTIVITIES AND ADLS

## 2022-08-20 ASSESSMENT — PAIN DESCRIPTION - DESCRIPTORS
DESCRIPTORS: DISCOMFORT

## 2022-08-20 ASSESSMENT — PAIN DESCRIPTION - ORIENTATION
ORIENTATION: RIGHT
ORIENTATION: RIGHT;LOWER

## 2022-08-20 ASSESSMENT — PAIN DESCRIPTION - ONSET: ONSET: GRADUAL

## 2022-08-20 ASSESSMENT — PAIN DESCRIPTION - PAIN TYPE: TYPE: ACUTE PAIN

## 2022-08-20 NOTE — OR NURSING
INFORMED CONSENT:  Obtained prior to procedure by Dr. Omari Ritter. Consent placed in chart. ASSESSMENT: Patient alert and oriented and aware of procedure to be done. No distress noted. BARRIER PRECAUTIONS & STERILE TECHNIQUE:               Pt transferred to the table and positioned supine for comfort. Warm blankets given. Pt placed on Vital Signs Monitor. Pt prepped and draped in a sterile fashion with chlorhexadine. PAIN/LOCAL ANESTHESIA/SEDATION MANAGEMENT:           Local: Lidocaine 1% given by Dr. Jaclyn Rosario:           ACCESS TIME: 1250 With Micropuncture          US/FLUORO: CT guided          WIRE USED:   J-loop              CATHETER USED: 10 [address] M-Drain    1300--total of 230cc of brown fluid removed    1304--total of 25cc of normal saline used to irrigate     1305--drain hooded to HERMINIO drain.   Drain compressed and returns brown fluid    STERILE DRESSINGS: Biopatch, Split-gauze and tegaderm    SPECIMENS: 90cc of brown fluid sent to lab    EBL:      Less than 1 cc       REPORT CALLED TO: Lauren Talavera RN

## 2022-08-20 NOTE — PROGRESS NOTES
Full consult note to follow  Care discussed with IR for perc drain placement today    Corinne Pringle, MD

## 2022-08-20 NOTE — PROGRESS NOTES
Comprehensive Nutrition Assessment    Type and Reason for Visit:  Positive Nutrition Screen    Nutrition Recommendations/Plan:   Advance diet as soon as medically appropriate  Will closely monitor ability to initiate nutrition, nutrition status, poc     Malnutrition Assessment:  Malnutrition Status: At risk for malnutrition (Comment) (08/20/22 5344)    Context:  Acute Illness       Nutrition Assessment:    Pt admitted for intra-abdominal abscess, severe sepsis, infect of organ or organ space after surgery, PMH: HTN, HLD, COPD, obesity, positive nutrition screen for weight loss and decreased appetite, pt currently NPO, no wounds noted, clinically significant wt loss per comprehensive weight hx,  will follow at high nutrition risk    Nutrition Related Findings:      Wound Type: None       Current Nutrition Intake & Therapies:    Average Meal Intake: NPO  Average Supplements Intake: NPO  Diet NPO Exceptions are: Ice Chips, Sips of Water with Meds    Anthropometric Measures:  Height: 5' 9.02\" (175.3 cm)  Ideal Body Weight (IBW): 160 lbs (73 kg)    Current Body Weight: 291 lb 0.1 oz (132 kg), 181.9 % IBW.  Weight Source: Bed Scale  Current BMI (kg/m2): 43  Usual Body Weight: 324 lb 8.3 oz (147.2 kg) ((5/17/22) per chart review)  % Weight Change (Calculated): -10.3  Weight Adjustment For: No Adjustment  BMI Categories: Obese Class 3 (BMI 40.0 or greater)    Estimated Daily Nutrient Needs:  Energy Requirements Based On: Formula  Weight Used for Energy Requirements: Current  Energy (kcal/day): 4070-4323 (Arrie Jluianna w/ stress factor 1.0-1.2)  Weight Used for Protein Requirements: Ideal  Protein (g/day):  (1.2-1.4 g/kg)  Method Used for Fluid Requirements: 1 ml/kcal    Nutrition Diagnosis:   Inadequate oral intake related to acute injury/trauma as evidenced by NPO or clear liquid status due to medical condition    Nutrition Interventions:   Food and/or Nutrient Delivery: Start Oral Diet (advance diet as soon as medically appropriate)  Nutrition Education/Counseling: No recommendation at this time  Coordination of Nutrition Care: Continue to monitor while inpatient  Goals:     Goals: Initiate PO diet, within 2 days  Nutrition Monitoring and Evaluation:   Behavioral-Environmental Outcomes: None Identified  Food/Nutrient Intake Outcomes: Diet Advancement/Tolerance  Physical Signs/Symptoms Outcomes: Biochemical Data, GI Status, Hemodynamic Status, Fluid Status or Edema, Weight, Meal Time Behavior    Discharge Planning:     Too soon to determine     Ronald Hernandez [address], [address] [place] [address],   Contact: [phone]

## 2022-08-20 NOTE — CARE COORDINATION
Patient possible readmission. Patient just admitted to hospital 8/12/22 - 8/18/22 for Sepsis due to cholelithiasis. Patient back to ED today 8/19/22 for complaint of a fever. Patient had surgery 8/12 and then was discharged from ARU yesterday. Upon returning home; patient began coughing and had 102 degree fever. Patient has abnormal labs.  Patient to be admitted to hospital.

## 2022-08-20 NOTE — CONSULTS
Department of GeneralSurgery   Surgical Service   Dr Thom Shoemaker, Sue Dumont PA-C   Consult Note      Date of Consult: 8/20/22      Reason for Consult:  intra-abdominal abscess  Requesting Physician:  Dr. Chris Andino:  Fevers and abd discomfort. History Obtained From:  patient, electronic medical record    HISTORY OF PRESENT ILLNESS:                The patient is a 66 y.o. male who presents with intraabdominal abscess. Pt is s/p ERCP on 8/9 and and subsequent lap alina per Dr. Vinh Mckeon on 8/10. Pt was d/c'd to ARU on 8/12, and discharged from ARU 2 days ago. He then developed fevers at home up to 102+, and decided to be evaluated in the ED. Pt reports mild abd discomfort, but no true pain. Lap incisions are without redness, swelling or drainage. Denies N/V. Is having regular Bms. Last BM was yesterday, continues to pass flatus today. In the ED, pt was noted to have leukocytosis on 17.1, mild elevation of alk Phos, but this has trended down from his recent admission. T. Bili 1.1. CT was performed and reviewed:  Impression   1. Postsurgical changes are seen from recent cholecystectomy. There is a   focal rim enhancing fluid collection with multiple locules of gas in adjacent   stranding within the cholecystectomy bed, concerning for an abscess versus   infected biloma. This measures 13.2 x 8.2 cm.   2. 3.7 cm infrarenal abdominal aortic aneurysm. Please see recommendations   below.        Past Medical History:    Past Medical History:   Diagnosis Date    Abdominal aneurysm ([address] 2012    4 cm x 3.6 cm followed at [address]    Arthritis 1/21/2014    Chronic back pain     [facility]ropactor [facility]    Chronic sinusitis 1/21/2014    COPD, mild ([address] 1/21/2014    Diverticulosis 2014    Dr. 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 Mayra Benavidez    MEGHAN (obstructive sleep apnea) 1/21/2014    CPAP changed to bipap (July 2014)    Otitis media, serous, TM rupture 1/21/2014    ENT x 2 s/p PE tubes bilaterally, cipro Otic       Past Surgical History:    Past Surgical History:   Procedure Laterality Date    CHOLECYSTECTOMY, LAPAROSCOPIC N/A 8/10/2022    CHOLECYSTECTOMY LAPAROSCOPIC performed by Ray Miranda MD at [address] [place] [address]  2008    [facility]- normal.      COLONOSCOPY  12/02/2014    diverticulosis, internal hemorrhoids    ERCP  08/09/2022    ERCP SPHINCTER/PAPILLOTOMY performed by Dr. Emilee Jorge at [address] [place] [address]    ERCP N/A 8/9/2022    ERCP SPHINCTER/PAPILLOTOMY and sweep and removal of debris, sludge and stones with use of 12-15 extractor balloon performed by Karyle Cradle, MD at [place] [address] Right 2012    JOINT REPLACEMENT Left 2011    KNEE SURGERY Bilateral     replacement    TOE SURGERY Bilateral     toe nail surgery Dr. Kayleen Burk Right 2014    DR. Rascon        Current Medications:   Current Facility-Administered Medications   Medication Dose Route Frequency Provider Last Rate Last Admin    0.9 % sodium chloride bolus  1,000 mL IntraVENous Once Ascencion Scott DO        cefepime (MAXIPIME) 2000 mg IVPB minibag  2,000 mg IntraVENous Keo Spann MD   Stopped at 08/20/22 0906    levothyroxine (SYNTHROID) tablet 175 mcg  175 mcg Oral Daily Ray Leahy MD   175 mcg at 08/20/22 4137    traZODone (DESYREL) tablet 50 mg  50 mg Oral Nightly PRN Ray Leahy MD        sodium chloride flush 0.9 % injection 5-40 mL  5-40 mL IntraVENous 2 times per day aRy Leahy MD        sodium chloride flush 0.9 % injection 5-40 mL  5-40 mL IntraVENous PRN Ray Leahy MD        0.9 % sodium chloride infusion   IntraVENous PRN Elisha Garcia MD        ondansetron (ZOFRAN-ODT) disintegrating tablet 4 mg  4 mg Oral Q8H PRN Elisha Garcia MD        Or    ondansetron (ZOFRAN) injection 4 mg  4 mg IntraVENous Q6H PRN Elsiha Garcia MD        polyethylene glycol (GLYCOLAX) packet 17 g  17 g Oral Daily PRN Elisha Garcia MD        aluminum & magnesium hydroxide-simethicone (MAALOX) 200-200-20 MG/5ML suspension 30 mL  30 mL Oral Q6H PRN Elisha Garcia MD        albuterol sulfate HFA (PROVENTIL;VENTOLIN;PROAIR) 108 (90 Base) MCG/ACT inhaler 1 puff  1 puff Inhalation Q6H PRN Elisha Garcia MD        And    ipratropium (ATROVENT HFA) 17 MCG/ACT inhaler 1 puff  1 puff Inhalation Q6H PRN Elisha Garcia MD        metronidazole (FLAGYL) 500 mg in 0.9% NaCl 100 mL IVPB premix  500 mg IntraVENous Q8H Pamela Scott DO   Stopped at 22 0800       Allergies:  Dilaudid [hydromorphone hcl], Hydrocodone, Ultram [tramadol], Vicodin [hydrocodone-acetaminophen], Morphine, Keflex [cephalexin], and Sulfa antibiotics    Social History:   Social History     Socioeconomic History    Marital status:      Spouse name: None    Number of children: 2    Years of education: None    Highest education level: None   Tobacco Use    Smoking status: Former     Packs/day: 2.00     Years: 36.00     Pack years: 72.00     Types: Cigarettes     Quit date: 2002     Years since quittin.4    Smokeless tobacco: Never    Tobacco comments:     quit 2002   Substance and Sexual Activity    Alcohol use: No     Alcohol/week: 0.0 standard drinks    Drug use: No     Social Determinants of Health     Financial Resource Strain: Unknown    Difficulty of Paying Living Expenses: Patient refused   Food Insecurity: Unknown    Worried About Running Out of Food in the Last Year: Patient refused    Ran Out of Food in the Last Year: Patient refused       Family History:   Family History   Problem Relation Age of Onset    High Blood Pressure Mother Musculoskeletal:         General: Normal range of motion. Cervical back: Normal range of motion and neck supple. Skin:     General: Skin is warm. Neurological:      Mental Status: He is alert and oriented to person, place, and time.          DATA:    CBC:   Lab Results   Component Value Date/Time    WBC 13.8 08/20/2022 07:12 AM    RBC 3.16 08/20/2022 07:12 AM    HGB 10.2 08/20/2022 07:12 AM    HCT 31.3 08/20/2022 07:12 AM    MCV 99.1 08/20/2022 07:12 AM    MCH 32.3 08/20/2022 07:12 AM    MCHC 32.6 08/20/2022 07:12 AM    RDW 15.3 08/20/2022 07:12 AM     08/20/2022 07:12 AM    MPV 9.8 08/20/2022 07:12 AM     CMP:    Lab Results   Component Value Date/Time     08/20/2022 07:12 AM    K 3.5 08/20/2022 07:12 AM     08/20/2022 07:12 AM    CO2 24 08/20/2022 07:12 AM    BUN 7 08/20/2022 07:12 AM    CREATININE 0.7 08/20/2022 07:12 AM    GFRAA >60 08/20/2022 07:12 AM    AGRATIO 1.5 07/27/2022 08:52 AM    LABGLOM >60 08/20/2022 07:12 AM    GLUCOSE 96 08/20/2022 07:12 AM    PROT 5.8 08/20/2022 07:12 AM    LABALBU 3.6 08/20/2022 07:12 AM    CALCIUM 8.2 08/20/2022 07:12 AM    BILITOT 1.0 08/20/2022 07:12 AM    ALKPHOS 179 08/20/2022 07:12 AM    AST 21 08/20/2022 07:12 AM    ALT 21 08/20/2022 07:12 AM       IMPRESSION:        Patient Active Problem List:     MEGHAN (obstructive sleep apnea)- on BIPAP     COPD, mild (HCC)     Arthritis     Chronic sinusitis     Acquired hypothyroidism     Dyslipidemia     Essential hypertension     Morbid obesity with BMI of 45.0-49.9, adult ([facility])     Anemia     NSAID long-term use     GERD (gastroesophageal reflux disease)- due to chronic NSAID use     Elevated fasting glucose     External hemorrhoids     AAA (abdominal aortic aneurysm) without rupture ([facility]- stable 2020, due 2023     Primary insomnia     History of recurrent ear infection     Positive FIT (fecal immunochemical test)     Sepsis ([facility]     Common bile duct stone     Ascending cholangitis     Obstructive sleep apnea     Calculus of gallbladder with acute cholecystitis and obstruction     Generalized weakness     Gait disturbance     Uncontrolled pain     Mixed hyperlipidemia     Sinus bradycardia     Intra-abdominal abscess (HCC)      PLAN:    Abscess amenable to percutaneous drainage. IR consulted. Agree with IV abx - will follow cultures after IR drainage. After procedure with IR, ok for diet advancement as tolerated. Patient and plan of care discussed with Dr. Betsy Rose.     Ginny Piper PA-C

## 2022-08-20 NOTE — PROGRESS NOTES
V2.0  [facility] Hospitalist Progress Note      Name:  Darien Whitehead /Age/Sex: 1950  (66 y.o. male)   MRN & CSN:  [id number] & [id number] Encounter Date/Time: 2022 12:03 PM EDT    Location:  1111/1111-A PCP: Krystin Schneider MD       Hospital Day: 2    Assessment and Plan:   Darien Whitehead is a 66 y.o. male who presents with Intra-abdominal abscess ([address]      Plan:    Sepsis secondary to intra-abdominal abscess  -Presented to fevers and abdominal discomfort. Had a recent ERCP followed by a lap cholecystectomy. -CT abdomen pelvis now demonstrating possible abscess.  -Has been placed on broad-spectrum antibiotics and IV fluids. IR consult has been ordered and he may need drainage.  -Hold anticoagulation. Essential hypertension  -Holding home losartan    Hypothyroidism  Continue home Synthroid. AAA  Found on CT scan. Needs repeat scan as outpatient. COPD obstructive apnea  -Stable. Continue home CPAP    Morbid obesity due to excess calories Body mass index is 42.95 kg/m². - Complicating assessment and treatment. Placing patient at risk for multiple co-morbidities as well as early death and contributing to the patient's presentation.  on weight loss when appropriate. Diet Diet NPO Exceptions are: Ice Chips, Sips of Water with Meds   DVT Prophylaxis Hold due to possible IR procedure   Code Status Full Code   Disposition From: Home  Expected Disposition: Home  Estimated Date of Discharge: 2 days  Patient requires continued admission due to sepsis needs possible IR procedure   Surrogate Decision Maker/ FERNIE Green. Subjective:     Chief Complaint: Fever (Septic early this month - )       Reports improvement in abdominal discomfort. Denies any nausea, vomiting, chest pain, shortness of breath, lightheadedness or dizziness. Had another fever overnight. Review of Systems:    Review of Systems   Constitutional:  Positive for fever.  Negative for chills. HENT:  Negative for sore throat. Eyes:  Negative for visual disturbance. Respiratory:  Negative for cough, shortness of breath and wheezing. Cardiovascular:  Negative for palpitations and leg swelling. Gastrointestinal:  Negative for nausea and vomiting. Endocrine: Negative for polyuria. Genitourinary:  Negative for dysuria. Musculoskeletal:  Negative for back pain. Skin:  Negative for wound. Neurological:  Negative for dizziness and weakness. Psychiatric/Behavioral:  Negative for confusion. Objective:   No intake or output data in the 24 hours ending 08/20/22 1203     Vitals:   Vitals:    08/20/22 0738   BP:    Pulse:    Resp:    Temp:    SpO2: 94%       Physical Exam:   Physical Exam  Constitutional:       General: He is not in acute distress. HENT:      Mouth/Throat:      Mouth: Mucous membranes are moist.      Pharynx: No posterior oropharyngeal erythema. Eyes:      General: No scleral icterus. Extraocular Movements: Extraocular movements intact. Pupils: Pupils are equal, round, and reactive to light. Cardiovascular:      Rate and Rhythm: Normal rate and regular rhythm. Pulses: Normal pulses. Heart sounds: No murmur heard. Pulmonary:      Effort: Pulmonary effort is normal.      Breath sounds: No wheezing or rales. Abdominal:      General: Bowel sounds are normal. There is no distension. Palpations: Abdomen is soft. Tenderness: There is no abdominal tenderness. Musculoskeletal:         General: Normal range of motion. Right lower leg: No edema. Left lower leg: No edema. Skin:     General: Skin is warm. Findings: No rash. Neurological:      General: No focal deficit present. Mental Status: He is alert and oriented to person, place, and time. Cranial Nerves: No cranial nerve deficit. Sensory: No sensory deficit. Motor: No weakness.    Psychiatric:         Mood and Affect: Mood normal. Medications:   Medications:    sodium chloride  1,000 mL IntraVENous Once    cefepime  2,000 mg IntraVENous Q8H    levothyroxine  175 mcg Oral Daily    sodium chloride flush  5-40 mL IntraVENous 2 times per day    metroNIDAZOLE  500 mg IntraVENous Q8H      Infusions:    sodium chloride       PRN Meds: traZODone, 50 mg, Nightly PRN  sodium chloride flush, 5-40 mL, PRN  sodium chloride, , PRN  ondansetron, 4 mg, Q8H PRN   Or  ondansetron, 4 mg, Q6H PRN  polyethylene glycol, 17 g, Daily PRN  aluminum & magnesium hydroxide-simethicone, 30 mL, Q6H PRN  albuterol sulfate HFA, 1 puff, Q6H PRN   And  ipratropium, 1 puff, Q6H PRN        Labs      Recent Results (from the past 24 hour(s))   Urinalysis    Collection Time: 08/19/22  4:59 PM   Result Value Ref Range    Color, UA YELLOW YELLOW    Clarity, UA CLEAR CLEAR    Glucose, Urine NEGATIVE NEGATIVE MG/DL    Bilirubin Urine NEGATIVE NEGATIVE MG/DL    Ketones, Urine TRACE (A) NEGATIVE MG/DL    Specific Gravity, UA 1.010 1.001 - 1.035    Blood, Urine NEGATIVE NEGATIVE    pH, Urine 7.5 5.0 - 8.0    Protein, UA TRACE (A) NEGATIVE MG/DL    Urobilinogen, Urine >8.0 (H) 0.2 - 1.0 MG/DL    Nitrite Urine, Quantitative NEGATIVE NEGATIVE    Leukocyte Esterase, Urine NEGATIVE NEGATIVE    RBC, UA NONE SEEN 0 - 3 /HPF    WBC, UA <1 0 - 2 /HPF    Bacteria, UA NEGATIVE NEGATIVE /HPF    Mucus, UA RARE (A) NEGATIVE HPF    Trichomonas, UA NONE SEEN NONE SEEN /HPF   CBC with Auto Differential    Collection Time: 08/19/22  9:32 PM   Result Value Ref Range    WBC 17.1 (H) 4.0 - 10.5 K/CU MM    RBC 3.58 (L) 4.6 - 6.2 M/CU MM    Hemoglobin 11.6 (L) 13.5 - 18.0 GM/DL    Hematocrit 34.9 (L) 42 - 52 %    MCV 97.5 78 - 100 FL    MCH 32.4 (H) 27 - 31 PG    MCHC 33.2 32.0 - 36.0 %    RDW 14.9 11.7 - 14.9 %    Platelets 724 958 - 370 K/CU MM    MPV 9.6 7.5 - 11.1 FL    Differential Type AUTOMATED DIFFERENTIAL     Segs Relative 79.2 (H) 36 - 66 %    Lymphocytes % 8.8 (L) 24 - 44 %    Monocytes % Non-African American >60 >60 mL/min/1.73m2    GFR African American >60 >60 mL/min/1.73m2    Anion Gap 13 4 - 16   CBC with Auto Differential    Collection Time: 08/20/22  7:12 AM   Result Value Ref Range    WBC 13.8 (H) 4.0 - 10.5 K/CU MM    RBC 3.16 (L) 4.6 - 6.2 M/CU MM    Hemoglobin 10.2 (L) 13.5 - 18.0 GM/DL    Hematocrit 31.3 (L) 42 - 52 %    MCV 99.1 78 - 100 FL    MCH 32.3 (H) 27 - 31 PG    MCHC 32.6 32.0 - 36.0 %    RDW 15.3 (H) 11.7 - 14.9 %    Platelets 113 084 - 946 K/CU MM    MPV 9.8 7.5 - 11.1 FL    Differential Type AUTOMATED DIFFERENTIAL     Segs Relative 78.2 (H) 36 - 66 %    Lymphocytes % 9.6 (L) 24 - 44 %    Monocytes % 11.2 (H) 0 - 4 %    Eosinophils % 0.1 0 - 3 %    Basophils % 0.2 0 - 1 %    Segs Absolute 10.8 K/CU MM    Lymphocytes Absolute 1.3 K/CU MM    Monocytes Absolute 1.6 K/CU MM    Eosinophils Absolute 0.0 K/CU MM    Basophils Absolute 0.0 K/CU MM    Nucleated RBC % 0.0 %    Total Nucleated RBC 0.0 K/CU MM    Total Immature Neutrophil 0.09 K/CU MM    Immature Neutrophil % 0.7 (H) 0 - 0.43 %   Protime-INR    Collection Time: 08/20/22  7:12 AM   Result Value Ref Range    Protime 16.5 (H) 11.7 - 14.5 SECONDS    INR 1.28 INDEX   Magnesium    Collection Time: 08/20/22  7:12 AM   Result Value Ref Range    Magnesium 1.9 1.8 - 2.4 mg/dl        Imaging/Diagnostics Last 24 Hours   CT ABDOMEN PELVIS W IV CONTRAST Additional Contrast? None    Result Date: 8/20/2022  EXAMINATION: CT OF THE ABDOMEN AND PELVIS WITH CONTRAST 8/19/2022 11:10 pm TECHNIQUE: CT of the abdomen and pelvis was performed with the administration of intravenous contrast. Multiplanar reformatted images are provided for review. Automated exposure control, iterative reconstruction, and/or weight based adjustment of the mA/kV was utilized to reduce the radiation dose to as low as reasonably achievable. COMPARISON: MRCP dated August 8, 2022.  HISTORY: ORDERING SYSTEM PROVIDED HISTORY: Fever/recent post op/WBC 17 TECHNOLOGIST PROVIDED HISTORY: Reason for exam:->Fever/recent post op/WBC 17 Additional Contrast?->None Decision Support Exception - unselect if not a suspected or confirmed emergency medical condition->Emergency Medical Condition (MA) Reason for Exam: Fever/recent post op/WBC 17 FINDINGS: Lower Chest: Bibasilar atelectasis is noted. Organs: The patient is status post recent cholecystectomy. There is a focal rim enhancing fluid collection with multiple focal locules of gas with adjacent stranding, concerning for abscess versus infected biloma. This measures 13.2 x 8.2 cm. This results in mild intrahepatic bile duct dilatation. The spleen, adrenal glands, pancreas, and kidneys are unremarkable. GI/Bowel: There is no evidence of bowel obstruction. Pelvis: Keely has been a prostatectomy. The bladder is unremarkable. Peritoneum/Retroperitoneum: There is no evidence of free intraperitoneal air. There is a 3.7 cm infrarenal abdominal aortic aneurysm. Bones/Soft Tissues: No destructive osseous lesions are identified. 1. Postsurgical changes are seen from recent cholecystectomy. There is a focal rim enhancing fluid collection with multiple locules of gas in adjacent stranding within the cholecystectomy bed, concerning for an abscess versus infected biloma. This measures 13.2 x 8.2 cm. 2. 3.7 cm infrarenal abdominal aortic aneurysm. Please see recommendations below. RECOMMENDATIONS: 3.7 cm infrarenal abdominal aortic aneurysm. Recommend follow-up every 2 years. Reference: J Am Bryon Radiol 5430;[id number]. XR CHEST PORTABLE    Result Date: 8/19/2022  EXAMINATION: ONE XRAY VIEW OF THE CHEST 8/19/2022 6:48 pm COMPARISON: None. HISTORY: ORDERING SYSTEM PROVIDED HISTORY: fever TECHNOLOGIST PROVIDED HISTORY: Reason for exam:->fever Reason for Exam: fever, recent surgery FINDINGS: The lungs are without acute focal process. There is no effusion or pneumothorax. The cardiomediastinal silhouette is without acute process.  The osseous structures are without acute process. No acute process.        Electronically signed by Sanjuanita Hammer MD on 8/20/2022 at 12:03 PM

## 2022-08-20 NOTE — ED PROVIDER NOTES
Emergency Department Encounter  Location: [address] [place] [address] EMERGENCY DEPARTMENT    Patient: Kimmie Bai  MRN: [id number]  : 1950  Date of evaluation: 2022  ED Provider: Laly Raphael DO    Chief Complaint:    Fever (Septic early this month - )    [place]:  Kimmie Bai is a 66 y.o. male that presents to the emergency department with concern for fever. Patient had an measured fever of 102 today. Has a pertinent history of ascending cholangitis. Underwent ERCP on  followed by laparoscopic cholecystectomy on 8/10 with Dr. Wallace Dewitt. He was discharged from this hospital on  on course of Augmentin for 7 days. He states he just got home from [facility] yesterday and had been doing well until the fever developed this afternoon. Patient admits has had a cough since surgery and felt little short of breath today. No chest pain. No syncope. Indicates he has been eating and drinking okay. States his abdominal pain seems to be gradually improving. No urinary symptoms. ROS:  At least 10 systems reviewed and are acutely negative unless otherwise noted in the HPI.     Past Medical History:   Diagnosis Date    Abdominal aneurysm ([facility]     4 cm x 3.6 cm followed at [place]    Arthritis 2014    Chronic back pain     [facility]    Chronic sinusitis 2014    COPD, mild ([address] 2014    Diverticulosis     Dr. 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 Yrn Weaver    MEGHAN (obstructive sleep apnea) 2014    CPAP changed to bipap (2014)    Otitis media, serous, TM rupture 2014    ENT x 2 s/p PE tubes bilaterally, cipro Otic     Past Surgical History:   Procedure Laterality Date    CHOLECYSTECTOMY, LAPAROSCOPIC N/A 8/10/2022    CHOLECYSTECTOMY LAPAROSCOPIC performed by Tramaine Sears MD at [facility]      [facility].      COLONOSCOPY  2014    diverticulosis, internal hemorrhoids    ERCP  2022    ERCP SPHINCTER/PAPILLOTOMY performed by Dr. Nish Hunt at [address] [place] [address]    ERCP N/A 2022    ERCP SPHINCTER/PAPILLOTOMY and sweep and removal of debris, sludge and stones with use of 12-15 extractor balloon performed by Dayan Molina MD at [place] [address] Right 2012    JOINT REPLACEMENT Left 2011    KNEE SURGERY Bilateral     replacement    TOE SURGERY Bilateral     toe nail surgery Dr. Pratima Kerns Right 2014    DR. Rascon      Family History   Problem Relation Age of Onset    High Blood Pressure Mother     Allergies Mother     Anemia Mother     High Cholesterol Mother     Thyroid Disease Mother     Heart Disease Father     Allergies Father     Obesity Paternal Grandmother     Obesity Paternal Grandfather     Cancer Paternal Uncle         prostate cancer     Social History     Socioeconomic History    Marital status:      Spouse name: Not on file    Number of children: 2    Years of education: Not on file    Highest education level: Not on file   Occupational History    Not on file   Tobacco Use    Smoking status: Former     Packs/day: 2.00     Years: 36.00     Pack years: 72.00     Types: Cigarettes     Quit date: 2002     Years since quittin.4    Smokeless tobacco: Never    Tobacco comments:     quit 2002   Substance and Sexual Activity    Alcohol use: No     Alcohol/week: 0.0 standard drinks    Drug use: No    Sexual activity: Not on file   Other Topics Concern    Not on file   Social History Narrative    Not on file     Social Determinants of Health     Financial Resource Strain: Unknown    Difficulty of Paying Living Expenses: Patient refused   Food Insecurity: Unknown    Worried About Running Out of Food in the Last Year: Patient refused    Ran Out of Food in the Last Year: Patient refused   Transportation Needs: Not on file   Physical Activity: Not on file   Stress: Not on file   Social Connections: Not on file   Intimate Partner Violence: Not on file   Housing Stability: Not on file     Current Facility-Administered Medications   Medication Dose Route Frequency Provider Last Rate Last Admin    0.9 % sodium chloride bolus  1,000 mL IntraVENous Once Pamela Scott, DO        0.9 % sodium chloride infusion   IntraVENous Continuous Henrietta Payton Scott, DO        metronidazole (FLAGYL) 500 mg in 0.9% NaCl 100 mL IVPB premix  500 mg IntraVENous Q8H Pamela Scott, DO        cefepime (MAXIPIME) 2000 mg IVPB minibag  2,000 mg IntraVENous Once [facility], [facility]         Current Outpatient Medications   Medication Sig Dispense Refill    senna (SENOKOT) 8.6 MG tablet Take 1 tablet by mouth daily 30 tablet 0    losartan (COZAAR) 25 MG tablet Take 1 tablet by mouth at bedtime To replace hydrochlorothiazide 90 tablet 1    Docusate Sodium 100 MG TABS Take 60 mg by mouth daily      fluticasone (FLONASE) 50 MCG/ACT nasal spray 1 spray by Each Nostril route daily      levothyroxine (SYNTHROID) 25 MCG tablet Take 175 mcg by mouth Daily       niacin 500 MG CR capsule Take 750 mg by mouth every morning       traZODone (DESYREL) 50 MG tablet Take 50 mg by mouth nightly. Multiple Vitamins-Minerals (CENTRUM PO) Take  by mouth. 50 plus      ascorbic acid (VITAMIN C) 500 MG tablet Take 500 mg by mouth daily. saline nasal gel (AYR) GEL by Nasal route.        Allergies   Allergen Reactions    Dilaudid [Hydromorphone Hcl] Itching    Hydrocodone Itching    Ultram [Tramadol] Itching    Vicodin [Hydrocodone-Acetaminophen] Itching    Morphine Itching     Pt states he only has itching with morphine    Keflex [Cephalexin] Other (See Comments) Extreme hyperacitivity    Sulfa Antibiotics Itching and Rash       Nursing Notes Reviewed    Physical Exam:  ED Triage Vitals [08/19/22 1625]   Enc Vitals Group      BP (!) 152/68      Heart Rate 90      Resp 18      Temp 99 °F (37.2 °C)      Temp Source Oral      SpO2 96 %      Weight 291 lb (132 kg)      Height 5' 9\" (1.753 m)      Head Circumference       Peak Flow       Pain Score       Pain Loc       Pain Edu? Excl. in [address]? GENERAL APPEARANCE: Awake and alert. Cooperative. No acute distress. HEAD: Normocephalic. Atraumatic. EYES: EOM's grossly intact. Sclera anicteric. ENT: Tolerates saliva. No trismus. NECK: Supple. Trachea midline. CARDIO: RRR. Radial pulse 2+. LUNGS: Respirations unlabored. CTAB. ABDOMEN: Soft. Non-distended. Mildly generally tender. Incisions are clean, dry, healing well. EXTREMITIES: No acute deformities. No lower extremity tenderness, edema or asymmetry. SKIN: Warm and dry. NEUROLOGICAL: No gross facial drooping. Moves all 4 extremities spontaneously. PSYCHIATRIC: Normal mood.      Labs:  Results for orders placed or performed during the hospital encounter of 08/19/22   COVID-19, Rapid    Specimen: Nasopharyngeal   Result Value Ref Range    Source UNKNOWN     SARS-CoV-2, NAAT NOT DETECTED NOT DETECTED   CBC with Auto Differential   Result Value Ref Range    WBC 17.1 (H) 4.0 - 10.5 K/CU MM    RBC 3.58 (L) 4.6 - 6.2 M/CU MM    Hemoglobin 11.6 (L) 13.5 - 18.0 GM/DL    Hematocrit 34.9 (L) 42 - 52 %    MCV 97.5 78 - 100 FL    MCH 32.4 (H) 27 - 31 PG    MCHC 33.2 32.0 - 36.0 %    RDW 14.9 11.7 - 14.9 %    Platelets 635 980 - 082 K/CU MM    MPV 9.6 7.5 - 11.1 FL    Differential Type AUTOMATED DIFFERENTIAL     Segs Relative 79.2 (H) 36 - 66 %    Lymphocytes % 8.8 (L) 24 - 44 %    Monocytes % 10.9 (H) 0 - 4 %    Eosinophils % 0.1 0 - 3 %    Basophils % 0.4 0 - 1 %    Segs Absolute 13.5 K/CU MM    Lymphocytes Absolute 1.5 K/CU MM    Monocytes Absolute 1.9 K/CU MM Eosinophils Absolute 0.0 K/CU MM    Basophils Absolute 0.1 K/CU MM    Nucleated RBC % 0.0 %    Total Nucleated RBC 0.0 K/CU MM    Total Immature Neutrophil 0.10 K/CU MM    Immature Neutrophil % 0.6 (H) 0 - 0.43 %   Comprehensive Metabolic Panel w/ Reflex to MG   Result Value Ref Range    Sodium 131 (L) 135 - 145 MMOL/L    Potassium 3.6 3.5 - 5.1 MMOL/L    Chloride 97 (L) 99 - 110 mMol/L    CO2 23 21 - 32 MMOL/L    BUN 7 6 - 23 MG/DL    Creatinine 0.6 (L) 0.9 - 1.3 MG/DL    Glucose 104 (H) 70 - 99 MG/DL    Calcium 8.9 8.3 - 10.6 MG/DL    Albumin 3.8 3.4 - 5.0 GM/DL    Total Protein 7.1 6.4 - 8.2 GM/DL    Total Bilirubin 1.1 (H) 0.0 - 1.0 MG/DL    ALT 23 10 - 40 U/L    AST 25 15 - 37 IU/L    Alkaline Phosphatase 205 (H) 40 - 129 IU/L    GFR Non-African American >60 >60 mL/min/1.73m2    GFR African American >60 >60 mL/min/1.73m2    Anion Gap 11 4 - 16   Lactate, Sepsis   Result Value Ref Range    Lactic Acid, Sepsis 1.0 0.5 - 1.9 mMOL/L   Urinalysis   Result Value Ref Range    Color, UA YELLOW YELLOW    Clarity, UA CLEAR CLEAR    Glucose, Urine NEGATIVE NEGATIVE MG/DL    Bilirubin Urine NEGATIVE NEGATIVE MG/DL    Ketones, Urine TRACE (A) NEGATIVE MG/DL    Specific Gravity, UA 1.010 1.001 - 1.035    Blood, Urine NEGATIVE NEGATIVE    pH, Urine 7.5 5.0 - 8.0    Protein, UA TRACE (A) NEGATIVE MG/DL    Urobilinogen, Urine >8.0 (H) 0.2 - 1.0 MG/DL    Nitrite Urine, Quantitative NEGATIVE NEGATIVE    Leukocyte Esterase, Urine NEGATIVE NEGATIVE    RBC, UA NONE SEEN 0 - 3 /HPF    WBC, UA <1 0 - 2 /HPF    Bacteria, UA NEGATIVE NEGATIVE /HPF    Mucus, UA RARE (A) NEGATIVE HPF    Trichomonas, UA NONE SEEN NONE SEEN /HPF     Radiographs (if obtained):  [] The following radiograph was interpreted by myself in the absence of a radiologist:  [x] Radiologist's Report reviewed at time of ED visit:  Echocardiogram complete 2D with doppler with color    Result Date: 8/11/2022  Transthoracic Echocardiography Report (TTE)  Demographics Patient Name      Chris Woody  Date of Study       08/11/2022   Date of Birth     1950         Gender              Male   Age               66 year(s)         Race                   Patient Number    [id number]         Room Number         5459   Visit Number      [id number]   Corporate ID      [id number]   Accession Number  [id number]         [place] JUSTIN Pearson   Ordering          Hannah Haynes MD  Physician         MD                 Physician  Procedure Type of Study   TTE procedure:ECHOCARDIOGRAM COMPLETE 2D W DOPPLER W COLOR. Procedure Date Date: 08/11/2022 Start: 08:27 AM Study Location: Portable Technical Quality: Adequate visualization Indications:Bradycardia. Patient Status: Routine Height: 69 inches Weight: 303 pounds BSA: 2.46 m2 BMI: 44.74 kg/m2 Rhythm: Sinus bradycardia HR: 54 bpm BP: 130/59 mmHg  Conclusions   Summary  Left ventricular systolic function is normal.  Ejection fraction is visually estimated at 55-60%. No evidence of any pericardial effusion. No significant valvular disease noted. Signature   ------------------------------------------------------------------  Electronically signed by Ronnie Hardy MD (Interpreting  physician) on 08/11/2022 at 09:30 AM  ------------------------------------------------------------------   Findings   Left Ventricle  Left ventricular systolic function is normal.  Ejection fraction is visually estimated at 55-60%. No regional wall motion abnormalites. Left ventricle size is normal.  Normal diastolic function. Left Atrium  Essentially normal left atrium. Right Atrium  Essentially normal right atrium. Right Ventricle  Essentially normal right ventricle. Aortic Valve  Sclerotic, but non-stenotic aortic valve. No significant valvular disease noted. Mitral Valve  Structurally normal mitral valve.   Trace calcifications in Lower bandar of lung;  paotietn states he is also fatigued Relevant Medical/Surgical History: patient states fever of uknown origin since July 9, 2022;  patient states known COPD and calcifications in Lower bandar of lung;  paotietn states he is also fatigued FINDINGS: Lower Chest: The lung bases demonstrate subpleural fibrosis. There is a calcified granuloma in the right middle lobe. Organs: The gallbladder is distended. There is a gallstone in the neck of the gallbladder. There is intrahepatic biliary ductal dilatation. The common bile duct is also dilated. No focal liver lesions are seen. There are calcified splenic granulomas. The pancreas appears unremarkable. No obvious masses are seen within the pancreatic head. The adrenal glands appear unremarkable. There is symmetric enhancement of the kidneys. There are small cysts involving the kidneys bilaterally. GI/Bowel: The bowel loops are not dilated. No bowel wall thickening is seen. I do not see a dilated appendix. Pelvis: No pelvic masses or fluid collections are seen. Peritoneum/Retroperitoneum: There is aneurysmal dilatation of the abdominal aorta measuring 3.7 x 3.3 cm. There are shotty mesenteric and retroperitoneal lymph nodes but no lymphadenopathy is seen. Bones/Soft Tissues: No acute bony abnormalities are noted. There are degenerative changes involving the spine. There are small fat containing inguinal hernias. 1. Intrahepatic and extrahepatic biliary ductal dilatation without obvious pancreatic head mass. I am concerned for an ampullary malignancy and cholangiocarcinoma. I would recommend further evaluation with either MRCP or ERCP. 2. Cholelithiasis with distended gallbladder. I do raise the possibility of acute cholecystitis given the location of the gallstone. 3. Small fat containing inguinal hernias. 4. Abdominal aortic aneurysm measuring 3.7 x 3.3 cm. RECOMMENDATIONS: 3.7 cm abdominal aortic aneurysm.  Recommend follow-up every 2 years. Reference: J Am Broyn Radiol 7076;[id number]. FL LESS THAN 1 HOUR    Result Date: 8/9/2022  EXAMINATION: SPOT FLUOROSCOPIC IMAGES 8/9/2022 3:28 pm TECHNIQUE: Fluoroscopy was provided by the radiology department for procedure. Radiologist was not present during examination. FLUOROSCOPY DOSE AND TYPE OR TIME AND EXPOSURES: 3.6 minutes of fluoroscopy. 2 images. COMPARISON: None HISTORY: ORDERING SYSTEM PROVIDED HISTORY: ERCP TECHNOLOGIST PROVIDED HISTORY: Reason for exam:->ERCP Reason for Exam: ERCP Intraprocedural imaging. FINDINGS: 2 spot images of the right upper quadrant were obtained. Images demonstrate cannulation the biliary system with dilated intra and extrahepatic bile ducts. No filling defect is evident. No extraluminal contrast is seen. Intraprocedural fluoroscopic spot images as above. See separate procedure report for more information. XR CHEST PORTABLE    Result Date: 8/19/2022  EXAMINATION: ONE XRAY VIEW OF THE CHEST 8/19/2022 6:48 pm COMPARISON: None. HISTORY: ORDERING SYSTEM PROVIDED HISTORY: fever TECHNOLOGIST PROVIDED HISTORY: Reason for exam:->fever Reason for Exam: fever, recent surgery FINDINGS: The lungs are without acute focal process. There is no effusion or pneumothorax. The cardiomediastinal silhouette is without acute process. The osseous structures are without acute process. No acute process. MRI ABDOMEN WO CONTRAST MRCP    Result Date: 8/8/2022  EXAMINATION: MRI OF THE ABDOMEN WITHOUT CONTRAST AND MRCP 8/8/2022 10:27 am TECHNIQUE: Multiplanar multisequence MRI of the abdomen was performed without the administration of intravenous contrast.  After initial T2 axial and coronal images, thick slab, thin slab and 3D coronal MRCP sequences were obtained without the administration of intravenous contrast.  MIP images are provided for review. COMPARISON: Ultrasound 08/07/2022. CT 08/05/2022.  HISTORY: ORDERING SYSTEM PROVIDED HISTORY: sepsis 2/2 echogenicity of the liver suggesting fatty infiltration. There is intrahepatic biliary ductal dilatation. BILIARY SYSTEM:  There is cholelithiasis. The gallbladder is distended. No wall thickening or pericholecystic fluid. The common bile duct is dilated measuring 19 mm in diameter. RIGHT KIDNEY: The right kidney is grossly unremarkable without evidence of hydronephrosis. PANCREAS:  There is a peripancreatic lymph node measuring 23 x 11 x 19 mm in size. On CT, this is outside of the pancreas itself. OTHER: No evidence of right upper quadrant ascites. Intrahepatic and extrahepatic biliary ductal dilatation with a dilated gallbladder. Common bile duct measures up to 19 mm in diameter. Cholelithiasis without specific sonographic evidence of acute cholecystitis. Peripancreatic enlarged lymph node measuring 23 x 11 x 19 mm. Recommend MRCP for further evaluation. ED Course and MDM:  On arrival, patient is fairly comfortable and hemodynamically stable. He states his last p.o. was around 8 AM this morning. Labs reviewed. He does have a new significant leukocytosis. No significant renal dysfunction or electrolyte derangement. Total bili is 1.1. ALT and AST are within normal limits. Urinalysis without convincing evidence of infection. Lactic acid is 1.    CT abdomen pelvis obtained given the recent surgery and ascending cholangitis. He does have findings suggestive of an abscess versus infected biloma within the cholecystectomy bed measuring 13.2 x 8.2 cm. Patient has been given cefepime and Flagyl. I discussed with Dr. Rosanne Romero, on-call for Dr. Reece Landeros. He agrees with plan for admission, IV antibiotics. Advises that he will consult and requests that IR be involved in the morning for possible drain placement. Patient has been updated on the findings, concerns, and plan of care. He is agreeable to proceed. Hospitalist has accepted the patient for admission. Final Impression:  1.  Infection of organ or organ space after surgery, initial encounter    2. Severe sepsis [facility])      DISPOSITION Decision To Admit 08/19/2022 11:58:00 PM      Patient referred to: No follow-up provider specified.   Discharge medications:  New Prescriptions    No medications on file     (Please note that portions of this note may have been completed with a voice recognition program. Efforts were made to edit the dictations but occasionally words are mis-transcribed.)    DO Vinay Wiley DO  08/20/22 0007

## 2022-08-20 NOTE — H&P
ROS reviewed negative, unless as noted above. Objective:     No intake or output data in the 24 hours ending 08/20/22 0019     Vitals:   Vitals:    08/19/22 1625   BP: (!) 152/68   Pulse: 90   Resp: 18   Temp: 99 °F (37.2 °C)   TempSrc: Oral   SpO2: 96%   Weight: 291 lb (132 kg)   Height: 5' 9\" (1.753 m)     BMI: Body mass index is 42.97 kg/m². General: Awake. WDWN. AAOx3. Morbidly obese. HEENT: NCAT. PERRLA. Vision grossly intact. Hearing grossly intact. Oropharynx clear. MMM. Neck: Supple. No JVP/JVD. CV: RRR. NL S1/S2. No M/R/G. CR <2 secs. No peripheral edema. Pulm: NL effort on RA. CTAB. No R/R/W. CW NTTP. GI: +BS x4. Soft. Mildly tender in RUQ. No rebound, guarding or rigidity. Incision are clean and well-healed. : No CVA or suprapubic tenderness. No Robertson catheter. Ext: No obvious deformities. Peripheral pulses intact. Skin: Intact. Normal coloration, warm, dry. MSK: No gross joint deformities. Full ROM. Muscle strength is 5/5 B/L. Neuro: CNs grossly intact. Normal speech. No focal deficit. Psych: Good judgement and reason. Past History:      PMHx:   Past Medical History:   Diagnosis Date    Abdominal aneurysm ([facility] [address] 2012    4 cm x 3.6 cm followed at [facility]    Arthritis 1/21/2014    Chronic [place] pain     [facility]    Chronic sinusitis 1/21/2014    COPD, mild ([address] 1/21/2014    Diverticulosis 2014    Dr. 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 Joelle Stuart    MEGHAN (obstructive sleep apnea) 1/21/2014    CPAP changed to bipap (July 2014)    Otitis media, serous, TM rupture 1/21/2014    ENT x 2 s/p PE tubes bilaterally, cipro Otic       PSHx:   Past Surgical History:   Procedure Laterality Date    CHOLECYSTECTOMY, LAPAROSCOPIC N/A 8/10/2022    CHOLECYSTECTOMY LAPAROSCOPIC performed by Killian Wells MD at [place] [address]      [facility].      COLONOSCOPY  2014    diverticulosis, internal hemorrhoids    ERCP  2022    ERCP SPHINCTER/PAPILLOTOMY performed by Dr. Margaret Rm at [facility]    ERCP N/A 2022    ERCP SPHINCTER/PAPILLOTOMY and sweep and removal of debris, sludge and stones with use of 12-15 extractor balloon performed by Louie Grajeda MD at [address] [place] [address] Right 2012    JOINT REPLACEMENT Left 2011    KNEE SURGERY Bilateral     replacement    TOE SURGERY Bilateral     toe nail surgery Dr. Jelena Ortiz Right 2014    DR. Rascon        Allergies: Allergies   Allergen Reactions    Dilaudid [Hydromorphone Hcl] Itching    Hydrocodone Itching    Ultram [Tramadol] Itching    Vicodin [Hydrocodone-Acetaminophen] Itching    Morphine Itching     Pt states he only has itching with morphine    Keflex [Cephalexin] Other (See Comments)     Extreme hyperacitivity    Sulfa Antibiotics Itching and Rash       FHx: family history includes Allergies in his father and mother; Anemia in his mother; Cancer in his paternal uncle; Heart Disease in his father; High Blood Pressure in his mother; High Cholesterol in his mother; Obesity in his paternal grandfather and paternal grandmother; Thyroid Disease in his mother. SHx:   Social History     Socioeconomic History    Marital status:      Spouse name: None    Number of children: 2    Years of education: None    Highest education level: None   Tobacco Use    Smoking status: Former     Packs/day: 2.00     Years: 36.00     Pack years: 72.00     Types: Cigarettes     Quit date: 2002     Years since quittin.4    Smokeless tobacco: Never    Tobacco comments:     quit 2002   Substance and Sexual Activity    Alcohol use:  No Alcohol/week: 0.0 standard drinks    Drug use: No     Social Determinants of Health     Financial Resource Strain: Unknown    Difficulty of Paying Living Expenses: Patient refused   Food Insecurity: Unknown    Worried About Running Out of Food in the Last Year: Patient refused    Ran Out of Food in the Last Year: Patient refused       Medications Prior to Admission     Prior to Admission medications    Medication Sig Start Date End Date Taking? Authorizing Provider   senna (SENOKOT) 8.6 MG tablet Take 1 tablet by mouth daily 8/19/22 9/18/22  SHANNON Briseno MD   losartan (COZAAR) 25 MG tablet Take 1 tablet by mouth at bedtime To replace hydrochlorothiazide 7/29/22   Talib Elliott MD   Docusate Sodium 100 MG TABS Take 60 mg by mouth daily 1/1/19   Historical Provider, MD   albuterol-ipratropium (COMBIVENT RESPIMAT)  MCG/ACT AERS inhaler USE 1 INHALATION TWICE A DAY 11/29/21 8/18/22  Talib Elliott MD   fluticasone (FLONASE) 50 MCG/ACT nasal spray 1 spray by Each Nostril route daily    Historical Provider, MD   azelastine HCl 0.15 % SOLN by Nasal route 2 times daily. Patient not taking: Reported on 8/8/2022 8/12/22  Historical Provider, MD   levothyroxine (SYNTHROID) 25 MCG tablet Take 175 mcg by mouth Daily     Historical Provider, MD   niacin 500 MG CR capsule Take 750 mg by mouth every morning     Historical Provider, MD   traZODone (DESYREL) 50 MG tablet Take 50 mg by mouth nightly. Historical Provider, MD   Multiple Vitamins-Minerals (CENTRUM PO) Take  by mouth. 50 plus    Historical Provider, MD   ascorbic acid (VITAMIN C) 500 MG tablet Take 500 mg by mouth daily. Historical Provider, MD   saline nasal gel (AYR) GEL by Nasal route. Historical Provider, MD   mometasone (NASONEX) 50 MCG/ACT nasal spray 2 sprays by Nasal route 2 times daily.   8/18/22  Historical Provider, MD   flunisolide (NASALIDE) 25 MCG/ACT (0.025%) SOLN Inhale 2 sprays into the lungs every 12 hours.  8/18/22  Historical Provider, MD       Data:     CBC:   Recent Labs     08/19/22 2132   WBC 17.1*   HGB 11.6*      MCV 97.5   RDW 14.9   LYMPHOPCT 8.8*   MONOPCT 10.9*   BASOPCT 0.4   MONOSABS 1.9   LYMPHSABS 1.5   EOSABS 0.0   BASOSABS 0.1     CMP:    Recent Labs     08/19/22 2132   *   K 3.6   CL 97*   CO2 23   BUN 7   CREATININE 0.6*   GFRAA >60   GLUCOSE 104*   LABALBU 3.8   CALCIUM 8.9   BILITOT 1.1*   ALKPHOS 205*   AST 25   ALT 23     Lipids:   Lab Results   Component Value Date/Time    CHOL 143 10/29/2021 08:07 AM    HDL 32 10/29/2021 08:07 AM    TRIG 117 10/29/2021 08:07 AM     Hemoglobin A1C:   Lab Results   Component Value Date/Time    LABA1C 5.1 02/05/2021 12:00 AM     TSH:   Lab Results   Component Value Date/Time    TSH 3.56 07/27/2022 08:52 AM     Troponin:   Lab Results   Component Value Date/Time    TROPONINT <0.010 08/07/2022 12:05 PM     BNP: No results for input(s): PROBNP in the last 72 hours. Lactic Acid: No results for input(s): LACTA in the last 72 hours.   UA:  Lab Results   Component Value Date/Time    NITRU NEGATIVE 08/19/2022 04:59 PM    COLORU YELLOW 08/19/2022 04:59 PM    WBCUA <1 08/19/2022 04:59 PM    RBCUA NONE SEEN 08/19/2022 04:59 PM    MUCUS RARE 08/19/2022 04:59 PM    TRICHOMONAS NONE SEEN 08/19/2022 04:59 PM    BACTERIA NEGATIVE 08/19/2022 04:59 PM    CLARITYU CLEAR 08/19/2022 04:59 PM    SPECGRAV 1.010 08/19/2022 04:59 PM    LEUKOCYTESUR NEGATIVE 08/19/2022 04:59 PM    UROBILINOGEN >8.0 08/19/2022 04:59 PM    BILIRUBINUR NEGATIVE 08/19/2022 04:59 PM    BLOODU NEGATIVE 08/19/2022 04:59 PM    KETUA TRACE 08/19/2022 04:59 PM     Urine Cultures: No results found for: Lisseth Bile  Blood Cultures: No results found for: BC  No results found for: BLOODCULT2  Organism:   Lab Results   Component Value Date/Time    ORG BSGA 07/23/2014 11:00 AM    ORG MRSA 07/23/2014 11:00 AM       Radiology results:  CT ABDOMEN PELVIS W IV CONTRAST Additional Contrast? None

## 2022-08-20 NOTE — PROGRESS NOTES
Physician Progress Note      Juancho Gary  CSN #:                  [id number]  :                       1950  ADMIT DATE:       2022 8:24 PM  [address] [place] DATE:  RESPONDING  PROVIDER #:        Jorge Gutierrez MD          QUERY TEXT:    Pt admitted with sepsis secondary to intra-abdominal abscess and underwent   ERCP on  followed by lap cholecystectomy on 8/10.? If possible, please   document in progress notes and discharge summary:    The medical record reflects the following:  Risk Factors: ERCP followed by lap alina, obesity  Clinical Indicators: H&P notes \"epsis secondary to intra-abdominal abscess  - Presented with fevers (Tmax 102? F) and abdominal pain. Recently had   ascending cholangitis and underwent ERCP on  followed by laparoscopic   cholecystectomy on 8/10, and was discharged on  with 7-day course of   Augmentin. \", abd/pelvis CT on  \"Postsurgical changes are seen from recent   cholecystectomy. There is a focal rim enhancing fluid collection with multiple   locules of gas in adjacent stranding within the cholecystectomy bed,   concerning for an abscess versus infected biloma. This measures 13.2 x 8.2 cm. \"  Treatment: IV cefepime, IV Flagyl, abd/pelvis CT, general surgery consult. Thank you,  CORBY SalmonN, RN, [facility]  [phone]  Options provided:  -- Sepsis secondary to intra-abdominal abscess?is a postoperative complication  -- Sepsis secondary to intra-abdominal abscess is not a postoperative   complication, but is due to other incidental risk factor, Please specify other   incidental risk factor. -- Other - I will add my own diagnosis  -- Disagree - Not applicable / Not valid  -- Disagree - Clinically unable to determine / Unknown  -- Refer to Clinical Documentation Reviewer    PROVIDER RESPONSE TEXT:    Patient has sepsis secondary to intra-abdominal abscess as a postoperative   complication.     Query created by: Everett Edwards on 2022 2:10 PM      Electronically signed by:  Frida Bryan MD 8/20/2022 2:16 PM

## 2022-08-21 ENCOUNTER — APPOINTMENT (OUTPATIENT)
Dept: NUCLEAR MEDICINE | Age: 72
DRG: 862 | End: 2022-08-21
Payer: MEDICARE

## 2022-08-21 PROCEDURE — 94761 N-INVAS EAR/PLS OXIMETRY MLT: CPT

## 2022-08-21 PROCEDURE — 6360000002 HC RX W HCPCS: Performed by: STUDENT IN AN ORGANIZED HEALTH CARE EDUCATION/TRAINING PROGRAM

## 2022-08-21 PROCEDURE — 1200000000 HC SEMI PRIVATE

## 2022-08-21 PROCEDURE — A9537 TC99M MEBROFENIN: HCPCS | Performed by: STUDENT IN AN ORGANIZED HEALTH CARE EDUCATION/TRAINING PROGRAM

## 2022-08-21 PROCEDURE — 6370000000 HC RX 637 (ALT 250 FOR IP): Performed by: STUDENT IN AN ORGANIZED HEALTH CARE EDUCATION/TRAINING PROGRAM

## 2022-08-21 PROCEDURE — 3430000000 HC RX DIAGNOSTIC RADIOPHARMACEUTICAL: Performed by: STUDENT IN AN ORGANIZED HEALTH CARE EDUCATION/TRAINING PROGRAM

## 2022-08-21 PROCEDURE — 2580000003 HC RX 258: Performed by: STUDENT IN AN ORGANIZED HEALTH CARE EDUCATION/TRAINING PROGRAM

## 2022-08-21 PROCEDURE — 78226 HEPATOBILIARY SYSTEM IMAGING: CPT

## 2022-08-21 PROCEDURE — 2500000003 HC RX 250 WO HCPCS: Performed by: EMERGENCY MEDICINE

## 2022-08-21 PROCEDURE — 99233 SBSQ HOSP IP/OBS HIGH 50: CPT | Performed by: PHYSICIAN ASSISTANT

## 2022-08-21 RX ORDER — FLUTICASONE PROPIONATE 50 MCG
1 SPRAY, SUSPENSION (ML) NASAL DAILY
Status: DISCONTINUED | OUTPATIENT
Start: 2022-08-21 | End: 2022-08-22

## 2022-08-21 RX ORDER — TRAZODONE HYDROCHLORIDE 50 MG/1
50 TABLET ORAL NIGHTLY
Status: DISCONTINUED | OUTPATIENT
Start: 2022-08-21 | End: 2022-08-25 | Stop reason: HOSPADM

## 2022-08-21 RX ORDER — ENOXAPARIN SODIUM 100 MG/ML
40 INJECTION SUBCUTANEOUS DAILY
Status: DISCONTINUED | OUTPATIENT
Start: 2022-08-21 | End: 2022-08-21 | Stop reason: DRUGHIGH

## 2022-08-21 RX ORDER — LOSARTAN POTASSIUM 25 MG/1
25 TABLET ORAL NIGHTLY
Status: DISCONTINUED | OUTPATIENT
Start: 2022-08-21 | End: 2022-08-25 | Stop reason: HOSPADM

## 2022-08-21 RX ORDER — ENOXAPARIN SODIUM 100 MG/ML
30 INJECTION SUBCUTANEOUS 2 TIMES DAILY
Status: DISCONTINUED | OUTPATIENT
Start: 2022-08-21 | End: 2022-08-25 | Stop reason: HOSPADM

## 2022-08-21 RX ADMIN — TRAZODONE HYDROCHLORIDE 50 MG: 50 TABLET ORAL at 22:31

## 2022-08-21 RX ADMIN — METRONIDAZOLE 500 MG: 500 INJECTION, SOLUTION INTRAVENOUS at 08:04

## 2022-08-21 RX ADMIN — SODIUM CHLORIDE: 9 INJECTION, SOLUTION INTRAVENOUS at 23:54

## 2022-08-21 RX ADMIN — CEFEPIME HYDROCHLORIDE 2000 MG: 2 INJECTION, POWDER, FOR SOLUTION INTRAMUSCULAR; INTRAVENOUS at 09:12

## 2022-08-21 RX ADMIN — LOSARTAN POTASSIUM 25 MG: 25 TABLET, FILM COATED ORAL at 22:31

## 2022-08-21 RX ADMIN — METRONIDAZOLE 500 MG: 500 INJECTION, SOLUTION INTRAVENOUS at 23:55

## 2022-08-21 RX ADMIN — ACETAMINOPHEN 650 MG: 325 TABLET ORAL at 18:50

## 2022-08-21 RX ADMIN — SODIUM CHLORIDE, PRESERVATIVE FREE 10 ML: 5 INJECTION INTRAVENOUS at 22:31

## 2022-08-21 RX ADMIN — CEFEPIME HYDROCHLORIDE 2000 MG: 2 INJECTION, POWDER, FOR SOLUTION INTRAMUSCULAR; INTRAVENOUS at 17:40

## 2022-08-21 RX ADMIN — SODIUM CHLORIDE, PRESERVATIVE FREE 10 ML: 5 INJECTION INTRAVENOUS at 00:05

## 2022-08-21 RX ADMIN — ACETAMINOPHEN 650 MG: 325 TABLET ORAL at 08:06

## 2022-08-21 RX ADMIN — ENOXAPARIN SODIUM 30 MG: 100 INJECTION SUBCUTANEOUS at 22:31

## 2022-08-21 RX ADMIN — METRONIDAZOLE 500 MG: 500 INJECTION, SOLUTION INTRAVENOUS at 15:33

## 2022-08-21 RX ADMIN — Medication 6 MILLICURIE: at 13:42

## 2022-08-21 RX ADMIN — SODIUM CHLORIDE, PRESERVATIVE FREE 10 ML: 5 INJECTION INTRAVENOUS at 08:07

## 2022-08-21 RX ADMIN — LEVOTHYROXINE SODIUM 175 MCG: 0.1 TABLET ORAL at 06:19

## 2022-08-21 RX ADMIN — CEFEPIME HYDROCHLORIDE 2000 MG: 2 INJECTION, POWDER, FOR SOLUTION INTRAMUSCULAR; INTRAVENOUS at 00:52

## 2022-08-21 ASSESSMENT — PAIN SCALES - GENERAL
PAINLEVEL_OUTOF10: 2
PAINLEVEL_OUTOF10: 4
PAINLEVEL_OUTOF10: 0
PAINLEVEL_OUTOF10: 3
PAINLEVEL_OUTOF10: 0
PAINLEVEL_OUTOF10: 0

## 2022-08-21 ASSESSMENT — PAIN DESCRIPTION - LOCATION
LOCATION: ABDOMEN

## 2022-08-21 ASSESSMENT — ENCOUNTER SYMPTOMS
COLOR CHANGE: 0
VOMITING: 0
WHEEZING: 0
APNEA: 0
NAUSEA: 0
ANAL BLEEDING: 0
COUGH: 0
EYE ITCHING: 0
CONSTIPATION: 0
EYE REDNESS: 0
STRIDOR: 0
SHORTNESS OF BREATH: 0
PHOTOPHOBIA: 0
ABDOMINAL PAIN: 1
BACK PAIN: 0
RECTAL PAIN: 0
SORE THROAT: 0
CHOKING: 0

## 2022-08-21 ASSESSMENT — PAIN - FUNCTIONAL ASSESSMENT: PAIN_FUNCTIONAL_ASSESSMENT: ACTIVITIES ARE NOT PREVENTED

## 2022-08-21 ASSESSMENT — PAIN DESCRIPTION - PAIN TYPE: TYPE: SURGICAL PAIN

## 2022-08-21 ASSESSMENT — PAIN SCALES - WONG BAKER
WONGBAKER_NUMERICALRESPONSE: 0
WONGBAKER_NUMERICALRESPONSE: 0

## 2022-08-21 ASSESSMENT — PAIN DESCRIPTION - ORIENTATION
ORIENTATION: RIGHT
ORIENTATION: RIGHT

## 2022-08-21 ASSESSMENT — PAIN DESCRIPTION - DESCRIPTORS
DESCRIPTORS: ACHING;TENDER
DESCRIPTORS: ACHING

## 2022-08-21 ASSESSMENT — PAIN DESCRIPTION - ONSET: ONSET: GRADUAL

## 2022-08-21 ASSESSMENT — PAIN DESCRIPTION - FREQUENCY: FREQUENCY: INTERMITTENT

## 2022-08-21 NOTE — PROGRESS NOTES
General Surgery  Dr. Ede Campos and Sadia Vazquez, [place] [facility] Day: 3    Chief Complaint on Admission: Fevers, chills, Abd pain      Subjective:     Gabriel Yang is a 66 y.o. male with   Intra-abdominal abscess bs Biloma s/p IR drain placement. Patient reports abdominal pain. Fevers/chills have resolved. Pt denies N/V. Tolerating ADULT DIET; Regular. +ve BM.     ROS:  Review of Systems   Constitutional:  Negative for chills and fever. HENT:  Negative for ear pain, mouth sores, sore throat and tinnitus. Eyes:  Negative for photophobia, redness and itching. Respiratory:  Negative for apnea, choking and stridor. Cardiovascular:  Negative for chest pain and palpitations. Gastrointestinal:  Positive for abdominal pain. Negative for anal bleeding, constipation, nausea, rectal pain and vomiting. Endocrine: Negative for polydipsia. Genitourinary:  Negative for enuresis, flank pain and hematuria. Musculoskeletal:  Negative for back pain, joint swelling and myalgias. Skin:  Negative for color change and pallor. Allergic/Immunologic: Negative for environmental allergies. Neurological:  Negative for syncope and speech difficulty. Psychiatric/Behavioral:  Negative for confusion and hallucinations.       Allergies  Dilaudid [hydromorphone hcl], Hydrocodone, Ultram [tramadol], Vicodin [hydrocodone-acetaminophen], Morphine, Keflex [cephalexin], and Sulfa antibiotics          Diagnosis Date    Abdominal aneurysm ([facility] [address] 2012    4 cm x 3.6 cm followed at [address] 1/21/2014    Chronic [place] [facility]     [facility]    Chronic sinusitis 1/21/2014    COPD, mild ([facility] [address] 1/21/2014    Diverticulosis 2014    Dr. 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 Héctor Holman    MEGHAN (obstructive sleep apnea) 2014    CPAP changed to bipap (2014)    Otitis media, serous, TM rupture 2014    ENT x 2 s/p PE tubes bilaterally, cipro Otic       Objective:     Vitals:    22 0730   BP: 125/71   Pulse: 61   Resp: 20   Temp: 98.1 °F (36.7 °C)   SpO2: 98%       TEMPERATURE:  Current - Temp: 98.1 °F (36.7 °C); Max - Temp  Av.2 °F (37.3 °C)  Min: 97.9 °F (36.6 °C)  Max: 100.9 °F (38.3 °C)    No intake/output data recorded. I/O last 3 completed shifts:  In: -   Out: 110 [Drains:110]      Physical Exam:  Physical Exam  Constitutional:       Appearance: He is well-developed. HENT:      Head: Normocephalic. Eyes:      Pupils: Pupils are equal, round, and reactive to light. Cardiovascular:      Rate and Rhythm: Normal rate. Pulmonary:      Effort: Pulmonary effort is normal.   Abdominal:      General: There is no distension. Palpations: Abdomen is soft. There is no mass. Tenderness: There is abdominal tenderness. There is no guarding or rebound. Comments: Lap incisions well healed. RUQ Drain with bilious output - no evidence of purulence. Musculoskeletal:         General: Normal range of motion. Cervical back: Normal range of motion and neck supple. Skin:     General: Skin is warm. Neurological:      Mental Status: He is alert and oriented to person, place, and time.            Scheduled Meds:   losartan  25 mg Oral Nightly    fluticasone  1 spray Each Nostril Daily    traZODone  50 mg Oral Nightly    sodium chloride  1,000 mL IntraVENous Once    cefepime  2,000 mg IntraVENous Q8H    levothyroxine  175 mcg Oral Daily    sodium chloride flush  5-40 mL IntraVENous 2 times per day    metroNIDAZOLE  500 mg IntraVENous Q8H     Continuous Infusions:   sodium chloride 25 mL/hr at 22 2336     PRN Meds:saline nasal gel, sodium chloride flush, sodium chloride, ondansetron **OR** ondansetron, polyethylene glycol, aluminum & magnesium hydroxide-simethicone, albuterol sulfate HFA **AND** ipratropium, acetaminophen, oxyCODONE-acetaminophen      Labs/Imaging Results:   Lab Results   Component Value Date    WBC 13.8 (H) 08/20/2022    HGB 10.2 (L) 08/20/2022    HCT 31.3 (L) 08/20/2022    MCV 99.1 08/20/2022     08/20/2022     Lab Results   Component Value Date     08/20/2022    K 3.5 08/20/2022     08/20/2022    CO2 24 08/20/2022    BUN 7 08/20/2022    CREATININE 0.7 (L) 08/20/2022    GLUCOSE 96 08/20/2022    CALCIUM 8.2 (L) 08/20/2022    PROT 5.8 (L) 08/20/2022    LABALBU 3.6 08/20/2022    BILITOT 1.0 08/20/2022    ALKPHOS 179 (H) 08/20/2022    AST 21 08/20/2022    ALT 21 08/20/2022    LABGLOM >60 08/20/2022    GFRAA >60 08/20/2022    AGRATIO 1.5 07/27/2022    GLOB 2.3 01/25/2016       Assessment:     Patient Active Problem List:     MEGHAN (obstructive sleep apnea)- on BIPAP     COPD, mild (HCC)     Arthritis     Chronic sinusitis     Acquired hypothyroidism     Dyslipidemia     Essential hypertension     Morbid obesity with BMI of 45.0-49.9, adult (HCC)     Anemia     NSAID long-term use     GERD (gastroesophageal reflux disease)- due to chronic NSAID use     Elevated fasting glucose     External hemorrhoids     AAA (abdominal aortic aneurysm) without rupture ([address]- stable 2020, due 2023     Primary insomnia     History of recurrent ear infection     Positive FIT (fecal immunochemical test)     Sepsis ([address]     Common bile duct stone     Ascending cholangitis     Obstructive sleep apnea     Calculus of gallbladder with acute cholecystitis and obstruction     Generalized weakness     Gait disturbance     Uncontrolled pain     Mixed hyperlipidemia     Sinus bradycardia     Intra-abdominal abscess (HCC) vs Biloma    Plan:     Based on drain output - clinically biloma vs. abscess. Awaiting lab results from IR fluid collection - so far no growth in last 24h.

## 2022-08-21 NOTE — PROGRESS NOTES
V2.0  [facility] Hospitalist Progress Note      Name:  Darien Whitehead /Age/Sex: 1950  (66 y.o. male)   MRN & CSN:  [id number] & [id number] Encounter Date/Time: 2022 12:03 PM EDT    Location:  1111/1111-A PCP: Jitendra Jimenez MD       Hospital Day: 3    Assessment and Plan:   Darien Whitehead is a 66 y.o. male who presents with Intra-abdominal abscess ([address]      Plan:    Sepsis secondary to intra-abdominal abscess as a postoperative complication  -Presented to fevers and abdominal discomfort. Had a recent ERCP followed by a lap cholecystectomy. -CT abdomen pelvis now demonstrating possible abscess.  -Is on broad-spectrum antibiotics. Underwent IR guided drainage of the abscess with drain placement. We will follow-up with the cultures from the IR procedure narrow based on sensitivities. Essential hypertension  -Restart home losartan. Hypothyroidism  Continue home Synthroid. AAA  Found on CT scan. Needs repeat scan as outpatient. COPD obstructive apnea  -Stable. Continue home CPAP    Morbid obesity due to excess calories Body mass index is 42.11 kg/m². - Complicating assessment and treatment. Placing patient at risk for multiple co-morbidities as well as early death and contributing to the patient's presentation.  on weight loss when appropriate. Diet ADULT DIET; Regular   DVT Prophylaxis Hold due to possible IR procedure   Code Status Full Code   Disposition From: Home  Expected Disposition: Home  Estimated Date of Discharge: 2 days  Patient requires continued admission due to sepsis needs possible IR procedure   Surrogate Decision Maker/ FERNIE Green. Subjective:     Chief Complaint: Fever (Septic early this month - )       Reports he is sore at the site of his drain. However his abdominal discomfort overall is improved and he has not had a fever overnight.   Denies any nausea, vomiting, chest pain, shortness of breath, lightheadedness or dizziness. Review of Systems:    Review of Systems   Constitutional:  Negative for chills and fever. HENT:  Negative for sore throat. Eyes:  Negative for visual disturbance. Respiratory:  Negative for cough, shortness of breath and wheezing. Cardiovascular:  Negative for palpitations and leg swelling. Gastrointestinal:  Negative for nausea and vomiting. Endocrine: Negative for polyuria. Genitourinary:  Negative for dysuria. Musculoskeletal:  Negative for back pain. Skin:  Negative for wound. Neurological:  Negative for dizziness and weakness. Psychiatric/Behavioral:  Negative for confusion. Objective: Intake/Output Summary (Last 24 hours) at 8/21/2022 0941  Last data filed at 8/21/2022 0121  Gross per 24 hour   Intake --   Output 110 ml   Net -110 ml          Vitals:   Vitals:    08/21/22 0730   BP: 125/71   Pulse: 61   Resp: 20   Temp: 98.1 °F (36.7 °C)   SpO2: 98%       Physical Exam:   Physical Exam  Constitutional:       General: He is not in acute distress. HENT:      Mouth/Throat:      Mouth: Mucous membranes are moist.      Pharynx: No posterior oropharyngeal erythema. Eyes:      General: No scleral icterus. Extraocular Movements: Extraocular movements intact. Pupils: Pupils are equal, round, and reactive to light. Cardiovascular:      Rate and Rhythm: Normal rate and regular rhythm. Pulses: Normal pulses. Heart sounds: No murmur heard. Pulmonary:      Effort: Pulmonary effort is normal.      Breath sounds: No wheezing or rales. Abdominal:      General: Bowel sounds are normal. There is no distension. Palpations: Abdomen is soft. Tenderness: There is no abdominal tenderness. Comments: Drain in place draining dark cloudy fluid. No tenderness to palpation around the drain site or erythema or warmth. Musculoskeletal:         General: Normal range of motion. Right lower leg: No edema. Left lower leg: No edema.    Skin: General: Skin is warm. Findings: No rash. Neurological:      General: No focal deficit present. Mental Status: He is alert and oriented to person, place, and time. Cranial Nerves: No cranial nerve deficit. Sensory: No sensory deficit. Motor: No weakness. Psychiatric:         Mood and Affect: Mood normal.         Medications:   Medications:    losartan  25 mg Oral Nightly    fluticasone  1 spray Each Nostril Daily    traZODone  50 mg Oral Nightly    sodium chloride  1,000 mL IntraVENous Once    cefepime  2,000 mg IntraVENous Q8H    levothyroxine  175 mcg Oral Daily    sodium chloride flush  5-40 mL IntraVENous 2 times per day    metroNIDAZOLE  500 mg IntraVENous Q8H      Infusions:    sodium chloride 25 mL/hr at 08/20/22 2336     PRN Meds: sodium chloride, , PRN  sodium chloride flush, 5-40 mL, PRN  sodium chloride, , PRN  ondansetron, 4 mg, Q8H PRN   Or  ondansetron, 4 mg, Q6H PRN  polyethylene glycol, 17 g, Daily PRN  aluminum & magnesium hydroxide-simethicone, 30 mL, Q6H PRN  albuterol sulfate HFA, 1 puff, Q6H PRN   And  ipratropium, 1 puff, Q6H PRN  acetaminophen, 650 mg, Q4H PRN  oxyCODONE-acetaminophen, 1 tablet, Q4H PRN      Labs      No results found for this or any previous visit (from the past 24 hour(s)). Imaging/Diagnostics Last 24 Hours   CT ABDOMEN PELVIS W IV CONTRAST Additional Contrast? None    Result Date: 8/20/2022  EXAMINATION: CT OF THE ABDOMEN AND PELVIS WITH CONTRAST 8/19/2022 11:10 pm TECHNIQUE: CT of the abdomen and pelvis was performed with the administration of intravenous contrast. Multiplanar reformatted images are provided for review. Automated exposure control, iterative reconstruction, and/or weight based adjustment of the mA/kV was utilized to reduce the radiation dose to as low as reasonably achievable. COMPARISON: [facility] dated August 8, 2022.  HISTORY: ORDERING SYSTEM PROVIDED HISTORY: Fever/recent post op/WBC 17 TECHNOLOGIST PROVIDED HISTORY: Reason for exam:->Fever/recent post op/WBC 17 Additional Contrast?->None Decision Support Exception - unselect if not a suspected or confirmed emergency medical condition->Emergency Medical Condition (MA) Reason for Exam: Fever/recent post op/WBC 17 FINDINGS: Lower Chest: Bibasilar atelectasis is noted. Organs: The patient is status post recent cholecystectomy. There is a focal rim enhancing fluid collection with multiple focal locules of gas with adjacent stranding, concerning for abscess versus infected biloma. This measures 13.2 x 8.2 cm. This results in mild intrahepatic bile duct dilatation. The spleen, adrenal glands, pancreas, and kidneys are unremarkable. GI/Bowel: There is no evidence of bowel obstruction. Pelvis: Keely has been a prostatectomy. The bladder is unremarkable. Peritoneum/Retroperitoneum: There is no evidence of free intraperitoneal air. There is a 3.7 cm infrarenal abdominal aortic aneurysm. Bones/Soft Tissues: No destructive osseous lesions are identified. 1. Postsurgical changes are seen from recent cholecystectomy. There is a focal rim enhancing fluid collection with multiple locules of gas in adjacent stranding within the cholecystectomy bed, concerning for an abscess versus infected biloma. This measures 13.2 x 8.2 cm. 2. 3.7 cm infrarenal abdominal aortic aneurysm. Please see recommendations below. RECOMMENDATIONS: 3.7 cm infrarenal abdominal aortic aneurysm. Recommend follow-up every 2 years. Reference: J Am Bryon Radiol 2158;[id number]. XR CHEST PORTABLE    Result Date: 8/19/2022  EXAMINATION: ONE XRAY VIEW OF THE CHEST 8/19/2022 6:48 pm COMPARISON: None. HISTORY: ORDERING SYSTEM PROVIDED HISTORY: fever TECHNOLOGIST PROVIDED HISTORY: Reason for exam:->fever Reason for Exam: fever, recent surgery FINDINGS: The lungs are without acute focal process. There is no effusion or pneumothorax. The cardiomediastinal silhouette is without acute process.  The osseous structures are without acute process. No acute process.        Electronically signed by Td Hook MD on 8/21/2022 at 9:41 AM

## 2022-08-21 NOTE — PROGRESS NOTES
LOVENOX PROPHYLAXIS EVALUATION    Wt Readings from Last 3 Encounters:   08/21/22 285 lb 4.4 oz (129.4 kg)   08/18/22 287 lb 14.7 oz (130.6 kg)   08/11/22 (!) 311 lb 14.4 oz (141.5 kg)       Estimated Creatinine Clearance: 125 mL/min (A) (based on SCr of 0.7 mg/dL (L)).   Recent Labs     08/19/22  2132 08/20/22  0712   BUN 7 7   CREATININE 0.6* 0.7*    284   HGB 11.6* 10.2*   HCT 34.9* 31.3*   INR  --  1.28       Weight Range (kg): 101-150.9 kg    CRCL = 30 or greater     50.9 kg   and below     .9  kg   101-150.9 kg   151-174.9  kg   175 kg  or greater     30mg subq  daily     40mg subq daily    (or 30mg subq BID orthopedic)     30mg subq  BID   40mg subq  BID   60mg subq BID       Per P/T protocol for appropriate subq anticoagulation by weight and CRCL change to:  Enoxaparin 30mg subq BID    Bal Diaz RPH  9:48 AM  08/21/22

## 2022-08-22 PROBLEM — R65.20 SEVERE SEPSIS (HCC): Status: ACTIVE | Noted: 2022-08-22

## 2022-08-22 PROBLEM — A41.9 SEVERE SEPSIS (HCC): Status: ACTIVE | Noted: 2022-08-22

## 2022-08-22 PROBLEM — T81.40XA POSTOPERATIVE INFECTION: Status: ACTIVE | Noted: 2022-08-22

## 2022-08-22 LAB
CULTURE: NORMAL
EKG ATRIAL RATE: 86 BPM
EKG DIAGNOSIS: NORMAL
EKG P AXIS: 52 DEGREES
EKG P-R INTERVAL: 198 MS
EKG Q-T INTERVAL: 392 MS
EKG QRS DURATION: 92 MS
EKG QTC CALCULATION (BAZETT): 469 MS
EKG R AXIS: 62 DEGREES
EKG T AXIS: 33 DEGREES
EKG VENTRICULAR RATE: 86 BPM
Lab: NORMAL
SPECIMEN: NORMAL

## 2022-08-22 PROCEDURE — 2580000003 HC RX 258: Performed by: STUDENT IN AN ORGANIZED HEALTH CARE EDUCATION/TRAINING PROGRAM

## 2022-08-22 PROCEDURE — 99232 SBSQ HOSP IP/OBS MODERATE 35: CPT | Performed by: SURGERY

## 2022-08-22 PROCEDURE — 6360000002 HC RX W HCPCS: Performed by: STUDENT IN AN ORGANIZED HEALTH CARE EDUCATION/TRAINING PROGRAM

## 2022-08-22 PROCEDURE — 6370000000 HC RX 637 (ALT 250 FOR IP): Performed by: STUDENT IN AN ORGANIZED HEALTH CARE EDUCATION/TRAINING PROGRAM

## 2022-08-22 PROCEDURE — 1200000000 HC SEMI PRIVATE

## 2022-08-22 PROCEDURE — 93010 ELECTROCARDIOGRAM REPORT: CPT | Performed by: INTERNAL MEDICINE

## 2022-08-22 PROCEDURE — 99223 1ST HOSP IP/OBS HIGH 75: CPT | Performed by: INTERNAL MEDICINE

## 2022-08-22 PROCEDURE — 2500000003 HC RX 250 WO HCPCS: Performed by: EMERGENCY MEDICINE

## 2022-08-22 PROCEDURE — 94761 N-INVAS EAR/PLS OXIMETRY MLT: CPT

## 2022-08-22 RX ORDER — FLUTICASONE PROPIONATE 50 MCG
1 SPRAY, SUSPENSION (ML) NASAL NIGHTLY
Status: DISCONTINUED | OUTPATIENT
Start: 2022-08-22 | End: 2022-08-25 | Stop reason: HOSPADM

## 2022-08-22 RX ORDER — DOCUSATE SODIUM 100 MG/1
100 CAPSULE, LIQUID FILLED ORAL DAILY
Status: DISCONTINUED | OUTPATIENT
Start: 2022-08-22 | End: 2022-08-25 | Stop reason: HOSPADM

## 2022-08-22 RX ADMIN — ACETAMINOPHEN 650 MG: 325 TABLET ORAL at 22:32

## 2022-08-22 RX ADMIN — FLUTICASONE PROPIONATE 1 SPRAY: 50 SPRAY, METERED NASAL at 22:32

## 2022-08-22 RX ADMIN — SODIUM CHLORIDE 25 ML: 9 INJECTION, SOLUTION INTRAVENOUS at 09:09

## 2022-08-22 RX ADMIN — METRONIDAZOLE 500 MG: 500 INJECTION, SOLUTION INTRAVENOUS at 23:30

## 2022-08-22 RX ADMIN — OXYCODONE AND ACETAMINOPHEN 1 TABLET: 7.5; 325 TABLET ORAL at 18:40

## 2022-08-22 RX ADMIN — CEFEPIME HYDROCHLORIDE 2000 MG: 2 INJECTION, POWDER, FOR SOLUTION INTRAMUSCULAR; INTRAVENOUS at 00:40

## 2022-08-22 RX ADMIN — SODIUM CHLORIDE: 9 INJECTION, SOLUTION INTRAVENOUS at 15:51

## 2022-08-22 RX ADMIN — METRONIDAZOLE 500 MG: 500 INJECTION, SOLUTION INTRAVENOUS at 06:00

## 2022-08-22 RX ADMIN — METRONIDAZOLE 500 MG: 500 INJECTION, SOLUTION INTRAVENOUS at 14:46

## 2022-08-22 RX ADMIN — CEFEPIME HYDROCHLORIDE 2000 MG: 2 INJECTION, POWDER, FOR SOLUTION INTRAMUSCULAR; INTRAVENOUS at 15:52

## 2022-08-22 RX ADMIN — LEVOTHYROXINE SODIUM 175 MCG: 0.1 TABLET ORAL at 06:01

## 2022-08-22 RX ADMIN — TRAZODONE HYDROCHLORIDE 50 MG: 50 TABLET ORAL at 19:55

## 2022-08-22 RX ADMIN — DOCUSATE SODIUM 100 MG: 100 CAPSULE, LIQUID FILLED ORAL at 09:48

## 2022-08-22 RX ADMIN — ENOXAPARIN SODIUM 30 MG: 100 INJECTION SUBCUTANEOUS at 19:55

## 2022-08-22 RX ADMIN — ENOXAPARIN SODIUM 30 MG: 100 INJECTION SUBCUTANEOUS at 08:10

## 2022-08-22 RX ADMIN — SODIUM CHLORIDE, PRESERVATIVE FREE 10 ML: 5 INJECTION INTRAVENOUS at 19:56

## 2022-08-22 RX ADMIN — CEFEPIME HYDROCHLORIDE 2000 MG: 2 INJECTION, POWDER, FOR SOLUTION INTRAMUSCULAR; INTRAVENOUS at 09:10

## 2022-08-22 RX ADMIN — ACETAMINOPHEN 650 MG: 325 TABLET ORAL at 03:27

## 2022-08-22 RX ADMIN — OXYCODONE AND ACETAMINOPHEN 1 TABLET: 7.5; 325 TABLET ORAL at 03:34

## 2022-08-22 RX ADMIN — OXYCODONE AND ACETAMINOPHEN 1 TABLET: 7.5; 325 TABLET ORAL at 14:35

## 2022-08-22 RX ADMIN — LOSARTAN POTASSIUM 25 MG: 25 TABLET, FILM COATED ORAL at 19:55

## 2022-08-22 RX ADMIN — ACETAMINOPHEN 650 MG: 325 TABLET ORAL at 08:10

## 2022-08-22 RX ADMIN — SODIUM CHLORIDE: 9 INJECTION, SOLUTION INTRAVENOUS at 06:00

## 2022-08-22 RX ADMIN — SODIUM CHLORIDE: 9 INJECTION, SOLUTION INTRAVENOUS at 00:39

## 2022-08-22 RX ADMIN — SODIUM CHLORIDE, PRESERVATIVE FREE 10 ML: 5 INJECTION INTRAVENOUS at 08:09

## 2022-08-22 ASSESSMENT — ENCOUNTER SYMPTOMS
BACK PAIN: 0
CONSTIPATION: 0
WHEEZING: 0
CHOKING: 0
RECTAL PAIN: 0
NAUSEA: 0
APNEA: 0
STRIDOR: 0
PHOTOPHOBIA: 0
EYE REDNESS: 0
EYE ITCHING: 0
ANAL BLEEDING: 0
VOMITING: 0
SORE THROAT: 0
COUGH: 0
ABDOMINAL PAIN: 0
COLOR CHANGE: 0
SHORTNESS OF BREATH: 0

## 2022-08-22 ASSESSMENT — PAIN DESCRIPTION - LOCATION
LOCATION: ABDOMEN

## 2022-08-22 ASSESSMENT — PAIN SCALES - GENERAL
PAINLEVEL_OUTOF10: 3
PAINLEVEL_OUTOF10: 0
PAINLEVEL_OUTOF10: 5
PAINLEVEL_OUTOF10: 6
PAINLEVEL_OUTOF10: 7
PAINLEVEL_OUTOF10: 7
PAINLEVEL_OUTOF10: 3

## 2022-08-22 ASSESSMENT — PAIN DESCRIPTION - FREQUENCY
FREQUENCY: INTERMITTENT
FREQUENCY: INTERMITTENT

## 2022-08-22 ASSESSMENT — PAIN - FUNCTIONAL ASSESSMENT
PAIN_FUNCTIONAL_ASSESSMENT: ACTIVITIES ARE NOT PREVENTED
PAIN_FUNCTIONAL_ASSESSMENT: PREVENTS OR INTERFERES SOME ACTIVE ACTIVITIES AND ADLS

## 2022-08-22 ASSESSMENT — PAIN DESCRIPTION - PAIN TYPE
TYPE: SURGICAL PAIN

## 2022-08-22 ASSESSMENT — PAIN DESCRIPTION - DESCRIPTORS
DESCRIPTORS: DISCOMFORT
DESCRIPTORS: ACHING;SHARP
DESCRIPTORS: SHARP
DESCRIPTORS: SHARP

## 2022-08-22 ASSESSMENT — PAIN DESCRIPTION - ORIENTATION
ORIENTATION: RIGHT
ORIENTATION: RIGHT;MID
ORIENTATION: RIGHT
ORIENTATION: RIGHT;LOWER
ORIENTATION: RIGHT
ORIENTATION: RIGHT

## 2022-08-22 ASSESSMENT — PAIN DESCRIPTION - ONSET
ONSET: ON-GOING
ONSET: ON-GOING

## 2022-08-22 NOTE — PLAN OF CARE
Problem: Discharge Planning  Goal: Discharge to home or other facility with appropriate resources  8/22/2022 0055 by Emma Cherry LPN  Outcome: Progressing  8/21/2022 1123 by Kate Liz LPN  Outcome: Progressing     Problem: ABCDS Injury Assessment  Goal: Absence of physical injury  8/22/2022 0055 by Emma Cherry LPN  Outcome: Progressing  8/21/2022 1123 by Kate Liz LPN  Outcome: Progressing     Problem: Pain  Goal: Verbalizes/displays adequate comfort level or baseline comfort level  Outcome: Progressing

## 2022-08-22 NOTE — CONSULTS
abdominal pain. CT of the abdomen and pelvis showed a focal rim-enhancing fluid collection with multiple focus of gas and adjacent stranding within the cholecystectomy bed concerning for an abscess. It measured 13.2 x 8.2 cm. CT-guided external drainage catheter was placed on 8/20/2022.  200 mL of dark brown fluid was removed. Was culture positive for Klebsiella oxytoca (sensitivity is pending). He has received cefepime and metronidazole since 8/20/2022. Fever has resolved. ? Infectious diseases service was consulted to evaluate the pt, and recommend further investigative and therapeutic measures. Review and summary of old records:  ROS: Other systems reviewed Including eyes, ENT, respiratory, cardiovascular, GI, , dermatologic, neurologic, psych, hem/lymphatic, musculoskeletal and endocrine were negative other than what is mentioned above.    Patient Active Problem List    Diagnosis Date Noted    Intra-abdominal abscess ([address] 08/20/2022    Calculus of gallbladder with acute cholecystitis and obstruction 08/13/2022    Generalized weakness 08/13/2022    Gait disturbance 08/13/2022    Uncontrolled pain 08/13/2022    Mixed hyperlipidemia 08/13/2022    Sinus bradycardia 08/13/2022    Obstructive sleep apnea 08/10/2022    Common bile duct stone 08/09/2022    Ascending cholangitis 08/09/2022    Sepsis ([address] 08/07/2022    Positive FIT (fecal immunochemical test) 05/03/2022    Primary insomnia 10/28/2021    History of recurrent ear infection 10/28/2021    AAA (abdominal aortic aneurysm) without rupture ([address]- stable 2020, due 2023 01/27/2015    External hemorrhoids 11/04/2014    Elevated fasting glucose 07/21/2014    MEGHAN (obstructive sleep apnea)- on BIPAP 01/21/2014    COPD, mild ([address] 01/21/2014    Arthritis 01/21/2014    Chronic sinusitis 01/21/2014    Acquired hypothyroidism 01/21/2014    Dyslipidemia 01/21/2014    Essential hypertension 01/21/2014    Morbid obesity with BMI of 45.0-49.9, adult ([address] 01/21/2014    Anemia 01/21/2014    NSAID long-term use 01/21/2014    GERD (gastroesophageal reflux disease)- due to chronic NSAID use 01/21/2014     Past Medical History:   Diagnosis Date    Abdominal aneurysm ([address] 2012    4 cm x 3.6 cm followed at [address]    Arthritis 1/21/2014    Chronic back pain     [facility]    Chronic sinusitis 1/21/2014    COPD, mild ([address] 1/21/2014    Diverticulosis 2014    Dr. 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 Karthik Ramos    MEGHAN (obstructive sleep apnea) 1/21/2014    CPAP changed to bipap (July 2014)    Otitis media, serous, TM rupture 1/21/2014    ENT x 2 s/p PE tubes bilaterally, cipro Otic      Past Surgical History:   Procedure Laterality Date    CHOLECYSTECTOMY, LAPAROSCOPIC N/A 8/10/2022    CHOLECYSTECTOMY LAPAROSCOPIC performed by Andra Marina MD at [address] [facility],[address]  [address] [place] [address]- normal.      COLONOSCOPY  12/02/2014    diverticulosis, internal hemorrhoids    CT DRAINAGE ABDOM ABSCESS OPEN  8/20/2022    CT DRAINAGE ABDOM ABSCESS OPEN 8/20/2022 [facility] CT SCAN    ERCP  08/09/2022    ERCP SPHINCTER/PAPILLOTOMY performed by Dr. Kapil Antoine at [facility]    ERCP N/A 8/9/2022    ERCP SPHINCTER/PAPILLOTOMY and sweep and removal of debris, sludge and stones with use of 12-15 extractor balloon performed by Alexandre Horton MD at [facility] [place] [id number] Right 2012    JOINT REPLACEMENT Left 2011    KNEE SURGERY Bilateral     replacement    TOE SURGERY Bilateral     toe nail surgery Dr. Catarina Arias Right 2014    DR. Rascon       Family History   Problem Relation Age of Onset    High Blood Pressure Mother Allergies Mother     Anemia Mother     High Cholesterol Mother     Thyroid Disease Mother     Heart Disease Father     Allergies Father     Obesity Paternal Grandmother     Obesity Paternal Grandfather     Cancer Paternal Uncle         prostate cancer      Infectious disease related family history - not contibutory. SOCIAL HISTORY  Social History     Tobacco Use    Smoking status: Former     Packs/day: 2.00     Years: 36.00     Pack years: 72.00     Types: Cigarettes     Quit date: 2002     Years since quittin.4    Smokeless tobacco: Never    Tobacco comments:     quit 2002   Substance Use Topics    Alcohol use: No     Alcohol/week: 0.0 standard drinks      Ancestry: White   Born:  Lived  Occupation: retired [employer]   No recent travel of significance. No recent unusual exposures. ? ALLERGIES  Allergies   Allergen Reactions    Dilaudid [Hydromorphone Hcl] Itching    Hydrocodone Itching    Ultram [Tramadol] Itching    Vicodin [Hydrocodone-Acetaminophen] Itching    Morphine Itching     Pt states he only has itching with morphine    Keflex [Cephalexin] Other (See Comments)     Extreme hyperacitivity    Sulfa Antibiotics Itching and Rash      MEDICATIONS  Reviewed and are per the chart/EMR.    IMMUNIZATION HISTORY  Immunization History   Administered Date(s) Administered     Influenza, FLUZONE (age 72 y+), High Dose 2020    COVID-19, MODERNA BLUE border, Primary or Immunocompromised, (age 12y+), IM, 100 mcg/0.5mL 2021, 2021, 2022    Hepatitis A 2019    Hepatitis A Vaccine 2019    Hepatitis A/Hepatitis B (Twinrix) 2019, 2019, 2019    Hepatitis B 2019    Hepatitis B vaccine 2019    Influenza 10/01/2015    Influenza A ([id number]) Vaccine IM 2009    Influenza A (Z2X2-64) Vaccine PF IM 12/10/2009    Influenza Vaccine, unspecified formulation 10/15/1996, 10/18/2005, 2007, 10/20/2008, 2009, 2010, 09/27/2011, 10/09/2012, 10/04/2013, 10/14/2014, 11/02/2015    Influenza Virus Vaccine 10/15/1996, 10/27/2003, 11/05/2007, 10/20/2008, 09/28/2009, 11/01/2010, 09/27/2011, 10/09/2012, 10/04/2013, 10/14/2014    Influenza Whole 10/14/2014    Influenza, FLUAD, (age 72 y+), Adjuvanted 10/29/2021    Influenza, FLUCELVAX, (age 10 mo+), MDCK, MDV 06/11/2019, 08/20/2019    Influenza, High Dose (Fluzone 65 yrs and older) 10/19/2015, 10/14/2016, 09/12/2017, 09/11/2018, 09/25/2020, 10/29/2021    Influenza, Triv, inactivated, subunit, adjuvanted, IM (Fluad 65 yrs and older) 09/17/2019    Pneumococcal Conjugate 13-valent ([id number]) 01/25/2016, 01/27/2016    Pneumococcal Polysaccharide ([id number]) 01/06/2015    Td (Adult), 5 Lf Tetanus Toxoid, Pf (Tenivac, Decavac) 08/27/2012    Td vaccine (adult) 09/19/2003, 08/27/2012    Td, unspecified formulation 09/19/2003, 08/27/2012    Tdap (Boostrix, Adacel) 11/02/2015, 11/05/2015    Zoster Live (Zostavax) 03/16/2009, 01/06/2015    Zoster Recombinant (Shingrix) 06/11/2019, 08/20/2019     ? Antibiotics:   Cefepime: 8/20  Metronidazole : 8/20  ?  -------------------------------------------------------------------------------------------------------------------    Vital Signs:  Vitals:    08/22/22 0818   BP: (!) 116/58   Pulse: 59   Resp: 16   Temp: 97.9 °F (36.6 °C)   SpO2:          Exam:    VS: noted; wt 129.4 kg  Gen: alert and oriented X3, no distress  Skin: no stigmata of endocarditis  Wounds: C/D/I  HEMT: AT/NC Oropharynx pink, moist, and without lesions or exudates; dentition in good state of repair  Eyes: PERRLA, EOMI, conjunctiva pink, sclera anicteric. Neck: Supple. Trachea midline. No LAD. Chest: no distress and CTA. Good air movement. Heart: RRR and no MRG. Abd: RUQ HERMINIO drain containing green liquid, soft, non-distended, no tenderness, no hepatomegaly. Normoactive bowel sounds.   Ext: no clubbing, cyanosis, or edema  Catheter Site: without erythema or tenderness  LDA: HERMINIO:RUQ  Neuro: Mental status intact. CN 2-12 intact and no focal sensory or motor deficits    ? Diagnostic Studies: reviewed  ? ? I have examined this patient and available medical records on this date and have made the above observations, conclusions and recommendations.   Electronically signed by: Electronically signed by Adilia Landis MD on 8/22/2022 at 10:26 AM

## 2022-08-22 NOTE — PROGRESS NOTES
General Surgery  Dr. Karina Pires and Janalee Crigler, [facility] Day: 4    Chief Complaint on Admission: Fevers, chills, Abd pain      Subjective:     Varun Quesada is a 66 y.o. male with   Intra-abdominal abscess bs Biloma s/p IR drain placement. Patient reports abdominal pain. Pt denies N/V. Tolerating ADULT DIET; Regular. +ve BM. Denies F/C. Had HIDA. ROS:  Review of Systems   Constitutional:  Negative for chills and fever. HENT:  Negative for ear pain, mouth sores, sore throat and tinnitus. Eyes:  Negative for photophobia, redness and itching. Respiratory:  Negative for apnea, choking and stridor. Cardiovascular:  Negative for chest pain and palpitations. Gastrointestinal:  Negative for abdominal pain, anal bleeding, constipation, nausea, rectal pain and vomiting. Endocrine: Negative for polydipsia. Genitourinary:  Negative for enuresis, flank pain and hematuria. Musculoskeletal:  Negative for back pain, joint swelling and myalgias. Skin:  Negative for color change and pallor. Allergic/Immunologic: Negative for environmental allergies. Neurological:  Negative for syncope and speech difficulty. Psychiatric/Behavioral:  Negative for confusion and hallucinations.       Allergies  Dilaudid [hydromorphone hcl], Hydrocodone, Ultram [tramadol], Vicodin [hydrocodone-acetaminophen], Morphine, Keflex [cephalexin], and Sulfa antibiotics          Diagnosis Date    Abdominal aneurysm ([facility] 2012    4 cm x 3.6 cm followed at [address] 1/21/2014    Chronic [place] [facility]     [facility]    Chronic sinusitis 1/21/2014    COPD, mild ([facility] 1/21/2014    Diverticulosis 2014    Dr. 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 Héctor Holman    MEGHAN (obstructive sleep apnea) 2014    CPAP changed to bipap (2014)    Otitis media, serous, TM rupture 2014    ENT x 2 s/p PE tubes bilaterally, cipro Otic       Objective:     Vitals:    22 0818   BP: (!) 116/58   Pulse: 59   Resp: 16   Temp: 97.9 °F (36.6 °C)   SpO2:        TEMPERATURE:  Current - Temp: 97.9 °F (36.6 °C); Max - Temp  Av.1 °F (36.7 °C)  Min: 97.9 °F (36.6 °C)  Max: 98.6 °F (37 °C)    I/O this shift:  In: 10 [I.V.:10]  Out: - I/O last 3 completed shifts:  In: -   Out: 100 [Drains:100]      Physical Exam:  Physical Exam  Constitutional:       Appearance: He is well-developed. HENT:      Head: Normocephalic. Eyes:      Pupils: Pupils are equal, round, and reactive to light. Cardiovascular:      Rate and Rhythm: Normal rate. Pulmonary:      Effort: Pulmonary effort is normal.   Abdominal:      General: There is no distension. Palpations: Abdomen is soft. There is no mass. Tenderness: There is abdominal tenderness. There is no guarding or rebound. Comments: Lap incisions well healed. RUQ Drain with bilious output - no evidence of purulence. Musculoskeletal:         General: Normal range of motion. Cervical back: Normal range of motion and neck supple. Skin:     General: Skin is warm. Neurological:      Mental Status: He is alert and oriented to person, place, and time. Drain with 40 mL out in last 24 h.       Scheduled Meds:   fluticasone  1 spray Each Nostril Nightly    docusate sodium  100 mg Oral Daily    losartan  25 mg Oral Nightly    traZODone  50 mg Oral Nightly    enoxaparin  30 mg SubCUTAneous BID    sodium chloride  1,000 mL IntraVENous Once    cefepime  2,000 mg IntraVENous Q8H    levothyroxine  175 mcg Oral Daily    sodium chloride flush  5-40 mL IntraVENous 2 times per day    metroNIDAZOLE  500 mg IntraVENous Q8H     Continuous Infusions:   sodium chloride 25 mL (08/22/22 0909)     PRN Meds:sodium chloride, sodium chloride flush, sodium chloride, ondansetron **OR** ondansetron, polyethylene glycol, aluminum & magnesium hydroxide-simethicone, albuterol sulfate HFA **AND** ipratropium, acetaminophen, oxyCODONE-acetaminophen      Labs/Imaging Results:   Lab Results   Component Value Date    WBC 13.8 (H) 08/20/2022    HGB 10.2 (L) 08/20/2022    HCT 31.3 (L) 08/20/2022    MCV 99.1 08/20/2022     08/20/2022     Lab Results   Component Value Date     08/20/2022    K 3.5 08/20/2022     08/20/2022    CO2 24 08/20/2022    BUN 7 08/20/2022    CREATININE 0.7 (L) 08/20/2022    GLUCOSE 96 08/20/2022    CALCIUM 8.2 (L) 08/20/2022    PROT 5.8 (L) 08/20/2022    LABALBU 3.6 08/20/2022    BILITOT 1.0 08/20/2022    ALKPHOS 179 (H) 08/20/2022    AST 21 08/20/2022    ALT 21 08/20/2022    LABGLOM >60 08/20/2022    GFRAA >60 08/20/2022    AGRATIO 1.5 07/27/2022    GLOB 2.3 01/25/2016     HIDA:  FINDINGS:   Prompt, homogenous uptake by the liver is noted with normal appearance of   radiotracer excretion into the biliary system. Clearance of blood pool   activity appears appropriate. Patient is status post cholecystectomy. There is normal expected   visualization of common bile duct with extension of radiopharmaceutical into   the small bowel. No accumulation of radiopharmaceutical is seen in the   surgical bed to suggest bile leak.        Assessment:     Patient Active Problem List:     MEGHAN (obstructive sleep apnea)- on BIPAP     COPD, mild (HCC)     Arthritis     Chronic sinusitis     Acquired hypothyroidism     Dyslipidemia     Essential hypertension     Morbid obesity with BMI of 45.0-49.9, adult (HCC)     Anemia     NSAID long-term use     GERD (gastroesophageal reflux disease)- due to chronic NSAID use     Elevated fasting glucose     External hemorrhoids     AAA (abdominal aortic aneurysm) without rupture ([address]- stable 2020, due 2023     Primary

## 2022-08-22 NOTE — PROGRESS NOTES
V2.0  [facility] Hospitalist Progress Note      Name:  Gabriel Yang /Age/Sex: 1950  (66 y.o. male)   MRN & CSN:  [id number] & [id number] Encounter Date/Time: 2022 12:03 PM EDT    Location:  1111/1111-A PCP: Jing Bailey MD       Hospital Day: 4    Assessment and Plan:   Gabriel Yang is a 66 y.o. male who presents with Intra-abdominal abscess ([address]      Plan:    Sepsis secondary to intra-abdominal abscess as a postoperative complication  -Presented to fevers and abdominal discomfort. Had a recent ERCP followed by a lap cholecystectomy. -CT abdomen pelvis now demonstrating possible abscess.  -Is on broad-spectrum antibiotics. Underwent IR guided drainage of the abscess with drain placement. We will follow-up with the cultures from the IR procedure narrow based on sensitivities. Infectious disease has been consulted for recommendations of duration of therapy. Essential hypertension  -Continue home losartan. Hypothyroidism  Continue home Synthroid. AAA  Found on CT scan. Needs repeat scan as outpatient. COPD obstructive apnea  -Stable. Continue home CPAP    Morbid obesity due to excess calories Body mass index is 42.11 kg/m². - Complicating assessment and treatment. Placing patient at risk for multiple co-morbidities as well as early death and contributing to the patient's presentation.  on weight loss when appropriate. Diet ADULT DIET; Regular   DVT Prophylaxis Lovenox. Code Status Full Code   Disposition From: Home  Expected Disposition: Home  Estimated Date of Discharge: 2 days  Patient requires continued admission due to sepsis needs possible IR procedure   Surrogate Decision Maker/ POA Patrick Halsted. Subjective:     Chief Complaint: Fever (Septic early this month - )       Reports he is sore at the site of his drain, required 2 Percocet yesterday. Veronica Zavala   However his abdominal discomfort overall is improved   Denies any nausea, vomiting, chest pain, shortness of breath, lightheadedness or dizziness. He reports he had a good bowel movement yesterday after eating some Swiss cheese. He is inquiring if he can get his Colace daily. Review of Systems:    Review of Systems   Constitutional:  Negative for chills and fever. HENT:  Negative for sore throat. Eyes:  Negative for visual disturbance. Respiratory:  Negative for cough, shortness of breath and wheezing. Cardiovascular:  Negative for palpitations and leg swelling. Gastrointestinal:  Negative for nausea and vomiting. Endocrine: Negative for polyuria. Genitourinary:  Negative for dysuria. Musculoskeletal:  Negative for back pain. Skin:  Negative for wound. Neurological:  Negative for dizziness and weakness. Psychiatric/Behavioral:  Negative for confusion. Objective: Intake/Output Summary (Last 24 hours) at 8/22/2022 1134  Last data filed at 8/22/2022 0809  Gross per 24 hour   Intake 10 ml   Output --   Net 10 ml          Vitals:   Vitals:    08/22/22 0818   BP: (!) 116/58   Pulse: 59   Resp: 16   Temp: 97.9 °F (36.6 °C)   SpO2:        Physical Exam:   Physical Exam  Constitutional:       General: He is not in acute distress. HENT:      Mouth/Throat:      Mouth: Mucous membranes are moist.      Pharynx: No posterior oropharyngeal erythema. Eyes:      General: No scleral icterus. Extraocular Movements: Extraocular movements intact. Pupils: Pupils are equal, round, and reactive to light. Cardiovascular:      Rate and Rhythm: Normal rate and regular rhythm. Pulses: Normal pulses. Heart sounds: No murmur heard. Pulmonary:      Effort: Pulmonary effort is normal.      Breath sounds: No wheezing or rales. Abdominal:      General: Bowel sounds are normal. There is no distension. Palpations: Abdomen is soft. Tenderness: There is no abdominal tenderness. Comments: Drain in place draining dark cloudy fluid.   No tenderness to palpation around the drain site or erythema or warmth. Musculoskeletal:         General: Normal range of motion. Right lower leg: No edema. Left lower leg: No edema. Skin:     General: Skin is warm. Findings: No rash. Neurological:      General: No focal deficit present. Mental Status: He is alert and oriented to person, place, and time. Cranial Nerves: No cranial nerve deficit. Sensory: No sensory deficit. Motor: No weakness. Psychiatric:         Mood and Affect: Mood normal.         Medications:   Medications:    fluticasone  1 spray Each Nostril Nightly    docusate sodium  100 mg Oral Daily    losartan  25 mg Oral Nightly    traZODone  50 mg Oral Nightly    enoxaparin  30 mg SubCUTAneous BID    sodium chloride  1,000 mL IntraVENous Once    cefepime  2,000 mg IntraVENous Q8H    levothyroxine  175 mcg Oral Daily    sodium chloride flush  5-40 mL IntraVENous 2 times per day    metroNIDAZOLE  500 mg IntraVENous Q8H      Infusions:    sodium chloride 25 mL (08/22/22 0909)     PRN Meds: sodium chloride, 2 spray, Q4H PRN  sodium chloride flush, 5-40 mL, PRN  sodium chloride, , PRN  ondansetron, 4 mg, Q8H PRN   Or  ondansetron, 4 mg, Q6H PRN  polyethylene glycol, 17 g, Daily PRN  aluminum & magnesium hydroxide-simethicone, 30 mL, Q6H PRN  albuterol sulfate HFA, 1 puff, Q6H PRN   And  ipratropium, 1 puff, Q6H PRN  acetaminophen, 650 mg, Q4H PRN  oxyCODONE-acetaminophen, 1 tablet, Q4H PRN      Labs      No results found for this or any previous visit (from the past 24 hour(s)). Imaging/Diagnostics Last 24 Hours   CT ABDOMEN PELVIS W IV CONTRAST Additional Contrast? None    Result Date: 8/20/2022  EXAMINATION: CT OF THE ABDOMEN AND PELVIS WITH CONTRAST 8/19/2022 11:10 pm TECHNIQUE: CT of the abdomen and pelvis was performed with the administration of intravenous contrast. Multiplanar reformatted images are provided for review.  Automated exposure control, iterative reconstruction, and/or weight based adjustment of the mA/kV was utilized to reduce the radiation dose to as low as reasonably achievable. COMPARISON: MRCP dated August 8, 2022. HISTORY: ORDERING SYSTEM PROVIDED HISTORY: Fever/recent post op/WBC 17 TECHNOLOGIST PROVIDED HISTORY: Reason for exam:->Fever/recent post op/WBC 17 Additional Contrast?->None Decision Support Exception - unselect if not a suspected or confirmed emergency medical condition->Emergency Medical Condition (MA) Reason for Exam: Fever/recent post op/WBC 17 FINDINGS: Lower Chest: Bibasilar atelectasis is noted. Organs: The patient is status post recent cholecystectomy. There is a focal rim enhancing fluid collection with multiple focal locules of gas with adjacent stranding, concerning for abscess versus infected biloma. This measures 13.2 x 8.2 cm. This results in mild intrahepatic bile duct dilatation. The spleen, adrenal glands, pancreas, and kidneys are unremarkable. GI/Bowel: There is no evidence of bowel obstruction. Pelvis: Keely has been a prostatectomy. The bladder is unremarkable. Peritoneum/Retroperitoneum: There is no evidence of free intraperitoneal air. There is a 3.7 cm infrarenal abdominal aortic aneurysm. Bones/Soft Tissues: No destructive osseous lesions are identified. 1. Postsurgical changes are seen from recent cholecystectomy. There is a focal rim enhancing fluid collection with multiple locules of gas in adjacent stranding within the cholecystectomy bed, concerning for an abscess versus infected biloma. This measures 13.2 x 8.2 cm. 2. 3.7 cm infrarenal abdominal aortic aneurysm. Please see recommendations below. RECOMMENDATIONS: 3.7 cm infrarenal abdominal aortic aneurysm. Recommend follow-up every 2 years. Reference: J Am Bryon Radiol 0329;[id number]. XR CHEST PORTABLE    Result Date: 8/19/2022  EXAMINATION: ONE XRAY VIEW OF THE CHEST 8/19/2022 6:48 pm COMPARISON: None.  HISTORY: ORDERING SYSTEM PROVIDED HISTORY: fever TECHNOLOGIST PROVIDED HISTORY: Reason for exam:->fever Reason for Exam: fever, recent surgery FINDINGS: The lungs are without acute focal process. There is no effusion or pneumothorax. The cardiomediastinal silhouette is without acute process. The osseous structures are without acute process. No acute process.        Electronically signed by Raegan Steiner MD on 8/22/2022 at 11:34 AM

## 2022-08-23 LAB
HIGH SENSITIVE C-REACTIVE PROTEIN: 121.4 MG/L
PROCALCITONIN: 0.22

## 2022-08-23 PROCEDURE — 99232 SBSQ HOSP IP/OBS MODERATE 35: CPT | Performed by: SURGERY

## 2022-08-23 PROCEDURE — 1200000000 HC SEMI PRIVATE

## 2022-08-23 PROCEDURE — 86141 C-REACTIVE PROTEIN HS: CPT

## 2022-08-23 PROCEDURE — 6370000000 HC RX 637 (ALT 250 FOR IP): Performed by: STUDENT IN AN ORGANIZED HEALTH CARE EDUCATION/TRAINING PROGRAM

## 2022-08-23 PROCEDURE — 94761 N-INVAS EAR/PLS OXIMETRY MLT: CPT

## 2022-08-23 PROCEDURE — 84145 PROCALCITONIN (PCT): CPT

## 2022-08-23 PROCEDURE — 2500000003 HC RX 250 WO HCPCS: Performed by: EMERGENCY MEDICINE

## 2022-08-23 PROCEDURE — 36415 COLL VENOUS BLD VENIPUNCTURE: CPT

## 2022-08-23 PROCEDURE — 99232 SBSQ HOSP IP/OBS MODERATE 35: CPT | Performed by: INTERNAL MEDICINE

## 2022-08-23 PROCEDURE — 6360000002 HC RX W HCPCS: Performed by: STUDENT IN AN ORGANIZED HEALTH CARE EDUCATION/TRAINING PROGRAM

## 2022-08-23 PROCEDURE — 2580000003 HC RX 258: Performed by: STUDENT IN AN ORGANIZED HEALTH CARE EDUCATION/TRAINING PROGRAM

## 2022-08-23 RX ADMIN — TRAZODONE HYDROCHLORIDE 50 MG: 50 TABLET ORAL at 22:33

## 2022-08-23 RX ADMIN — CEFEPIME HYDROCHLORIDE 2000 MG: 2 INJECTION, POWDER, FOR SOLUTION INTRAMUSCULAR; INTRAVENOUS at 00:41

## 2022-08-23 RX ADMIN — OXYCODONE AND ACETAMINOPHEN 1 TABLET: 7.5; 325 TABLET ORAL at 14:00

## 2022-08-23 RX ADMIN — SODIUM CHLORIDE, PRESERVATIVE FREE 10 ML: 5 INJECTION INTRAVENOUS at 12:08

## 2022-08-23 RX ADMIN — ACETAMINOPHEN 650 MG: 325 TABLET ORAL at 22:32

## 2022-08-23 RX ADMIN — SODIUM CHLORIDE, PRESERVATIVE FREE 10 ML: 5 INJECTION INTRAVENOUS at 22:33

## 2022-08-23 RX ADMIN — OXYCODONE AND ACETAMINOPHEN 1 TABLET: 7.5; 325 TABLET ORAL at 02:06

## 2022-08-23 RX ADMIN — FLUTICASONE PROPIONATE 1 SPRAY: 50 SPRAY, METERED NASAL at 22:38

## 2022-08-23 RX ADMIN — ENOXAPARIN SODIUM 30 MG: 100 INJECTION SUBCUTANEOUS at 09:42

## 2022-08-23 RX ADMIN — LOSARTAN POTASSIUM 25 MG: 25 TABLET, FILM COATED ORAL at 22:33

## 2022-08-23 RX ADMIN — CEFEPIME HYDROCHLORIDE 2000 MG: 2 INJECTION, POWDER, FOR SOLUTION INTRAMUSCULAR; INTRAVENOUS at 18:12

## 2022-08-23 RX ADMIN — ENOXAPARIN SODIUM 30 MG: 100 INJECTION SUBCUTANEOUS at 22:33

## 2022-08-23 RX ADMIN — CEFEPIME HYDROCHLORIDE 2000 MG: 2 INJECTION, POWDER, FOR SOLUTION INTRAMUSCULAR; INTRAVENOUS at 10:44

## 2022-08-23 RX ADMIN — DOCUSATE SODIUM 100 MG: 100 CAPSULE, LIQUID FILLED ORAL at 09:42

## 2022-08-23 RX ADMIN — METRONIDAZOLE 500 MG: 500 INJECTION, SOLUTION INTRAVENOUS at 10:59

## 2022-08-23 RX ADMIN — LEVOTHYROXINE SODIUM 175 MCG: 0.1 TABLET ORAL at 06:22

## 2022-08-23 RX ADMIN — ACETAMINOPHEN 650 MG: 325 TABLET ORAL at 09:48

## 2022-08-23 RX ADMIN — METRONIDAZOLE 500 MG: 500 INJECTION, SOLUTION INTRAVENOUS at 18:16

## 2022-08-23 RX ADMIN — SODIUM CHLORIDE: 9 INJECTION, SOLUTION INTRAVENOUS at 10:40

## 2022-08-23 ASSESSMENT — PAIN SCALES - GENERAL
PAINLEVEL_OUTOF10: 7
PAINLEVEL_OUTOF10: 4
PAINLEVEL_OUTOF10: 3
PAINLEVEL_OUTOF10: 3
PAINLEVEL_OUTOF10: 5
PAINLEVEL_OUTOF10: 2
PAINLEVEL_OUTOF10: 2

## 2022-08-23 ASSESSMENT — PAIN SCALES - WONG BAKER
WONGBAKER_NUMERICALRESPONSE: 0

## 2022-08-23 ASSESSMENT — ENCOUNTER SYMPTOMS
APNEA: 0
EYE ITCHING: 0
SORE THROAT: 0
PHOTOPHOBIA: 0
CONSTIPATION: 0
COLOR CHANGE: 0
CHOKING: 0
WHEEZING: 0
ANAL BLEEDING: 0
SHORTNESS OF BREATH: 0
BACK PAIN: 0
COUGH: 0
RECTAL PAIN: 0
VOMITING: 0
NAUSEA: 0
ABDOMINAL PAIN: 0
EYE REDNESS: 0
STRIDOR: 0

## 2022-08-23 ASSESSMENT — PAIN - FUNCTIONAL ASSESSMENT
PAIN_FUNCTIONAL_ASSESSMENT: ACTIVITIES ARE NOT PREVENTED

## 2022-08-23 ASSESSMENT — PAIN DESCRIPTION - LOCATION
LOCATION: ABDOMEN

## 2022-08-23 ASSESSMENT — PAIN DESCRIPTION - ORIENTATION
ORIENTATION: MID
ORIENTATION: RIGHT

## 2022-08-23 ASSESSMENT — PAIN DESCRIPTION - DESCRIPTORS
DESCRIPTORS: DULL
DESCRIPTORS: ACHING

## 2022-08-23 ASSESSMENT — PAIN DESCRIPTION - FREQUENCY
FREQUENCY: INTERMITTENT
FREQUENCY: INTERMITTENT

## 2022-08-23 ASSESSMENT — PAIN DESCRIPTION - ONSET
ONSET: ON-GOING
ONSET: ON-GOING

## 2022-08-23 ASSESSMENT — PAIN DESCRIPTION - PAIN TYPE
TYPE: SURGICAL PAIN
TYPE: SURGICAL PAIN

## 2022-08-23 NOTE — CARE COORDINATION
CM spoke with OT and recommendation is for ARU. CM called Stefania with ARU and provided her the referral.   CM documented on wrong chart.

## 2022-08-23 NOTE — PROGRESS NOTES
Infectious Disease Progress Note  2022   Patient Name: Jodie Crawford : 1950     Assessment  Sepsis secondary to postcholecystectomy K oxytoca abscess: Postoperative, plan is for CT-guided external drainage catheter placement on 2022. Drainage on a downward trend. Afebrile, pct is wnl, CRP is elevated. Called the lab, further testing being done. Suspected bile leak:No evidence of bowel leak on HIDA  Comorbid conditions: COPD, morbid obesity, obstructive sleep apnea     Plan  Therapeutic: Continue cefepime - and metronidazole . Expect to treat with antimicrobial for 5 days after HERMINIO is removed. Duration of antimicrobials is dependent on source control. Diagnostic: Trend CRP and procalcitonin. F/u: Abdominal abscess culture susceptibility  Other:      Reason for visit: F/u Sepsis secondary to postcholecystectomy abscess? History:? Interval history noted  Denies n/v/d/f or untoward effects of antimicrobials  Physical Exam:  Vital Signs: /74   Pulse 59   Temp 98.2 °F (36.8 °C) (Oral)   Resp 18   Ht 5' 9.02\" (1.753 m)   Wt 285 lb 4.4 oz (129.4 kg)   SpO2 98%   BMI 42.11 kg/m²     Gen: alert and oriented X3, no distress  Skin: no stigmata of endocarditis  Wounds: C/D/I  HEMT: AT/NC Oropharynx pink, moist, and without lesions or exudates; dentition in good state of repair  Eyes: PERRLA, EOMI, conjunctiva pink, sclera anicteric. Neck: Supple. Trachea midline. No LAD. Chest: no distress and CTA. Good air movement. Heart: RRR and no MRG. Abd: RUQ HERMINIO drain containing green liquid, soft, non-distended, no tenderness, no hepatomegaly. Normoactive bowel sounds. Ext: no clubbing, cyanosis, or edema  Catheter Site: without erythema or tenderness  LDA:  HERMINIO:RUQ  Neuro: Mental status intact. CN 2-12 intact and no focal sensory or motor deficits     Radiologic / Imaging / TESTING  No results found.      Labs:    No results found for this or any previous visit (from the past 24 hour(s)). CULTURE results: Invalid input(s): BLOOD CULTURE,  URINE CULTURE, SURGICAL CULTURE    Diagnosis:  Patient Active Problem List   Diagnosis    MEGHAN (obstructive sleep apnea)- on BIPAP    COPD, mild (HCC)    Arthritis    Chronic sinusitis    Acquired hypothyroidism    Dyslipidemia    Essential hypertension    Morbid obesity with BMI of 45.0-49.9, adult (HCC)    Anemia    NSAID long-term use    GERD (gastroesophageal reflux disease)- due to chronic NSAID use    Elevated fasting glucose    External hemorrhoids    AAA (abdominal aortic aneurysm) without rupture ([address]- stable 2020, due 2023    Primary insomnia    History of recurrent ear infection    Positive FIT (fecal immunochemical test)    Sepsis ([facility]    Common bile duct stone    Ascending cholangitis    Obstructive sleep apnea    Calculus of gallbladder with acute cholecystitis and obstruction    Generalized weakness    Gait disturbance    Uncontrolled pain    Mixed hyperlipidemia    Sinus bradycardia    Intra-abdominal abscess (HCC)    Postoperative infection    Severe sepsis (HCC)       Active Problems  Principal Problem:    Intra-abdominal abscess (HCC)  Active Problems:    Postoperative infection    Severe sepsis (HCC)  Resolved Problems:    * No resolved hospital problems.  *              Electronically signed by: Electronically signed by Gloria Whitman MD on 8/23/2022 at 9:26 AM

## 2022-08-23 NOTE — PLAN OF CARE
Problem: Discharge Planning  Goal: Discharge to home or other facility with appropriate resources  Outcome: Progressing  Flowsheets (Taken 8/22/2022 0800 by Chava Lazaro RN)  Discharge to home or other facility with appropriate resources:   Identify barriers to discharge with patient and caregiver   Arrange for needed discharge resources and transportation as appropriate   Identify discharge learning needs (meds, wound care, etc)   Arrange for interpreters to assist at discharge as needed     Problem: ABCDS Injury Assessment  Goal: Absence of physical injury  Outcome: Progressing     Problem: Pain  Goal: Verbalizes/displays adequate comfort level or baseline comfort level  Outcome: Progressing

## 2022-08-23 NOTE — DISCHARGE INSTR - COC
Continuity of Care Form    Patient Name: Marco Hilario   :  1950  MRN:  [id number]    Admit date:  2022  Discharge date:  ***    Code Status Order: Full Code   Advance Directives:     Admitting Physician:  Marley Beckwith MD  PCP: Tien Spann MD    Discharging Nurse: [facility] Unit/Room#: 0552/9888-H  Discharging Unit Phone Number: ***    Emergency Contact:   Extended Emergency Contact Information  Primary Emergency Contact: Jaylan Shankar  Home Phone: [phone]  Relation: Brother/Sister  Secondary Emergency Contact: Haroldo Brady Phone: [phone]  Relation: Brother/Sister    Past Surgical History:  Past Surgical History:   Procedure Laterality Date    CHOLECYSTECTOMY, LAPAROSCOPIC N/A 8/10/2022    CHOLECYSTECTOMY LAPAROSCOPIC performed by Waqar Bailey MD at [address] [place] [address]  2008    [address]- normal.      COLONOSCOPY  2014    diverticulosis, internal hemorrhoids    CT DRAINAGE ABDOM ABSCESS OPEN  2022    CT DRAINAGE ABDOM ABSCESS OPEN 2022 [address] [facility] CT SCAN    ERCP  2022    ERCP SPHINCTER/PAPILLOTOMY performed by Dr. oDmenico Flores at [address] [facility]    ERCP N/A 2022    ERCP SPHINCTER/PAPILLOTOMY and sweep and removal of debris, sludge and stones with use of 12-15 extractor balloon performed by Jarret Alatrore MD at [facility] Right 2012    JOINT REPLACEMENT Left     KNEE SURGERY Bilateral     replacement    TOE SURGERY Bilateral     toe nail surgery Dr. Arabella Hamm Right     DR. Rascon        Immunization History:   Immunization History   Administered Date(s) Administered     Influenza, Effie Quiros (age 72 y+), High Dose 2020    COVID-19, MODERNA BLUE border, Primary or Immunocompromised, (age 12y+), IM, 100 mcg/0.5mL 2021, 2021, 2022    Hepatitis A 2019    Hepatitis A Vaccine 2019    Hepatitis A/Hepatitis B (Twinrix) 2019, 04/05/2019, 09/06/2019    Hepatitis B 03/07/2019    Hepatitis B vaccine 03/07/2019    Influenza 10/01/2015    Influenza A (W7E9-48) Vaccine IM 11/13/2009    Influenza A (G6H4-11) Vaccine PF IM 12/10/2009    Influenza Vaccine, unspecified formulation 10/15/1996, 10/18/2005, 11/05/2007, 10/20/2008, 09/28/2009, 11/01/2010, 09/27/2011, 10/09/2012, 10/04/2013, 10/14/2014, 11/02/2015    Influenza Virus Vaccine 10/15/1996, 10/27/2003, 11/05/2007, 10/20/2008, 09/28/2009, 11/01/2010, 09/27/2011, 10/09/2012, 10/04/2013, 10/14/2014    Influenza Whole 10/14/2014    Influenza, FLUAD, (age 72 y+), Adjuvanted 10/29/2021    Influenza, FLUCELVAX, (age 10 mo+), MDCK, MDV 06/11/2019, 08/20/2019    Influenza, High Dose (Fluzone 65 yrs and older) 10/19/2015, 10/14/2016, 09/12/2017, 09/11/2018, 09/25/2020, 10/29/2021    Influenza, Triv, inactivated, subunit, adjuvanted, IM (Fluad 65 yrs and older) 09/17/2019    Pneumococcal Conjugate 13-valent (Wxseeed89) 01/25/2016, 01/27/2016    Pneumococcal Polysaccharide ([id number]) 01/06/2015    Td (Adult), 5 Lf Tetanus Toxoid, Pf (Tenivac, Decavac) 08/27/2012    Td vaccine (adult) 09/19/2003, 08/27/2012    Td, unspecified formulation 09/19/2003, 08/27/2012    Tdap (Boostrix, Adacel) 11/02/2015, 11/05/2015    Zoster Live (Zostavax) 03/16/2009, 01/06/2015    Zoster Recombinant (Shingrix) 06/11/2019, 08/20/2019       Active Problems:  Patient Active Problem List   Diagnosis Code    MEGHAN (obstructive sleep apnea)- on BIPAP G47.33    COPD, mild (HCC) J44.9    Arthritis M19.90    Chronic sinusitis J32.9    Acquired hypothyroidism E03.9    Dyslipidemia E78.5    Essential hypertension I10    Morbid obesity with BMI of 45.0-49.9, adult (HCC) E66.01, Z68.42    Anemia D64.9    NSAID long-term use Z79.1    GERD (gastroesophageal reflux disease)- due to chronic NSAID use K21.9    Elevated fasting glucose R73.01    External hemorrhoids K64.4    AAA (abdominal aortic aneurysm) without rupture ([facility]- stable 2020, due 2023 I71.4    Primary insomnia F51.01    History of recurrent ear infection Z86.69    Positive FIT (fecal immunochemical test) R19.5    Sepsis (HCC) A41.9    Common bile duct stone K80.50    Ascending cholangitis K83.09    Obstructive sleep apnea G47.33    Calculus of gallbladder with acute cholecystitis and obstruction K80.01    Generalized weakness R53.1    Gait disturbance R26.9    Uncontrolled pain R52    Mixed hyperlipidemia E78.2    Sinus bradycardia R00.1    Intra-abdominal abscess (HCC) K65.1    Postoperative infection T81.40XA    Severe sepsis (HCC) A41.9, R65.20       Isolation/Infection:   Isolation            No Isolation          Patient Infection Status       Infection Onset Added Last Indicated Last Indicated By Review Planned Expiration Resolved Resolved By    None active    Resolved    COVID-19 (Rule Out) 08/19/22 08/19/22 08/19/22 COVID-19, Rapid (Ordered)   08/19/22 Rule-Out Test Resulted    COVID-19 (Rule Out) 08/07/22 08/07/22 08/07/22 COVID-19, Rapid (Ordered)   08/07/22 Rule-Out Test Resulted    MRSA  07/28/14 07/28/14 Kandis Craven RN   08/14/22 Cira Baldwin RN    Wound 7/23/14            Nurse Assessment:  Last Vital Signs: /74   Pulse 59   Temp 98.2 °F (36.8 °C) (Oral)   Resp 18   Ht 5' 9.02\" (1.753 m)   Wt 285 lb 4.4 oz (129.4 kg)   SpO2 98%   BMI 42.11 kg/m²     Last documented pain score (0-10 scale): Pain Level: 3  Last Weight:   Wt Readings from Last 1 Encounters:   08/21/22 285 lb 4.4 oz (129.4 kg)     Mental Status:  {IP PT MENTAL STATUS:20030}    IV Access:  {[facility] IV ACCESS:997496950}    Nursing Mobility/ADLs:  Walking   {[facility] DME QIVS:549925312}  Transfer  {[facility] DME AHCH:009595428}  Bathing  {[facility] DME VHED:907314637}  Dressing  {[facility] DME OLOH:237898177}  Toileting  {[facility] DME WGOM:625174840}  Feeding  {[facility] DME SGZX:069452546}  Med Admin  {[facility] CTDO:262722265}  Med Delivery   {[facility] MED Delivery:908506034}    Wound Care Documentation and Therapy:  Incision 08/10/22 Abdomen Medial;Upper (Active)   Dressing Status Clean;Dry; Intact 08/22/22 2000   Dressing/Treatment Open to air 08/23/22 0847   Closure Surgical glue 08/23/22 0847   Margins Approximated 08/23/22 0847   Incision Assessment Dry 08/23/22 0847   Drainage Amount None 08/23/22 0847   Odor None 08/23/22 0847   Meliza-incision Assessment Intact 08/23/22 0847   Number of days: 13       Incision 08/12/22 Abdomen Lateral;Right;Upper (Active)   Dressing Status Clean;Dry; Intact 08/22/22 2000   Dressing/Treatment Open to air 08/23/22 0847   Closure Surgical glue 08/23/22 0847   Margins Approximated 08/23/22 0847   Incision Assessment Dry 08/23/22 0847   Drainage Amount None 08/23/22 0847   Odor None 08/23/22 0847   Meliza-incision Assessment Intact 08/23/22 0847   Number of days: 10       Incision 08/12/22 Abdomen Lateral;Left;Upper (Active)   Dressing Status Clean;Dry; Intact 08/22/22 2000   Dressing/Treatment Open to air 08/23/22 0847   Closure Surgical glue 08/23/22 0847   Margins Approximated 08/23/22 0847   Incision Assessment Dry 08/23/22 0847   Drainage Amount None 08/23/22 0847   Odor None 08/23/22 0847   Meliza-incision Assessment Intact 08/23/22 0847   Number of days: 10       Incision 08/12/22 Abdomen Left; Lower (Active)   Dressing Status Clean;Dry; Intact 08/22/22 2000   Dressing/Treatment Open to air 08/23/22 0847   Closure Surgical glue 08/23/22 0847   Margins Approximated 08/23/22 0847   Incision Assessment Dry 08/23/22 0847   Drainage Amount None 08/23/22 0847   Odor None 08/23/22 0847   Meliza-incision Assessment Intact 08/23/22 0847   Number of days: 10        Elimination:  Continence:    Bowel: {YES / RP:19257}  Bladder: {YES / BONIFACIO:37735}  Urinary Catheter: {Urinary Catheter:703792697}   Colostomy/Ileostomy/Ileal Conduit: {YES / TK:37918}       Date of Last BM: ***    Intake/Output Summary (Last 24 hours) at 8/23/2022 1353  Last data filed at 8/23/2022 0300  Gross per 24 hour   Intake -- Output 30 ml   Net -30 ml     I/O last 3 completed shifts:   In: 10 [I.V.:10]  Out: 30 [Drains:30]    Safety Concerns:     [facility] Safety Concerns:822834074}    Impairments/Disabilities:      [address] [place] [address] Impairments/Disabilities:529296079}        Patient's personal belongings (please select all that are sent with patient):  {[facility] DME Belongings:864462215}    RN SIGNATURE:  {Esignature:620263896}    CASE MANAGEMENT/SOCIAL WORK SECTION    Inpatient Status Date: ***    Readmission Risk Assessment Score:  Readmission Risk              Risk of Unplanned Readmission:  14           Discharging to Facility/ Agency   Name:   Address:  Phone:  Fax:    Dialysis Facility (if applicable)   Name:  Address:  Dialysis Schedule:  Phone:  Fax:    / signature: {Esignature:357134669}    PHYSICIAN SECTION    Nutrition Therapy:  Current Nutrition Therapy:   [address] [place] [address] Diet List:091073062}    Routes of Feeding: {[facility] DME Other Feedings:981259346}  Liquids: {Slp liquid thickness:57889}  Daily Fluid Restriction: {[facility] DME Yes amt example:635687100}  Last Modified Barium Swallow with Video (Video Swallowing Test): {Done Not Done WZZ}    Treatments at the Time of Hospital Discharge:   Respiratory Treatments: ***  Oxygen Therapy:  {Therapy; copd oxygen:10306}  Ventilator:    {MH CC Vent QXJH:874445612}    Rehab Therapies: {THERAPEUTIC INTERVENTION:0667506509}  Weight Bearing Status/Restrictions: [address] [place] [address] Weight Bearin}  Other Medical Equipment (for information only, NOT a DME order):  {EQUIPMENT:228743345}  Other Treatments: ***    Prognosis: {Prognosis:0880582289}    Condition at Discharge: [address] [place] [address] Patient Condition:681054131}    Rehab Potential (if transferring to Rehab): {Prognosis:3593949769}    Recommended Labs or Other Treatments After Discharge: ***    Physician Certification: I certify the above information and transfer of Renaman Saunders  is necessary for the continuing treatment of the diagnosis listed and that he requires {Admit to Appropriate Level of Care:98670} for {GREATER/LESS:806811776} 30 days.      Update Admission H&P: {[facility] DME Changes in PET:913782910}    PHYSICIAN SIGNATURE:  {Esignature:861551508}

## 2022-08-23 NOTE — PROGRESS NOTES
V2.0  [facility] [place]      Name:  Clarence lCeveland /Age/Sex: 1950  (66 y.o. male)   MRN & CSN:  [id number] & [id number] Encounter Date/Time: 2022 12:03 PM EDT    Location:  1111/1111-A PCP: Debora Flores MD       Hospital Day: 5    Assessment and Plan:   Clarence Cleveland is a 66 y.o. male who presents with Intra-abdominal abscess ([address]      Plan:    Sepsis secondary to intra-abdominal abscess as a postoperative complication  -Presented to fevers and abdominal discomfort. Had a recent ERCP followed by a lap cholecystectomy. -CT abdomen pelvis now demonstrating possible abscess.  -Is on broad-spectrum antibiotics. Underwent IR guided drainage of the abscess with drain placement. We will follow-up with the cultures from the IR procedure narrow based on sensitivities which are still pending. Infectious disease has been consulted for recommendations of duration of therapy. Drain still with considerable output. HIDA scan -ve for bile leak. Needs output to slow down prior to considering removal.     Essential hypertension  -Continue home losartan. Hypothyroidism  Continue home Synthroid. AAA  Found on CT scan. Needs repeat scan as outpatient. COPD obstructive apnea  -Stable. Continue home CPAP    Morbid obesity due to excess calories Body mass index is 42.11 kg/m². - Complicating assessment and treatment. Placing patient at risk for multiple co-morbidities as well as early death and contributing to the patient's presentation.  on weight loss when appropriate. Diet ADULT DIET; Regular   DVT Prophylaxis Lovenox. Code Status Full Code   Disposition From: Home  Expected Disposition: Home  Estimated Date of Discharge: 2 days  Patient requires continued admission due to sepsis needs possible IR procedure   Surrogate Decision Maker/ FERNIE Gudino.      Subjective:     Chief Complaint: Fever (Septic early this month - )       Reports he is sore at the site of his drain, required ocassional Percocet . [place] However his abdominal discomfort overall is improved   Denies any nausea, vomiting, chest pain, shortness of breath, lightheadedness or dizziness. Review of Systems:    Review of Systems   Constitutional:  Negative for chills and fever. HENT:  Negative for sore throat. Eyes:  Negative for visual disturbance. Respiratory:  Negative for cough, shortness of breath and wheezing. Cardiovascular:  Negative for palpitations and leg swelling. Gastrointestinal:  Negative for nausea and vomiting. Endocrine: Negative for polyuria. Genitourinary:  Negative for dysuria. Musculoskeletal:  Negative for back pain. Skin:  Negative for wound. Neurological:  Negative for dizziness and weakness. Psychiatric/Behavioral:  Negative for confusion. Objective: Intake/Output Summary (Last 24 hours) at 8/23/2022 1119  Last data filed at 8/23/2022 0300  Gross per 24 hour   Intake --   Output 30 ml   Net -30 ml          Vitals:   Vitals:    08/23/22 0754   BP: 138/74   Pulse: 59   Resp: 18   Temp: 98.2 °F (36.8 °C)   SpO2: 98%       Physical Exam:   Physical Exam  Constitutional:       General: He is not in acute distress. HENT:      Mouth/Throat:      Mouth: Mucous membranes are moist.      Pharynx: No posterior oropharyngeal erythema. Eyes:      General: No scleral icterus. Extraocular Movements: Extraocular movements intact. Pupils: Pupils are equal, round, and reactive to light. Cardiovascular:      Rate and Rhythm: Normal rate and regular rhythm. Pulses: Normal pulses. Heart sounds: No murmur heard. Pulmonary:      Effort: Pulmonary effort is normal.      Breath sounds: No wheezing or rales. Abdominal:      General: Bowel sounds are normal. There is no distension. Palpations: Abdomen is soft. Tenderness: There is no abdominal tenderness. Comments: Drain in place draining dark cloudy fluid.   No tenderness to palpation around the drain site or erythema or warmth. Musculoskeletal:         General: Normal range of motion. Right lower leg: No edema. Left lower leg: No edema. Skin:     General: Skin is warm. Findings: No rash. Neurological:      General: No focal deficit present. Mental Status: He is alert and oriented to person, place, and time. Cranial Nerves: No cranial nerve deficit. Sensory: No sensory deficit. Motor: No weakness. Psychiatric:         Mood and Affect: Mood normal.         Medications:   Medications:    fluticasone  1 spray Each Nostril Nightly    docusate sodium  100 mg Oral Daily    losartan  25 mg Oral Nightly    traZODone  50 mg Oral Nightly    enoxaparin  30 mg SubCUTAneous BID    sodium chloride  1,000 mL IntraVENous Once    cefepime  2,000 mg IntraVENous Q8H    levothyroxine  175 mcg Oral Daily    sodium chloride flush  5-40 mL IntraVENous 2 times per day    metroNIDAZOLE  500 mg IntraVENous Q8H      Infusions:    sodium chloride 25 mL/hr at 08/23/22 1040     PRN Meds: sodium chloride, 2 spray, Q4H PRN  sodium chloride flush, 5-40 mL, PRN  sodium chloride, , PRN  ondansetron, 4 mg, Q8H PRN   Or  ondansetron, 4 mg, Q6H PRN  polyethylene glycol, 17 g, Daily PRN  aluminum & magnesium hydroxide-simethicone, 30 mL, Q6H PRN  albuterol sulfate HFA, 1 puff, Q6H PRN   And  ipratropium, 1 puff, Q6H PRN  acetaminophen, 650 mg, Q4H PRN  oxyCODONE-acetaminophen, 1 tablet, Q4H PRN      Labs      No results found for this or any previous visit (from the past 24 hour(s)). Imaging/Diagnostics Last 24 Hours   CT ABDOMEN PELVIS W IV CONTRAST Additional Contrast? None    Result Date: 8/20/2022  EXAMINATION: CT OF THE ABDOMEN AND PELVIS WITH CONTRAST 8/19/2022 11:10 pm TECHNIQUE: CT of the abdomen and pelvis was performed with the administration of intravenous contrast. Multiplanar reformatted images are provided for review.  Automated exposure control, iterative reconstruction, and/or weight based adjustment of the mA/kV was utilized to reduce the radiation dose to as low as reasonably achievable. COMPARISON: MRCP dated August 8, 2022. HISTORY: ORDERING SYSTEM PROVIDED HISTORY: Fever/recent post op/WBC 17 TECHNOLOGIST PROVIDED HISTORY: Reason for exam:->Fever/recent post op/WBC 17 Additional Contrast?->None Decision Support Exception - unselect if not a suspected or confirmed emergency medical condition->Emergency Medical Condition (MA) Reason for Exam: Fever/recent post op/WBC 17 FINDINGS: Lower Chest: Bibasilar atelectasis is noted. Organs: The patient is status post recent cholecystectomy. There is a focal rim enhancing fluid collection with multiple focal locules of gas with adjacent stranding, concerning for abscess versus infected biloma. This measures 13.2 x 8.2 cm. This results in mild intrahepatic bile duct dilatation. The spleen, adrenal glands, pancreas, and kidneys are unremarkable. GI/Bowel: There is no evidence of bowel obstruction. Pelvis: Keely has been a prostatectomy. The bladder is unremarkable. Peritoneum/Retroperitoneum: There is no evidence of free intraperitoneal air. There is a 3.7 cm infrarenal abdominal aortic aneurysm. Bones/Soft Tissues: No destructive osseous lesions are identified. 1. Postsurgical changes are seen from recent cholecystectomy. There is a focal rim enhancing fluid collection with multiple locules of gas in adjacent stranding within the cholecystectomy bed, concerning for an abscess versus infected biloma. This measures 13.2 x 8.2 cm. 2. 3.7 cm infrarenal abdominal aortic aneurysm. Please see recommendations below. RECOMMENDATIONS: 3.7 cm infrarenal abdominal aortic aneurysm. Recommend follow-up every 2 years. Reference: J Am Bryon Radiol 7338;[id number]. XR CHEST PORTABLE    Result Date: 8/19/2022  EXAMINATION: ONE XRAY VIEW OF THE CHEST 8/19/2022 6:48 pm COMPARISON: None.  HISTORY: ORDERING SYSTEM PROVIDED HISTORY: fever TECHNOLOGIST PROVIDED HISTORY: Reason for exam:->fever Reason for Exam: fever, recent surgery FINDINGS: The lungs are without acute focal process. There is no effusion or pneumothorax. The cardiomediastinal silhouette is without acute process. The osseous structures are without acute process. No acute process.        Electronically signed by Jan Daniels MD on 8/23/2022 at 11:19 AM

## 2022-08-23 NOTE — TELEPHONE ENCOUNTER
Patient admitted to hospital, will likely be discharged with [facility].. Will provider verbal of continuing HC once discharged.

## 2022-08-23 NOTE — PROGRESS NOTES
General Surgery  Dr. Jinny Zuniga and Nury Huertas, [facility] Day: 5    Chief Complaint on Admission: Fevers, chills, Abd pain      Subjective:     Seen by ID. Tolerating PO. No F/C.  +OOB and ambulating. Drain output decreasing. ROS:  Review of Systems   Constitutional:  Negative for chills and fever. HENT:  Negative for ear pain, mouth sores, sore throat and tinnitus. Eyes:  Negative for photophobia, redness and itching. Respiratory:  Negative for apnea, choking and stridor. Cardiovascular:  Negative for chest pain and palpitations. Gastrointestinal:  Negative for abdominal pain, anal bleeding, constipation, nausea, rectal pain and vomiting. Endocrine: Negative for polydipsia. Genitourinary:  Negative for enuresis, flank pain and hematuria. Musculoskeletal:  Negative for back pain, joint swelling and myalgias. Skin:  Negative for color change and pallor. Allergic/Immunologic: Negative for environmental allergies. Neurological:  Negative for syncope and speech difficulty. Psychiatric/Behavioral:  Negative for confusion and hallucinations.       Allergies  Dilaudid [hydromorphone hcl], Hydrocodone, Ultram [tramadol], Vicodin [hydrocodone-acetaminophen], Morphine, Keflex [cephalexin], and Sulfa antibiotics          Diagnosis Date    Abdominal aneurysm ([facility] 2012    4 cm x 3.6 cm followed at [address] 1/21/2014    Chronic [place] pain     [place]actor [place]    Chronic sinusitis 1/21/2014    COPD, mild ([facility] 1/21/2014    Diverticulosis 2014    Dr. 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 Katya Linder    MEGHAN (obstructive sleep apnea) 1/21/2014    CPAP changed to bipap (2014)    Otitis media, serous, TM rupture 2014    ENT x 2 s/p PE tubes bilaterally, cipro Otic       Objective:     Vitals:    22 0754   BP: 138/74   Pulse: 59   Resp: 18   Temp: 98.2 °F (36.8 °C)   SpO2: 98%       TEMPERATURE:  Current - Temp: 98.2 °F (36.8 °C); Max - Temp  Av.1 °F (36.7 °C)  Min: 97.9 °F (36.6 °C)  Max: 98.2 °F (36.8 °C)    No intake/output data recorded. I/O last 3 completed shifts: In: 10 [I.V.:10]  Out: 30 [Drains:30]      Physical Exam:  Physical Exam  Constitutional:       Appearance: He is well-developed. HENT:      Head: Normocephalic. Eyes:      Pupils: Pupils are equal, round, and reactive to light. Cardiovascular:      Rate and Rhythm: Normal rate. Pulmonary:      Effort: Pulmonary effort is normal.   Abdominal:      General: There is no distension. Palpations: Abdomen is soft. There is no mass. Tenderness: There is abdominal tenderness. There is no guarding or rebound. Comments: Lap incisions well healed. RUQ Drain with bilious output - no evidence of purulence. Musculoskeletal:         General: Normal range of motion. Cervical back: Normal range of motion and neck supple. Skin:     General: Skin is warm. Neurological:      Mental Status: He is alert and oriented to person, place, and time. Drain with 30 mL out in last 24 h.       Scheduled Meds:   fluticasone  1 spray Each Nostril Nightly    docusate sodium  100 mg Oral Daily    losartan  25 mg Oral Nightly    traZODone  50 mg Oral Nightly    enoxaparin  30 mg SubCUTAneous BID    sodium chloride  1,000 mL IntraVENous Once    cefepime  2,000 mg IntraVENous Q8H    levothyroxine  175 mcg Oral Daily    sodium chloride flush  5-40 mL IntraVENous 2 times per day    metroNIDAZOLE  500 mg IntraVENous Q8H     Continuous Infusions:   sodium chloride 25 mL/hr at 22 1551     PRN Meds:sodium chloride, sodium chloride flush, sodium chloride, ondansetron **OR** ondansetron, polyethylene glycol, aluminum & magnesium hydroxide-simethicone, albuterol sulfate HFA **AND** ipratropium, acetaminophen, oxyCODONE-acetaminophen      Labs/Imaging Results:   Lab Results   Component Value Date    WBC 13.8 (H) 08/20/2022    HGB 10.2 (L) 08/20/2022    HCT 31.3 (L) 08/20/2022    MCV 99.1 08/20/2022     08/20/2022     Lab Results   Component Value Date     08/20/2022    K 3.5 08/20/2022     08/20/2022    CO2 24 08/20/2022    BUN 7 08/20/2022    CREATININE 0.7 (L) 08/20/2022    GLUCOSE 96 08/20/2022    CALCIUM 8.2 (L) 08/20/2022    PROT 5.8 (L) 08/20/2022    LABALBU 3.6 08/20/2022    BILITOT 1.0 08/20/2022    ALKPHOS 179 (H) 08/20/2022    AST 21 08/20/2022    ALT 21 08/20/2022    LABGLOM >60 08/20/2022    GFRAA >60 08/20/2022    AGRATIO 1.5 07/27/2022    GLOB 2.3 01/25/2016     HIDA:  FINDINGS:   Prompt, homogenous uptake by the liver is noted with normal appearance of   radiotracer excretion into the biliary system. Clearance of blood pool   activity appears appropriate. Patient is status post cholecystectomy. There is normal expected   visualization of common bile duct with extension of radiopharmaceutical into   the small bowel. No accumulation of radiopharmaceutical is seen in the   surgical bed to suggest bile leak.        Assessment:     Patient Active Problem List:     MEGHAN (obstructive sleep apnea)- on BIPAP     COPD, mild (HCC)     Arthritis     Chronic sinusitis     Acquired hypothyroidism     Dyslipidemia     Essential hypertension     Morbid obesity with BMI of 45.0-49.9, adult (HCC)     Anemia     NSAID long-term use     GERD (gastroesophageal reflux disease)- due to chronic NSAID use     Elevated fasting glucose     External hemorrhoids     AAA (abdominal aortic aneurysm) without rupture ([facility]- stable 2020, due 2023     Primary insomnia     History of recurrent ear infection     Positive FIT (fecal immunochemical test)     Sepsis ([facility]     Common bile duct stone     Ascending cholangitis     Obstructive sleep apnea     Calculus of gallbladder with acute cholecystitis and obstruction     Generalized weakness     Gait disturbance     Uncontrolled pain     Mixed hyperlipidemia     Sinus bradycardia     Intra-abdominal abscess (HCC) vs Biloma    Plan:     -Overall, improving    -F/u drain Cx. Prelim is klebsiella. Blood cx x 2 NG x 72 h.    -Continue Abx and de-escalate based on above cx. Appreciate ID recs. Reviewed and d/w pt. -Diet as tolerated. -Drain care. If continues to decrease then will have drain d/c'd prior to d/c.     -Will continue to follow.      Mary Anne James MD

## 2022-08-24 LAB
CULTURE: NORMAL
CULTURE: NORMAL
HIGH SENSITIVE C-REACTIVE PROTEIN: 83.9 MG/L
Lab: NORMAL
Lab: NORMAL
SPECIMEN: NORMAL
SPECIMEN: NORMAL

## 2022-08-24 PROCEDURE — 6360000002 HC RX W HCPCS: Performed by: STUDENT IN AN ORGANIZED HEALTH CARE EDUCATION/TRAINING PROGRAM

## 2022-08-24 PROCEDURE — 99232 SBSQ HOSP IP/OBS MODERATE 35: CPT | Performed by: SURGERY

## 2022-08-24 PROCEDURE — 99232 SBSQ HOSP IP/OBS MODERATE 35: CPT | Performed by: INTERNAL MEDICINE

## 2022-08-24 PROCEDURE — 86141 C-REACTIVE PROTEIN HS: CPT

## 2022-08-24 PROCEDURE — 6370000000 HC RX 637 (ALT 250 FOR IP): Performed by: STUDENT IN AN ORGANIZED HEALTH CARE EDUCATION/TRAINING PROGRAM

## 2022-08-24 PROCEDURE — 97165 OT EVAL LOW COMPLEX 30 MIN: CPT

## 2022-08-24 PROCEDURE — 1200000000 HC SEMI PRIVATE

## 2022-08-24 PROCEDURE — 94761 N-INVAS EAR/PLS OXIMETRY MLT: CPT

## 2022-08-24 PROCEDURE — 36415 COLL VENOUS BLD VENIPUNCTURE: CPT

## 2022-08-24 PROCEDURE — 6370000000 HC RX 637 (ALT 250 FOR IP): Performed by: INTERNAL MEDICINE

## 2022-08-24 PROCEDURE — 2580000003 HC RX 258: Performed by: STUDENT IN AN ORGANIZED HEALTH CARE EDUCATION/TRAINING PROGRAM

## 2022-08-24 PROCEDURE — 2500000003 HC RX 250 WO HCPCS: Performed by: EMERGENCY MEDICINE

## 2022-08-24 RX ORDER — METRONIDAZOLE 250 MG/1
500 TABLET ORAL EVERY 8 HOURS SCHEDULED
Status: DISCONTINUED | OUTPATIENT
Start: 2022-08-24 | End: 2022-08-25 | Stop reason: HOSPADM

## 2022-08-24 RX ORDER — CIPROFLOXACIN 500 MG/1
750 TABLET, FILM COATED ORAL EVERY 12 HOURS SCHEDULED
Status: DISCONTINUED | OUTPATIENT
Start: 2022-08-24 | End: 2022-08-25 | Stop reason: HOSPADM

## 2022-08-24 RX ADMIN — CEFEPIME HYDROCHLORIDE 2000 MG: 2 INJECTION, POWDER, FOR SOLUTION INTRAMUSCULAR; INTRAVENOUS at 08:51

## 2022-08-24 RX ADMIN — OXYCODONE AND ACETAMINOPHEN 1 TABLET: 7.5; 325 TABLET ORAL at 15:04

## 2022-08-24 RX ADMIN — ACETAMINOPHEN 650 MG: 325 TABLET ORAL at 22:55

## 2022-08-24 RX ADMIN — METRONIDAZOLE 500 MG: 250 TABLET ORAL at 22:56

## 2022-08-24 RX ADMIN — METRONIDAZOLE 500 MG: 500 INJECTION, SOLUTION INTRAVENOUS at 15:16

## 2022-08-24 RX ADMIN — ENOXAPARIN SODIUM 30 MG: 100 INJECTION SUBCUTANEOUS at 22:56

## 2022-08-24 RX ADMIN — SODIUM CHLORIDE, PRESERVATIVE FREE 10 ML: 5 INJECTION INTRAVENOUS at 22:58

## 2022-08-24 RX ADMIN — CIPROFLOXACIN HYDROCHLORIDE 750 MG: 500 TABLET, FILM COATED ORAL at 22:55

## 2022-08-24 RX ADMIN — TRAZODONE HYDROCHLORIDE 50 MG: 50 TABLET ORAL at 22:56

## 2022-08-24 RX ADMIN — OXYCODONE AND ACETAMINOPHEN 1 TABLET: 7.5; 325 TABLET ORAL at 06:01

## 2022-08-24 RX ADMIN — SODIUM CHLORIDE, PRESERVATIVE FREE 10 ML: 5 INJECTION INTRAVENOUS at 10:20

## 2022-08-24 RX ADMIN — CEFEPIME HYDROCHLORIDE 2000 MG: 2 INJECTION, POWDER, FOR SOLUTION INTRAMUSCULAR; INTRAVENOUS at 00:55

## 2022-08-24 RX ADMIN — METRONIDAZOLE 500 MG: 500 INJECTION, SOLUTION INTRAVENOUS at 00:52

## 2022-08-24 RX ADMIN — ENOXAPARIN SODIUM 30 MG: 100 INJECTION SUBCUTANEOUS at 08:37

## 2022-08-24 RX ADMIN — SODIUM CHLORIDE: 9 INJECTION, SOLUTION INTRAVENOUS at 08:50

## 2022-08-24 RX ADMIN — FLUTICASONE PROPIONATE 1 SPRAY: 50 SPRAY, METERED NASAL at 22:58

## 2022-08-24 RX ADMIN — SODIUM CHLORIDE: 9 INJECTION, SOLUTION INTRAVENOUS at 08:52

## 2022-08-24 RX ADMIN — DOCUSATE SODIUM 100 MG: 100 CAPSULE, LIQUID FILLED ORAL at 08:38

## 2022-08-24 RX ADMIN — LEVOTHYROXINE SODIUM 175 MCG: 0.1 TABLET ORAL at 05:59

## 2022-08-24 RX ADMIN — METRONIDAZOLE 500 MG: 500 INJECTION, SOLUTION INTRAVENOUS at 08:53

## 2022-08-24 RX ADMIN — CEFEPIME HYDROCHLORIDE 2000 MG: 2 INJECTION, POWDER, FOR SOLUTION INTRAMUSCULAR; INTRAVENOUS at 16:44

## 2022-08-24 RX ADMIN — LOSARTAN POTASSIUM 25 MG: 25 TABLET, FILM COATED ORAL at 22:56

## 2022-08-24 ASSESSMENT — PAIN - FUNCTIONAL ASSESSMENT: PAIN_FUNCTIONAL_ASSESSMENT: ACTIVITIES ARE NOT PREVENTED

## 2022-08-24 ASSESSMENT — ENCOUNTER SYMPTOMS
STRIDOR: 0
BACK PAIN: 0
SORE THROAT: 0
ANAL BLEEDING: 0
COLOR CHANGE: 0
EYE REDNESS: 0
APNEA: 0
ABDOMINAL PAIN: 0
PHOTOPHOBIA: 0
CONSTIPATION: 0
VOMITING: 0
CHOKING: 0
NAUSEA: 0
RECTAL PAIN: 0
EYE ITCHING: 0

## 2022-08-24 ASSESSMENT — PAIN SCALES - GENERAL
PAINLEVEL_OUTOF10: 3
PAINLEVEL_OUTOF10: 1
PAINLEVEL_OUTOF10: 7
PAINLEVEL_OUTOF10: 3
PAINLEVEL_OUTOF10: 7
PAINLEVEL_OUTOF10: 4

## 2022-08-24 ASSESSMENT — PAIN DESCRIPTION - LOCATION
LOCATION: ABDOMEN

## 2022-08-24 ASSESSMENT — PAIN DESCRIPTION - ORIENTATION
ORIENTATION: RIGHT;LEFT
ORIENTATION: RIGHT;LEFT

## 2022-08-24 ASSESSMENT — PAIN SCALES - WONG BAKER: WONGBAKER_NUMERICALRESPONSE: 0

## 2022-08-24 ASSESSMENT — PAIN DESCRIPTION - DESCRIPTORS
DESCRIPTORS: ACHING;SORE

## 2022-08-24 ASSESSMENT — PAIN DESCRIPTION - ONSET: ONSET: AWAKENED FROM SLEEP

## 2022-08-24 NOTE — PLAN OF CARE
Problem: Discharge Planning  Goal: Discharge to home or other facility with appropriate resources  8/24/2022 0138 by Girish Damon RN  Outcome: Progressing  8/23/2022 2341 by Elif Blake RN  Outcome: Progressing  8/23/2022 1253 by Jose Trejo RN  Outcome: Progressing     Problem: ABCDS Injury Assessment  Goal: Absence of physical injury  8/24/2022 0138 by Girish Damon RN  Outcome: Progressing  8/23/2022 2341 by Elif Blake RN  Outcome: Progressing  8/23/2022 1253 by Jose Trejo RN  Outcome: Progressing     Problem: Pain  Goal: Verbalizes/displays adequate comfort level or baseline comfort level  8/24/2022 0138 by Girish Damon RN  Outcome: Progressing  8/23/2022 2341 by Elif Blake RN  Outcome: Progressing  8/23/2022 1253 by Jose Trejo RN  Outcome: Progressing

## 2022-08-24 NOTE — CARE COORDINATION
CM identified that the previous CM note was on the wrong pt. CM updated Dr Heraclio Arriola. CM into see pt to continue to discuss discharge planning. Pt shared that he is ambulating well in the room. He no longer feels a need to go to ARU. Pt is most concern about having IV antibiotics at home. He is aware he is waiting for cultures. Pt shared that he has no one to help with IV antibiotics. CM explained that the CM will continue to follow for discharge needs.

## 2022-08-24 NOTE — PLAN OF CARE
Problem: Discharge Planning  Goal: Discharge to home or other facility with appropriate resources  8/23/2022 2341 by Ra Estevez RN  Outcome: Progressing  8/23/2022 1253 by Yuliet Vidal RN  Outcome: Progressing     Problem: ABCDS Injury Assessment  Goal: Absence of physical injury  8/23/2022 2341 by Ra Estevez RN  Outcome: Progressing  8/23/2022 1253 by Yuliet Vidal RN  Outcome: Progressing     Problem: Pain  Goal: Verbalizes/displays adequate comfort level or baseline comfort level  8/23/2022 2341 by Ra Estevez RN  Outcome: Progressing  8/23/2022 1253 by Yuliet Vidal RN  Outcome: Progressing

## 2022-08-24 NOTE — PROGRESS NOTES
Infectious Disease Progress Note  2022   Patient Name: Larisa Lauren : 1950     Assessment  Sepsis secondary to postcholecystectomy K oxytoca abscess: Postoperative,s/p CT-guided external drainage catheter placement on 2022. Drainage volume on a downward trend. Afebrile, pct is wnl, CRP is elevated. Bacteria susceptible to ciprofloxacin. Improving. Suspected bile leak:No evidence of bowel leak on HIDA  Comorbid conditions: COPD, morbid obesity, obstructive sleep apnea     Plan  Therapeutic: Discontinue cefepime - and start ciprofloxacin 750 mg p.o. twice daily on  and continue metronidazole . Expect to treat with antimicrobial for 5 days after HERMINIO is removed. Duration of antimicrobials is dependent on source control. Diagnostic: Trend CRP and procalcitonin. F/u: Abdominal abscess culture susceptibility  Other:      Reason for visit: F/u Sepsis secondary to postcholecystectomy abscess? History:? Interval history noted  Denies n/v/d/f or untoward effects of antimicrobials  Physical Exam:  Vital Signs: /70   Pulse 59   Temp 97.9 °F (36.6 °C) (Oral)   Resp 16   Ht 5' 9.02\" (1.753 m)   Wt 288 lb 2.3 oz (130.7 kg)   SpO2 100%   BMI 42.53 kg/m²     Gen: alert and oriented X3, no distress  Skin: no stigmata of endocarditis  Wounds: C/D/I  HEMT: AT/NC Oropharynx pink, moist, and without lesions or exudates; dentition in good state of repair  Eyes: PERRLA, EOMI, conjunctiva pink, sclera anicteric. Neck: Supple. Trachea midline. No LAD. Chest: no distress and CTA. Good air movement. Heart: RRR and no MRG. Abd: RUQ HERMINIO drain containing green liquid, soft, non-distended, no tenderness, no hepatomegaly. Normoactive bowel sounds. Ext: no clubbing, cyanosis, or edema  Catheter Site: without erythema or tenderness  LDA:  HERMINIO:RUQ  Neuro: Mental status intact. CN 2-12 intact and no focal sensory or motor deficits     Radiologic / Imaging / TESTING  No results found.      Labs:

## 2022-08-24 NOTE — PROGRESS NOTES
V2.0  [facility] [place] [facility]      Name:  Loren Paz /Age/Sex: 1950  (66 y.o. male)   MRN & CSN:  [id number] & [id number] Encounter Date/Time: 2022 11:02 AM EDT    Location:  1111/1111-A PCP: Libby Corado MD       Hospital Day: 6    Assessment and Plan:   Loren Paz is a 66 y.o. male with who presents with Intra-abdominal abscess ([address]    1. Sepsis secondary to postoperative intra-abdominal abscess status postcholecystectomy  -Presented to fevers and abdominal discomfort. Had a recent ERCP followed by a lap cholecystectomy. -CT abdomen pelvis now demonstrating possible abscess.  -Is on broad-spectrum antibiotics. Underwent IR guided drainage of the abscess with drain placement.  -Cultures growing Klebsiella oxytoca. -ID consulted and recommend cefepime and metronidazole. 3 to 5 days after HERMINIO removal.    -Drain management per general surgery     2. Essential hypertension  -Continue home losartan. 3. Hypothyroidism  -Continue home Synthroid. 4. AAA  -Found on CT scan. Needs repeat scan as outpatient. COPD obstructive apnea  -Stable. Continue home CPAP     Morbid obesity due to excess calories Body mass index is 42.11 kg/m². -  -Counseling and outpatient follow-up  Diet ADULT DIET; Regular   DVT Prophylaxis [] Lovenox, []  Heparin, [] SCDs, [] Ambulation,  [] Eliquis, [] Xarelto  [] Coumadin   Code Status Full Code   Disposition From: Home  Expected Disposition: Home  Estimated Date of Discharge: TBD  Patient requires continued admission due to intra-abdominal abscess requiring antibiotics   Surrogate Decision Maker/ FERNIE Shankar     Subjective:     Patient seen and examined at bedside this morning. No acute overnight events reported. Reports that his pain is well controlled. Denies any chest pain, shortness of breath, nausea vomiting, fever or chills  Objective:      Intake/Output Summary (Last 24 hours) at 2022 1102  Last data filed at 8/24/2022 1020  Gross per 24 hour   Intake 339 ml   Output 10 ml   Net 329 ml        Vitals:   Vitals:    08/24/22 0831   BP: 127/77   Pulse: 57   Resp: 16   Temp: 97.9 °F (36.6 °C)   SpO2: 97%       Physical Exam:     General: NAD  Eyes: EOMI  ENT: neck supple  Cardiovascular: Regular rate. Respiratory: Clear to auscultation  Gastrointestinal: Soft, non tender, drain with cloudy fluid noted  Genitourinary: no suprapubic tenderness  Musculoskeletal: No edema  Skin: warm, dry  Neuro: Alert. Psych: Mood appropriate. Medications:   Medications:    fluticasone  1 spray Each Nostril Nightly    docusate sodium  100 mg Oral Daily    losartan  25 mg Oral Nightly    traZODone  50 mg Oral Nightly    enoxaparin  30 mg SubCUTAneous BID    sodium chloride  1,000 mL IntraVENous Once    cefepime  2,000 mg IntraVENous Q8H    levothyroxine  175 mcg Oral Daily    sodium chloride flush  5-40 mL IntraVENous 2 times per day    metroNIDAZOLE  500 mg IntraVENous Q8H      Infusions:    sodium chloride 25 mL/hr at 08/24/22 0852     PRN Meds: sodium chloride, 2 spray, Q4H PRN  sodium chloride flush, 5-40 mL, PRN  sodium chloride, , PRN  ondansetron, 4 mg, Q8H PRN   Or  ondansetron, 4 mg, Q6H PRN  polyethylene glycol, 17 g, Daily PRN  aluminum & magnesium hydroxide-simethicone, 30 mL, Q6H PRN  albuterol sulfate HFA, 1 puff, Q6H PRN   And  ipratropium, 1 puff, Q6H PRN  acetaminophen, 650 mg, Q4H PRN  oxyCODONE-acetaminophen, 1 tablet, Q4H PRN        Labs      Recent Results (from the past 24 hour(s))   C-Reactive Protein    Collection Time: 08/23/22 11:13 AM   Result Value Ref Range    CRP, High Sensitivity 121.4 mg/L   Procalcitonin    Collection Time: 08/23/22 11:19 AM   Result Value Ref Range    Procalcitonin 0.221    C-Reactive Protein    Collection Time: 08/24/22  4:39 AM   Result Value Ref Range    CRP, High Sensitivity 83.9 mg/L        Imaging/Diagnostics Last 24 Hours   No results found.     Electronically signed by Radha Cassidy MD on 8/24/2022 at 11:02 AM

## 2022-08-24 NOTE — PROGRESS NOTES
General Surgery  Dr. Lady Borges and Lillian Saeed, [facility] Day: 6    Chief Complaint on Admission: Fevers, chills, Abd pain      Subjective:     No complaints. Denies F/C. Tolerating PO. Minimal pain. Ambulating. ROS:  Review of Systems   Constitutional:  Negative for chills and fever. HENT:  Negative for ear pain, mouth sores, sore throat and tinnitus. Eyes:  Negative for photophobia, redness and itching. Respiratory:  Negative for apnea, choking and stridor. Cardiovascular:  Negative for chest pain and palpitations. Gastrointestinal:  Negative for abdominal pain, anal bleeding, constipation, nausea, rectal pain and vomiting. Endocrine: Negative for polydipsia. Genitourinary:  Negative for enuresis, flank pain and hematuria. Musculoskeletal:  Negative for back pain, joint swelling and myalgias. Skin:  Negative for color change and pallor. Allergic/Immunologic: Negative for environmental allergies. Neurological:  Negative for syncope and speech difficulty. Psychiatric/Behavioral:  Negative for confusion and hallucinations.       Allergies  Dilaudid [hydromorphone hcl], Hydrocodone, Ultram [tramadol], Vicodin [hydrocodone-acetaminophen], Morphine, Keflex [cephalexin], and Sulfa antibiotics          Diagnosis Date    Abdominal aneurysm ([facility] 2012    4 cm x 3.6 cm followed at [address] 1/21/2014    Chronic [place]     chiropactor [place]    Chronic sinusitis 1/21/2014    COPD, mild ([facility] 1/21/2014    Diverticulosis 2014    Dr. 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 Karthik Ramos    MEGHAN (obstructive sleep apnea) 1/21/2014    CPAP changed to bipap (2014)    Otitis media, serous, TM rupture 2014    ENT x 2 s/p PE tubes bilaterally, cipro Otic       Objective:     Vitals:    22 2100   BP: 137/64   Pulse: 56   Resp: 18   Temp: (!) 96.7 °F (35.9 °C)   SpO2: 97%       TEMPERATURE:  Current - Temp: (!) 96.7 °F (35.9 °C) (Patient drinking ice water); Max - Temp  Av.8 °F (36.6 °C)  Min: 96.7 °F (35.9 °C)  Max: 98.4 °F (36.9 °C)    No intake/output data recorded. I/O last 3 completed shifts: In: 329 [I.V.:179; IV Piggyback:150]  Out: 40 [Drains:40]      Physical Exam:  Physical Exam  Constitutional:       Appearance: He is well-developed. HENT:      Head: Normocephalic. Eyes:      Pupils: Pupils are equal, round, and reactive to light. Cardiovascular:      Rate and Rhythm: Normal rate. Pulmonary:      Effort: Pulmonary effort is normal.   Abdominal:      General: There is no distension. Palpations: Abdomen is soft. There is no mass. Tenderness: There is abdominal tenderness. There is no guarding or rebound. Comments: Lap incisions well healed. RUQ Drain with bilious output - no evidence of purulence. Musculoskeletal:         General: Normal range of motion. Cervical back: Normal range of motion and neck supple. Skin:     General: Skin is warm. Neurological:      Mental Status: He is alert and oriented to person, place, and time. Drain with 10 mL out in last 24 h.       Scheduled Meds:   fluticasone  1 spray Each Nostril Nightly    docusate sodium  100 mg Oral Daily    losartan  25 mg Oral Nightly    traZODone  50 mg Oral Nightly    enoxaparin  30 mg SubCUTAneous BID    sodium chloride  1,000 mL IntraVENous Once    cefepime  2,000 mg IntraVENous Q8H    levothyroxine  175 mcg Oral Daily    sodium chloride flush  5-40 mL IntraVENous 2 times per day    metroNIDAZOLE  500 mg IntraVENous Q8H     Continuous Infusions:   sodium chloride Stopped (22 0300)     PRN Meds:sodium chloride, sodium chloride flush, test)     Sepsis ([facility]     Common bile duct stone     Ascending cholangitis     Obstructive sleep apnea     Calculus of gallbladder with acute cholecystitis and obstruction     Generalized weakness     Gait disturbance     Uncontrolled pain     Mixed hyperlipidemia     Sinus bradycardia     Intra-abdominal abscess (HCC) vs Biloma    Plan:     -Overall, improving    -F/u drain Cx. Prelim is klebsiella. Blood cx x 2 NG - final.    -Continue Abx and de-escalate based on above cx. Appreciate ID recs. Reviewed and d/w pt. -Diet as tolerated. -Drain care. If continues to decrease then will have drain d/c'd prior to d/c.     -Will continue to follow. Pt chart indicates OT recommends ARU but I do not see a note from OT where he was formally evaluated.  Also, pt denies working with OT this admission and states he feels like he could actually return home at time of d/c.     Varun Wu II, MD

## 2022-08-24 NOTE — PROGRESS NOTES
Comprehensive Nutrition Assessment    Type and Reason for Visit:  Reassess    Nutrition Recommendations/Plan:   Continue regular diet   Ordered high protein, low calorie nutrition supplement   Added kefir supplement once daily while on atb. Please encourage and record PO intake TID      Malnutrition Assessment:  Malnutrition Status: At risk for malnutrition (Comment) (08/20/22 5328)    Context:  Acute Illness       Nutrition Assessment:    Pt advanced to regular diet; tolerating diet per notes although no PO intake recorded. Noted pt ambulating and improving clinically. Will start high protein, low calorie nutrition supplement to optimize intake and promote healing. Nutrition Related Findings:    on atb IV Wound Type: Surgical Incision       Current Nutrition Intake & Therapies:    Average Meal Intake: Unable to assess  Average Supplements Intake: None Ordered  ADULT DIET; Regular    Anthropometric Measures:  Height: 5' 9.02\" (175.3 cm)  Ideal Body Weight (IBW): 160 lbs (73 kg)    Admission Body Weight: 291 lb 0.1 oz (132 kg)  Current Body Weight: 288 lb 2.3 oz (130.7 kg), 181.9 % IBW.  Weight Source: Bed Scale  Current BMI (kg/m2): 42.5  Usual Body Weight: 324 lb 8.3 oz (147.2 kg) ((5/17/22) per chart review)  % Weight Change (Calculated): -10.3  Weight Adjustment For: No Adjustment                 BMI Categories: Obese Class 3 (BMI 40.0 or greater)    Estimated Daily Nutrient Needs:  Energy Requirements Based On: Formula  Weight Used for Energy Requirements: Current  Energy (kcal/day): 3084-9676 (Annye Fluke w/ stress factor 1.0-1.2)  Weight Used for Protein Requirements: Ideal  Protein (g/day): 80-95 (1.1-1.3 g/kg)    Nutrition Diagnosis:   Inadequate oral intake related to acute injury/trauma as evidenced by  (no recorded PO intake)    Nutrition Interventions:   Food and/or Nutrient Delivery: Continue Current Diet, Start Oral Nutrition Supplement  Nutrition Education/Counseling: No recommendation at this time  Coordination of Nutrition Care: Continue to monitor while inpatient       Goals:     Goals: Initiate PO diet, within 2 days       Nutrition Monitoring and Evaluation:   Behavioral-Environmental Outcomes: None Identified  Food/Nutrient Intake Outcomes: Diet Advancement/Tolerance  Physical Signs/Symptoms Outcomes: Biochemical Data, GI Status, Hemodynamic Status, Fluid Status or Edema, Weight, Meal Time Behavior    Discharge Planning:     Too soon to determine     Deisi Levi RD, LD  Contact: [phone]

## 2022-08-24 NOTE — PROGRESS NOTES
Occupational Therapy    [facility] ACUTE CARE OCCUPATIONAL THERAPY EVALUATION  Silke Rivera, 1950, 1111/1111-A, 8/24/2022    History  [place]:  The primary encounter diagnosis was Infection of organ or organ space after surgery, initial encounter. A diagnosis of Severe sepsis (HCC) was also pertinent to this visit. Patient  has a past medical history of Abdominal aneurysm ([address] Arthritis, Chronic back pain, Chronic sinusitis, COPD, mild ([address] Diverticulosis, Dyslipidemia, HTN (hypertension), Hyperlipidemia, Hypothyroidism, Ingrown toenail, MDRO (multiple drug resistant organisms) resistance, Morbid obesity with BMI of 45.0-49.9, adult ([address] Onychocryptosis, MEGHAN (obstructive sleep apnea), and Otitis media, serous, TM rupture. Patient  has a past surgical history that includes joint replacement (Right, 2012); joint replacement (Left, 2011); Tympanostomy tube placement (Right, 2014); knee surgery (Bilateral); Colonoscopy (2008); Colonoscopy (12/02/2014); Toe Surgery (Bilateral); ERCP (08/09/2022); ERCP (N/A, 8/9/2022); Cholecystectomy, laparoscopic (N/A, 8/10/2022); and CT ABSCESS DRAINAGE PERITONEAL/PERITONITIS OPEN (8/20/2022). Subjective:  Patient states:  \"I went down to ARU and I feel just a bit winded when the pain starts in my stomach\". Pain:  5/10 pain in abdomen, surgical  Pain Intervention: Increased movement, repositioned, RN notified.     Communication with other providers:  Handoff to RN  Restrictions: J-tube, abdominal precautions, General Precautions, Fall Risk    Home Setup/Prior level of function   Lives With: Alone  Type of Home: [facility])  Home Layout: One level  Home Access: Level entry  Bathroom Shower/Tub: Walk-in shower, Shower chair without back  Bathroom Toilet: Handicap height  Bathroom Equipment: Grab bars in shower  Bathroom Accessibility: Accessible  Has the patient had two or more falls in the past year or any fall with injury in the past year?: No  Receives Help From: (Brother is able to help for a few days following d/c to home. Normally does not receive help from anyone)  ADL Assistance: Independent  Homemaking Assistance: Independent  Homemaking Responsibilities: Yes  Ambulation Assistance: Independent (uses no AD)  Transfer Assistance: Independent  Active : Yes  Mode of Transportation: Car  Occupation: Retired,     Examination of body systems (includes body structures/functions, activity/participation limitations):  Observation:  Sitting upright in chair, agreeable to therapy  Vision:  Glasses  Hearing:  WFL  Cardiopulmonary:  No 02 needs      Body Systems and functions:  ROM R/L:  WFL. Strength R/L:  5/5,   Sensation: WFL  Tone: Normal  Coordination: WFL  Perception: WNL    Activities of Daily Living (ADLs):  Feeding: Independent  Grooming: Independent  UB bathing: Independent  LB bathing: Supervision  UB dressing: Independent  LB dressing: Supervision  Toileting: Supervision    Cognitive and Psychosocial Functioning:  Overall cognitive status: WNL  Affect: Normal        Mobility:  Supine to sit:  DNT pt received sitting upright in chair  Transfers: Supervision  Sitting balance:  Independent. Standing balance:  Supervision. Functional Mobility Supervision ~200 feet before fatiguing due to pain in abdomen and SOB. 02 saturation remained WFL  Toilet/Shower Transfers: DNT                  Treatment:  Therapeutic Activity Training:   Therapeutic activity training was instructed today. Cues were given for safety, sequence, UE/LE placement, awareness, and balance. Activities performed today included STS, functional mobility, stand to sit    Safety: patient left in chair with chair alarm, call light within reach, RN notified, gait belt used. Assessment:  Pt is a 67 yo male admitted from home for intra-abdominal abscess.  Pt at baseline is Independent for ADLs Independent for high level IADLs and Independent for functional transfers/mobility. Pt currently presents near baseline Independent level and is safe to return home when medically appropriate. Complexity: Low  Prognosis: Good, no significant barriers to participation at this time.    Plan  Times per Week: eval and discharge     Equipment: defer      Recommendations for NURSING activity: Up to chair for all 3 meals and up to standard commode for all toileting needs    Time:   Time in: 1407  Time out: 1422  Timed treatment minutes: 0 minutes  Total time: 15 minutes    Electronically signed by:    Kalia NATHAN/RATNA [id number]  2:32 PM,8/24/2022

## 2022-08-25 VITALS
OXYGEN SATURATION: 97 % | HEIGHT: 69 IN | WEIGHT: 287.48 LBS | TEMPERATURE: 98.2 F | BODY MASS INDEX: 42.58 KG/M2 | RESPIRATION RATE: 15 BRPM | SYSTOLIC BLOOD PRESSURE: 131 MMHG | HEART RATE: 55 BPM | DIASTOLIC BLOOD PRESSURE: 73 MMHG

## 2022-08-25 LAB
ANION GAP SERPL CALCULATED.3IONS-SCNC: 9 MMOL/L (ref 4–16)
BUN BLDV-MCNC: 10 MG/DL (ref 6–23)
CALCIUM SERPL-MCNC: 8.7 MG/DL (ref 8.3–10.6)
CHLORIDE BLD-SCNC: 104 MMOL/L (ref 99–110)
CO2: 29 MMOL/L (ref 21–32)
CREAT SERPL-MCNC: 0.6 MG/DL (ref 0.9–1.3)
CULTURE: ABNORMAL
CULTURE: ABNORMAL
GFR AFRICAN AMERICAN: >60 ML/MIN/1.73M2
GFR NON-AFRICAN AMERICAN: >60 ML/MIN/1.73M2
GLUCOSE BLD-MCNC: 103 MG/DL (ref 70–99)
GRAM SMEAR: ABNORMAL
GRAM SMEAR: ABNORMAL
HCT VFR BLD CALC: 31.1 % (ref 42–52)
HEMOGLOBIN: 9.7 GM/DL (ref 13.5–18)
HIGH SENSITIVE C-REACTIVE PROTEIN: 49.1 MG/L
Lab: ABNORMAL
MCH RBC QN AUTO: 31 PG (ref 27–31)
MCHC RBC AUTO-ENTMCNC: 31.2 % (ref 32–36)
MCV RBC AUTO: 99.4 FL (ref 78–100)
PDW BLD-RTO: 14.9 % (ref 11.7–14.9)
PLATELET # BLD: 322 K/CU MM (ref 140–440)
PMV BLD AUTO: 10.4 FL (ref 7.5–11.1)
POTASSIUM SERPL-SCNC: 4.3 MMOL/L (ref 3.5–5.1)
PROCALCITONIN: 0.13
RBC # BLD: 3.13 M/CU MM (ref 4.6–6.2)
SODIUM BLD-SCNC: 142 MMOL/L (ref 135–145)
SPECIMEN: ABNORMAL
WBC # BLD: 5.1 K/CU MM (ref 4–10.5)

## 2022-08-25 PROCEDURE — 85027 COMPLETE CBC AUTOMATED: CPT

## 2022-08-25 PROCEDURE — 6360000002 HC RX W HCPCS: Performed by: STUDENT IN AN ORGANIZED HEALTH CARE EDUCATION/TRAINING PROGRAM

## 2022-08-25 PROCEDURE — 6370000000 HC RX 637 (ALT 250 FOR IP): Performed by: STUDENT IN AN ORGANIZED HEALTH CARE EDUCATION/TRAINING PROGRAM

## 2022-08-25 PROCEDURE — 99232 SBSQ HOSP IP/OBS MODERATE 35: CPT | Performed by: SURGERY

## 2022-08-25 PROCEDURE — 99232 SBSQ HOSP IP/OBS MODERATE 35: CPT | Performed by: INTERNAL MEDICINE

## 2022-08-25 PROCEDURE — 36415 COLL VENOUS BLD VENIPUNCTURE: CPT

## 2022-08-25 PROCEDURE — 6370000000 HC RX 637 (ALT 250 FOR IP): Performed by: INTERNAL MEDICINE

## 2022-08-25 PROCEDURE — 86141 C-REACTIVE PROTEIN HS: CPT

## 2022-08-25 PROCEDURE — 84145 PROCALCITONIN (PCT): CPT

## 2022-08-25 PROCEDURE — 94761 N-INVAS EAR/PLS OXIMETRY MLT: CPT

## 2022-08-25 PROCEDURE — 80048 BASIC METABOLIC PNL TOTAL CA: CPT

## 2022-08-25 PROCEDURE — 2580000003 HC RX 258: Performed by: STUDENT IN AN ORGANIZED HEALTH CARE EDUCATION/TRAINING PROGRAM

## 2022-08-25 RX ORDER — KETOROLAC TROMETHAMINE 10 MG/1
10 TABLET, FILM COATED ORAL EVERY 6 HOURS PRN
Qty: 20 TABLET | Refills: 0 | Status: SHIPPED | OUTPATIENT
Start: 2022-08-25 | End: 2022-08-30 | Stop reason: ALTCHOICE

## 2022-08-25 RX ORDER — CIPROFLOXACIN 750 MG/1
750 TABLET, FILM COATED ORAL EVERY 12 HOURS SCHEDULED
Qty: 20 TABLET | Refills: 0 | Status: SHIPPED | OUTPATIENT
Start: 2022-08-25 | End: 2022-08-30 | Stop reason: ALTCHOICE

## 2022-08-25 RX ORDER — METRONIDAZOLE 500 MG/1
500 TABLET ORAL 3 TIMES DAILY
Qty: 15 TABLET | Refills: 0 | Status: SHIPPED | OUTPATIENT
Start: 2022-08-25 | End: 2022-08-30 | Stop reason: ALTCHOICE

## 2022-08-25 RX ORDER — METRONIDAZOLE 500 MG/1
500 TABLET ORAL EVERY 8 HOURS SCHEDULED
Qty: 30 TABLET | Refills: 0 | Status: SHIPPED | OUTPATIENT
Start: 2022-08-25 | End: 2022-08-30 | Stop reason: ALTCHOICE

## 2022-08-25 RX ORDER — CIPROFLOXACIN 750 MG/1
750 TABLET, FILM COATED ORAL 2 TIMES DAILY
Qty: 10 TABLET | Refills: 0 | Status: SHIPPED | OUTPATIENT
Start: 2022-08-25 | End: 2022-08-30 | Stop reason: ALTCHOICE

## 2022-08-25 RX ORDER — FLUTICASONE PROPIONATE 50 MCG
1 SPRAY, SUSPENSION (ML) NASAL NIGHTLY
Qty: 16 G | Refills: 3 | Status: SHIPPED | OUTPATIENT
Start: 2022-08-25

## 2022-08-25 RX ORDER — LEVOTHYROXINE SODIUM 175 UG/1
175 TABLET ORAL DAILY
Qty: 30 TABLET | Refills: 3 | Status: SHIPPED | OUTPATIENT
Start: 2022-08-26

## 2022-08-25 RX ADMIN — OXYCODONE AND ACETAMINOPHEN 1 TABLET: 7.5; 325 TABLET ORAL at 01:54

## 2022-08-25 RX ADMIN — SODIUM CHLORIDE, PRESERVATIVE FREE 10 ML: 5 INJECTION INTRAVENOUS at 10:04

## 2022-08-25 RX ADMIN — CIPROFLOXACIN HYDROCHLORIDE 750 MG: 500 TABLET, FILM COATED ORAL at 10:03

## 2022-08-25 RX ADMIN — LEVOTHYROXINE SODIUM 175 MCG: 0.1 TABLET ORAL at 05:55

## 2022-08-25 RX ADMIN — ENOXAPARIN SODIUM 30 MG: 100 INJECTION SUBCUTANEOUS at 10:04

## 2022-08-25 RX ADMIN — DOCUSATE SODIUM 100 MG: 100 CAPSULE, LIQUID FILLED ORAL at 10:04

## 2022-08-25 RX ADMIN — METRONIDAZOLE 500 MG: 250 TABLET ORAL at 05:55

## 2022-08-25 ASSESSMENT — ENCOUNTER SYMPTOMS
BACK PAIN: 0
CONSTIPATION: 0
COLOR CHANGE: 0
NAUSEA: 0
APNEA: 0
ANAL BLEEDING: 0
EYE REDNESS: 0
PHOTOPHOBIA: 0
SORE THROAT: 0
RECTAL PAIN: 0
VOMITING: 0
STRIDOR: 0
EYE ITCHING: 0
CHOKING: 0
ABDOMINAL PAIN: 0

## 2022-08-25 ASSESSMENT — PAIN DESCRIPTION - LOCATION: LOCATION: ABDOMEN

## 2022-08-25 ASSESSMENT — PAIN DESCRIPTION - DESCRIPTORS: DESCRIPTORS: ACHING;SHARP

## 2022-08-25 ASSESSMENT — PAIN SCALES - GENERAL: PAINLEVEL_OUTOF10: 7

## 2022-08-25 ASSESSMENT — PAIN - FUNCTIONAL ASSESSMENT: PAIN_FUNCTIONAL_ASSESSMENT: ACTIVITIES ARE NOT PREVENTED

## 2022-08-25 NOTE — PROGRESS NOTES
Infectious Disease Progress Note  2022   Patient Name: Ermias Watters : 1950     Assessment  Sepsis secondary to postcholecystectomy K oxytoca abscess: Postoperative,s/p CT-guided external drainage catheter placement on 2022. Drainage volume on a downward trend. Afebrile, pct is wnl, CRP is elevated. Bacteria susceptible to ciprofloxacin. Improving. Suspected bile leak:No evidence of bowel leak on HIDA  Comorbid conditions: COPD, morbid obesity, obstructive sleep apnea     Plan  Therapeutic: ciprofloxacin 750 mg p.o. twice daily on  and continue metronidazole . Expect to treat with antimicrobial for 5 days after HERMINIO is removed. Duration of antimicrobials is dependent on source control. Diagnostic: Trend CRP and procalcitonin. F/u:   Other:  d/c from ID standpoint. Should follow up with surgery. Reason for visit: F/u Sepsis secondary to postcholecystectomy abscess? History:? Interval history noted  Denies n/v/d/f or untoward effects of antimicrobials  Physical Exam:  Vital Signs: /73   Pulse 55   Temp 98.2 °F (36.8 °C) (Oral)   Resp 15   Ht 5' 9.02\" (1.753 m)   Wt 287 lb 7.7 oz (130.4 kg)   SpO2 97%   BMI 42.43 kg/m²     Gen: alert and oriented X3, no distress  Skin: no stigmata of endocarditis  Wounds: C/D/I  HEMT: AT/NC Oropharynx pink, moist, and without lesions or exudates; dentition in good state of repair  Eyes: PERRLA, EOMI, conjunctiva pink, sclera anicteric. Neck: Supple. Trachea midline. No LAD. Chest: no distress and CTA. Good air movement. Heart: RRR and no MRG. Abd: RUQ HERMINIO drain containing green liquid, soft, non-distended, no tenderness, no hepatomegaly. Normoactive bowel sounds. Ext: no clubbing, cyanosis, or edema  Catheter Site: without erythema or tenderness  LDA:  HERMINIO:RUQ  Neuro: Mental status intact. CN 2-12 intact and no focal sensory or motor deficits     Radiologic / Imaging / TESTING  No results found.      Labs:    Recent Results (from the past 24 hour(s))   C-Reactive Protein    Collection Time: 08/25/22  4:29 AM   Result Value Ref Range    CRP, High Sensitivity 49.1 mg/L   CBC    Collection Time: 08/25/22  8:00 AM   Result Value Ref Range    WBC 5.1 4.0 - 10.5 K/CU MM    RBC 3.13 (L) 4.6 - 6.2 M/CU MM    Hemoglobin 9.7 (L) 13.5 - 18.0 GM/DL    Hematocrit 31.1 (L) 42 - 52 %    MCV 99.4 78 - 100 FL    MCH 31.0 27 - 31 PG    MCHC 31.2 (L) 32.0 - 36.0 %    RDW 14.9 11.7 - 14.9 %    Platelets 959 062 - 738 K/CU MM    MPV 10.4 7.5 - 11.1 FL   Basic Metabolic Panel    Collection Time: 08/25/22  8:00 AM   Result Value Ref Range    Sodium 142 135 - 145 MMOL/L    Potassium 4.3 3.5 - 5.1 MMOL/L    Chloride 104 99 - 110 mMol/L    CO2 29 21 - 32 MMOL/L    Anion Gap 9 4 - 16    BUN 10 6 - 23 MG/DL    Creatinine 0.6 (L) 0.9 - 1.3 MG/DL    Glucose 103 (H) 70 - 99 MG/DL    Calcium 8.7 8.3 - 10.6 MG/DL    GFR Non-African American >60 >60 mL/min/1.73m2    GFR African American >60 >60 mL/min/1.73m2     CULTURE results: Invalid input(s): BLOOD CULTURE,  URINE CULTURE, SURGICAL CULTURE    Diagnosis:  Patient Active Problem List   Diagnosis    MEGHAN (obstructive sleep apnea)- on BIPAP    COPD, mild (HCC)    Arthritis    Chronic sinusitis    Acquired hypothyroidism    Dyslipidemia    Essential hypertension    Morbid obesity with BMI of 45.0-49.9, adult (HCC)    Anemia    NSAID long-term use    GERD (gastroesophageal reflux disease)- due to chronic NSAID use    Elevated fasting glucose    External hemorrhoids    AAA (abdominal aortic aneurysm) without rupture [address] [facility]- stable 2020, due 2023    Primary insomnia    History of recurrent ear infection    Positive FIT (fecal immunochemical test)    Sepsis (HCC)    Common bile duct stone    Ascending cholangitis    Obstructive sleep apnea    Calculus of gallbladder with acute cholecystitis and obstruction    Generalized weakness    Gait disturbance    Uncontrolled pain    Mixed hyperlipidemia    Sinus bradycardia Intra-abdominal abscess (HCC)    Postoperative infection    Severe sepsis (HCC)       Active Problems  Principal Problem:    Intra-abdominal abscess (HCC)  Active Problems:    Postoperative infection    Severe sepsis (HCC)  Resolved Problems:    * No resolved hospital problems.  *              Electronically signed by: Electronically signed by Navjot Navarro MD on 8/25/2022 at 1:08 PM

## 2022-08-25 NOTE — DISCHARGE INSTR - DIET

## 2022-08-25 NOTE — DISCHARGE SUMMARY
V2.0  Discharge Summary    Name:  Yamini Aleman /Age/Sex: 1950 (89 y.o. male)   Admit Date: 2022  Discharge Date: 22    MRN & CSN:  [id number] & [id number] Encounter Date and Time 22 11:33 AM EDT    Attending:  Saint Schmid, MD Discharging Provider: Saint Schmid, MD       Hospital Course:     Brief HPI: Patient is a 42-year-old male with a PMHx of HTN, HLD, hypothyroidism, abdominal aorta and morbid obesity who presented to the ED with fevers (Tmax 102°F) and abdominal pain. Recently had ascending cholangitis and underwent ERCP on  followed by laparoscopic cholecystectomy on 8/10, and was discharged on  with 7-day course of Augmentin. He returned home from ARU the day prior and was doing well there. Denied any F/C, HA, dizziness, CP, SOB, N/V, bleeding (hemoptysis / hematemesis, hematuria, BRBPR), C/D, or changes in urinary habits. Brief Problem Based Course:   1. Sepsis secondary to postoperative intra-abdominal abscess status postcholecystectomy  -Presented to fevers and abdominal discomfort. Had a recent ERCP followed by a lap cholecystectomy. -CT abdomen pelvis now demonstrating possible abscess.  -Is on broad-spectrum antibiotics. Underwent IR guided drainage of the abscess with drain placement.  -Cultures grew Klebsiella oxytoca. -ID consulted and recommend stop cefepime and start ciprofloxacin 750 mg twice daily and metronidazole. Patient will continue antibiotics for 5 days after HERMINIO removal   -Outpatient follow-up with general surgery    2. Essential hypertension  -Continue home losartan. 3. Hypothyroidism  -Continue  Synthroid. 4. AAA  -Found on CT scan. Needs repeat scan as outpatient. COPD obstructive apnea  -Stable. Continue home CPAP     Morbid obesity due to excess calories Body mass index is 42.11 kg/m².  -  -Counseling and outpatient follow-up      The patient expressed appropriate understanding of, and agreement with the discharge recommendations, medications, and plan.      Consults this admission:  IP CONSULT TO GENERAL SURGERY  IP CONSULT TO HOSPITALIST  IP CONSULT TO INTERVENTIONAL RADIOLOGY  IP CONSULT TO INTERVENTIONAL RADIOLOGY  IP CONSULT TO INFECTIOUS DISEASES    Discharge Diagnosis:   Intra-abdominal abscess [facility])        Discharge Instruction:   Follow up appointments: General surgery  Primary care physician: Franci Parsons MD within 2 weeks  Diet: regular diet   Activity: activity as tolerated  Disposition: Discharged to:   []Home, []HHC, []SNF, []Acute Rehab, []Hospice   Condition on discharge: Stable  Labs and Tests to be Followed up as an outpatient by PCP or Specialist:     Discharge Medications:        Medication List        START taking these medications      ciprofloxacin 750 MG tablet  Commonly known as: CIPRO  Take 1 tablet by mouth every 12 hours for 10 days     metroNIDAZOLE 500 MG tablet  Commonly known as: FLAGYL  Take 1 tablet by mouth every 8 hours for 10 days            CHANGE how you take these medications      fluticasone 50 MCG/ACT nasal spray  Commonly known as: FLONASE  1 spray by Each Nostril route nightly  What changed: when to take this     levothyroxine 175 MCG tablet  Commonly known as: SYNTHROID  Take 1 tablet by mouth Daily  Start taking on: August 26, 2022  What changed: medication strength            CONTINUE taking these medications      ascorbic acid 500 MG tablet  Commonly known as: VITAMIN C     CENTRUM PO     Docusate Sodium 100 MG Tabs     losartan 25 MG tablet  Commonly known as: COZAAR  Take 1 tablet by mouth at bedtime To replace hydrochlorothiazide     niacin 500 MG extended release capsule     saline nasal gel Gel     senna 8.6 MG tablet  Commonly known as: SENOKOT  Take 1 tablet by mouth daily     traZODone 50 MG tablet  Commonly known as: DESYREL            STOP taking these medications      albuterol-ipratropium  MCG/ACT Aers inhaler  Commonly known as: Combivent Respimat     azelastine HCl 0.15 % Soln     flunisolide 25 MCG/ACT (0.025%) Soln  Commonly known as: NASALIDE     mometasone 50 MCG/ACT nasal spray  Commonly known as: NASONEX               Where to Get Your Medications        These medications were sent to [address]  [place] [address]      Phone: [phone]   ciprofloxacin 750 MG tablet  fluticasone 50 MCG/ACT nasal spray  levothyroxine 175 MCG tablet  metroNIDAZOLE 500 MG tablet        Objective Findings at Discharge:   /73   Pulse 55   Temp 98.2 °F (36.8 °C) (Oral)   Resp 15   Ht 5' 9.02\" (1.753 m)   Wt 287 lb 7.7 oz (130.4 kg)   SpO2 97%   BMI 42.43 kg/m²       Physical Exam:   General: NAD  Eyes: EOMI  ENT: neck supple  Cardiovascular: Regular rate. Respiratory: Clear to auscultation  Gastrointestinal: Soft, non tender  Genitourinary: no suprapubic tenderness  Musculoskeletal: No edema  Skin: warm, dry  Neuro: Alert. Psych: Mood appropriate. Labs and Imaging   NM HEPATOBILIARY    Result Date: 8/21/2022  EXAMINATION: NUCLEAR MEDICINE HEPATOBILIARY SCINTIGRAPHY (HIDA SCAN). 8/21/2022 1:43 pm TECHNIQUE: Approximately 5.0 mCi Tc-99m Mebrofenin (Choletec) was administered IV. Then, dynamic images of the abdomen were obtained in the anterior projection for 60 min(s). A right lateral view was also obtained at 60 min(s). COMPARISON: CT abdomen and pelvis 08/19/2022. HISTORY: ORDERING SYSTEM PROVIDED HISTORY: Evaluate for bile leak TECHNOLOGIST PROVIDED HISTORY: Reason for exam:->Evaluate for bile leak Reason for Exam: R/O bile leak FINDINGS: Prompt, homogenous uptake by the liver is noted with normal appearance of radiotracer excretion into the biliary system. Clearance of blood pool activity appears appropriate. Patient is status post cholecystectomy.   There is normal expected visualization of common bile duct with extension of radiopharmaceutical into the small bowel. No accumulation of radiopharmaceutical is seen in the surgical bed to suggest bile leak. No evidence for bile leak. CT ABDOMEN PELVIS W IV CONTRAST Additional Contrast? None    Result Date: 8/20/2022  EXAMINATION: CT OF THE ABDOMEN AND PELVIS WITH CONTRAST 8/19/2022 11:10 pm TECHNIQUE: CT of the abdomen and pelvis was performed with the administration of intravenous contrast. Multiplanar reformatted images are provided for review. Automated exposure control, iterative reconstruction, and/or weight based adjustment of the mA/kV was utilized to reduce the radiation dose to as low as reasonably achievable. COMPARISON: MRCP dated August 8, 2022. HISTORY: ORDERING SYSTEM PROVIDED HISTORY: Fever/recent post op/WBC 17 TECHNOLOGIST PROVIDED HISTORY: Reason for exam:->Fever/recent post op/WBC 17 Additional Contrast?->None Decision Support Exception - unselect if not a suspected or confirmed emergency medical condition->Emergency Medical Condition (MA) Reason for Exam: Fever/recent post op/WBC 17 FINDINGS: Lower Chest: Bibasilar atelectasis is noted. Organs: The patient is status post recent cholecystectomy. There is a focal rim enhancing fluid collection with multiple focal locules of gas with adjacent stranding, concerning for abscess versus infected biloma. This measures 13.2 x 8.2 cm. This results in mild intrahepatic bile duct dilatation. The spleen, adrenal glands, pancreas, and kidneys are unremarkable. GI/Bowel: There is no evidence of bowel obstruction. Pelvis: Keely has been a prostatectomy. The bladder is unremarkable. Peritoneum/Retroperitoneum: There is no evidence of free intraperitoneal air. There is a 3.7 cm infrarenal abdominal aortic aneurysm. Bones/Soft Tissues: No destructive osseous lesions are identified. 1. Postsurgical changes are seen from recent cholecystectomy.   There is a focal rim enhancing fluid collection with multiple locules of gas in adjacent stranding within the cholecystectomy bed, concerning for an abscess versus infected biloma. This measures 13.2 x 8.2 cm. 2. 3.7 cm infrarenal abdominal aortic aneurysm. Please see recommendations below. RECOMMENDATIONS: 3.7 cm infrarenal abdominal aortic aneurysm. Recommend follow-up every 2 years. Reference: J Am Bryon Radiol [id number];[id number]. XR CHEST PORTABLE    Result Date: 8/19/2022  EXAMINATION: ONE XRAY VIEW OF THE CHEST 8/19/2022 6:48 pm COMPARISON: None. HISTORY: ORDERING SYSTEM PROVIDED HISTORY: fever TECHNOLOGIST PROVIDED HISTORY: Reason for exam:->fever Reason for Exam: fever, recent surgery FINDINGS: The lungs are without acute focal process. There is no effusion or pneumothorax. The cardiomediastinal silhouette is without acute process. The osseous structures are without acute process. No acute process. CT ABSCESS DRAINAGE PERITONEAL/PERITONITIS OPEN    Result Date: 8/20/2022  PROCEDURE: CT GUIDED ABSCESS FLUID DRAIN 8/20/2022 HISTORY: ORDERING SYSTEM PROVIDED HISTORY: intra-abdominal abscess TECHNOLOGIST PROVIDED HISTORY: Reason for exam:->intra-abdominal abscess Reason for Exam: intra-abdominal abscess TECHNIQUE: Maximum sterile barrier technique including hand hygiene, skin prep and sterile ultrasound technique utilized for procedure. Following informed consent and CT-guided puncture site selection, skin was prepped and draped in sterile fashion. 15 mL 1% lidocaine without epinephrine for local anesthesia. CT-guided access of complex fluid collection in gallbladder fossa was achieved. Guidewires advanced over which track was dilated and a 10 [place] Donna external drainage catheter was placed. 200 mL dark brown, cloudy fluid aspirated. Specimen sent for laboratory evaluation. Catheter affixed to the patient's skin. Nicholas-Cruz drainage bulb and dressing applied. Postprocedure images made. Patient tolerated procedure well.  Automated exposure control, iterative reconstruction, and/or weight based adjustment of the mA/kV was utilized to reduce the radiation dose to as low as reasonably achievable. CONTRAST: None SEDATION: None FLUOROSCOPY DOSE AND TYPE OR TIME AND EXPOSURES: CT guidance for procedure DLP: 535.14 mGy cm DESCRIPTION OF PROCEDURE: Informed consent was obtained after a detailed explanation of the procedure including risks, benefits, and alternatives. Universal protocol was observed. Sterile gowns, masks, hats and gloves utilized for maximal sterile barrier. As above in technique section FINDINGS: Pre and intraprocedural images demonstrate complex gas containing fluid collection in the gallbladder fossa with access needle, guide wire and external drainage catheter seen within it. Postprocedure images demonstrate no residual fluid with pigtail tip of external drainage catheter curled within medial aspect of decompressed/collapsed abscess cavity. No complication suggested. 8 Western Donna external drainage catheter placement within complex fluid collection in the gallbladder fossa. 200 mL dark brown/clarity fluid removed. Specimen sent for laboratory evaluation. Complete evacuation of abscess cavity achieved. Nicholas-Cruz drainage bulb applied. No complication suggested. CBC:   Recent Labs     08/25/22  0800   WBC 5.1   HGB 9.7*        BMP:    Recent Labs     08/25/22  0800      K 4.3      CO2 29   BUN 10   CREATININE 0.6*   GLUCOSE 103*     Hepatic: No results for input(s): AST, ALT, ALB, BILITOT, ALKPHOS in the last 72 hours.   Lipids:   Lab Results   Component Value Date/Time    CHOL 143 10/29/2021 08:07 AM    HDL 32 10/29/2021 08:07 AM    TRIG 117 10/29/2021 08:07 AM     Hemoglobin A1C:   Lab Results   Component Value Date/Time    LABA1C 5.1 02/05/2021 12:00 AM     TSH:   Lab Results   Component Value Date/Time    TSH 3.56 07/27/2022 08:52 AM     Troponin:   Lab Results   Component Value Date/Time    TROPONINT <0.010 08/07/2022 12:05 PM     Lactic Acid: No results for input(s): LACTA in the last 72 hours. BNP: No results for input(s): PROBNP in the last 72 hours.   UA:  Lab Results   Component Value Date/Time    NITRU NEGATIVE 08/19/2022 04:59 PM    COLORU YELLOW 08/19/2022 04:59 PM    WBCUA <1 08/19/2022 04:59 PM    RBCUA NONE SEEN 08/19/2022 04:59 PM    MUCUS RARE 08/19/2022 04:59 PM    TRICHOMONAS NONE SEEN 08/19/2022 04:59 PM    BACTERIA NEGATIVE 08/19/2022 04:59 PM    CLARITYU CLEAR 08/19/2022 04:59 PM    SPECGRAV 1.010 08/19/2022 04:59 PM    LEUKOCYTESUR NEGATIVE 08/19/2022 04:59 PM    UROBILINOGEN >8.0 08/19/2022 04:59 PM    BILIRUBINUR NEGATIVE 08/19/2022 04:59 PM    BLOODU NEGATIVE 08/19/2022 04:59 PM    KETUA TRACE 08/19/2022 04:59 PM     Urine Cultures: No results found for: Jaelyn Roque  Blood Cultures: No results found for: BC  No results found for: BLOODCULT2  Organism:   Lab Results   Component Value Date/Time    ORG BSGA 07/23/2014 11:00 AM    ORG MRSA 07/23/2014 11:00 AM       Time Spent Discharging patient 35 minutes    Electronically signed by Magan Suarez MD on 8/25/2022 at 11:33 AM

## 2022-08-25 NOTE — PROGRESS NOTES
General Surgery  Dr. Salomón Gamez and Sheri Adams, [facility] Day: 7    Chief Complaint on Admission: Fevers, chills, Abd pain      Subjective:     No complaints - sitting up in chair and reading paper. Denies F/C. Tolerating PO. Minimal to no pain. Ambulating. Drain output decreasing each day. ROS:  Review of Systems   Constitutional:  Negative for chills and fever. HENT:  Negative for ear pain, mouth sores, sore throat and tinnitus. Eyes:  Negative for photophobia, redness and itching. Respiratory:  Negative for apnea, choking and stridor. Cardiovascular:  Negative for chest pain and palpitations. Gastrointestinal:  Negative for abdominal pain, anal bleeding, constipation, nausea, rectal pain and vomiting. Endocrine: Negative for polydipsia. Genitourinary:  Negative for enuresis, flank pain and hematuria. Musculoskeletal:  Negative for back pain, joint swelling and myalgias. Skin:  Negative for color change and pallor. Allergic/Immunologic: Negative for environmental allergies. Neurological:  Negative for syncope and speech difficulty. Psychiatric/Behavioral:  Negative for confusion and hallucinations.       Allergies  Dilaudid [hydromorphone hcl], Hydrocodone, Ultram [tramadol], Vicodin [hydrocodone-acetaminophen], Morphine, Keflex [cephalexin], and Sulfa antibiotics          Diagnosis Date    Abdominal aneurysm ([facility] 2012    4 cm x 3.6 cm [place] [address] [address] [address] 1/21/2014    Chronic back pain     [facility]ropactor [facility]    Chronic sinusitis 1/21/2014    COPD, mild ([facility] 1/21/2014    Diverticulosis 2014    Dr. 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 Munira Chen    MEGHAN (obstructive sleep apnea) 2014    CPAP changed to bipap (2014)    Otitis media, serous, TM rupture 2014    ENT x 2 s/p PE tubes bilaterally, cipro Otic       Objective:     Vitals:    22 0754   BP: 131/73   Pulse: 55   Resp: 15   Temp: 98.2 °F (36.8 °C)   SpO2: 97%       TEMPERATURE:  Current - Temp: 98.2 °F (36.8 °C); Max - Temp  Av °F (36.7 °C)  Min: 97.7 °F (36.5 °C)  Max: 98.3 °F (36.8 °C)    No intake/output data recorded. I/O last 3 completed shifts: In: 339 [I.V.:189; IV Piggyback:150]  Out: 15 [Drains:15]      Physical Exam:  Physical Exam  Constitutional:       Appearance: He is well-developed. HENT:      Head: Normocephalic. Eyes:      Pupils: Pupils are equal, round, and reactive to light. Cardiovascular:      Rate and Rhythm: Normal rate. Pulmonary:      Effort: Pulmonary effort is normal.   Abdominal:      General: There is no distension. Palpations: Abdomen is soft. There is no mass. Tenderness: There is abdominal tenderness. There is no guarding or rebound. Comments: Lap incisions well healed. RUQ Drain with bilious output - no evidence of purulence. Musculoskeletal:         General: Normal range of motion. Cervical back: Normal range of motion and neck supple. Skin:     General: Skin is warm. Neurological:      Mental Status: He is alert and oriented to person, place, and time. Drain with 5 mL out in last 24 h.       Scheduled Meds:   ciprofloxacin  750 mg Oral 2 times per day    metroNIDAZOLE  500 mg Oral 3 times per day    fluticasone  1 spray Each Nostril Nightly    docusate sodium  100 mg Oral Daily    losartan  25 mg Oral Nightly    traZODone  50 mg Oral Nightly    enoxaparin  30 mg SubCUTAneous BID    sodium chloride  1,000 mL IntraVENous Once    levothyroxine  175 mcg Oral Daily    sodium chloride flush  5-40 mL IntraVENous 2 times per day     Continuous Infusions:   sodium chloride 25 mL/hr at 08/24/22 1643     PRN Meds:sodium chloride, sodium chloride flush, sodium chloride, ondansetron **OR** ondansetron, polyethylene glycol, aluminum & magnesium hydroxide-simethicone, albuterol sulfate HFA **AND** ipratropium, acetaminophen, oxyCODONE-acetaminophen      Labs/Imaging Results:   Lab Results   Component Value Date    WBC 5.1 08/25/2022    HGB 9.7 (L) 08/25/2022    HCT 31.1 (L) 08/25/2022    MCV 99.4 08/25/2022     08/25/2022     Lab Results   Component Value Date     08/25/2022    K 4.3 08/25/2022     08/25/2022    CO2 29 08/25/2022    BUN 10 08/25/2022    CREATININE 0.6 (L) 08/25/2022    GLUCOSE 103 (H) 08/25/2022    CALCIUM 8.7 08/25/2022    PROT 5.8 (L) 08/20/2022    LABALBU 3.6 08/20/2022    BILITOT 1.0 08/20/2022    ALKPHOS 179 (H) 08/20/2022    AST 21 08/20/2022    ALT 21 08/20/2022    LABGLOM >60 08/25/2022    GFRAA >60 08/25/2022    AGRATIO 1.5 07/27/2022    GLOB 2.3 01/25/2016     HIDA:  FINDINGS:   Prompt, homogenous uptake by the liver is noted with normal appearance of   radiotracer excretion into the biliary system. Clearance of blood pool   activity appears appropriate. Patient is status post cholecystectomy. There is normal expected   visualization of common bile duct with extension of radiopharmaceutical into   the small bowel. No accumulation of radiopharmaceutical is seen in the   surgical bed to suggest bile leak.        Assessment:     Patient Active Problem List:     MEGHAN (obstructive sleep apnea)- on BIPAP     COPD, mild (HCC)     Arthritis     Chronic sinusitis     Acquired hypothyroidism     Dyslipidemia     Essential hypertension     Morbid obesity with BMI of 45.0-49.9, adult (HCC)     Anemia     NSAID long-term use     GERD (gastroesophageal reflux disease)- due to chronic NSAID use     Elevated fasting glucose     External hemorrhoids     AAA (abdominal aortic aneurysm) without rupture ([address]- stable 2020, due 2023     Primary insomnia     History of recurrent ear infection     Positive FIT (fecal immunochemical test)     Sepsis (HCC)     Common bile duct stone     Ascending cholangitis     Obstructive sleep apnea     Calculus of gallbladder with acute cholecystitis and obstruction     Generalized weakness     Gait disturbance     Uncontrolled pain     Mixed hyperlipidemia     Sinus bradycardia     Intra-abdominal abscess (HCC) vs Biloma    Plan:     -Overall, improving.    -Abx recs per ID appreciated. -Removed drain at bedside. 5 mL out last 24 h, 10 mL out previous 24 h. Pt tolerated well. Dressing changes PRN. -Diet as tolerated. -OK to be d/c'd from Gen Surg standpoint once all other teams have cleared. F/u at my office in 1-2 weeks. D/w pt and pt's nurse at bedside.      Thelma Miner MD

## 2022-08-26 ENCOUNTER — TELEPHONE (OUTPATIENT)
Dept: INFECTIOUS DISEASES | Age: 72
End: 2022-08-26

## 2022-08-26 NOTE — TELEPHONE ENCOUNTER
Pt called to get an appt.  With Dr. Ethelda Bence, and discussed with pt about Doctor note and it showed that at this time pt did not have to FU with

## 2022-08-29 PROBLEM — T81.40XA POSTOPERATIVE INFECTION: Status: RESOLVED | Noted: 2022-08-22 | Resolved: 2022-08-29

## 2022-08-29 PROBLEM — K80.01 CALCULUS OF GALLBLADDER WITH ACUTE CHOLECYSTITIS AND OBSTRUCTION: Status: RESOLVED | Noted: 2022-08-13 | Resolved: 2022-08-29

## 2022-08-29 PROBLEM — R65.20 SEVERE SEPSIS (HCC): Status: RESOLVED | Noted: 2022-01-01 | Resolved: 2022-01-01

## 2022-08-29 PROBLEM — K83.09 ASCENDING CHOLANGITIS: Status: RESOLVED | Noted: 2022-08-09 | Resolved: 2022-08-29

## 2022-08-29 PROBLEM — K80.50 COMMON BILE DUCT STONE: Status: RESOLVED | Noted: 2022-08-09 | Resolved: 2022-08-29

## 2022-08-29 PROBLEM — Z87.19 HISTORY OF ACUTE CHOLANGITIS: Status: ACTIVE | Noted: 2022-08-29

## 2022-08-29 PROBLEM — A41.9 SEVERE SEPSIS (HCC): Status: RESOLVED | Noted: 2022-08-22 | Resolved: 2022-08-29

## 2022-08-29 PROBLEM — A41.9 SEPSIS (HCC): Status: RESOLVED | Noted: 2022-08-07 | Resolved: 2022-08-29

## 2022-08-30 ENCOUNTER — OFFICE VISIT (OUTPATIENT)
Dept: FAMILY MEDICINE CLINIC | Age: 72
End: 2022-08-30
Payer: MEDICARE

## 2022-08-30 VITALS
OXYGEN SATURATION: 96 % | DIASTOLIC BLOOD PRESSURE: 76 MMHG | BODY MASS INDEX: 43.1 KG/M2 | WEIGHT: 292 LBS | HEART RATE: 63 BPM | SYSTOLIC BLOOD PRESSURE: 118 MMHG

## 2022-08-30 DIAGNOSIS — Z87.19 HISTORY OF ACUTE CHOLANGITIS: ICD-10-CM

## 2022-08-30 DIAGNOSIS — E03.9 ACQUIRED HYPOTHYROIDISM: ICD-10-CM

## 2022-08-30 DIAGNOSIS — R73.01 ELEVATED FASTING GLUCOSE: ICD-10-CM

## 2022-08-30 DIAGNOSIS — R53.1 GENERALIZED WEAKNESS: ICD-10-CM

## 2022-08-30 DIAGNOSIS — I10 ESSENTIAL HYPERTENSION: ICD-10-CM

## 2022-08-30 DIAGNOSIS — K65.1 INTRA-ABDOMINAL ABSCESS (HCC): ICD-10-CM

## 2022-08-30 DIAGNOSIS — Z09 HOSPITAL DISCHARGE FOLLOW-UP: Primary | ICD-10-CM

## 2022-08-30 DIAGNOSIS — J44.9 COPD, MILD (HCC): ICD-10-CM

## 2022-08-30 PROBLEM — E87.6 HYPOKALEMIA: Status: ACTIVE | Noted: 2022-08-30

## 2022-08-30 PROBLEM — H60.93 RECURRENT OTITIS EXTERNA OF BOTH EARS: Status: ACTIVE | Noted: 2022-08-30

## 2022-08-30 LAB — HBA1C MFR BLD: 4.9 %

## 2022-08-30 PROCEDURE — 3017F COLORECTAL CA SCREEN DOC REV: CPT | Performed by: FAMILY MEDICINE

## 2022-08-30 PROCEDURE — 83036 HEMOGLOBIN GLYCOSYLATED A1C: CPT | Performed by: FAMILY MEDICINE

## 2022-08-30 PROCEDURE — 1111F DSCHRG MED/CURRENT MED MERGE: CPT | Performed by: FAMILY MEDICINE

## 2022-08-30 PROCEDURE — 3023F SPIROM DOC REV: CPT | Performed by: FAMILY MEDICINE

## 2022-08-30 PROCEDURE — 99214 OFFICE O/P EST MOD 30 MIN: CPT | Performed by: FAMILY MEDICINE

## 2022-08-30 PROCEDURE — 1123F ACP DISCUSS/DSCN MKR DOCD: CPT | Performed by: FAMILY MEDICINE

## 2022-08-30 PROCEDURE — G8427 DOCREV CUR MEDS BY ELIG CLIN: HCPCS | Performed by: FAMILY MEDICINE

## 2022-08-30 PROCEDURE — 1036F TOBACCO NON-USER: CPT | Performed by: FAMILY MEDICINE

## 2022-08-30 PROCEDURE — G8417 CALC BMI ABV UP PARAM F/U: HCPCS | Performed by: FAMILY MEDICINE

## 2022-08-30 RX ORDER — POTASSIUM CHLORIDE 20 MEQ/1
20 TABLET, EXTENDED RELEASE ORAL DAILY
Refills: 0 | COMMUNITY
Start: 2022-08-30

## 2022-08-30 RX ORDER — CIPROFLOXACIN AND DEXAMETHASONE 3; 1 MG/ML; MG/ML
5 SUSPENSION/ DROPS AURICULAR (OTIC) 2 TIMES DAILY
COMMUNITY
Start: 2022-08-30

## 2022-08-30 NOTE — PROGRESS NOTES
Post-Discharge Transitional Care  Follow Up      Daryle Alice   YOB: 1950    Date of Office Visit:  8/30/2022  Date of Hospital Admission: 8/19/22  Date of Hospital Discharge: 8/25/22  Risk of hospital readmission (high >=14%. Medium >=10%) :Readmission Risk Score: 20.4      Care management risk score Rising risk (score 2-5) and Complex Care (Scores >=6): No Risk Score On File     Non face to face  following discharge, date last encounter closed (first attempt may have been earlier): *No documented post hospital discharge outreach found in the last 14 days    Call initiated 2 business days of discharge: *No response recorded in the last 14 days    ASSESSMENT/PLAN:   Hospital discharge follow-up  -     IN DISCHARGE MEDS RECONCILED W/ CURRENT OUTPATIENT MED LIST  -     IN DISCHARGE MEDS RECONCILED W/ CURRENT OUTPATIENT MED LIST  Intra-abdominal abscess ([facility]  History of acute cholangitis  Assessment & Plan:     COPD, mild (HCC)  Generalized weakness  Essential hypertension  Acquired hypothyroidism  Elevated fasting glucose  -     POCT glycosylated hemoglobin (Hb A1C)    Finished cipro and flagyl. For abscess/cholangitis. F/[place] Dr. Larissa Morse nemt month. ID f/u is now as needed. F/u with cardiology as scheduled next month for bradycardia work up. BP stable on losartan. Doing well following lower fat diet and working with [email or web address]    Activity to improve stamina and using walker for long walk  Sleep doing okay with prn trazodone. Continue thyroid dose  Blood pressure doing well on losartan 25mg daily. Still taking K+ with [facility] doctor for history of hypokalemia      COPD stable with combivent as needed  Patient will continue to use incentive spirometer at home. Medical Decision Making: moderate complexity  Return for Keep upcoming appointment in this office.            Subjective:   HPI:  Follow up of Hospital problems/diagnosis(es): Patient is status postcholecystectomy due to sepsis secondary to ascending cholangitis from his gallstones. He then developed an intra-abdominal abscess that required a HERMINIO drain was recently removed by surgery. Inpatient course: Discharge summary reviewed- see chart. Interval history/Current status: Overall patient states he is 50% better. He is increasing his walking stamina by using the walker for long distances as well as the electric cart at the grocery store. He has met with a dietitian during his rehab and is trying to work on finding foods that are low-fat as to not flare loose stools since his cholecystectomy. He has noticed that fried foods trigger him. He is looking for more low-fat options in general.  His COPD has been stable and using the Combivent on a regular basis. He has intermittent productive mucus with his cough but no fevers or hemoptysis and feels this is his normal baseline. He is compliant with his thyroid medication. He still has some follow-up with his [place] doctor on a regular basis this fall is prescribed potassium supplements for his history of hypokalemia. He sees surgery next month as a follow-up. He follows with cardiology regular we will continue the work-up for his bradycardia that was noted while he was hospitalized. He is doing his home exercise program from his time in inpatient rehab as well as outpatient rehab. By himself at the [facility] home independent living. He is working with their menu options to help him find foods that he can tolerate since his gallbladder has been removed. He is only following up with ID as needed at this point. He finished his Cipro and Flagyl for his Klebsiella culture.     Patient Active Problem List   Diagnosis    MEGHAN (obstructive sleep apnea)- on BIPAP    COPD, mild (HCC)    Arthritis    Chronic sinusitis    Acquired hypothyroidism    Dyslipidemia    Essential hypertension    Morbid obesity with BMI of 45.0-49.9, adult (HCC)    Anemia    NSAID long-term use    GERD (gastroesophageal reflux disease)- due to chronic NSAID use    Elevated fasting glucose    External hemorrhoids    AAA (abdominal aortic aneurysm) without rupture ([facility]- stable 2020, due 2023    Primary insomnia    History of recurrent ear infection    Positive FIT (fecal immunochemical test)    Obstructive sleep apnea    Generalized weakness    Gait disturbance    Uncontrolled pain    Mixed hyperlipidemia    Sinus bradycardia    Intra-abdominal abscess ([facility]    History of acute cholangitis    Hypokalemia    Recurrent otitis externa of both ears       Medications listed as ordered at the time of discharge from hospital     Medication List            Accurate as of August 30, 2022 12:05 PM. If you have any questions, ask your nurse or doctor.                 CONTINUE taking these medications      albuterol-ipratropium  MCG/ACT Aers inhaler  Commonly known as: COMBIVENT RESPIMAT     ascorbic acid 500 MG tablet  Commonly known as: VITAMIN C     CENTRUM PO     Ciprodex 0.3-0.1 % otic suspension  Generic drug: ciprofloxacin-dexamethasone     Docusate Sodium 100 MG Tabs     fluticasone 50 MCG/ACT nasal spray  Commonly known as: FLONASE  1 spray by Each Nostril route nightly     levothyroxine 175 MCG tablet  Commonly known as: SYNTHROID  Take 1 tablet by mouth Daily     losartan 25 MG tablet  Commonly known as: COZAAR  Take 1 tablet by mouth at bedtime To replace hydrochlorothiazide     niacin 500 MG extended release capsule     potassium chloride 20 MEQ extended release tablet  Commonly known as: KLOR-CON M     saline nasal gel Gel     senna 8.6 MG tablet  Commonly known as: SENOKOT  Take 1 tablet by mouth daily     traZODone 50 MG tablet  Commonly known as: DESYREL            STOP taking these medications      ciprofloxacin 750 MG tablet  Commonly known as: CIPRO  Stopped by: Roge Del Rio MD     ketorolac 10 MG tablet  Commonly known as: TORADOL  Stopped by: Roge Del Rio MD metroNIDAZOLE 500 MG tablet  Commonly known as: FLAGYL  Stopped by: Mino Kaur MD                Medications marked \"taking\" at this time  Outpatient Medications Marked as Taking for the 8/30/22 encounter (Office Visit) with Mino Kaur MD   Medication Sig Dispense Refill    potassium chloride (KLOR-CON M) 20 MEQ extended release tablet Take 1 tablet by mouth daily [facility]  0    albuterol-ipratropium (COMBIVENT RESPIMAT)  MCG/ACT AERS inhaler Inhale 1 puff into the lungs in the morning and 1 puff at noon and 1 puff in the evening and 1 puff before bedtime. ciprofloxacin-dexamethasone (CIPRODEX) 0.3-0.1 % otic suspension Place 5 drops into both ears 2 times daily Per [facility] doctor      fluticasone (FLONASE) 50 MCG/ACT nasal spray 1 spray by Each Nostril route nightly 16 g 3    levothyroxine (SYNTHROID) 175 MCG tablet Take 1 tablet by mouth Daily 30 tablet 3    senna (SENOKOT) 8.6 MG tablet Take 1 tablet by mouth daily 30 tablet 0    losartan (COZAAR) 25 MG tablet Take 1 tablet by mouth at bedtime To replace hydrochlorothiazide 90 tablet 1    Docusate Sodium 100 MG TABS Take 60 mg by mouth daily      niacin 500 MG CR capsule Take 750 mg by mouth nightly      traZODone (DESYREL) 50 MG tablet Take 50 mg by mouth nightly. Multiple Vitamins-Minerals (CENTRUM PO) Take  by mouth. 50 plus      ascorbic acid (VITAMIN C) 500 MG tablet Take 500 mg by mouth daily. saline nasal gel (AYR) GEL by Nasal route.           Medications patient taking as of now reconciled against medications ordered at time of hospital discharge: Yes  Review of Systems    Objective:    /76 (Site: Left Upper Arm, Position: Sitting, Cuff Size: Large Adult)   Pulse 63   Wt 292 lb (132.5 kg)   SpO2 96%   BMI 43.10 kg/m²   Blood pressure-  Heart rate-    Respiratory rate-    Temperature-  Pulse oximetry-     Patient-Reported Vitals 9/22/2020   Patient-Reported Systolic 668 Patient-Reported Diastolic 66   Patient-Reported Pulse 62        Nursing note reviewed  /76 (Site: Left Upper Arm, Position: Sitting, Cuff Size: Large Adult)   Pulse 63   Wt 292 lb (132.5 kg)   SpO2 96%   BMI 43.10 kg/m²   BP Readings from Last 3 Encounters:   08/30/22 118/76   08/25/22 131/73   08/18/22 134/70     Wt Readings from Last 3 Encounters:   08/30/22 292 lb (132.5 kg)   08/25/22 287 lb 7.7 oz (130.4 kg)   08/18/22 287 lb 14.7 oz (130.6 kg)       Constitutional: [x] Appears well-developed and well-nourished [x] No apparent distress      [] Abnormal-   Mental status  [x] Alert and awake  [x] Oriented to person/place/time [x]Able to follow commands      Eyes:  EOM    [x]  Normal  [] Abnormal-  Sclera  [x]  Normal  [] Abnormal -         Discharge []  None visible  [] Abnormal -    HENT:   [x] Normocephalic, atraumatic. [] Abnormal   [] Mouth/Throat: Mucous membranes are moist.     External Ears [] Normal  [] Abnormal-     Neck: [] No visualized mass     Pulmonary/Chest: [x] Respiratory effort normal.  [x] No visualized signs of difficulty breathing or respiratory distress        [] Abnormal-      Musculoskeletal:   [] Normal gait with no signs of ataxia         [] Normal range of motion of neck        [x] Abnormal- 2+ bilateral pitting edema to legs and feet and well healed bilateral shin scars      Neurological:        [x] No Facial Asymmetry (Cranial nerve 7 motor function) (limited exam to video visit)          [x] No gaze palsy        [] Abnormal-         Skin:        [] No significant exanthematous lesions or discoloration noted on facial skin         [x] Abnormal- well healed surgical scar on abdomen. Psychiatric:       [x] Normal Affect [x] No Hallucinations        [] Abnormal-       An electronic signature was used to authenticate this note.   --Sara Escalante MD

## 2022-09-01 ENCOUNTER — PREP FOR PROCEDURE (OUTPATIENT)
Dept: GASTROENTEROLOGY | Age: 72
End: 2022-09-01

## 2022-09-01 ENCOUNTER — TELEPHONE (OUTPATIENT)
Dept: FAMILY MEDICINE CLINIC | Age: 72
End: 2022-09-01

## 2022-09-01 RX ORDER — SODIUM CHLORIDE 9 MG/ML
25 INJECTION, SOLUTION INTRAVENOUS PRN
Status: CANCELLED | OUTPATIENT
Start: 2022-09-01

## 2022-09-01 RX ORDER — SODIUM CHLORIDE, SODIUM LACTATE, POTASSIUM CHLORIDE, CALCIUM CHLORIDE 600; 310; 30; 20 MG/100ML; MG/100ML; MG/100ML; MG/100ML
INJECTION, SOLUTION INTRAVENOUS CONTINUOUS
Status: CANCELLED | OUTPATIENT
Start: 2022-09-01

## 2022-09-01 RX ORDER — SODIUM CHLORIDE 0.9 % (FLUSH) 0.9 %
5-40 SYRINGE (ML) INJECTION PRN
Status: CANCELLED | OUTPATIENT
Start: 2022-09-01

## 2022-09-01 RX ORDER — SODIUM CHLORIDE 0.9 % (FLUSH) 0.9 %
5-40 SYRINGE (ML) INJECTION EVERY 12 HOURS SCHEDULED
Status: CANCELLED | OUTPATIENT
Start: 2022-09-01

## 2022-09-01 NOTE — TELEPHONE ENCOUNTER
Ahsan Dunne from [facility] verbal givens for PT/OT.   Verbal given  [phone]

## 2022-09-06 ENCOUNTER — TELEPHONE (OUTPATIENT)
Dept: FAMILY MEDICINE CLINIC | Age: 72
End: 2022-09-06

## 2022-09-06 DIAGNOSIS — K62.5 BRIGHT RED RECTAL BLEEDING: ICD-10-CM

## 2022-09-06 NOTE — TELEPHONE ENCOUNTER
Anucortt-HC25 mg Rectal sup, once twice daily as needed,  is needing a refill. Please send refill to [facility].

## 2022-09-07 RX ORDER — HYDROCORTISONE ACETATE 25 MG/1
25 SUPPOSITORY RECTAL 2 TIMES DAILY PRN
Qty: 24 SUPPOSITORY | Refills: 5 | Status: SHIPPED | OUTPATIENT
Start: 2022-09-07

## 2022-09-08 ENCOUNTER — OFFICE VISIT (OUTPATIENT)
Dept: BARIATRICS/WEIGHT MGMT | Age: 72
End: 2022-09-08

## 2022-09-08 VITALS
HEIGHT: 69 IN | DIASTOLIC BLOOD PRESSURE: 76 MMHG | BODY MASS INDEX: 42.52 KG/M2 | WEIGHT: 287.1 LBS | HEART RATE: 98 BPM | OXYGEN SATURATION: 98 % | SYSTOLIC BLOOD PRESSURE: 110 MMHG

## 2022-09-08 DIAGNOSIS — Z09 POSTOP CHECK: Primary | ICD-10-CM

## 2022-09-08 PROCEDURE — 99024 POSTOP FOLLOW-UP VISIT: CPT | Performed by: SURGERY

## 2022-09-08 ASSESSMENT — PATIENT HEALTH QUESTIONNAIRE - PHQ9
1. LITTLE INTEREST OR PLEASURE IN DOING THINGS: 0
SUM OF ALL RESPONSES TO PHQ9 QUESTIONS 1 & 2: 0
SUM OF ALL RESPONSES TO PHQ QUESTIONS 1-9: 0
SUM OF ALL RESPONSES TO PHQ QUESTIONS 1-9: 0
2. FEELING DOWN, DEPRESSED OR HOPELESS: 0
SUM OF ALL RESPONSES TO PHQ QUESTIONS 1-9: 0
SUM OF ALL RESPONSES TO PHQ QUESTIONS 1-9: 0

## 2022-09-08 NOTE — PROGRESS NOTES
BMI of 45.0-49.9, adult ([facility] 2014    Onychocryptosis     Dr. Susu Kenny    MEGHAN (obstructive sleep apnea) 2014    CPAP changed to bipap (2014)    Otitis media, serous, TM rupture 2014    ENT x 2 s/p PE tubes bilaterally, cipro Otic    Postoperative infection 2022     Family History   Problem Relation Age of Onset    High Blood Pressure Mother     Allergies Mother     Anemia Mother     High Cholesterol Mother     Thyroid Disease Mother     Heart Disease Father     Allergies Father     Obesity Paternal Grandmother     Obesity Paternal Grandfather     Cancer Paternal Uncle         prostate cancer     Social History     Socioeconomic History    Marital status:      Spouse name: Not on file    Number of children: 2    Years of education: Not on file    Highest education level: Not on file   Occupational History    Not on file   Tobacco Use    Smoking status: Former     Packs/day: 2.00     Years: 36.00     Pack years: 72.00     Types: Cigarettes     Quit date: 2002     Years since quittin.4    Smokeless tobacco: Never    Tobacco comments:     quit 2002   Substance and Sexual Activity    Alcohol use: No     Alcohol/week: 0.0 standard drinks    Drug use: No    Sexual activity: Not on file   Other Topics Concern    Not on file   Social History Narrative    Not on file     Social Determinants of Health     Financial Resource Strain: Unknown    Difficulty of Paying Living Expenses: Patient refused   Food Insecurity: Unknown    Worried About Running Out of Food in the Last Year: Patient refused    Ran Out of Food in the Last Year: Patient refused   Transportation Needs: Not on file   Physical Activity: Not on file   Stress: Not on file   Social Connections: Not on file   Intimate Partner Violence: Not on file   Housing Stability: Not on file       OBJECTIVE:  Physical Exam  A&Ox3 NAD at rest  AT NC  Breathing unlabored  RRR  S, NT, ND, no PS, incisions healing appropriately  [place]  Warm, dry      Pathology report reveals:   Final Pathologic Diagnosis:   Gallbladder, cholecystectomy:   -     Chronic active cholecystitis. -     Cholelithiasis. ASSESSMENT:      1. Postop check        PLAN:  1. Reviewed pathology report  2. Diet - as tolerated  3. Activity -as tolerated  4. Follow up PRN  5. Call with any questions, concerns, or issues whatsoever. No orders of the defined types were placed in this encounter. No orders of the defined types were placed in this encounter. Follow Up: No follow-ups on file.     Celina Simmons MD

## 2022-09-09 NOTE — PROGRESS NOTES
.Surgery @ [facility] on 9/13/22 you will be called 9/12/22 with times               1. Follow the office instructions for the bowel prep. 2. Follow your directions as prescribed by the doctor for your procedure and medications. Take Levothyroxine morning of surgery with a sip of water. Use your inhaler if needed and bring day of surgery. 3. Check with your Doctor regarding stopping vitamins, supplements, blood thinners (Plavix, Coumadin, Lovenox, Effient, Pradaxa, Xarelto, Fragmin or                   other blood thinners) and follow their instructions. Stop vitamins, supplements and NSAIDS:  today 9/9/22   4. Do not smoke, vape or use chewing tobacco morning of surgery. Do not drink any alcoholic beverages 24 hours prior to surgery. This includes NA Beer. No street drugs 7 days prior to surgery. 5. You may brush your teeth and gargle the morning of surgery. DO NOT SWALLOW WATER   6. You MUST make arrangements for a responsible adult to take you home after your surgery and be able to check on you every couple                   hours for the day. You will not be allowed to leave alone or drive yourself home. It is strongly suggested someone stay with you the first 24                   hrs. Your surgery will be cancelled if you do not have a ride home. 7. Please wear simple, loose fitting clothing to the hospital.  Marley Siddiqi not bring valuables (money, credit cards, checkbooks, etc.) Do not wear any                   makeup (including no eye makeup) or nail polish on your fingers or toes. 8. DO NOT wear any jewelry or piercings on day of surgery. All body piercing jewelry must be removed. 9. If you have dentures, they will be removed before going to the OR; we will provide you a container. If you wear contact lenses or glasses,                  they will be removed; please bring a case for them.            10. If you  have a Living Will and Durable Power of  for Healthcare, please bring in a copy. 11. Please bring picture ID,  insurance card, paperwork from the doctors office    (H & P, Consent, & card for implantable devices). 12. Take a shower the morning of your procedure. Do not apply any make-up, deodorant, lotion, oil or powder. 13.  Enter thru the main entrance wearing a mask on the day of surgery.

## 2022-09-09 NOTE — PROGRESS NOTES
9/9/22 - . LM with my call-back # concerning  surgery @ [facility] on  9/13/22. Please call the PAT Nurse for a phone assessment and surgery instructions.

## 2022-09-12 ENCOUNTER — ANESTHESIA EVENT (OUTPATIENT)
Dept: ENDOSCOPY | Age: 72
End: 2022-09-12
Payer: MEDICARE

## 2022-09-12 NOTE — PROGRESS NOTES
Spoke with patient and he will arrive at 0600 at [facility] on 9/13/2022 for his procedure at 0800.

## 2022-09-12 NOTE — ANESTHESIA PRE PROCEDURE
Department of Anesthesiology  Preprocedure Note       Name:  Timothy Rosa   Age:  66 y.o.  :  1950                                          MRN:  [id number]         Date:  2022      Surgeon: Beatrice Paulson):  Donna Khan MD    Procedure: Procedure(s):  COLONOSCOPY DIAGNOSTIC    Medications prior to admission:   Prior to Admission medications    Medication Sig Start Date End Date Taking? Authorizing Provider   hydrocortisone (ANUSOL-HC) 25 MG suppository Place 1 suppository rectally 2 times daily as needed for Hemorrhoids 22   Frank Leon MD   potassium chloride (KLOR-CON M) 20 MEQ extended release tablet Take 1 tablet by mouth daily [facility] 22   Frank Leon MD   albuterol-ipratropium (COMBIVENT RESPIMAT)  MCG/ACT AERS inhaler Inhale 1 puff into the lungs in the morning and 1 puff at noon and 1 puff in the evening and 1 puff before bedtime. 22   Frank Leon MD   ciprofloxacin-dexamethasone (CIPRODEX) 0.3-0.1 % otic suspension Place 5 drops into both ears 2 times daily Per [facility] 22   Frank Leon MD   fluticasone [facility]) 50 MCG/ACT nasal spray 1 spray by Each Nostril route nightly 22   Bishop Black MD   levothyroxine (SYNTHROID) 175 MCG tablet Take 1 tablet by mouth Daily 22   Bishop Black MD   senna (SENOKOT) 8.6 MG tablet Take 1 tablet by mouth daily 22  SHANNON Major MD   losartan (COZAAR) 25 MG tablet Take 1 tablet by mouth at bedtime To replace hydrochlorothiazide 22   Frank Leon MD   Docusate Sodium 100 MG TABS Take 60 mg by mouth daily 19   Historical Provider, MD   azelastine HCl 0.15 % SOLN by Nasal route 2 times daily.   Patient not taking: Reported on 2022  Historical Provider, MD   niacin 500 MG CR capsule Take 750 mg by mouth nightly    Historical Provider, MD   traZODone (DESYREL) 50 MG tablet Take 50 mg by mouth nightly. Historical Provider, MD   Multiple Vitamins-Minerals (CENTRUM PO) Take  by mouth. 50 plus    Historical Provider, MD   ascorbic acid (VITAMIN C) 500 MG tablet Take 500 mg by mouth daily. Historical Provider, MD   saline nasal gel (AYR) GEL by Nasal route. Historical Provider, MD   mometasone (NASONEX) 50 MCG/ACT nasal spray 2 sprays by Nasal route 2 times daily. 8/18/22  Historical Provider, MD   flunisolide (NASALIDE) 25 MCG/ACT (0.025%) SOLN Inhale 2 sprays into the lungs every 12 hours. 8/18/22  Historical Provider, MD       Current medications:    No current facility-administered medications for this encounter. Current Outpatient Medications   Medication Sig Dispense Refill    hydrocortisone (ANUSOL-HC) 25 MG suppository Place 1 suppository rectally 2 times daily as needed for Hemorrhoids 24 suppository 5    potassium chloride (KLOR-CON M) 20 MEQ extended release tablet Take 1 tablet by mouth daily [facility]  0    albuterol-ipratropium (COMBIVENT RESPIMAT)  MCG/ACT AERS inhaler Inhale 1 puff into the lungs in the morning and 1 puff at noon and 1 puff in the evening and 1 puff before bedtime.  ciprofloxacin-dexamethasone (CIPRODEX) 0.3-0.1 % otic suspension Place 5 drops into both ears 2 times daily [facility]      fluticasone (FLONASE) 50 MCG/ACT nasal spray 1 spray by Each Nostril route nightly 16 g 3    levothyroxine (SYNTHROID) 175 MCG tablet Take 1 tablet by mouth Daily 30 tablet 3    senna (SENOKOT) 8.6 MG tablet Take 1 tablet by mouth daily 30 tablet 0    losartan (COZAAR) 25 MG tablet Take 1 tablet by mouth at bedtime To replace hydrochlorothiazide 90 tablet 1    Docusate Sodium 100 MG TABS Take 60 mg by mouth daily      niacin 500 MG CR capsule Take 750 mg by mouth nightly      traZODone (DESYREL) 50 MG tablet Take 50 mg by mouth nightly.  Multiple Vitamins-Minerals (CENTRUM PO) Take  by mouth.  50 plus      ascorbic acid (VITAMIN C) 500 MG tablet Take 500 mg by mouth daily.  saline nasal gel (AYR) GEL by Nasal route. Allergies:     Allergies   Allergen Reactions    Morphine Itching     Pt states he only has itching with morphine    Dilaudid [Hydromorphone Hcl] Itching    Hydrocodone Itching    Keflex [Cephalexin] Other (See Comments)     Extreme hyperacitivity    Sulfa Antibiotics Itching and Rash    Ultram [Tramadol] Itching    Vicodin [Hydrocodone-Acetaminophen] Itching       Problem List:    Patient Active Problem List   Diagnosis Code    MEGHAN (obstructive sleep apnea)- on BIPAP G47.33    COPD, mild ([facility]) J44.9    Arthritis M19.90    Chronic sinusitis J32.9    Acquired hypothyroidism E03.9    Dyslipidemia E78.5    Essential hypertension I10    Morbid obesity with BMI of 45.0-49.9, adult ([facility]) E66.01, Z68.42    Anemia D64.9    NSAID long-term use Z79.1    GERD (gastroesophageal reflux disease)- due to chronic NSAID use K21.9    Elevated fasting glucose R73.01    External hemorrhoids K64.4    AAA (abdominal aortic aneurysm) without rupture ([facility])- stable 2020, due 2023 I71.4    Primary insomnia F51.01    History of recurrent ear infection Z86.69    Positive FIT (fecal immunochemical test) R19.5    Obstructive sleep apnea G47.33    Generalized weakness R53.1    Gait disturbance R26.9    Uncontrolled pain R52    Mixed hyperlipidemia E78.2    Sinus bradycardia R00.1    Intra-abdominal abscess ([facility]) K65.1    History of acute cholangitis Z87.19    Hypokalemia E87.6    Recurrent otitis externa of both ears H60.93       Past Medical History:        Diagnosis Date    Abdominal aneurysm ([facility] 2012    4 cm x 3.6 cm [facility] at [facility] 211 Arthritis 01/21/2014    Ascending cholangitis 08/09/2022    Calculus of gallbladder with acute cholecystitis and obstruction 08/13/2022    [facility]    Chronic sinusitis 01/21/2014    Common bile duct stone 08/09/2022    COPD, mild ([facility] [address] 01/21/2014    Diverticulosis     Dr. Gianfranco Leone Dyslipidemia 2014    HTN (hypertension) 2014    Hyperlipidemia     Hypothyroidism 2014    Ingrown toenail ,     podiatry clinic at [address] 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 Milo Elliott MEGHAN (obstructive sleep apnea) 2014    CPAP changed to bipap (2014)    Otitis media, serous, TM rupture 2014    ENT x 2 s/p PE tubes bilaterally, cipro Otic    Postoperative infection 2022       Past Surgical History:        Procedure Laterality Date    CHOLECYSTECTOMY, LAPAROSCOPIC N/A 8/10/2022    CHOLECYSTECTOMY LAPAROSCOPIC performed by Alice Torre MD at [address]- [place].      COLONOSCOPY  2014    diverticulosis, internal hemorrhoids    CT DRAINAGE ABDOM ABSCESS OPEN  2022    CT DRAINAGE ABDOM ABSCESS OPEN 2022 [facility] CT SCAN    ERCP  2022    ERCP SPHINCTER/PAPILLOTOMY performed by Dr. Jane Boggs at [facility] ERCP N/A 2022    ERCP SPHINCTER/PAPILLOTOMY and sweep and removal of debris, sludge and stones with use of 12-15 extractor balloon performed by Jelena Jauregui MD at [facility] [address] 2012    JOINT REPLACEMENT Left 2011    KNEE SURGERY Bilateral     replacement    TOE SURGERY Bilateral     toe nail surgery Dr. Shahrzad Sanchez Right     DR. Rascon        Social History:    Social History     Tobacco Use    Smoking status: Former     Packs/day: 2.00     Years: 36.00     Pack years: 72.00     Types: Cigarettes     Quit date: 2002     Years since quittin.4    Smokeless tobacco: Never    Tobacco comments:     quit 2002   Substance Use Topics    Alcohol use: No     Alcohol/week: 0.0 standard drinks Counseling given: Not Answered  Tobacco comments: quit APril 16, 2002      Vital Signs (Current):   Vitals:    09/09/22 0906   Weight: 287 lb (130.2 kg)   Height: 5' 9\" (1.753 m)                                              BP Readings from Last 3 Encounters:   09/08/22 110/76   08/30/22 118/76   08/25/22 131/73       NPO Status:                                                                                 BMI:   Wt Readings from Last 3 Encounters:   09/08/22 287 lb 1.6 oz (130.2 kg)   08/30/22 292 lb (132.5 kg)   08/25/22 287 lb 7.7 oz (130.4 kg)     Body mass index is 42.38 kg/m². CBC:   Lab Results   Component Value Date/Time    WBC 5.1 08/25/2022 08:00 AM    RBC 3.13 08/25/2022 08:00 AM    HGB 9.7 08/25/2022 08:00 AM    HCT 31.1 08/25/2022 08:00 AM    MCV 99.4 08/25/2022 08:00 AM    RDW 14.9 08/25/2022 08:00 AM     08/25/2022 08:00 AM       CMP:   Lab Results   Component Value Date/Time     08/25/2022 08:00 AM    K 4.3 08/25/2022 08:00 AM     08/25/2022 08:00 AM    CO2 29 08/25/2022 08:00 AM    BUN 10 08/25/2022 08:00 AM    CREATININE 0.6 08/25/2022 08:00 AM    GFRAA >60 08/25/2022 08:00 AM    AGRATIO 1.5 07/27/2022 08:52 AM    LABGLOM >60 08/25/2022 08:00 AM    GLUCOSE 103 08/25/2022 08:00 AM    PROT 5.8 08/20/2022 07:12 AM    CALCIUM 8.7 08/25/2022 08:00 AM    BILITOT 1.0 08/20/2022 07:12 AM    ALKPHOS 179 08/20/2022 07:12 AM    AST 21 08/20/2022 07:12 AM    ALT 21 08/20/2022 07:12 AM       POC Tests: No results for input(s): POCGLU, POCNA, POCK, POCCL, POCBUN, POCHEMO, POCHCT in the last 72 hours.     Coags:   Lab Results   Component Value Date/Time    PROTIME 16.5 08/20/2022 07:12 AM    INR 1.28 08/20/2022 07:12 AM    APTT 31.4 11/04/2014 08:03 AM       HCG (If Applicable): No results found for: PREGTESTUR, PREGSERUM, HCG, HCGQUANT     ABGs: No results found for: PHART, PO2ART, SNZ3SIT, NHG2GGQ, BEART, Y7GVXFSZ     Type & Screen (If Applicable):  No results found for: LABABO, LABRH    Drug/Infectious Status (If Applicable):  Lab Results   Component Value Date/Time    HEPCAB NON REACTIVE 2022 10:34 AM       COVID-19 Screening (If Applicable):   Lab Results   Component Value Date/Time    COVID19 NOT DETECTED 2022 09:47 PM           Anesthesia Evaluation  Patient summary reviewed  Airway: Mallampati: I  TM distance: >3 FB   Neck ROM: full  Mouth opening: > = 3 FB   Dental:    (+) poor dentition      Pulmonary: breath sounds clear to auscultation  (+) COPD:  sleep apnea: on CPAP,                            ROS comment: Smoking Status: Former - 72 pack years  Quit Smokin02       Cardiovascular:    (+) hypertension:, hyperlipidemia        Rhythm: regular  Rate: normal           Beta Blocker:  Not on Beta Blocker      ROS comment: Echo 2022:  Summary   Left ventricular systolic function is normal.   Ejection fraction is visually estimated at 55-60%. No evidence of any pericardial effusion. No significant valvular disease noted. Neuro/Psych:   Negative Neuro/Psych ROS              GI/Hepatic/Renal:   (+) GERD:, bowel prep, morbid obesity          Endo/Other:    (+) hypothyroidism, blood dyscrasia: anemia:., .                 Abdominal:   (+) obese,     Abdomen: soft. Vascular:           ROS comment: Abdominal aneurysm (HCC) 4 cm x 3.6 cm followed at [facility] . Other Findings:           Anesthesia Plan      MAC     ASA 3       Induction: intravenous. Anesthetic plan and risks discussed with patient. Use of blood products discussed with patient whom. Plan discussed with CRNA. Attending anesthesiologist reviewed and agrees with Preprocedure content                DEYA Lord - CRNA   2022         Pre Anesthesia Assessment complete.  Chart reviewed on 2022

## 2022-09-13 ENCOUNTER — ANESTHESIA (OUTPATIENT)
Dept: ENDOSCOPY | Age: 72
End: 2022-09-13
Payer: MEDICARE

## 2022-09-13 ENCOUNTER — HOSPITAL ENCOUNTER (OUTPATIENT)
Age: 72
Setting detail: OUTPATIENT SURGERY
Discharge: HOME | End: 2022-09-13
Attending: INTERNAL MEDICINE | Admitting: INTERNAL MEDICINE
Payer: MEDICARE

## 2022-09-13 VITALS
SYSTOLIC BLOOD PRESSURE: 132 MMHG | BODY MASS INDEX: 42.51 KG/M2 | HEART RATE: 50 BPM | DIASTOLIC BLOOD PRESSURE: 69 MMHG | OXYGEN SATURATION: 100 % | WEIGHT: 287 LBS | RESPIRATION RATE: 16 BRPM | TEMPERATURE: 97.4 F | HEIGHT: 69 IN

## 2022-09-13 PROBLEM — K63.5 POLYP OF SIGMOID COLON: Status: ACTIVE | Noted: 2022-09-13

## 2022-09-13 PROCEDURE — 6360000002 HC RX W HCPCS

## 2022-09-13 PROCEDURE — 7100000010 HC PHASE II RECOVERY - FIRST 15 MIN: Performed by: INTERNAL MEDICINE

## 2022-09-13 PROCEDURE — 88305 TISSUE EXAM BY PATHOLOGIST: CPT | Performed by: PATHOLOGY

## 2022-09-13 PROCEDURE — 3609010600 HC COLONOSCOPY POLYPECTOMY SNARE/COLD BIOPSY: Performed by: INTERNAL MEDICINE

## 2022-09-13 PROCEDURE — 45385 COLONOSCOPY W/LESION REMOVAL: CPT | Performed by: INTERNAL MEDICINE

## 2022-09-13 PROCEDURE — 3700000001 HC ADD 15 MINUTES (ANESTHESIA): Performed by: INTERNAL MEDICINE

## 2022-09-13 PROCEDURE — 2709999900 HC NON-CHARGEABLE SUPPLY: Performed by: INTERNAL MEDICINE

## 2022-09-13 PROCEDURE — 7100000011 HC PHASE II RECOVERY - ADDTL 15 MIN: Performed by: INTERNAL MEDICINE

## 2022-09-13 PROCEDURE — 2580000003 HC RX 258: Performed by: NURSE PRACTITIONER

## 2022-09-13 PROCEDURE — 3700000000 HC ANESTHESIA ATTENDED CARE: Performed by: INTERNAL MEDICINE

## 2022-09-13 RX ORDER — SODIUM CHLORIDE 0.9 % (FLUSH) 0.9 %
5-40 SYRINGE (ML) INJECTION EVERY 12 HOURS SCHEDULED
Status: DISCONTINUED | OUTPATIENT
Start: 2022-09-13 | End: 2022-09-13 | Stop reason: HOSPADM

## 2022-09-13 RX ORDER — SODIUM CHLORIDE 0.9 % (FLUSH) 0.9 %
5-40 SYRINGE (ML) INJECTION PRN
Status: DISCONTINUED | OUTPATIENT
Start: 2022-09-13 | End: 2022-09-13 | Stop reason: HOSPADM

## 2022-09-13 RX ORDER — SODIUM CHLORIDE 9 MG/ML
25 INJECTION, SOLUTION INTRAVENOUS PRN
Status: DISCONTINUED | OUTPATIENT
Start: 2022-09-13 | End: 2022-09-13 | Stop reason: HOSPADM

## 2022-09-13 RX ORDER — PROPOFOL 10 MG/ML
INJECTION, EMULSION INTRAVENOUS PRN
Status: DISCONTINUED | OUTPATIENT
Start: 2022-09-13 | End: 2022-09-13 | Stop reason: SDUPTHER

## 2022-09-13 RX ORDER — SODIUM CHLORIDE, SODIUM LACTATE, POTASSIUM CHLORIDE, CALCIUM CHLORIDE 600; 310; 30; 20 MG/100ML; MG/100ML; MG/100ML; MG/100ML
INJECTION, SOLUTION INTRAVENOUS CONTINUOUS
Status: DISCONTINUED | OUTPATIENT
Start: 2022-09-13 | End: 2022-09-13 | Stop reason: HOSPADM

## 2022-09-13 RX ADMIN — PROPOFOL 470 MG: 10 INJECTION, EMULSION INTRAVENOUS at 08:06

## 2022-09-13 RX ADMIN — SODIUM CHLORIDE, POTASSIUM CHLORIDE, SODIUM LACTATE AND CALCIUM CHLORIDE: 600; 310; 30; 20 INJECTION, SOLUTION INTRAVENOUS at 06:51

## 2022-09-13 ASSESSMENT — PAIN - FUNCTIONAL ASSESSMENT: PAIN_FUNCTIONAL_ASSESSMENT: 0-10

## 2022-09-13 ASSESSMENT — PAIN SCALES - GENERAL: PAINLEVEL_OUTOF10: 0

## 2022-09-13 NOTE — PROGRESS NOTES
0745: Pt alert and oriented x 4. Friend at bedside. Pre-op questions asked and answered appropriately by pt. Name, armband verified, , procedure, allergies, implants, prep status, last PO intake, history, meds.

## 2022-09-13 NOTE — H&P
HISTORY & PHYSICAL:  Patient's history with special attention to the cardiovascular, pulmonary systems and the current problem was reviewed with the patient immediately prior to the procedure. Medications, allergies, and pertinent laboratory tests were also reviewed at this time. The physical examination,as below, was then performed. Patient assessed to be ASA Class 2. Mallampati Airway Assessment: 2. History of adverse reactions involving sedation/anesthesia. None  Family history of adverse reaction to sedation/anesthesia. None      PHYSICAL EXAMINATION    /66   Pulse 56   Temp 96.9 °F (36.1 °C) (Temporal)   Resp 18   Ht 5' 9\" (1.753 m)   Wt 287 lb (130.2 kg)   SpO2 97%   BMI 42.38 kg/m²     Mouth and Pharynx : Normal without focal findings  Cardiac: Regular rate and rhythm  Pulmonary: Clear bilaterally  Neurological: Normal without focal findings   Abdomen: Soft, non-tender, non-distended     Written informed consent obtained from patient. Risks (including but not limited to perforation, bloating and bleeding,) benefits and alternatives explained and questions answered. Patient verbalized understanding. Based on history and airway assessment patient is an appropriate candidate for  sedation.     Heriberto Dover MD  09/13/22

## 2022-09-13 NOTE — BRIEF OP NOTE
Brief Postoperative Note      Patient: Ana Lemon  YOB: 1950  MRN: [id number]    Date of Procedure: 9/13/2022    Pre-Op Diagnosis: +FIT TEST    Post-Op Diagnosis: 5 mm sigmoid polyp removed with cold snare, internal hemorrhoids. Procedure(s):  COLONOSCOPY POLYPECTOMY SNARE/COLD BIOPSY    Surgeon(s):  Jorje Mcneill MD    Assistant:  * No surgical staff found *    Anesthesia: Monitor Anesthesia Care    Estimated Blood Loss (mL): Minimal    Complications: None    Specimens:   ID Type Source Tests Collected by Time Destination   A :  Tissue Colon SURGICAL PATHOLOGY Jorje Mcneill MD 9/13/2022 0827        Implants:  * No implants in log *      Drains:   [REMOVED] Closed/Suction Drain Right RLQ Bulb (Removed)   Site Description Clean, dry & intact 08/23/22 0300   Dressing Status Clean, dry & intact 08/25/22 1103   Drainage Appearance Purulent 08/25/22 1103   Drain Status Compressed; To bulb suction 08/25/22 1103   Output (ml) 5 ml 08/24/22 2130       Findings: As above, repeat colon in 5 years pending pathology.      Electronically signed by Jorje Mcneill MD on 9/13/2022 at 8:41 AM

## 2022-09-13 NOTE — PROGRESS NOTES
5002 Pt. Brought back to unit, bedside report received from [facility], [facility]. Pt. Is A&O, VSS, pt. Provided with bam mist and crackers. Education provided on call light, call light in reach. [facility] instructions reviewed with pt and friend, both parties express understanding. 7631 Pt. To [place] home in private vehicle.

## 2022-09-13 NOTE — DISCHARGE INSTRUCTIONS
[address]  [phone]    Do not drive, work around [address] [place], [place] [address] or use equipment. Do not drink any alcoholic beverages. Do not smoke while alone. Avoid making important decisions. Plan to spend a quiet, relaxed evening @ home. Resume normal activities as you begin to feel better. Eat lightly for your first meal, then gradually increase your diet to what is normal for you. In case of nausea, avoid food and drink only clear liquids. Resume food as nausea ceases. Notify your surgeon if you experience fever, chills, large amount of bleeding, difficulty breathing, persistent nausea and vomiting or any other disturbing problem. Call for a follow-up appointment with your surgeon. DR. Jared Tang     OFFICE NUMBER [phone]    FOLLOW UP APPOINTMENT AS NEEDED (CALL [facility]ER) OFFICE NEXT WEEK     REPEAT PROCEDURE IN 5 YEARS. What to expect at home: Your Recovery   Your doctor will tell you when you can eat and do your other usual activities Your doctor will talk to you about when you will need your next colonoscopy. Your doctor can help you decide how often you need to be checked. This will depend on the results of your test and your risk for colorectal cancer. After the test, you may be bloated or have gas pains. You may need to pass gas. If a biopsy was done or a polyp was removed, you may have streaks of blood in your stool (feces) for a few days. This care sheet gives you a general idea about how long it will take for you to recover. But each person recovers at a different pace. Follow the steps below to get better as quickly as possible. How can you care for yourself at home? Activity  Rest when you feel tired. Diet  Follow your doctor's directions for eating. Unless your doctor has told you not to, drink plenty of fluids. This helps to replace the fluids that were lost during the colon prep. DO NOT DRINK ALCOHOL.   Medicines  Your doctor will tell you if and when you can restart your medicines. He or she will also give you instructions about taking any new medicines. If you take blood thinners, such as warfarin (Coumadin), clopidogrel (Plavix), or aspirin, be sure to talk to your doctor. He or she will tell you if and when to start taking those medicines again. Make sure that you understand exactly what your doctor wants you to do. If polyps were removed or a biopsy was done during the test, your doctor may tell you not to take aspirin or other anti-inflammatory medicines for a few days. These include ibuprofen (Advil, Motrin) and naproxen (Aleve). Other instructions: Anethesia  For your safety, do not drive or operate machinery for 24 hours. Do not sign legal documents or make major decisions for 24 hours. The anesthesia can make it hard for you to fully understand what you are agreeing to. Follow-up care is a key part of your treatment and safety. Be sure to make and go to all appointments, and call your doctor if you are having problems. It's also a good idea to know your test results and keep a list of the medicines you take. When should you call for help? [address] [facility] [place] [phone]  Call 911 anytime you think you may need emergency care. For example, call if:  You passed out (lost consciousness). You pass maroon or bloody stools. You have severe belly pain. Call your doctor now or seek immediate medical care if:  Your stools are black and tarlike. Your stools have streaks of blood, but you did not have a biopsy or any polyps removed. You have belly pain, or your belly is swollen and firm. You vomit. You have a fever. You are very dizzy. Watch closely for changes in your health, and be sure to contact your doctor if you have any problems. Where can you learn more? Go to https://ian.[employer]. org and sign in to your [employer] account. Enter E264 in the [facility] box to learn more about Colonoscopy: What to Expect at Home.     If you do not have an account, please click on the Sign Up Now link. © 8293-3065 Healthwise, Incorporated. Care instructions adapted under license by [facility] ([facility]). This care instruction is for use with your licensed healthcare professional. If you have questions about a medical condition or this instruction, always ask your healthcare professional. [address] any warranty or liability for your use of this information.   Content Version: 23.2.906811; Current as of: November 20, 2015

## 2022-09-20 ENCOUNTER — NURSE ONLY (OUTPATIENT)
Dept: CARDIOLOGY CLINIC | Age: 72
End: 2022-09-20
Payer: MEDICARE

## 2022-09-20 ENCOUNTER — OFFICE VISIT (OUTPATIENT)
Dept: CARDIOLOGY CLINIC | Age: 72
End: 2022-09-20
Payer: MEDICARE

## 2022-09-20 VITALS
HEIGHT: 69 IN | SYSTOLIC BLOOD PRESSURE: 116 MMHG | RESPIRATION RATE: 16 BRPM | BODY MASS INDEX: 42.82 KG/M2 | WEIGHT: 289.13 LBS | HEART RATE: 61 BPM | OXYGEN SATURATION: 99 % | DIASTOLIC BLOOD PRESSURE: 60 MMHG

## 2022-09-20 DIAGNOSIS — R00.1 BRADYCARDIA: Primary | ICD-10-CM

## 2022-09-20 DIAGNOSIS — E78.5 DYSLIPIDEMIA: ICD-10-CM

## 2022-09-20 DIAGNOSIS — I10 ESSENTIAL HYPERTENSION: ICD-10-CM

## 2022-09-20 DIAGNOSIS — E66.01 CLASS 3 SEVERE OBESITY DUE TO EXCESS CALORIES WITHOUT SERIOUS COMORBIDITY WITH BODY MASS INDEX (BMI) OF 40.0 TO 44.9 IN ADULT (HCC): ICD-10-CM

## 2022-09-20 DIAGNOSIS — I71.40 ABDOMINAL AORTIC ANEURYSM (AAA) WITHOUT RUPTURE: ICD-10-CM

## 2022-09-20 PROCEDURE — G8427 DOCREV CUR MEDS BY ELIG CLIN: HCPCS | Performed by: INTERNAL MEDICINE

## 2022-09-20 PROCEDURE — 3017F COLORECTAL CA SCREEN DOC REV: CPT | Performed by: INTERNAL MEDICINE

## 2022-09-20 PROCEDURE — G8417 CALC BMI ABV UP PARAM F/U: HCPCS | Performed by: INTERNAL MEDICINE

## 2022-09-20 PROCEDURE — 1111F DSCHRG MED/CURRENT MED MERGE: CPT | Performed by: INTERNAL MEDICINE

## 2022-09-20 PROCEDURE — 1123F ACP DISCUSS/DSCN MKR DOCD: CPT | Performed by: INTERNAL MEDICINE

## 2022-09-20 PROCEDURE — 99214 OFFICE O/P EST MOD 30 MIN: CPT | Performed by: INTERNAL MEDICINE

## 2022-09-20 PROCEDURE — 1036F TOBACCO NON-USER: CPT | Performed by: INTERNAL MEDICINE

## 2022-09-20 PROCEDURE — 93225 XTRNL ECG REC<48 HRS REC: CPT | Performed by: INTERNAL MEDICINE

## 2022-09-20 NOTE — PROGRESS NOTES
48 hour applied 9/20/2022 AT 11am for bradycardia.  [facility] number [phone]

## 2022-09-20 NOTE — PROGRESS NOTES
Jah Lizama MD                                  CARDIOLOGY  NOTE        Chief Complaint:    Chief Complaint   Patient presents with    Follow-Up from Hospital    Results     Patient was unable to completed 14-day monitor    Hyperlipidemia    Bradycardia    Hypertension        HPI:     Milana Baum is a 66y.o. year old male who was recently evaluated in the hospital on 8/9/2022 for sepsis and complicated cholicystitis     Hx of sleep apnea     s/p ERCP and sphincterotomy/cholecystectomy and abscess drainage     Cardiology was consulted in the hospital for bradycardia. Incidentally patient was noted to have heart rate around in low 30s     Patient was advised to follow-up for stress test, event monitor        Current Outpatient Medications   Medication Sig Dispense Refill    hydrocortisone (ANUSOL-HC) 25 MG suppository Place 1 suppository rectally 2 times daily as needed for Hemorrhoids 24 suppository 5    potassium chloride (KLOR-CON M) 20 MEQ extended release tablet Take 1 tablet by mouth daily [facility]  0    albuterol-ipratropium (COMBIVENT RESPIMAT)  MCG/ACT AERS inhaler Inhale 1 puff into the lungs in the morning and 1 puff at noon and 1 puff in the evening and 1 puff before bedtime. ciprofloxacin-dexamethasone (CIPRODEX) 0.3-0.1 % otic suspension Place 5 drops into both ears 2 times daily [facility]      fluticasone (FLONASE) 50 MCG/ACT nasal spray 1 spray by Each Nostril route nightly 16 g 3    levothyroxine (SYNTHROID) 175 MCG tablet Take 1 tablet by mouth Daily 30 tablet 3    losartan (COZAAR) 25 MG tablet Take 1 tablet by mouth at bedtime To replace hydrochlorothiazide 90 tablet 1    Docusate Sodium 100 MG TABS Take 60 mg by mouth daily      niacin 500 MG CR capsule Take 750 mg by mouth nightly      traZODone (DESYREL) 50 MG tablet Take 50 mg by mouth nightly. Multiple Vitamins-Minerals (CENTRUM PO) Take  by mouth.  50 plus      ascorbic acid (VITAMIN C) 500 MG tablet Take 500 mg by mouth daily. saline nasal gel (AYR) GEL by Nasal route. No current facility-administered medications for this visit.        Allergies:     Morphine, Dilaudid [hydromorphone hcl], Hydrocodone, Keflex [cephalexin], Sulfa antibiotics, Ultram [tramadol], and Vicodin [hydrocodone-acetaminophen]    Patient History:    Past Medical History:   Diagnosis Date    Abdominal aneurysm ([facility] 2012    4 [facility] x 3.[facility] [id number]/21[facility]    Ascending cholangitis 08/09/2022    Calculus of gallbladder with acute cholecystitis and obstruction 08/13/2022    Chronic back pain     [facility]    Chronic sinusitis 01/21/2014    Common bile duct stone 08/09/2022    COPD, mild ([facility] 01/21/2014    Diverticulosis 2014    Dr. 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 Ifeoma Smith    MEGHAN (obstructive sleep apnea) 01/21/2014    CPAP changed to bipap (July 2014)    Otitis media, serous, TM rupture 01/21/2014    ENT x 2 s/p PE tubes bilaterally, cipro Otic    Postoperative infection 08/22/2022     Past Surgical History:   Procedure Laterality Date    CHOLECYSTECTOMY, LAPAROSCOPIC N/A 8/10/2022    CHOLECYSTECTOMY LAPAROSCOPIC performed by Gunnar Cox MD at [place] [address]  2008    [facility]- normal.      COLONOSCOPY  12/02/2014    diverticulosis, internal hemorrhoids    COLONOSCOPY N/A 9/13/2022    COLONOSCOPY POLYPECTOMY SNARE/COLD BIOPSY performed by Iveth Castillo MD at [address] [place] [address]  ABDOM ABSCESS OPEN  8/20/2022    CT DRAINAGE ABDOM ABSCESS OPEN 8/20/2022 [address] [facility] [address] SCAN    ERCP  08/09/2022    ERCP SPHINCTER/PAPILLOTOMY performed by Dr. Elyse Castillo at [facility] endo    ERCP N/A 2022    ERCP SPHINCTER/PAPILLOTOMY and sweep and removal of debris, sludge and stones with use of 12-15 extractor balloon performed by Tawny Calderon MD at [facility] Right 2012    JOINT REPLACEMENT Left 2011    KNEE SURGERY Bilateral     replacement    TOE SURGERY Bilateral     toe nail surgery Dr. Candance Aver Right 2014    DR. Rascon      Family History   Problem Relation Age of Onset    High Blood Pressure Mother     Allergies Mother     Anemia Mother     High Cholesterol Mother     Thyroid Disease Mother     Heart Disease Father     Allergies Father     Obesity Paternal Grandmother     Obesity Paternal Grandfather     Cancer Paternal Uncle         prostate cancer     Social History     Tobacco Use    Smoking status: Former     Packs/day: 2.00     Years: 36.00     Pack years: 72.00     Types: Cigarettes     Quit date: 2002     Years since quittin.4    Smokeless tobacco: Never    Tobacco comments:     quit 2002   Substance Use Topics    Alcohol use: No     Alcohol/week: 0.0 standard drinks        Review of Systems:     Constitutional:  No Fever or Weight Loss   Eyes: No Decreased Vision  ENT: No Headaches, Hearing Loss or Vertigo  Cardiovascular: No Chest Pain,  No Shortness of breath, No Palpitations. No Edema   Respiratory: No cough or wheezing .  No Respiratory distress   Gastrointestinal: No abdominal pain, appetite loss, blood in stools, constipation, diarrhea or heartburn  Genitourinary: No dysuria, trouble voiding, or hematuria  Musculoskeletal:  denies any new  joint aches , or pain   Integumentary: No rash or pruritis  Neurological: No TIA or stroke symptoms  Psychiatric: No anxiety or depression  Endocrine: No malaise, fatigue or temperature intolerance  Hematologic/Lymphatic: No bleeding problems, blood clots or swollen lymph nodes  Allergic/Immunologic: No nasal congestion or hives        Objective: Physical Exam:    /60   Pulse 61   Resp 16   Ht 5' 9\" (1.753 m)   Wt 289 lb 2 oz (131.1 kg)   SpO2 99%   BMI 42.70 kg/m²   Wt Readings from Last 3 Encounters:   09/20/22 289 lb 2 oz (131.1 kg)   09/13/22 287 lb (130.2 kg)   09/08/22 287 lb 1.6 oz (130.2 kg)     Body mass index is 42.7 kg/m². Vitals:    09/20/22 1015   BP: 116/60   Pulse: 61   Resp: 16   SpO2: 99%        General Appearance and Constitutional: Conversant, Well developed, Well nourished, No acute distress, Non-toxic appearance. HEENT:  Normocephalic, Atraumatic, Bilateral external ears normal, Oropharynx moist, No oral exudates,   Nose normal.   Neck- Normal range of motion, No tenderness, Supple  Eyes:  EOMI, Conjunctiva normal, No discharge. Respiratory:  Normal breath sounds, No respiratory distress, No wheezing, No Rales, No Ronchi. No chest tenderness. Cardiovascular: S1-S2, no added heart sounds, No Mumurs appreciated. No gallops, rubs. No Pedal Edema   GI:  Bowel sounds normal, Soft, No tenderness,  :  No costovertebral angle tenderness   Musculoskeletal:  No gross deformities.  Back- No tenderness  Integument:  Well hydrated, no rash   Lymphatic:  No lymphadenopathy noted   Neurologic:  Alert & oriented x 3, Normal motor function, normal sensory function, no focal deficits noted   Psychiatric:  Speech and behavior appropriate       Medical decision making and Data review:    DATA:    Lab Results   Component Value Date    TROPONINT <0.010 08/07/2022     BNP:  No results found for: PROBNP  PT/INR:  No results found for: PTINR  Lab Results   Component Value Date    LABA1C 4.9 08/30/2022    LABA1C 5.1 02/05/2021     Lab Results   Component Value Date    CHOL 143 10/29/2021    TRIG 117 10/29/2021    HDL 32 (L) 10/29/2021    LDLCALC 88 10/29/2021    LDLDIRECT 102 (H) 09/14/2016     Lab Results   Component Value Date    WBC 5.1 08/25/2022    HGB 9.7 (L) 08/25/2022    HCT 31.1 (L) 08/25/2022    MCV 99.4 08/25/2022     08/25/2022     TSH:   Lab Results   Component Value Date    TSH 3.56 07/27/2022     Lab Results   Component Value Date    AST 21 08/20/2022    ALT 21 08/20/2022    BILIDIR 1.0 (H) 08/12/2022    BILITOT 1.0 08/20/2022    ALKPHOS 179 (H) 08/20/2022         All labs, medications and tests reviewed by myself including data and history from outside source , patient and available family . 1. Bradycardia    2. Essential hypertension    3. Dyslipidemia    4. Abdominal aortic aneurysm (AAA) without rupture (HCC)    5. Class 3 severe obesity due to excess calories without serious comorbidity with body mass index (BMI) of 40.0 to 44.9 in adult [facility])         Impression and Plan:       Bradycardia:     Sinus bradycardia during hospitalization in the setting of sepsis and EMGHAN. Echocardiogram normal,   Obtain 48-hour Holter monitor to assess for bradycardia burden  Exercise stress test to assess for chronotropic competence      (patient has history of bilateral knee replacement as well as arthritis. Objective of stress test is to assess for chronotropic competence, he can ambulate on treadmill at low exercise thresholds.)      Post op alina. Doing well      HTN: B/P stable, continue with Losartan 25 mg daily. Blood pressure 116/60     MEGHAN: patient has home CPAP and is using while hospitalized. [facility]: [facility] AAA 3.7 cm, continue with good B/P control. Outpatient follow-up at [place]     Dyslipidemia: continue statins      Obesity: recommend weight loss. Return in about 3 months (around 12/20/2022). Counseled extensively and medication compliance urged. We discussed that for the  prevention of ASCVD our  goal is aggressive risk modification. Patient is encouraged to exercise even a brisk walk for 30 minutes  at least 3 to 4 times a week   Various goals were discussed and questions answered. Continue current medications. Appropriate prescriptions are addressed and refills ordered. Questions answered and patient verbalizes understanding. Call for any problems, questions, or concerns.

## 2022-09-20 NOTE — PATIENT INSTRUCTIONS
Please hold on to these instructions the  will call you within 1-9 business days when we receive authorization from your insurance. Treadmill Stress test    WHAT TO EXPECT:     The exercise stress test is a test used to provide information about how the heart responds to exertion. It involves walking on a treadmill at increasing levels of difficulty, while electrocardiogram, heart rate, and blood pressure are monitored. This test will take approximately 1 hours: Please arrive at the office 5-10 min before the scheduled testing time. Once you are taken back to the stress lab you will be asked to read and sign a consent form before proceeding with the test. At this time feel free to ask any question that you may have as the procedure is explained to you. You will be attached to the EKG monitoring equipment, your blood pressure will be taken, and you will begin walking on the treadmill. The treadmill starts off slowly and every 3 minutes the treadmill speeds up and the elevation increases. The average person usually walks for a period of 6-8min. PREPARATION FOR TEST:    Eat a light meal such as juice and toast at least 2 hours prior to the procedure. AVOID CAFFEINE 24 HOURS PRIOR TO THE TEST: Including coffee, Tea, Rufina and other soft drinks even those labeled  caffeine free or decaffeinated. Please wear loose comfortable clothing and comfortable walking shoes. Please wear a short sleeved shirt. Please shower or bathe and do not apply powder or lotion to the skin prior to testing, as the electrodes will adhere better giving us a clearer visual EKG recording.    DO NOT TAKE BETA-BLOCKERS 24 HOURS PRIOR TO TESTING SUCH AS:

## 2022-09-26 ENCOUNTER — OFFICE VISIT (OUTPATIENT)
Dept: GASTROENTEROLOGY | Age: 72
End: 2022-09-26
Payer: MEDICARE

## 2022-09-26 VITALS
OXYGEN SATURATION: 98 % | SYSTOLIC BLOOD PRESSURE: 120 MMHG | BODY MASS INDEX: 42.92 KG/M2 | WEIGHT: 289.8 LBS | TEMPERATURE: 97.2 F | HEART RATE: 61 BPM | DIASTOLIC BLOOD PRESSURE: 60 MMHG | HEIGHT: 69 IN

## 2022-09-26 DIAGNOSIS — K76.0 FATTY LIVER: ICD-10-CM

## 2022-09-26 DIAGNOSIS — D12.5 ADENOMATOUS POLYP OF SIGMOID COLON: Primary | ICD-10-CM

## 2022-09-26 PROCEDURE — G8427 DOCREV CUR MEDS BY ELIG CLIN: HCPCS | Performed by: NURSE PRACTITIONER

## 2022-09-26 PROCEDURE — 3017F COLORECTAL CA SCREEN DOC REV: CPT | Performed by: NURSE PRACTITIONER

## 2022-09-26 PROCEDURE — 1036F TOBACCO NON-USER: CPT | Performed by: NURSE PRACTITIONER

## 2022-09-26 PROCEDURE — G8417 CALC BMI ABV UP PARAM F/U: HCPCS | Performed by: NURSE PRACTITIONER

## 2022-09-26 PROCEDURE — 99214 OFFICE O/P EST MOD 30 MIN: CPT | Performed by: NURSE PRACTITIONER

## 2022-09-26 PROCEDURE — 1123F ACP DISCUSS/DSCN MKR DOCD: CPT | Performed by: NURSE PRACTITIONER

## 2022-09-26 NOTE — PROGRESS NOTES
Rama Sanchez 66 y.o. male was seen by DENNIS Bautista on 9/26/2022     Wt Readings from Last 3 Encounters:   09/26/22 289 lb 12.8 oz (131.5 kg)   09/20/22 289 lb 2 oz (131.1 kg)   09/13/22 287 lb (130.2 kg)       PEPE Sanchez is a pleasant 66 y.o.  male who presents today for follow-up on colonoscopy and fatty liver. His colonoscopy showed one 5 mm adenomatous colon polyp that was removed and internal hemorrhoids. He reports feeling good. His appetite is good and weight is stable. His abdominal ultrasound showed evidence of fatty liver and intrahepatic biliary ductal dilation. MRI showed choledocholithiasis with associated biliary dilation. He was hospitalized on 8-7-2022 and had ERCP done by Dr. Emilee Jorge on 8-9-2022 showing ampulla edematous with heme, common bile duct massively dilated, massive dilation seen in extrahepatic ducts as well, no obvious stone seen on cholangiogram, gallbladder not seen, sphincterotomy, duct swept with 12, 15 mm balloons x3. He had laparoscopic cholecystectomy done by Dr. Jose A Luong on 8-. He returned to the [facility] and noted to have abscess at surgical site and had drain placed and was treated with antibiotics with good results. His CBC done on 8- showed WBC 5.1, RBC 3.13, Hgb 9.7, Hct 31.1, MCV 99.4 and Platelets 279. CMP Sodium 137, BUN 7, Creatinine 0.7, Total Bilirubin 1.0, ALT 21, AST 21 and Alkaline Phosphatase 179. His appetite is good and weight is stable. No nausea or vomiting. No abdominal pain, bloating or distention. No heartburn or acid reflux. No nocturnal awakenings with acid reflux. No dysphagia or pain with swallowing. No excess belching or flatulence. His typical bowel pattern is daily with soft brown formed stools. No diarrhea or constipation. He is taking Colace and fiber. No blood in his stools or melena. No family history of stomach or colon cancer.      ROS  Review of Systems  Constitutional: Negative for chills, diaphoresis and fever. HENT: Positive for tinnitus. Negative for ear pain and hearing loss. Eyes: Positive for visual disturbance. Negative for pain and discharge. Respiratory: Negative for cough, shortness of breath and wheezing. Cardiovascular: Negative for chest pain, palpitations and leg swelling. Gastrointestinal: Negative for abdominal pain, blood in stool, constipation, diarrhea, nausea and vomiting. Endocrine: Negative for cold intolerance and heat intolerance. Genitourinary: Negative for dysuria, frequency, hematuria and urgency. Musculoskeletal: Positive for back pain. Negative for myalgias and neck pain. Skin: Negative for color change, pallor and rash. Allergic/Immunologic: Positive for environmental allergies (Pollen, ragweed). Negative for food allergies. Neurological: Positive for headaches. Negative for dizziness, seizures and weakness. Hematological: Bruises/bleeds easily. Psychiatric/Behavioral: Negative for dysphoric mood and sleep disturbance. The patient is not nervous/anxious.             Allergies  Allergies   Allergen Reactions    Morphine Itching     Pt states he only has itching with morphine    Dilaudid [Hydromorphone Hcl] Itching    Hydrocodone Itching    Keflex [Cephalexin] Other (See Comments)     Extreme hyperacitivity    Sulfa Antibiotics Itching and Rash    Ultram [Tramadol] Itching    Vicodin [Hydrocodone-Acetaminophen] Itching       Medications  Current Outpatient Medications   Medication Sig Dispense Refill    hydrocortisone (ANUSOL-HC) 25 MG suppository Place 1 suppository rectally 2 times daily as needed for Hemorrhoids 24 suppository 5    potassium chloride (KLOR-CON M) 20 MEQ extended release tablet Take 1 tablet by mouth daily [facility]  0    albuterol-ipratropium (COMBIVENT RESPIMAT)  MCG/ACT AERS inhaler Inhale 1 puff into the lungs in the morning and 1 puff at noon and 1 puff in the evening and 1 puff before bedtime. ciprofloxacin-dexamethasone (CIPRODEX) 0.3-0.1 % otic suspension Place 5 drops into both ears 2 times daily Per [facility]      fluticasone (FLONASE) 50 MCG/ACT nasal spray 1 spray by Each Nostril route nightly 16 g 3    levothyroxine (SYNTHROID) 175 MCG tablet Take 1 tablet by mouth Daily 30 tablet 3    losartan (COZAAR) 25 MG tablet Take 1 tablet by mouth at bedtime To replace hydrochlorothiazide 90 tablet 1    Docusate Sodium 100 MG TABS Take 60 mg by mouth daily      niacin 500 MG CR capsule Take 750 mg by mouth nightly      traZODone (DESYREL) 50 MG tablet Take 50 mg by mouth nightly. Multiple Vitamins-Minerals (CENTRUM PO) Take  by mouth. 50 plus      ascorbic acid (VITAMIN C) 500 MG tablet Take 500 mg by mouth daily. saline nasal gel (AYR) GEL by Nasal route. No current facility-administered medications for this visit. Past medical history:   He has a past medical history of Abdominal aneurysm ([facility] [address] Arthritis, Ascending cholangitis, Calculus of gallbladder with acute cholecystitis and obstruction, Chronic back pain, Chronic sinusitis, Common bile duct stone, COPD, mild ([address] Diverticulosis, Dyslipidemia, HTN (hypertension), Hyperlipidemia, Hypothyroidism, Ingrown toenail, MDRO (multiple drug resistant organisms) resistance, Morbid obesity with BMI of 45.0-49.9, adult ([address] Onychocryptosis, MEGHAN (obstructive sleep apnea), Otitis media, serous, TM rupture, and Postoperative infection. Past surgical history:  He has a past surgical history that includes joint replacement (Right, 2012); joint replacement (Left, 2011); Tympanostomy tube placement (Right, 2014); knee surgery (Bilateral); Colonoscopy (2008); Colonoscopy (12/02/2014); Toe Surgery (Bilateral); ERCP (08/09/2022); ERCP (N/A, 8/9/2022); Cholecystectomy, laparoscopic (N/A, 8/10/2022); CT ABSCESS DRAINAGE PERITONEAL/PERITONITIS OPEN (8/20/2022); and Colonoscopy (N/A, 9/13/2022).     Social History:  He reports Component Date Value Ref Range Status    Hemoglobin A1C 08/30/2022 4.9  % Final   Admission on 08/19/2022, Discharged on 08/25/2022   Component Date Value Ref Range Status    WBC 08/19/2022 17.1 (A)  4.0 - 10.5 K/CU MM Final    RBC 08/19/2022 3.58 (A)  4.6 - 6.2 M/CU MM Final    Hemoglobin 08/19/2022 11.6 (A)  13.5 - 18.0 GM/DL Final    Hematocrit 08/19/2022 34.9 (A)  42 - 52 % Final    MCV 08/19/2022 97.5  78 - 100 FL Final    MCH 08/19/2022 32.4 (A)  27 - 31 PG Final    MCHC 08/19/2022 33.2  32.0 - 36.0 % Final    RDW 08/19/2022 14.9  11.7 - 14.9 % Final    Platelets 62/35/9316 307  140 - 440 K/CU MM Final    MPV 08/19/2022 9.6  7.5 - 11.1 FL Final    Differential Type 08/19/2022 AUTOMATED DIFFERENTIAL   Final    Segs Relative 08/19/2022 79.2 (A)  36 - 66 % Final    Lymphocytes % 08/19/2022 8.8 (A)  24 - 44 % Final    Monocytes % 08/19/2022 10.9 (A)  0 - 4 % Final    Eosinophils % 08/19/2022 0.1  0 - 3 % Final    Basophils % 08/19/2022 0.4  0 - 1 % Final    Segs Absolute 08/19/2022 13.5  K/CU MM Final    Lymphocytes Absolute 08/19/2022 1.5  K/CU MM Final    Monocytes Absolute 08/19/2022 1.9  K/CU MM Final    Eosinophils Absolute 08/19/2022 0.0  K/CU MM Final    Basophils Absolute 08/19/2022 0.1  K/CU MM Final    Nucleated RBC % 08/19/2022 0.0  % Final    Total Nucleated RBC 08/19/2022 0.0  K/CU MM Final    Total Immature Neutrophil 08/19/2022 0.10  K/CU MM Final    Immature Neutrophil % 08/19/2022 0.6 (A)  0 - 0.43 % Final    Sodium 08/19/2022 131 (A)  135 - 145 MMOL/L Final    Potassium 08/19/2022 3.6  3.5 - 5.1 MMOL/L Final    Chloride 08/19/2022 97 (A)  99 - 110 mMol/L Final    CO2 08/19/2022 23  21 - 32 MMOL/L Final    BUN 08/19/2022 7  6 - 23 MG/DL Final    Creatinine 08/19/2022 0.6 (A)  0.9 - 1.3 MG/DL Final    Glucose 08/19/2022 104 (A)  70 - 99 MG/DL Final    Calcium 08/19/2022 8.9  8.3 - 10.6 MG/DL Final    Albumin 08/19/2022 3.8  3.4 - 5.0 GM/DL Final    Total Protein 08/19/2022 7.1  6.4 - 8.2 GM/DL Final    Total Bilirubin 08/19/2022 1.1 (A)  0.0 - 1.0 MG/DL Final    ALT 08/19/2022 23  10 - 40 U/L Final    AST 08/19/2022 25  15 - 37 IU/L Final    Alkaline Phosphatase 08/19/2022 205 (A)  40 - 129 IU/L Final    GFR Non- 08/19/2022 >60  >60 mL/min/1.73m2 Final    GFR  08/19/2022 >60  >60 mL/min/1.73m2 Final    Anion Gap 08/19/2022 11  4 - 16 Final    Specimen 08/19/2022 BLOOD   Final    Special Requests 08/19/2022 NONE   Final    Culture 08/19/2022 NO GROWTH AT 5 DAYS   Final    Specimen 08/19/2022 BLOOD   Final    Special Requests 08/19/2022 NONE   Final    Culture 08/19/2022 NO GROWTH AT 5 DAYS   Final    Lactic Acid, Sepsis 08/19/2022 1.0  0.5 - 1.9 mMOL/L Final    Source 08/19/2022 UNKNOWN   Final    SARS-CoV-2, NAAT 08/19/2022 NOT DETECTED  NOT DETECTED Final    Comment:         Rapid NAAT: The specimen is NEGATIVE for SARS-CoV-2, the novel coronavirus associated with   COVID-19. Negative results should be treated as presumptive and, if inconsistent with clinical signs   and symptoms or necessary for patient management, should be tested with an alternate   molecular assay. Negative results do not preclude SARS-CoV-2 infection and should not be used as the sole   basis for patient management decisions. This test has been authorized by the FDA under an Emergency Use Authorization (EUA) for use   by authorized laboratories. The ID NOW COVID-19 assay is designed to detect the virus that causes COVID-19 in patients   with signs and symptoms of infection who are suspected of COVID-19. An individual without symptoms of COVID-19 and who is not shedding SARS-CoV-2 virus would   expect to have a negative (not detected) result in this assay.           Fact sheet for Healthcare Providers: Mari  Fact sheet for [facility]                           ts: Mari          METHODOLOGY: Isothermal Nucleic Acid Amplification      Color, UA 08/19/2022 YELLOW  YELLOW Final    Clarity, UA 08/19/2022 CLEAR  CLEAR Final    Glucose, Urine 08/19/2022 NEGATIVE  NEGATIVE MG/DL Final    Bilirubin Urine 08/19/2022 NEGATIVE  NEGATIVE MG/DL Final    Ketones, Urine 08/19/2022 TRACE (A)  NEGATIVE MG/DL Final    Specific Gravity, UA 08/19/2022 1.010  1.001 - 1.035 Final    Blood, Urine 08/19/2022 NEGATIVE  NEGATIVE Final    pH, Urine 08/19/2022 7.5  5.0 - 8.0 Final    Protein, UA 08/19/2022 TRACE (A)  NEGATIVE MG/DL Final    Urobilinogen, Urine 08/19/2022 >8.0 (A)  0.2 - 1.0 MG/DL Final    Nitrite Urine, Quantitative 08/19/2022 NEGATIVE  NEGATIVE Final    Leukocyte Esterase, Urine 08/19/2022 NEGATIVE  NEGATIVE Final    RBC, UA 08/19/2022 NONE SEEN  0 - 3 /HPF Final    WBC, UA 08/19/2022 <1  0 - 2 /HPF Final    Bacteria, UA 08/19/2022 NEGATIVE  NEGATIVE /HPF Final    Mucus, UA 08/19/2022 RARE (A)  NEGATIVE HPF Final    Trichomonas, UA 08/19/2022 NONE SEEN  NONE SEEN /HPF Final    Specimen 08/19/2022 URINE CLEAN CATCH   Final    Special Requests 08/19/2022 NONE   Final    Culture 08/19/2022 Final Report No growth at 18 to 36 hours   Final    Sodium 08/20/2022 137  135 - 145 MMOL/L Final    Potassium 08/20/2022 3.5  3.5 - 5.1 MMOL/L Final    Chloride 08/20/2022 100  99 - 110 mMol/L Final    CO2 08/20/2022 24  21 - 32 MMOL/L Final    BUN 08/20/2022 7  6 - 23 MG/DL Final    Creatinine 08/20/2022 0.7 (A)  0.9 - 1.3 MG/DL Final    Glucose 08/20/2022 96  70 - 99 MG/DL Final    Calcium 08/20/2022 8.2 (A)  8.3 - 10.6 MG/DL Final    Albumin 08/20/2022 3.6  3.4 - 5.0 GM/DL Final    Total Protein 08/20/2022 5.8 (A)  6.4 - 8.2 GM/DL Final    Total Bilirubin 08/20/2022 1.0  0.0 - 1.0 MG/DL Final    ALT 08/20/2022 21  10 - 40 U/L Final    AST 08/20/2022 21  15 - 37 IU/L Final    Alkaline Phosphatase 08/20/2022 179 (A)  40 - 128 IU/L Final    GFR Non- 08/20/2022 >60  >60 mL/min/1.73m2 Final    GFR  08/20/2022 >60  >60 mL/min/1.73m2 Final    Anion Gap 08/20/2022 13  4 - 16 Final    WBC 08/20/2022 13.8 (A)  4.0 - 10.5 K/CU MM Final    RBC 08/20/2022 3.16 (A)  4.6 - 6.2 M/CU MM Final    Hemoglobin 08/20/2022 10.2 (A)  13.5 - 18.0 GM/DL Final    Hematocrit 08/20/2022 31.3 (A)  42 - 52 % Final    MCV 08/20/2022 99.1  78 - 100 FL Final    MCH 08/20/2022 32.3 (A)  27 - 31 PG Final    MCHC 08/20/2022 32.6  32.0 - 36.0 % Final    RDW 08/20/2022 15.3 (A)  11.7 - 14.9 % Final    Platelets 94/88/3059 284  140 - 440 K/CU MM Final    MPV 08/20/2022 9.8  7.5 - 11.1 FL Final    Differential Type 08/20/2022 AUTOMATED DIFFERENTIAL   Final    Segs Relative 08/20/2022 78.2 (A)  36 - 66 % Final    Lymphocytes % 08/20/2022 9.6 (A)  24 - 44 % Final    Monocytes % 08/20/2022 11.2 (A)  0 - 4 % Final    Eosinophils % 08/20/2022 0.1  0 - 3 % Final    Basophils % 08/20/2022 0.2  0 - 1 % Final    Segs Absolute 08/20/2022 10.8  K/CU MM Final    Lymphocytes Absolute 08/20/2022 1.3  K/CU MM Final    Monocytes Absolute 08/20/2022 1.6  K/CU MM Final    Eosinophils Absolute 08/20/2022 0.0  K/CU MM Final    Basophils Absolute 08/20/2022 0.0  K/CU MM Final    Nucleated RBC % 08/20/2022 0.0  % Final    Total Nucleated RBC 08/20/2022 0.0  K/CU MM Final    Total Immature Neutrophil 08/20/2022 0.09  K/CU MM Final    Immature Neutrophil % 08/20/2022 0.7 (A)  0 - 0.43 % Final    Protime 08/20/2022 16.5 (A)  11.7 - 14.5 SECONDS Final    Comment: Protime seconds can vary  due to reagent sensitivity. Please use INR to monitor  oral anticoagulants. INR 08/20/2022 1.28  INDEX Final    Comment: THERAPEUTIC RANGE:  INDICATIONS: INR 2.0-3.0  Most (DVT, PE,  Atrial Fibillation, Bioprosthetic valve,   [place] Judes bicuspid aortic valve)  INDICATIONS: 2.5-3.5 Most mechanical valves  recurrent thrombosis.       Magnesium 08/20/2022 1.9  1.8 - 2.4 mg/dl Final    Specimen 08/20/2022 ABSCESS   Final    Special Requests 08/20/2022 Peritoneal fluid from abscess   Final    Gram Smear 08/20/2022    Final                    Value:MODERATE  NECROTIC POLYS      Gram Smear 08/20/2022    Final                    Value:RARE  GRAM NEGATIVE BACILLI      Culture 08/20/2022 Final Report No anaerobes isolated   Final    Culture 08/20/2022 KLEBSIELLA OXYTOCA Moderate growth Multiple Resistant Drug Organism (A)   Final    Ventricular Rate 08/19/2022 86  BPM Final    Atrial Rate 08/19/2022 86  BPM Final    P-R Interval 08/19/2022 198  ms Final    QRS Duration 08/19/2022 92  ms Final    Q-T Interval 08/19/2022 392  ms Final    QTc Calculation (Bazett) 08/19/2022 469  ms Final    P Axis 08/19/2022 52  degrees Final    R Axis 08/19/2022 62  degrees Final    T Axis 08/19/2022 33  degrees Final    Diagnosis 08/19/2022    Final                    Value:Normal sinus rhythm  Normal ECG  When compared with ECG of 07-AUG-2022 12:00,  No significant change was found  Confirmed by Flaca Arellano MD, Дмитрий Hendrickson ([id number]) on 8/22/2022 10:25:56 PM      CRP, High Sensitivity 08/23/2022 121.4  mg/L Final    Comment: Cardiovascular risk  evaluation guidelines  Low Risk         <1.0 mg/L  Average Risk     1.0-3.0 mg/L  High Risk        >3.0 mg/L      Procalcitonin 08/23/2022 0.221   Final    Comment:         Suspected Sepsis:  <0.50 ng/mL  Low likelihood of sepsis. 0.50-2.00 ng/mL  Increased likelihood of sepsis. Antibiotics encouraged. >2.00 ng/mL  High risk of sepsis/shock. Antibiotics strongly encouraged. Suspected Lower Respiratory Infections  <0.24 ng/mL  Low likelihood of bacterial infection. >0.24 ng/mL  Increased likelihood of bacterial infection. Antibiotics encouraged. With successful antibiotic therapy,PCT levels should decrease rapidly. (Half-life of 24 to   36 hours.)          Procalcitonin values from samples collected within the first 6 hours of systemic infections   may still be low. Retesting may be indicated.   Values from day 1 and day 4 can be entered into the Change in Procalcitonin Calculator   ([email or web address]-[email or web address] [email or web address]) to determine the patient's Mortality Risk Prognosis. In healthy neonates, plasma Procalcitonin (PCT) concentrations increase gradually after   birth, reaching peak values at about 24 hours of age then decre                           ase to normal values below   0.5 ng/mL by 48-72 hours of age. CRP, High Sensitivity 08/24/2022 83.9  mg/L Final    Comment: Cardiovascular risk  evaluation guidelines  Low Risk         <1.0 mg/L  Average Risk     1.0-3.0 mg/L  High Risk        >3.0 mg/L      CRP, High Sensitivity 08/25/2022 49.1  mg/L Final    Comment: Cardiovascular risk  evaluation guidelines  Low Risk         <1.0 mg/L  Average Risk     1.0-3.0 mg/L  High Risk        >3.0 mg/L      Procalcitonin 08/25/2022 0.130   Final    Comment:         Suspected Sepsis:  <0.50 ng/mL  Low likelihood of sepsis. 0.50-2.00 ng/mL  Increased likelihood of sepsis. Antibiotics encouraged. >2.00 ng/mL  High risk of sepsis/shock. Antibiotics strongly encouraged. Suspected Lower Respiratory Infections  <0.24 ng/mL  Low likelihood of bacterial infection. >0.24 ng/mL  Increased likelihood of bacterial infection. Antibiotics encouraged. With successful antibiotic therapy,PCT levels should decrease rapidly. (Half-life of 24 to   36 hours.)          Procalcitonin values from samples collected within the first 6 hours of systemic infections   may still be low. Retesting may be indicated. Values from day 1 and day 4 can be entered into the Change in Procalcitonin Calculator   ([email or web address] [email or web address]) to determine the patient's Mortality Risk Prognosis. In healthy neonates, plasma Procalcitonin (PCT) concentrations increase gradually after   birth, reaching peak values at about 24 hours of age then decre                           ase to normal values below   0.5 ng/mL by 48-72 hours of age.       WBC 08/25/2022 5.1  4.0 - 10.5 K/CU MM Final    RBC 08/25/2022 3.13 (A)  4.6 - 6.2 M/CU MM Final    Hemoglobin 08/25/2022 9.7 (A)  13.5 - 18.0 GM/DL Final    Hematocrit 08/25/2022 31.1 (A)  42 - 52 % Final    MCV 08/25/2022 99.4  78 - 100 FL Final    MCH 08/25/2022 31.0  27 - 31 PG Final    MCHC 08/25/2022 31.2 (A)  32.0 - 36.0 % Final    RDW 08/25/2022 14.9  11.7 - 14.9 % Final    Platelets 96/53/6151 322  140 - 440 K/CU MM Final    MPV 08/25/2022 10.4  7.5 - 11.1 FL Final    Sodium 08/25/2022 142  135 - 145 MMOL/L Final    Potassium 08/25/2022 4.3  3.5 - 5.1 MMOL/L Final    Chloride 08/25/2022 104  99 - 110 mMol/L Final    CO2 08/25/2022 29  21 - 32 MMOL/L Final    Anion Gap 08/25/2022 9  4 - 16 Final    BUN 08/25/2022 10  6 - 23 MG/DL Final    Creatinine 08/25/2022 0.6 (A)  0.9 - 1.3 MG/DL Final    Glucose 08/25/2022 103 (A)  70 - 99 MG/DL Final    Calcium 08/25/2022 8.7  8.3 - 10.6 MG/DL Final    GFR Non- 08/25/2022 >60  >60 mL/min/1.73m2 Final    GFR  08/25/2022 >60  >60 mL/min/1.73m2 Final   Admission on 08/12/2022, Discharged on 08/18/2022   Component Date Value Ref Range Status    WBC 08/16/2022 5.4  4.0 - 10.5 K/CU MM Final    RBC 08/16/2022 3.28 (A)  4.6 - 6.2 M/CU MM Final    Hemoglobin 08/16/2022 10.3 (A)  13.5 - 18.0 GM/DL Final    Hematocrit 08/16/2022 32.7 (A)  42 - 52 % Final    MCV 08/16/2022 99.7  78 - 100 FL Final    MCH 08/16/2022 31.4 (A)  27 - 31 PG Final    MCHC 08/16/2022 31.5 (A)  32.0 - 36.0 % Final    RDW 08/16/2022 15.2 (A)  11.7 - 14.9 % Final    Platelets 35/62/9063 288  140 - 440 K/CU MM Final    MPV 08/16/2022 9.5  7.5 - 11.1 FL Final    Sodium 08/16/2022 141  135 - 145 MMOL/L Final    Potassium 08/16/2022 4.0  3.5 - 5.1 MMOL/L Final    Chloride 08/16/2022 103  99 - 110 mMol/L Final    CO2 08/16/2022 31  21 - 32 MMOL/L Final    Anion Gap 08/16/2022 7  4 - 16 Final    BUN 08/16/2022 7  6 - 23 MG/DL Final    Creatinine 08/16/2022 0.6 (A)  0.9 - 1.3 MG/DL Final    Glucose 08/16/2022 101 (A)  70 - 99 MG/DL Final    Calcium 08/16/2022 9.0  8.3 - 10.6 MG/DL Final    GFR Non- 08/16/2022 >60  >60 mL/min/1.73m2 Final    GFR  08/16/2022 >60  >60 mL/min/1.73m2 Final   No results displayed because visit has over 200 results.       Hospital Outpatient Visit on 08/05/2022   Component Date Value Ref Range Status    Sodium 08/05/2022 136  135 - 145 MMOL/L Final    Potassium 08/05/2022 4.6  3.5 - 5.1 MMOL/L Final    Chloride 08/05/2022 96 (A)  99 - 110 mMol/L Final    CO2 08/05/2022 26  21 - 32 MMOL/L Final    BUN 08/05/2022 9  6 - 23 MG/DL Final    Creatinine 08/05/2022 0.7 (A)  0.9 - 1.3 MG/DL Final    Glucose 08/05/2022 94  70 - 99 MG/DL Final    Calcium 08/05/2022 9.4  8.3 - 10.6 MG/DL Final    Albumin 08/05/2022 4.2  3.4 - 5.0 GM/DL Final    Total Protein 08/05/2022 7.1  6.4 - 8.2 GM/DL Final    Total Bilirubin 08/05/2022 1.5 (A)  0.0 - 1.0 MG/DL Final    ALT 08/05/2022 153 (A)  10 - 40 U/L Final    AST 08/05/2022 97 (A)  15 - 37 IU/L Final    Alkaline Phosphatase 08/05/2022 474 (A)  40 - 128 IU/L Final    GFR Non- 08/05/2022 >60  >60 mL/min/1.73m2 Final    GFR  08/05/2022 >60  >60 mL/min/1.73m2 Final    Anion Gap 08/05/2022 14  4 - 16 Final    WBC 08/05/2022 8.0  4.0 - 10.5 K/CU MM Final    RBC 08/05/2022 3.97 (A)  4.6 - 6.2 M/CU MM Final    Hemoglobin 08/05/2022 12.7 (A)  13.5 - 18.0 GM/DL Final    Hematocrit 08/05/2022 39.2 (A)  42 - 52 % Final    MCV 08/05/2022 98.7  78 - 100 FL Final    MCH 08/05/2022 32.0 (A)  27 - 31 PG Final    MCHC 08/05/2022 32.4  32.0 - 36.0 % Final    RDW 08/05/2022 15.4 (A)  11.7 - 14.9 % Final    Platelets 48/55/3081 300  140 - 440 K/CU MM Final    MPV 08/05/2022 10.1  7.5 - 11.1 FL Final    Differential Type 08/05/2022 AUTOMATED DIFFERENTIAL   Final    Segs Relative 08/05/2022 81.4 (A)  36 - 66 % Final    Lymphocytes % 08/05/2022 9.5 (A)  24 - 44 % Final    Monocytes % 08/05/2022 8.4 (A)  0 - 4 % Final    Eosinophils % 08/05/2022 0.1  0 - 3 % Final    Basophils % 08/05/2022 0.2  0 - 1 % Final    Segs Absolute 08/05/2022 6.5  K/CU MM Final    Lymphocytes Absolute 08/05/2022 0.8  K/CU MM Final    Monocytes Absolute 08/05/2022 0.7  K/CU MM Final    Eosinophils Absolute 08/05/2022 0.0  K/CU MM Final    Basophils Absolute 08/05/2022 0.0  K/CU MM Final    Nucleated RBC % 08/05/2022 0.0  % Final    Total Nucleated RBC 08/05/2022 0.0  K/CU MM Final    Total Immature Neutrophil 08/05/2022 0.03  K/CU MM Final    Immature Neutrophil % 08/05/2022 0.4  0 - 0.43 % Final    A-1 Antitrypsin 08/05/2022 236 (A)  90 - 200 mg/dL Final    To convert to umol/L, multiply mg/dL by 0.185    A-1 Antitrypsin Pheno 08/05/2022 M1M1   Final    Comment: (NOTE)  The patient appears to have a normal phenotype. All M alleles   (including subtypes M1, M2, and M3) produce normal serum   concentrations of alpha-1-protease inhibitor and are not   associated with clinical disease. Caution in interpretation is   advised if the patient has been transfused within the previous 21   days. Performed By: [facility] [address] [place] [address], [address] [place] [address]  : Chip Kaplan MD, PhD      LOR 08/05/2022 DETECTED (A)  None Detected Final    Comment: (NOTE)  Antibodies to Anti-Nuclear Antibodies (LOR) detected. Additional   testing to follow. INTERPRETIVE INFORMATION: Anti-Nuclear Antibodies (LOR), IgG by   KACY  Antinuclear Antibodies (LOR), IgG by KACY: LOR specimens are   screened using enzyme-linked immunosorbent assay (KACY)   methodology. All KACY results reported as Detected are further   tested by indirect fluorescent assay (IFA) using HEp-2 substrate   with an IgG-specific conjugate. The LOR KACY screen is designed   to detect antibodies against dsDNA, histones, SS-A (Ro), SS-B   (La), Melissa, Melissa/RNP, Scl-70, Gisselle-1, centromeric proteins, other   antigens extracted from the HEp-2 cell nucleus.  LOR KACY assays   have been reported to have lower sensitivities than LOR IFA for   systemic autoimmune rheumatic diseases (SARD). Negative results do not necessarily rule out SARD. Performed By: [facility] [address] [place] [address]  : Margoth Dai MD, PhD      Ceruloplasmin 08/05/2022 41 (A)  15 - 30 mg/dL Final    Comment: (NOTE)  REFERENCE INTERVAL: Ceruloplasmin  Access complete set of age- and/or gender-specific reference   intervals for this test in the [address] [place] [address] Laboratory Test Directory   ([email or web address]). Performed By: [facility] [address] [place] [address]  : Margoth Dai MD, PhD      F-Actin IgG 08/05/2022 40 (A)  0 - 19 Units Final    Comment: (NOTE)  REFERENCE INTERVAL: F-Actin (Smooth Muscle) Antibody, IgG by                      KACY   19 Units or less . ...... Negative   20 - 30 Units . ......... Weak Positive-Suggest repeat                            testing in two to three weeks                            with fresh specimen. 31 Units or greater. .... Positive-Suggestive of                            autoimmune hepatitis type 1                            or chronic active hepatitis. F-actin IgG antibodies have been shown to have increased   sensitivity for autoimmune hepatitis (AIH) but lower specificity   than smooth muscle antibodies (SMA). F-actin IgG antibodies can   also be seen in SMA-negative disease controls (non-AIH),   especially in patients with primary biliary cirrhosis and chronic   hepatitis C infections. Some patients with AIH may be SMA-positive   but negative for F-actin IgG. Consider testing for SMA by IFA if   suspicion for AIH is strong.   Performed By: [facility] [address] [place] [address]  : Margoth Dai MD, PhD      Ferritin 08/05/2022 253  30 - 400 NG/ML Final    GGT 08/05/2022 748 (A)  8 - 61 IU/L Final    Hep A IgM 08/05/2022 NON REACTIVE  NON REACTIVE Final    Hep A Total Ab 08/05/2022 POSITIVE (A)  Negative Final    Comment: (NOTE)  The positive anti-HAV is consistent with recent or remote   Hepatitis A infection or antibody response to HAV vaccination. False positive anti-HAV can occur. Performed by [facility] [address], [address] [phone]  www. Eli Dunn MD, PHD, Lab. Director      Hep B Core Ab, IgM 08/05/2022 NON REACTIVE  NON REACTIVE Final    Hep B Core Total Ab 08/05/2022 Negative  Negative Final    Comment: (NOTE)  INTERPRETIVE INFORMATION: Hepatitis B Core Ab (Total)  This assay should not be used for blood donor screening,   associated re-entry protocols, or for screening Human Cells,   Tissues and Cellular and Tissue-Based Products (HCT/P). Performed by [facility] [address], [address] [phone]  www. Eli Dunn MD, PHD, Lab. Director      Hep B S Ab 08/05/2022 <3.5   Final    Comment: (NOTE)  Reference Range: >11.5 IMMUNE                8.6 to 11.5 INDETERMINATE                  <8. 6 NOT IMMUNE             Hepatitis B Surface Ag 08/05/2022 NON REACTIVE  NON REACTIVE Final    Hepatitis C Ab 08/05/2022 NON REACTIVE  NON REACTIVE Final    HSV I/II IgG 08/05/2022 >22.40  IV Final    Comment: (NOTE)  INTERPRETIVE INFORMATION: HSV 1/2 COMBINED Ab SCREEN, IgG   0.89 IV or less. ....... Pinky Angles Not Detected   0.90-1.09 IV. .......... Pinky Angles Indeterminate- Repeat testing                           in 10-14 days may be helpful. 1.10 IV or greater. ... Pinky Angles Pinky Angles Detected  The best evidence for current infection is a significant change on   two appropriately timed specimens, where both tests are done in   the same laboratory at the same time.   Performed By: [facility] [address]  [place], [address] [facility]  : Christiano Martino MD, PhD      Anti-Mitochon Titer 08/05/2022 3.1  0.0 - 24.9 Units Final    Comment: (NOTE)  REFERENCE INTERVAL: Mitochondrial (M2) Antibody, IgG      20.0 Units or less . ........ Negative   20.1 - 24.9 Units. .......... Equivocal   25.0 Units or greater. ...... Positive    Anti-mitochondrial antibodies (AMA) are thought to be present in   90-95% of patients with primary biliary cholangitis (PBC). However, the frequency of detected antibodies may be cohort or   assay dependent, as lower sensitivities have been reported. Not   all PBC patients are positive for AMA; some patients may be   positive for  and/or  antibodies. A negative result does   not rule out PBC. Performed By: [facility]  : Zeynep Lopez MD, PhD      Protime 08/05/2022 13.4  11.7 - 14.5 SECONDS Final    Comment: Protime seconds can vary  due to reagent sensitivity. Please use INR to monitor  oral anticoagulants. INR 08/05/2022 1.04  INDEX Final    Comment: THERAPEUTIC RANGE:  INDICATIONS: INR 2.0-3.0  Most (DVT, PE,  Atrial Fibillation, Bioprosthetic valve,   [place] bicuspid aortic valve)  INDICATIONS: 2.5-3.5 Most mechanical valves  recurrent thrombosis. Iron 08/05/2022 66  59 - 158 ug/dL Final    UIBC 08/05/2022 220  110 - 370 ug/dL Final    TIBC 08/05/2022 286  250 - 450 ug/dL Final    Transferrin % 08/05/2022 23  10 - 44 % Final    SMOOTH MUSCLE AB IgG TITER 08/05/2022 <1:20  <1:20 Final    Comment: (NOTE)  INTERPRETIVE INFORMATION:  Smooth Muscle Ab, IgG Titer   Less than 1:20 . ....... Negative - No antibody detected. 1:20 - 1:80  . ......... Weak Positive - Suggest repeat                           in two to three weeks with fresh                           specimen. 1:160 or greater . ..... Positive - Suggestive of                           autoimmune hepatitis or chronic                           active hepatitis.   Performed By: [facility], [facility]  : Zeynep Lopez MD, PhD      Antinuclear Antibody, Hep-2, IGG 08/05/2022 <1:80  <1:80 Final    Interpretation 08/05/2022 See Note   Final    Comment: (NOTE)  Antinuclear antibodies by IFA negative for homogeneous, speckled,   nucleolar, centromere, and nuclear dots patterns. Cytoplasmic antibodies by IFA negative for reticular/AMA,   discrete/GW body-like, polar/golgi-like, rods and rings, and   cytoplasmic speckled patterns. INTERPRETIVE INFORMATION: LOR Interpretive Comment  Presence of antinuclear antibodies (LOR) is a hallmark feature of   systemic autoimmune rheumatic diseases (SARD). However, LOR lacks   diagnostic specificity and is associated with a variety of   diseases (cancers, autoimmune, infectious, and inflammatory   conditions)  and may also occur in healthy individuals in varying   prevalence. The lack of diagnostic specificity requires   confirmation of positive LOR by more specific serologic tests. LOR   (nuclear reactivity) positive patterns reported include   centromere, homogeneous, nuclear dots, nucleolar, or speckled. LOR   (cytoplasmic reactivity) positive patterns reported include   reticular/AMA, discrete                           /GW body-like, polar/golgi-like,   cytoplasmic speckled or rods and rings. All positive patterns are   reported to endpoint titers (1:2560). Reported patterns may help   guide differential diagnosis, although they may not be specific   for individual antibodies or diseases. Mitotic staining patterns   not reported. Negative results do not necessarily rule out SARD.   Performed By: [facility] [address]  [place], [address] [facility]  : Fernando Lane MD, PhD     Orders Only on 07/27/2022   Component Date Value Ref Range Status    TSH 07/27/2022 3.56  0.27 - 4.20 uIU/mL Final    Sodium 07/27/2022 133 (A)  136 - 145 mmol/L Final    Potassium 07/27/2022 3.7  3.5 - 5.1 mmol/L Final    Chloride 07/27/2022 93 (A)  99 - 110 mmol/L Final    CO2 07/27/2022 24  21 - 32 mmol/L Final    Anion Gap 07/27/2022 16  3 - 16 Final    Glucose 07/27/2022 101 (A)  70 - 99 mg/dL Final    BUN 07/27/2022 9  7 - 20 mg/dL Final    Creatinine 07/27/2022 0.7 (A)  0.8 - 1.3 mg/dL Final    GFR Non- 07/27/2022 >60  >60 Final    Comment: >60 mL/min/1.73m2 EGFR, calc. for ages 25 and older using the  MDRD formula (not corrected for weight), is valid for stable  renal function. GFR  07/27/2022 >60  >60 Final    Comment: Chronic Kidney Disease: less than 60 ml/min/1.73 sq.m. Kidney Failure: less than 15 ml/min/1.73 sq.m. Results valid for patients 18 years and older.       Calcium 07/27/2022 9.4  8.3 - 10.6 mg/dL Final    Total Protein 07/27/2022 6.7  6.4 - 8.2 g/dL Final    Albumin 07/27/2022 4.0  3.4 - 5.0 g/dL Final    Albumin/Globulin Ratio 07/27/2022 1.5  1.1 - 2.2 Final    Total Bilirubin 07/27/2022 2.1 (A)  0.0 - 1.0 mg/dL Final    Alkaline Phosphatase 07/27/2022 368 (A)  40 - 129 U/L Final    ALT 07/27/2022 150 (A)  10 - 40 U/L Final    AST 07/27/2022 102 (A)  15 - 37 U/L Final    WBC 07/27/2022 5.1  4.0 - 11.0 K/uL Final    RBC 07/27/2022 3.88 (A)  4.20 - 5.90 M/uL Final    Hemoglobin 07/27/2022 12.5 (A)  13.5 - 17.5 g/dL Final    Hematocrit 07/27/2022 36.9 (A)  40.5 - 52.5 % Final    MCV 07/27/2022 95.0  80.0 - 100.0 fL Final    MCH 07/27/2022 32.1  26.0 - 34.0 pg Final    MCHC 07/27/2022 33.8  31.0 - 36.0 g/dL Final    RDW 07/27/2022 15.7 (A)  12.4 - 15.4 % Final    Platelets 55/81/8069 280  135 - 450 K/uL Final    MPV 07/27/2022 8.4  5.0 - 10.5 fL Final    Neutrophils % 07/27/2022 73.2  % Final    Lymphocytes % 07/27/2022 14.1  % Final    Monocytes % 07/27/2022 11.5  % Final    Eosinophils % 07/27/2022 0.5  % Final    Basophils % 07/27/2022 0.7  % Final    Neutrophils Absolute 07/27/2022 3.7  1.7 - 7.7 K/uL Final    Lymphocytes Absolute 07/27/2022 0.7 (A)  1.0 - 5.1 K/uL Final    Monocytes Absolute 07/27/2022 0.6  0.0 - 1.3 K/uL Final    Eosinophils Absolute 07/27/2022 0.0 0.0 - 0.6 K/uL Final    Basophils Absolute 07/27/2022 0.0  0.0 - 0.2 K/uL Final    Sed Rate 07/27/2022 71 (A)  0 - 20 mm/Hr Final       Assessment and Plan:  Adenomatous colon polyp that was removed recommend repeat in 5 years. 2.  Fatty liver most likely due to obesity from excess calories. His Fib 4 index was 1.24 with negative predictive value for advanced fibrosis. His liver enzymes have normalized. Recommend weight loss at least 10% of his baseline. Would recommend continue with his dietician at [facility] home recommendations to assist with weight loss. Avoid alcohol use. 3.  The patient was encouraged to follow-up as needed. Total time:  30 minutes.

## 2022-10-07 ENCOUNTER — PROCEDURE VISIT (OUTPATIENT)
Dept: CARDIOLOGY CLINIC | Age: 72
End: 2022-10-07
Payer: MEDICARE

## 2022-10-07 DIAGNOSIS — E78.2 MIXED HYPERLIPIDEMIA: ICD-10-CM

## 2022-10-07 DIAGNOSIS — E78.5 DYSLIPIDEMIA: ICD-10-CM

## 2022-10-07 DIAGNOSIS — R00.1 BRADYCARDIA: Primary | ICD-10-CM

## 2022-10-07 DIAGNOSIS — E66.01 CLASS 3 SEVERE OBESITY DUE TO EXCESS CALORIES WITHOUT SERIOUS COMORBIDITY WITH BODY MASS INDEX (BMI) OF 40.0 TO 44.9 IN ADULT (HCC): ICD-10-CM

## 2022-10-07 DIAGNOSIS — I10 HYPERTENSION, UNSPECIFIED TYPE: ICD-10-CM

## 2022-10-07 DIAGNOSIS — I10 ESSENTIAL HYPERTENSION: ICD-10-CM

## 2022-10-07 DIAGNOSIS — I71.40 ABDOMINAL AORTIC ANEURYSM (AAA) WITHOUT RUPTURE, UNSPECIFIED PART: ICD-10-CM

## 2022-10-07 DIAGNOSIS — R94.31 ABNORMAL EKG: ICD-10-CM

## 2022-10-07 PROCEDURE — 93015 CV STRESS TEST SUPVJ I&R: CPT | Performed by: INTERNAL MEDICINE

## 2022-10-25 PROCEDURE — 93227 XTRNL ECG REC<48 HR R&I: CPT | Performed by: INTERNAL MEDICINE

## 2022-11-01 SDOH — HEALTH STABILITY: PHYSICAL HEALTH: ON AVERAGE, HOW MANY MINUTES DO YOU ENGAGE IN EXERCISE AT THIS LEVEL?: 10 MIN

## 2022-11-01 SDOH — HEALTH STABILITY: PHYSICAL HEALTH: ON AVERAGE, HOW MANY DAYS PER WEEK DO YOU ENGAGE IN MODERATE TO STRENUOUS EXERCISE (LIKE A BRISK WALK)?: 5 DAYS

## 2022-11-01 ASSESSMENT — LIFESTYLE VARIABLES
HOW MANY STANDARD DRINKS CONTAINING ALCOHOL DO YOU HAVE ON A TYPICAL DAY: PATIENT DOES NOT DRINK
HOW OFTEN DO YOU HAVE A DRINK CONTAINING ALCOHOL: NEVER
HOW OFTEN DO YOU HAVE A DRINK CONTAINING ALCOHOL: 1
HOW MANY STANDARD DRINKS CONTAINING ALCOHOL DO YOU HAVE ON A TYPICAL DAY: 0
HOW OFTEN DO YOU HAVE SIX OR MORE DRINKS ON ONE OCCASION: 1

## 2022-11-01 ASSESSMENT — PATIENT HEALTH QUESTIONNAIRE - PHQ9
SUM OF ALL RESPONSES TO PHQ QUESTIONS 1-9: 0
1. LITTLE INTEREST OR PLEASURE IN DOING THINGS: 0
SUM OF ALL RESPONSES TO PHQ QUESTIONS 1-9: 0
2. FEELING DOWN, DEPRESSED OR HOPELESS: 0
SUM OF ALL RESPONSES TO PHQ9 QUESTIONS 1 & 2: 0

## 2022-11-02 ENCOUNTER — TELEPHONE (OUTPATIENT)
Dept: CARDIOLOGY CLINIC | Age: 72
End: 2022-11-02

## 2022-11-02 NOTE — TELEPHONE ENCOUNTER
Patient given holter monitor results. 48-hour Holter monitor  Indication bradycardia  Minimum heart rate 44 bpm average heart rate 64 bpm maximum heart rate 148 bpm  Bradycardia burden 30%  No atrial fibrillation or atrial flutter  PACs 274  PVCs 123  Rare PACs and couplets and triplets.     Conclusion:  Predominantly normal sinus rhythm  Sinus bradycardia at 30%  Rare PACs and PVCs

## 2022-11-08 ENCOUNTER — OFFICE VISIT (OUTPATIENT)
Dept: FAMILY MEDICINE CLINIC | Age: 72
End: 2022-11-08
Payer: MEDICARE

## 2022-11-08 VITALS
HEIGHT: 69 IN | DIASTOLIC BLOOD PRESSURE: 72 MMHG | WEIGHT: 290 LBS | SYSTOLIC BLOOD PRESSURE: 122 MMHG | BODY MASS INDEX: 42.95 KG/M2 | OXYGEN SATURATION: 95 % | HEART RATE: 58 BPM

## 2022-11-08 DIAGNOSIS — D64.9 ANEMIA, UNSPECIFIED TYPE: ICD-10-CM

## 2022-11-08 DIAGNOSIS — K76.0 FATTY LIVER: ICD-10-CM

## 2022-11-08 DIAGNOSIS — E03.9 ACQUIRED HYPOTHYROIDISM: ICD-10-CM

## 2022-11-08 DIAGNOSIS — Z00.00 MEDICARE ANNUAL WELLNESS VISIT, SUBSEQUENT: Primary | ICD-10-CM

## 2022-11-08 DIAGNOSIS — Z23 NEEDS FLU SHOT: ICD-10-CM

## 2022-11-08 PROBLEM — R19.5 POSITIVE FIT (FECAL IMMUNOCHEMICAL TEST): Status: RESOLVED | Noted: 2022-05-03 | Resolved: 2022-11-08

## 2022-11-08 PROBLEM — R53.1 GENERALIZED WEAKNESS: Status: RESOLVED | Noted: 2022-08-13 | Resolved: 2022-11-08

## 2022-11-08 PROBLEM — K63.5 POLYP OF SIGMOID COLON: Status: RESOLVED | Noted: 2022-09-13 | Resolved: 2022-11-08

## 2022-11-08 PROBLEM — K65.1 INTRA-ABDOMINAL ABSCESS (HCC): Status: RESOLVED | Noted: 2022-08-20 | Resolved: 2022-11-08

## 2022-11-08 LAB
HCT VFR BLD CALC: 38.1 % (ref 40.5–52.5)
HEMOGLOBIN: 12.8 G/DL (ref 13.5–17.5)
MCH RBC QN AUTO: 32 PG (ref 26–34)
MCHC RBC AUTO-ENTMCNC: 33.7 G/DL (ref 31–36)
MCV RBC AUTO: 94.8 FL (ref 80–100)
PDW BLD-RTO: 14.9 % (ref 12.4–15.4)
PLATELET # BLD: 186 K/UL (ref 135–450)
PMV BLD AUTO: 9.8 FL (ref 5–10.5)
RBC # BLD: 4.01 M/UL (ref 4.2–5.9)
WBC # BLD: 4.1 K/UL (ref 4–11)

## 2022-11-08 PROCEDURE — 3078F DIAST BP <80 MM HG: CPT | Performed by: FAMILY MEDICINE

## 2022-11-08 PROCEDURE — 3017F COLORECTAL CA SCREEN DOC REV: CPT | Performed by: FAMILY MEDICINE

## 2022-11-08 PROCEDURE — G8484 FLU IMMUNIZE NO ADMIN: HCPCS | Performed by: FAMILY MEDICINE

## 2022-11-08 PROCEDURE — 1123F ACP DISCUSS/DSCN MKR DOCD: CPT | Performed by: FAMILY MEDICINE

## 2022-11-08 PROCEDURE — G0439 PPPS, SUBSEQ VISIT: HCPCS | Performed by: FAMILY MEDICINE

## 2022-11-08 PROCEDURE — G0008 ADMIN INFLUENZA VIRUS VAC: HCPCS | Performed by: FAMILY MEDICINE

## 2022-11-08 PROCEDURE — 90694 VACC AIIV4 NO PRSRV 0.5ML IM: CPT | Performed by: FAMILY MEDICINE

## 2022-11-08 PROCEDURE — 3074F SYST BP LT 130 MM HG: CPT | Performed by: FAMILY MEDICINE

## 2022-11-08 NOTE — PROGRESS NOTES
Medicare Annual Wellness Visit    Alberta Charles is here for Medicare AWV    Assessment & Plan   Medicare annual wellness visit, subsequent    Fatty liver- working on weight loss to help with fatty liver. Finding lower fat foods and healthier eating since cholecystectomy    Needs flu shot  -     Influenza, FLUAD, (age 72 y+), IM, Preservative Free, 0.5 mL    Acquired hypothyroidism  Check levels-continue current   -     TSH; Future  -     CBC; Future    Anemia, unspecified type  -     Basic Metabolic Panel; Future      Recommendations for Preventive Services Due: see orders and patient instructions/AVS.  Recommended screening schedule for the next 5-10 years is provided to the patient in written form: see Patient Instructions/AVS.     Return in about 6 months (around 5/8/2023) for Med refills for chronic conditions. Subjective   The following acute and/or chronic problems were also addressed today:  Overall he is feeling better. More stamina. Appetite back to normal but working on lower fat foods due to cholecystectomy. Combivent 1 puff twice daily. Adherent to Bipap daily and working on updating equipment through the [place] . Moods doing well. H/o anemia. The patient denies abnormal bruising or abnormal bleeding from any body orifice such as bleeding from nose or gums, blood in urine or stool, or melena, hemoptysis or hematemesis. Adherent with meds without issues. Patient's complete Health Risk Assessment and screening values have been reviewed and are found in Flowsheets. The following problems were reviewed today and where indicated follow up appointments were made and/or referrals ordered.     Positive Risk Factor Screenings with Interventions:              Health Habits/Nutrition:  Physical Activity: Insufficiently Active    Days of Exercise per Week: 5 days    Minutes of Exercise per Session: 10 min     Have you lost any weight without trying in the past 3 months?: (!) Yes  Body mass index: (!) 42.82  Have you seen the dentist within the past year?: Yes  Health Habits/Nutrition Interventions:  Patient working on lower fat food to avoid loose stools             Objective   Vitals:    11/08/22 0758   BP: 122/72   Site: Left Upper Arm   Position: Sitting   Cuff Size: Medium Adult   Pulse: 58   SpO2: 95%   Weight: 290 lb (131.5 kg)   Height: 5' 9\" (1.753 m)      Body mass index is 42.83 kg/m². Constitutional: [x] Appears well-developed and well-nourished [x] No apparent distress      [] Abnormal-   Mental status  [x] Alert and awake  [x] Oriented to person/place/time [x]Able to follow commands      Eyes:  EOM    [x]  Normal  [] Abnormal-  Sclera  [x]  Normal  [] Abnormal -         Discharge []  None visible  [] Abnormal -    HENT:   [x] Normocephalic, atraumatic. [] Abnormal   [] Mouth/Throat: Mucous membranes are moist.     External Ears [] Normal  [] Abnormal-     Neck: [] No visualized mass     Pulmonary/Chest: [x] Respiratory effort normal.  [x] No visualized signs of difficulty breathing or respiratory distress        [] Abnormal-      Musculoskeletal:   [] Normal gait with no signs of ataxia         [] Normal range of motion of neck        [x] Abnormal- 2+ bilateral pitting edema to legs and feet and well healed bilateral shin scars      Neurological:        [x] No Facial Asymmetry (Cranial nerve 7 motor function) (limited exam to video visit)          [x] No gaze palsy        [] Abnormal-         Skin:        [] No significant exanthematous lesions or discoloration noted on facial skin         [x] Abnormal- well healed surgical scar on abdomen.            Psychiatric:       [x] Normal Affect [x] No Hallucinations        [] Abnormal-       Allergies   Allergen Reactions    Morphine Itching     Pt states he only has itching with morphine    Dilaudid [Hydromorphone Hcl] Itching    Hydrocodone Itching    Keflex [Cephalexin] Other (See Comments)     Extreme hyperacitivity    Sulfa Antibiotics Itching and Rash    Ultram [Tramadol] Itching    Vicodin [Hydrocodone-Acetaminophen] Itching     Prior to Visit Medications    Medication Sig Taking? Authorizing Provider   hydrocortisone (ANUSOL-HC) 25 MG suppository Place 1 suppository rectally 2 times daily as needed for Hemorrhoids Yes Dangelo Gaines MD   potassium chloride (KLOR-CON M) 20 MEQ extended release tablet Take 1 tablet by mouth daily [facility] Yes Dangelo Gaines MD   albuterol-ipratropium (COMBIVENT RESPIMAT)  MCG/ACT AERS inhaler Inhale 1 puff into the lungs in the morning and 1 puff at noon and 1 puff in the evening and 1 puff before bedtime. Yes Dangelo Gaines MD   ciprofloxacin-dexamethasone (CIPRODEX) 0.3-0.1 % otic suspension Place 5 drops into both ears 2 times daily [facility] Yes Dangelo Gaines MD   fluticasone (FLONASE) 50 MCG/ACT nasal spray 1 spray by Each Nostril route nightly Yes Rick Maldonado MD   levothyroxine (SYNTHROID) 175 MCG tablet Take 1 tablet by mouth Daily Yes Rick Maldonado MD   losartan (COZAAR) 25 MG tablet Take 1 tablet by mouth at bedtime To replace hydrochlorothiazide Yes Dangelo Gaines MD   Docusate Sodium 100 MG TABS Take 60 mg by mouth daily Yes Historical Provider, MD   niacin 500 MG CR capsule Take 750 mg by mouth nightly Yes Historical Provider, MD   traZODone (DESYREL) 50 MG tablet Take 50 mg by mouth nightly. Yes Historical Provider, MD   Multiple Vitamins-Minerals (CENTRUM PO) Take  by mouth. 50 plus Yes Historical Provider, MD   ascorbic acid (VITAMIN C) 500 MG tablet Take 500 mg by mouth daily. Yes Historical Provider, MD   saline nasal gel (AYR) GEL by Nasal route. Yes Historical Provider, MD   azelastine HCl 0.15 % SOLN by Nasal route 2 times daily. Historical Provider, MD   mometasone (NASONEX) 50 MCG/ACT nasal spray 2 sprays by Nasal route 2 times daily.   Historical Provider, MD   flunisolide (NASALIDE) 25 MCG/ACT (0.025%) SOLN Inhale 2 sprays into the lungs every 12 hours.   Historical Provider, MD [place] (Including outside providers/suppliers regularly involved in providing care):   Patient Care Team:  Ara Salgado MD as PCP - Hillary Madrid MD as PCP - [facility] Empaneled Provider     Reviewed and updated this visit:  Tobacco  Allergies  Meds  Problems  Med Hx  Surg Hx  Soc Hx  Fam Hx

## 2022-11-08 NOTE — PATIENT INSTRUCTIONS
Personalized Preventive Plan for Fer [place] - 11/8/2022  Medicare offers a range of preventive health benefits. Some of the tests and screenings are paid in full while other may be subject to a deductible, co-insurance, and/or copay. Some of these benefits include a comprehensive review of your medical history including lifestyle, illnesses that may run in your family, and various assessments and screenings as appropriate. After reviewing your medical record and screening and assessments performed today your provider may have ordered immunizations, labs, imaging, and/or referrals for you. A list of these orders (if applicable) as well as your Preventive Care list are included within your After Visit Summary for your review. Other Preventive Recommendations:    A preventive eye exam performed by an eye specialist is recommended every 1-2 years to screen for glaucoma; cataracts, macular degeneration, and other eye disorders. A preventive dental visit is recommended every 6 months. Try to get at least 150 minutes of exercise per week or 10,000 steps per day on a pedometer . Order or download the FREE \"Exercise & Physical Activity: Your Everyday Guide\" from The [employer]. Call 6-105.574.8069 or search The [employer] online. You need 2958-6933 mg of calcium and 5248-9611 IU of vitamin D per day. It is possible to meet your calcium requirement with diet alone, but a vitamin D supplement is usually necessary to meet this goal.  When exposed to the sun, use a sunscreen that protects against both UVA and UVB radiation with an SPF of 30 or greater. Reapply every 2 to 3 hours or after sweating, drying off with a towel, or swimming. Always wear a seat belt when traveling in a car. Always wear a helmet when riding a bicycle or motorcycle.

## 2022-11-09 LAB
ANION GAP SERPL CALCULATED.3IONS-SCNC: 12 MMOL/L (ref 3–16)
BUN BLDV-MCNC: 16 MG/DL (ref 7–20)
CALCIUM SERPL-MCNC: 9.7 MG/DL (ref 8.3–10.6)
CHLORIDE BLD-SCNC: 104 MMOL/L (ref 99–110)
CO2: 25 MMOL/L (ref 21–32)
CREAT SERPL-MCNC: 0.8 MG/DL (ref 0.8–1.3)
GFR SERPL CREATININE-BSD FRML MDRD: >60 ML/MIN/{1.73_M2}
GLUCOSE BLD-MCNC: 95 MG/DL (ref 70–99)
POTASSIUM SERPL-SCNC: 4.2 MMOL/L (ref 3.5–5.1)
SODIUM BLD-SCNC: 141 MMOL/L (ref 136–145)
TSH SERPL DL<=0.05 MIU/L-ACNC: 5.82 UIU/ML (ref 0.27–4.2)

## 2022-11-19 NOTE — PATIENT INSTRUCTIONS
Personalized Preventive Plan for Jacoby Tovar - 9/11/2018  Medicare offers a range of preventive health benefits. Some of the tests and screenings are paid in full while other may be subject to a deductible, co-insurance, and/or copay. Some of these benefits include a comprehensive review of your medical history including lifestyle, illnesses that may run in your family, and various assessments and screenings as appropriate. After reviewing your medical record and screening and assessments performed today your provider may have ordered immunizations, labs, imaging, and/or referrals for you. A list of these orders (if applicable) as well as your Preventive Care list are included within your After Visit Summary for your review. Other Preventive Recommendations:    · A preventive eye exam performed by an eye specialist is recommended every 1-2 years to screen for glaucoma; cataracts, macular degeneration, and other eye disorders. · A preventive dental visit is recommended every 6 months. · Try to get at least 150 minutes of exercise per week or 10,000 steps per day on a pedometer . · Order or download the FREE \"Exercise & Physical Activity: Your Everyday Guide\" from The [employer]. Call 2-420.939.7501 or search The [employer] online. · You need 5022-0159 mg of calcium and 5836-3229 IU of vitamin D per day. It is possible to meet your calcium requirement with diet alone, but a vitamin D supplement is usually necessary to meet this goal.  · When exposed to the sun, use a sunscreen that protects against both UVA and UVB radiation with an SPF of 30 or greater. Reapply every 2 to 3 hours or after sweating, drying off with a towel, or swimming. · Always wear a seat belt when traveling in a car. Always wear a helmet when riding a bicycle or motorcycle. None

## 2022-12-18 DIAGNOSIS — J44.9 COPD, MILD (HCC): ICD-10-CM

## 2022-12-20 ENCOUNTER — OFFICE VISIT (OUTPATIENT)
Dept: CARDIOLOGY CLINIC | Age: 72
End: 2022-12-20
Payer: MEDICARE

## 2022-12-20 VITALS
WEIGHT: 287 LBS | HEART RATE: 72 BPM | SYSTOLIC BLOOD PRESSURE: 116 MMHG | BODY MASS INDEX: 42.51 KG/M2 | HEIGHT: 69 IN | DIASTOLIC BLOOD PRESSURE: 58 MMHG

## 2022-12-20 DIAGNOSIS — R00.1 BRADYCARDIA: Primary | ICD-10-CM

## 2022-12-20 DIAGNOSIS — E66.01 CLASS 3 SEVERE OBESITY DUE TO EXCESS CALORIES WITHOUT SERIOUS COMORBIDITY WITH BODY MASS INDEX (BMI) OF 40.0 TO 44.9 IN ADULT (HCC): ICD-10-CM

## 2022-12-20 DIAGNOSIS — I10 ESSENTIAL HYPERTENSION: ICD-10-CM

## 2022-12-20 DIAGNOSIS — E78.5 DYSLIPIDEMIA: ICD-10-CM

## 2022-12-20 PROCEDURE — 1123F ACP DISCUSS/DSCN MKR DOCD: CPT | Performed by: INTERNAL MEDICINE

## 2022-12-20 PROCEDURE — 3074F SYST BP LT 130 MM HG: CPT | Performed by: INTERNAL MEDICINE

## 2022-12-20 PROCEDURE — 1036F TOBACCO NON-USER: CPT | Performed by: INTERNAL MEDICINE

## 2022-12-20 PROCEDURE — 3017F COLORECTAL CA SCREEN DOC REV: CPT | Performed by: INTERNAL MEDICINE

## 2022-12-20 PROCEDURE — 3078F DIAST BP <80 MM HG: CPT | Performed by: INTERNAL MEDICINE

## 2022-12-20 PROCEDURE — G8484 FLU IMMUNIZE NO ADMIN: HCPCS | Performed by: INTERNAL MEDICINE

## 2022-12-20 PROCEDURE — G8428 CUR MEDS NOT DOCUMENT: HCPCS | Performed by: INTERNAL MEDICINE

## 2022-12-20 PROCEDURE — 99214 OFFICE O/P EST MOD 30 MIN: CPT | Performed by: INTERNAL MEDICINE

## 2022-12-20 PROCEDURE — G8417 CALC BMI ABV UP PARAM F/U: HCPCS | Performed by: INTERNAL MEDICINE

## 2022-12-20 RX ORDER — IPRATROPIUM/ALBUTEROL SULFATE 20-100 MCG
MIST INHALER (GRAM) INHALATION
Qty: 12 G | Refills: 5 | Status: SHIPPED | OUTPATIENT
Start: 2022-12-20

## 2022-12-20 RX ORDER — CARBOXYMETHYLCELLULOSE SODIUM 10 MG/ML
1 SOLUTION/ DROPS OPHTHALMIC 3 TIMES DAILY
COMMUNITY

## 2022-12-20 NOTE — PROGRESS NOTES
Liss Curtis MD                                  CARDIOLOGY  NOTE        Chief Complaint:    Chief Complaint   Patient presents with    Results     Pt denies any new cardiac sx no surgeries or procedures scheduled that he is aware of and no refills needed    3 Month Follow-Up        48-hour Holter monitor  Indication bradycardia  Minimum heart rate 44 bpm average heart rate 64 bpm maximum heart rate 148 bpm  Bradycardia burden 30%  No atrial fibrillation or atrial flutter  PACs 274  PVCs 123  Rare PACs and couplets and triplets. Conclusion:  Predominantly normal sinus rhythm  Sinus bradycardia at 30%  Rare PACs and PVCs    Exercise stress test: 9/20/2022:    Normal stress test with normal chronotropic response    ECHO 8/10/2022     Left ventricular systolic function is normal.   Ejection fraction is visually estimated at 55-60%. No evidence of any pericardial effusion. No significant valvular disease noted. HPI:     Emma Pratt is a 74y.o. year old male who was recently evaluated in the hospital on 8/9/2022 for sepsis and complicated cholicystitis     Hx of sleep apnea     s/p ERCP and sphincterotomy/cholecystectomy and abscess drainage     Cardiology was consulted in the hospital for bradycardia.     Incidentally patient was noted to have heart rate around in low 30s     Patient was advised to follow-up for stress test, event monitor        Current Outpatient Medications   Medication Sig Dispense Refill    carboxymethylcellulose (ARTIFICIAL TEARS) 1 % ophthalmic solution 1 drop 3 times daily      hydrocortisone (ANUSOL-HC) 25 MG suppository Place 1 suppository rectally 2 times daily as needed for Hemorrhoids 24 suppository 5    potassium chloride (KLOR-CON M) 20 MEQ extended release tablet Take 1 tablet by mouth daily [facility]  0    albuterol-ipratropium (COMBIVENT RESPIMAT)  MCG/ACT AERS inhaler Inhale 1 puff into the lungs in the morning and 1 puff at noon and 1 puff in the evening and 1 puff before bedtime. ciprofloxacin-dexamethasone (CIPRODEX) 0.3-0.1 % otic suspension Place 5 drops into both ears 2 times daily Per [facility]      fluticasone (FLONASE) 50 MCG/ACT nasal spray 1 spray by Each Nostril route nightly 16 g 3    levothyroxine (SYNTHROID) 175 MCG tablet Take 1 tablet by mouth Daily 30 tablet 3    losartan (COZAAR) 25 MG tablet Take 1 tablet by mouth at bedtime To replace hydrochlorothiazide 90 tablet 1    Docusate Sodium 100 MG TABS Take 60 mg by mouth daily      niacin 500 MG CR capsule Take 750 mg by mouth nightly      traZODone (DESYREL) 50 MG tablet Take 50 mg by mouth nightly. Multiple Vitamins-Minerals (CENTRUM PO) Take  by mouth. 50 plus      ascorbic acid (VITAMIN C) 500 MG tablet Take 500 mg by mouth daily. saline nasal gel (AYR) GEL by Nasal route. No current facility-administered medications for this visit.        Allergies:     Morphine, Dilaudid [hydromorphone hcl], Hydrocodone, Keflex [cephalexin], Sulfa antibiotics, Ultram [tramadol], and Vicodin [hydrocodone-acetaminophen]    Patient History:    Past Medical History:   Diagnosis Date    Abdominal aneurysm 2012    4 cm x 3.6 cm followed at [place]    Arthritis 01/21/2014    Ascending cholangitis 08/09/2022    Calculus of gallbladder with acute cholecystitis and obstruction 08/13/2022    Chronic back pain     [facility]    Chronic sinusitis 01/21/2014    Common bile duct stone 08/09/2022    COPD, mild ([facility] 01/21/2014    Diverticulosis 2014    Dr. 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      COLONOSCOPY  2014    diverticulosis, internal hemorrhoids    COLONOSCOPY N/A 2022    COLONOSCOPY POLYPECTOMY SNARE/COLD BIOPSY performed by Stanton Bassett MD at [facility] ABDOM ABSCESS OPEN  2022    CT DRAINAGE ABDOM ABSCESS OPEN 2022 [facility] [address] SCAN    ERCP  2022    ERCP SPHINCTER/PAPILLOTOMY performed by Dr. Norm Barger at [address] [facility]    ERCP N/A 2022    ERCP SPHINCTER/PAPILLOTOMY and sweep and removal of debris, sludge and stones with use of 12-15 extractor balloon performed by Stanton Bassett MD at [place] [address] Right 2012    JOINT REPLACEMENT Left 2011    KNEE SURGERY Bilateral     replacement    TOE SURGERY Bilateral     toe nail surgery Dr. Aakash Chery Right 2014    DR. Rascon      Family History   Problem Relation Age of Onset    High Blood Pressure Mother     Allergies Mother     Anemia Mother     High Cholesterol Mother     Thyroid Disease Mother     Heart Disease Father     Allergies Father     Obesity Paternal Grandmother     Obesity Paternal Grandfather     Cancer Paternal Uncle         prostate cancer     Social History     Tobacco Use    Smoking status: Former     Packs/day: 2.00     Years: 36.00     Pack years: 72.00     Types: Cigarettes     Quit date: 2002     Years since quittin.7    Smokeless tobacco: Never    Tobacco comments:     quit 2002   Substance Use Topics    Alcohol use: No     Alcohol/week: 0.0 standard drinks        Review of Systems:     Constitutional:  No Fever or Weight Loss   Eyes: No Decreased Vision  ENT: No Headaches, Hearing Loss or Vertigo  Cardiovascular: No Chest Pain,  No Shortness of breath, No Palpitations. No Edema   Respiratory: No cough or wheezing . No Respiratory distress   Gastrointestinal: No abdominal pain, appetite loss, blood in stools, constipation, diarrhea or heartburn  Genitourinary: No dysuria, trouble voiding, or hematuria  Musculoskeletal:  denies any new  joint aches , or pain   Integumentary: No rash or pruritis  Neurological: No TIA or stroke symptoms  Psychiatric: No anxiety or depression  Endocrine: No malaise, fatigue or temperature intolerance  Hematologic/Lymphatic: No bleeding problems, blood clots or swollen lymph nodes  Allergic/Immunologic: No nasal congestion or hives        Objective:      Physical Exam:    BP (!) 116/58 (Site: Left Upper Arm, Position: Sitting, Cuff Size: Large Adult)   Pulse 72   Ht 5' 9\" (1.753 m)   Wt 287 lb (130.2 kg)   BMI 42.38 kg/m²   Wt Readings from Last 3 Encounters:   12/20/22 287 lb (130.2 kg)   11/08/22 290 lb (131.5 kg)   09/26/22 289 lb 12.8 oz (131.5 kg)     Body mass index is 42.38 kg/m². Vitals:    12/20/22 1052   BP: (!) 116/58   Pulse: 72        General Appearance and Constitutional: Conversant, Well developed, Well nourished, No acute distress, Non-toxic appearance. HEENT:  Normocephalic, Atraumatic, Bilateral external ears normal, Oropharynx moist, No oral exudates,   Nose normal.   Neck- Normal range of motion, No tenderness, Supple  Eyes:  EOMI, Conjunctiva normal, No discharge. Respiratory:  Normal breath sounds, No respiratory distress, No wheezing, No Rales, No Ronchi. No chest tenderness. Cardiovascular: S1-S2, no added heart sounds, No Mumurs appreciated. No gallops, rubs.  No Pedal Edema   GI:  Bowel sounds normal, Soft, No tenderness,  :  No costovertebral angle tenderness   Musculoskeletal:  No gross deformities. Back- No tenderness  Integument:  Well hydrated, no rash   Lymphatic:  No lymphadenopathy noted   Neurologic:  Alert & oriented x 3, Normal motor function, normal sensory function, no focal deficits noted   Psychiatric:  Speech and behavior appropriate       Medical decision making and Data review:    DATA:    Lab Results   Component Value Date    TROPONINT <0.010 08/07/2022     BNP:  No results found for: PROBNP  PT/INR:  No results found for: PTINR  Lab Results   Component Value Date    LABA1C 4.9 08/30/2022    LABA1C 5.1 02/05/2021     Lab Results   Component Value Date    CHOL 143 10/29/2021    TRIG 117 10/29/2021    HDL 32 (L) 10/29/2021    LDLCALC 88 10/29/2021    LDLDIRECT 102 (H) 09/14/2016     Lab Results   Component Value Date    WBC 4.1 11/08/2022    HGB 12.8 (L) 11/08/2022    HCT 38.1 (L) 11/08/2022    MCV 94.8 11/08/2022     11/08/2022     TSH:   Lab Results   Component Value Date    TSH 5.82 (H) 11/08/2022     Lab Results   Component Value Date    AST 21 08/20/2022    ALT 21 08/20/2022    BILIDIR 1.0 (H) 08/12/2022    BILITOT 1.0 08/20/2022    ALKPHOS 179 (H) 08/20/2022         All labs, medications and tests reviewed by myself including data and history from outside source , patient and available family . 1. Bradycardia    2. Dyslipidemia    3. Essential hypertension    4. Class 3 severe obesity due to excess calories without serious comorbidity with body mass index (BMI) of 40.0 to 44.9 in adult [facility])           Impression and Plan:       Sinus Bradycardia: (Asymptomatic)     Sinus bradycardia during hospitalization in the setting of sepsis and MEGHAN. 48-hour Holter monitor reveals minimum heart rate of 44 bpm, bradycardia burden of 30%. No atrial fibrillation or flutter. No sustained arrhythmias.   Rare PVCs and PACs - Essentially unremarkable study    Echocardiogram normal    Exercise stress test shows normal hemodynamic response, normal chronotropic competence    TSH and T4 within normal limits    No further work-up     Post op alina. Doing well      HTN: B/P stable, continue with Losartan 25 mg daily. Blood pressure 116/58     MEGHAN: patient has home CPAP and is using while hospitalized. AAA: unchanged[facility]. AAA 3.7 cm, continue with good B/P control. Outpatient follow-up at [place]     Dyslipidemia: continue niacin. Low-fat diet and exercise as tolerated     Obesity: BMI of 42.38. Recommend weight loss. Low-fat diet and exercise as tolerated               Return in about 1 year (around 12/20/2023). Counseled extensively and medication compliance urged. We discussed that for the  prevention of ASCVD our  goal is aggressive risk modification. Patient is encouraged to exercise even a brisk walk for 30 minutes  at least 3 to 4 times a week   Various goals were discussed and questions answered. Continue current medications. Appropriate prescriptions are addressed and refills ordered. Questions answered and patient verbalizes understanding. Call for any problems, questions, or concerns.

## 2022-12-20 NOTE — PATIENT INSTRUCTIONS
Please be informed that if you contact our office outside of normal business hours the physician on call cannot help with any scheduling or rescheduling issues, procedure instruction questions or any type of medication issue. We advise you for any urgent/emergency that you go to the nearest emergency room! PLEASE CALL OUR OFFICE DURING NORMAL BUSINESS HOURS    Monday - Friday   8 am to 5 pm    [place]: [phone]    [place]:  [phone]  **It is YOUR responsibilty to bring medication bottles and/or updated medication list to [address] [place] [address]. This will allow us to better serve you and all your healthcare needs**  [facility] Locations - No appointment necessary. Sites open Monday to Friday. Call your preferred location for test preparation, business hours and other information you need. [employer] accepts [employer]. Kenna [facility] [address] [facility]. Martina Oseguera, [address] [facility]  Phone: [phone] [facility] [address] [facility]  [address]. Theresa Linder, [address] [facility]  Phone: [phone]     Thank you for allowing us to care for you today! We want to ensure we can follow your treatment plan and we strive to give you the best outcomes and experience possible. If you ever have a life threatening emergency and call 911 - for an ambulance (EMS)   Our providers can only care for you at:   [facility] or LTAC, located within [facility]. Even if you have someone take you or you drive yourself we can only care for you in a [facility] facility. Our providers are not setup at the other healthcare locations!

## 2023-01-01 ENCOUNTER — APPOINTMENT (OUTPATIENT)
Dept: GENERAL RADIOLOGY | Age: 73
DRG: 870 | End: 2023-01-01
Payer: MEDICARE

## 2023-01-01 ENCOUNTER — HOSPITAL ENCOUNTER (INPATIENT)
Age: 73
LOS: 20 days | DRG: 870 | End: 2023-08-01
Attending: EMERGENCY MEDICINE | Admitting: HOSPITALIST
Payer: MEDICARE

## 2023-01-01 ENCOUNTER — APPOINTMENT (OUTPATIENT)
Dept: CT IMAGING | Age: 73
DRG: 870 | End: 2023-01-01
Payer: MEDICARE

## 2023-01-01 ENCOUNTER — APPOINTMENT (OUTPATIENT)
Dept: ULTRASOUND IMAGING | Age: 73
DRG: 870 | End: 2023-01-01
Payer: MEDICARE

## 2023-01-01 VITALS
TEMPERATURE: 99.8 F | HEART RATE: 88 BPM | SYSTOLIC BLOOD PRESSURE: 120 MMHG | HEIGHT: 69 IN | RESPIRATION RATE: 24 BRPM | OXYGEN SATURATION: 91 % | BODY MASS INDEX: 40.62 KG/M2 | DIASTOLIC BLOOD PRESSURE: 60 MMHG | WEIGHT: 274.25 LBS

## 2023-01-01 DIAGNOSIS — J96.01 ACUTE RESPIRATORY FAILURE WITH HYPOXIA (HCC): Primary | ICD-10-CM

## 2023-01-01 DIAGNOSIS — J18.9 MULTIFOCAL PNEUMONIA: ICD-10-CM

## 2023-01-01 LAB
ALBUMIN SERPL-MCNC: 2.6 GM/DL (ref 3.4–5)
ALBUMIN SERPL-MCNC: 2.8 GM/DL (ref 3.4–5)
ALBUMIN SERPL-MCNC: 2.9 GM/DL (ref 3.4–5)
ALBUMIN SERPL-MCNC: 2.9 GM/DL (ref 3.4–5)
ALBUMIN SERPL-MCNC: 3.2 GM/DL (ref 3.4–5)
ALBUMIN SERPL-MCNC: 3.4 GM/DL (ref 3.4–5)
ALP BLD-CCNC: 59 IU/L (ref 40–129)
ALP BLD-CCNC: 72 IU/L (ref 40–129)
ALP BLD-CCNC: 72 IU/L (ref 40–129)
ALP BLD-CCNC: 78 IU/L (ref 40–128)
ALP BLD-CCNC: 81 IU/L (ref 40–129)
ALP BLD-CCNC: 92 IU/L (ref 40–129)
ALT SERPL-CCNC: 18 U/L (ref 10–40)
ALT SERPL-CCNC: 18 U/L (ref 10–40)
ALT SERPL-CCNC: 24 U/L (ref 10–40)
ALT SERPL-CCNC: 25 U/L (ref 10–40)
ALT SERPL-CCNC: 29 U/L (ref 10–40)
ALT SERPL-CCNC: 37 U/L (ref 10–40)
ANION GAP SERPL CALCULATED.3IONS-SCNC: 10 MMOL/L (ref 4–16)
ANION GAP SERPL CALCULATED.3IONS-SCNC: 11 MMOL/L (ref 4–16)
ANION GAP SERPL CALCULATED.3IONS-SCNC: 12 MMOL/L (ref 4–16)
ANION GAP SERPL CALCULATED.3IONS-SCNC: 13 MMOL/L (ref 4–16)
ANION GAP SERPL CALCULATED.3IONS-SCNC: 13 MMOL/L (ref 4–16)
ANION GAP SERPL CALCULATED.3IONS-SCNC: 14 MMOL/L (ref 4–16)
ANION GAP SERPL CALCULATED.3IONS-SCNC: 15 MMOL/L (ref 4–16)
ANION GAP SERPL CALCULATED.3IONS-SCNC: 4 MMOL/L (ref 4–16)
ANION GAP SERPL CALCULATED.3IONS-SCNC: 4 MMOL/L (ref 4–16)
ANION GAP SERPL CALCULATED.3IONS-SCNC: 5 MMOL/L (ref 4–16)
ANION GAP SERPL CALCULATED.3IONS-SCNC: 6 MMOL/L (ref 4–16)
ANION GAP SERPL CALCULATED.3IONS-SCNC: 7 MMOL/L (ref 4–16)
ANION GAP SERPL CALCULATED.3IONS-SCNC: 8 MMOL/L (ref 4–16)
ANION GAP SERPL CALCULATED.3IONS-SCNC: 8 MMOL/L (ref 4–16)
ANION GAP SERPL CALCULATED.3IONS-SCNC: 9 MMOL/L (ref 4–16)
ANISOCYTOSIS: ABNORMAL
ANISOCYTOSIS: ABNORMAL
APTT: 25.1 SECONDS (ref 25.1–37.1)
APTT: 26.4 SECONDS (ref 25.1–37.1)
AST SERPL-CCNC: 29 IU/L (ref 15–37)
AST SERPL-CCNC: 30 IU/L (ref 15–37)
AST SERPL-CCNC: 33 IU/L (ref 15–37)
AST SERPL-CCNC: 38 IU/L (ref 15–37)
AST SERPL-CCNC: 38 IU/L (ref 15–37)
AST SERPL-CCNC: 40 IU/L (ref 15–37)
B PARAP IS1001 DNA NPH QL NAA+NON-PROBE: NOT DETECTED
B PERT.PT PRMT NPH QL NAA+NON-PROBE: NOT DETECTED
BANDED NEUTROPHILS ABSOLUTE COUNT: 0.19 K/CU MM
BANDED NEUTROPHILS ABSOLUTE COUNT: 0.43 K/CU MM
BANDED NEUTROPHILS ABSOLUTE COUNT: 0.44 K/CU MM
BANDED NEUTROPHILS ABSOLUTE COUNT: 0.69 K/CU MM
BANDED NEUTROPHILS RELATIVE PERCENT: 1 % (ref 5–11)
BANDED NEUTROPHILS RELATIVE PERCENT: 3 % (ref 5–11)
BANDED NEUTROPHILS RELATIVE PERCENT: 4 % (ref 5–11)
BANDED NEUTROPHILS RELATIVE PERCENT: 5 % (ref 5–11)
BASE EXCESS MIXED: 0.3 (ref 0–1.2)
BASE EXCESS MIXED: 10.5 (ref 0–1.2)
BASE EXCESS MIXED: 10.6 (ref 0–1.2)
BASE EXCESS MIXED: 11.8 (ref 0–1.2)
BASE EXCESS MIXED: 12 (ref 0–1.2)
BASE EXCESS MIXED: 12.9 (ref 0–1.2)
BASE EXCESS MIXED: 13.9 (ref 0–1.2)
BASE EXCESS MIXED: 14.1 (ref 0–1.2)
BASE EXCESS MIXED: 14.3 (ref 0–1.2)
BASE EXCESS MIXED: 14.9 (ref 0–1.2)
BASE EXCESS MIXED: 15 (ref 0–1.2)
BASE EXCESS MIXED: 15.3 (ref 0–1.2)
BASE EXCESS MIXED: 15.4 (ref 0–1.2)
BASE EXCESS MIXED: 15.5 (ref 0–1.2)
BASE EXCESS MIXED: 15.7 (ref 0–1.2)
BASE EXCESS MIXED: 15.8 (ref 0–1.2)
BASE EXCESS MIXED: 16.1 (ref 0–1.2)
BASE EXCESS MIXED: 16.1 (ref 0–1.2)
BASE EXCESS MIXED: 16.9 (ref 0–1.2)
BASE EXCESS MIXED: 24.9 (ref 0–1.2)
BASE EXCESS MIXED: 3.6 (ref 0–1.2)
BASE EXCESS MIXED: 6.1 (ref 0–1.2)
BASE EXCESS MIXED: 6.5 (ref 0–1.2)
BASE EXCESS MIXED: 7.1 (ref 0–1.2)
BASE EXCESS MIXED: 7.7 (ref 0–1.2)
BASE EXCESS MIXED: 8.1 (ref 0–1.2)
BASE EXCESS MIXED: 8.7 (ref 0–1.2)
BASE EXCESS MIXED: 8.8 (ref 0–1.2)
BASE EXCESS MIXED: 8.8 (ref 0–1.2)
BASE EXCESS MIXED: 9.3 (ref 0–1.2)
BASE EXCESS MIXED: 9.4 (ref 0–1.2)
BASE EXCESS: ABNORMAL (ref 0–3.3)
BASE EXCESS: ABNORMAL (ref 0–3.3)
BASOPHILS ABSOLUTE: 0 K/CU MM
BASOPHILS ABSOLUTE: 0.1 K/CU MM
BASOPHILS RELATIVE PERCENT: 0.1 % (ref 0–1)
BASOPHILS RELATIVE PERCENT: 0.2 % (ref 0–1)
BASOPHILS RELATIVE PERCENT: 0.3 % (ref 0–1)
BILIRUB SERPL-MCNC: 0.3 MG/DL (ref 0–1)
BILIRUB SERPL-MCNC: 0.4 MG/DL (ref 0–1)
BILIRUB SERPL-MCNC: 0.4 MG/DL (ref 0–1)
BILIRUBIN DIRECT: 0.2 MG/DL (ref 0–0.3)
BILIRUBIN, INDIRECT: 0.1 MG/DL (ref 0–0.7)
BILIRUBIN, INDIRECT: 0.2 MG/DL (ref 0–0.7)
BUN SERPL-MCNC: 12 MG/DL (ref 6–23)
BUN SERPL-MCNC: 14 MG/DL (ref 6–23)
BUN SERPL-MCNC: 16 MG/DL (ref 6–23)
BUN SERPL-MCNC: 18 MG/DL (ref 6–23)
BUN SERPL-MCNC: 19 MG/DL (ref 6–23)
BUN SERPL-MCNC: 21 MG/DL (ref 6–23)
BUN SERPL-MCNC: 23 MG/DL (ref 6–23)
BUN SERPL-MCNC: 24 MG/DL (ref 6–23)
BUN SERPL-MCNC: 25 MG/DL (ref 6–23)
BUN SERPL-MCNC: 26 MG/DL (ref 6–23)
BUN SERPL-MCNC: 27 MG/DL (ref 6–23)
BUN SERPL-MCNC: 28 MG/DL (ref 6–23)
BUN SERPL-MCNC: 29 MG/DL (ref 6–23)
BUN SERPL-MCNC: 29 MG/DL (ref 6–23)
BUN SERPL-MCNC: 31 MG/DL (ref 6–23)
BUN SERPL-MCNC: 31 MG/DL (ref 6–23)
BUN SERPL-MCNC: 33 MG/DL (ref 6–23)
BUN SERPL-MCNC: 34 MG/DL (ref 6–23)
BUN SERPL-MCNC: 34 MG/DL (ref 6–23)
BUN SERPL-MCNC: 36 MG/DL (ref 6–23)
BUN SERPL-MCNC: 39 MG/DL (ref 6–23)
C PNEUM DNA NPH QL NAA+NON-PROBE: NOT DETECTED
CALCIUM SERPL-MCNC: 6.6 MG/DL (ref 8.3–10.6)
CALCIUM SERPL-MCNC: 7.1 MG/DL (ref 8.3–10.6)
CALCIUM SERPL-MCNC: 7.5 MG/DL (ref 8.3–10.6)
CALCIUM SERPL-MCNC: 7.9 MG/DL (ref 8.3–10.6)
CALCIUM SERPL-MCNC: 7.9 MG/DL (ref 8.3–10.6)
CALCIUM SERPL-MCNC: 8 MG/DL (ref 8.3–10.6)
CALCIUM SERPL-MCNC: 8 MG/DL (ref 8.3–10.6)
CALCIUM SERPL-MCNC: 8.1 MG/DL (ref 8.3–10.6)
CALCIUM SERPL-MCNC: 8.2 MG/DL (ref 8.3–10.6)
CALCIUM SERPL-MCNC: 8.3 MG/DL (ref 8.3–10.6)
CALCIUM SERPL-MCNC: 8.3 MG/DL (ref 8.3–10.6)
CALCIUM SERPL-MCNC: 8.4 MG/DL (ref 8.3–10.6)
CALCIUM SERPL-MCNC: 8.4 MG/DL (ref 8.3–10.6)
CALCIUM SERPL-MCNC: 8.6 MG/DL (ref 8.3–10.6)
CALCIUM SERPL-MCNC: 8.6 MG/DL (ref 8.3–10.6)
CALCIUM SERPL-MCNC: 8.7 MG/DL (ref 8.3–10.6)
CALCIUM SERPL-MCNC: 8.7 MG/DL (ref 8.3–10.6)
CALCIUM SERPL-MCNC: 8.8 MG/DL (ref 8.3–10.6)
CALCIUM SERPL-MCNC: 8.9 MG/DL (ref 8.3–10.6)
CALCIUM SERPL-MCNC: 8.9 MG/DL (ref 8.3–10.6)
CALCIUM SERPL-MCNC: 9.1 MG/DL (ref 8.3–10.6)
CARBON MONOXIDE, BLOOD: 2.2 % (ref 0–5)
CARBON MONOXIDE, BLOOD: 2.2 % (ref 0–5)
CARBON MONOXIDE, BLOOD: 2.3 % (ref 0–5)
CARBON MONOXIDE, BLOOD: 2.4 % (ref 0–5)
CARBON MONOXIDE, BLOOD: 2.5 % (ref 0–5)
CARBON MONOXIDE, BLOOD: 2.6 % (ref 0–5)
CARBON MONOXIDE, BLOOD: 2.7 % (ref 0–5)
CARBON MONOXIDE, BLOOD: 2.7 % (ref 0–5)
CARBON MONOXIDE, BLOOD: 2.8 % (ref 0–5)
CARBON MONOXIDE, BLOOD: 2.8 % (ref 0–5)
CARBON MONOXIDE, BLOOD: 2.9 % (ref 0–5)
CARBON MONOXIDE, BLOOD: 3 % (ref 0–5)
CARBON MONOXIDE, BLOOD: 3.1 % (ref 0–5)
CARBON MONOXIDE, BLOOD: 3.2 % (ref 0–5)
CARBON MONOXIDE, BLOOD: 3.2 % (ref 0–5)
CARBON MONOXIDE, BLOOD: 3.8 % (ref 0–5)
CHLORIDE BLD-SCNC: 100 MMOL/L (ref 99–110)
CHLORIDE BLD-SCNC: 101 MMOL/L (ref 99–110)
CHLORIDE BLD-SCNC: 102 MMOL/L (ref 99–110)
CHLORIDE BLD-SCNC: 105 MMOL/L (ref 99–110)
CHLORIDE BLD-SCNC: 105 MMOL/L (ref 99–110)
CHLORIDE BLD-SCNC: 88 MMOL/L (ref 99–110)
CHLORIDE BLD-SCNC: 89 MMOL/L (ref 99–110)
CHLORIDE BLD-SCNC: 91 MMOL/L (ref 99–110)
CHLORIDE BLD-SCNC: 93 MMOL/L (ref 99–110)
CHLORIDE BLD-SCNC: 93 MMOL/L (ref 99–110)
CHLORIDE BLD-SCNC: 95 MMOL/L (ref 99–110)
CHLORIDE BLD-SCNC: 96 MMOL/L (ref 99–110)
CHLORIDE BLD-SCNC: 97 MMOL/L (ref 99–110)
CHLORIDE BLD-SCNC: 98 MMOL/L (ref 99–110)
CHLORIDE BLD-SCNC: 98 MMOL/L (ref 99–110)
CHLORIDE BLD-SCNC: 99 MMOL/L (ref 99–110)
CO2 CONTENT: 27.3 MMOL/L (ref 19–24)
CO2 CONTENT: 31.2 MMOL/L (ref 19–24)
CO2 CONTENT: 34 MMOL/L (ref 19–24)
CO2 CONTENT: 36.1 MMOL/L (ref 19–24)
CO2 CONTENT: 37.4 MMOL/L (ref 19–24)
CO2 CONTENT: 37.8 MMOL/L (ref 19–24)
CO2 CONTENT: 39.3 MMOL/L (ref 19–24)
CO2 CONTENT: 40 MMOL/L (ref 19–24)
CO2 CONTENT: 40.8 MMOL/L (ref 19–24)
CO2 CONTENT: 40.8 MMOL/L (ref 19–24)
CO2 CONTENT: 41.6 MMOL/L (ref 19–24)
CO2 CONTENT: 42.2 MMOL/L (ref 19–24)
CO2 CONTENT: 43.1 MMOL/L (ref 19–24)
CO2 CONTENT: 44.5 MMOL/L (ref 19–24)
CO2 CONTENT: 45.9 MMOL/L (ref 19–24)
CO2 CONTENT: 46 MMOL/L (ref 19–24)
CO2 CONTENT: 46.4 MMOL/L (ref 19–24)
CO2 CONTENT: 48.3 MMOL/L (ref 19–24)
CO2 CONTENT: 48.6 MMOL/L (ref 19–24)
CO2 CONTENT: 48.6 MMOL/L (ref 19–24)
CO2 CONTENT: 49.1 MMOL/L (ref 19–24)
CO2 CONTENT: 49.2 MMOL/L (ref 19–24)
CO2 CONTENT: 49.5 MMOL/L (ref 19–24)
CO2 CONTENT: 51.7 MMOL/L (ref 19–24)
CO2 CONTENT: 51.8 MMOL/L (ref 19–24)
CO2 CONTENT: 52.1 MMOL/L (ref 19–24)
CO2: 20 MMOL/L (ref 21–32)
CO2: 21 MMOL/L (ref 21–32)
CO2: 22 MMOL/L (ref 21–32)
CO2: 23 MMOL/L (ref 21–32)
CO2: 26 MMOL/L (ref 21–32)
CO2: 28 MMOL/L (ref 21–32)
CO2: 29 MMOL/L (ref 21–32)
CO2: 30 MMOL/L (ref 21–32)
CO2: 31 MMOL/L (ref 21–32)
CO2: 31 MMOL/L (ref 21–32)
CO2: 32 MMOL/L (ref 21–32)
CO2: 33 MMOL/L (ref 21–32)
CO2: 33 MMOL/L (ref 21–32)
CO2: 34 MMOL/L (ref 21–32)
CO2: 35 MMOL/L (ref 21–32)
CO2: 36 MMOL/L (ref 21–32)
CO2: 37 MMOL/L (ref 21–32)
CO2: 38 MMOL/L (ref 21–32)
CO2: 39 MMOL/L (ref 21–32)
CO2: 40 MMOL/L (ref 21–32)
CO2: 44 MMOL/L (ref 21–32)
COMMENT: 24
COMMENT: ABNORMAL
CREAT SERPL-MCNC: 0.3 MG/DL (ref 0.9–1.3)
CREAT SERPL-MCNC: 0.4 MG/DL (ref 0.9–1.3)
CREAT SERPL-MCNC: 0.5 MG/DL (ref 0.9–1.3)
CREAT SERPL-MCNC: 0.6 MG/DL (ref 0.9–1.3)
CREAT SERPL-MCNC: 0.6 MG/DL (ref 0.9–1.3)
CREAT SERPL-MCNC: 0.7 MG/DL (ref 0.9–1.3)
CREAT SERPL-MCNC: 0.8 MG/DL (ref 0.9–1.3)
CREAT SERPL-MCNC: 0.8 MG/DL (ref 0.9–1.3)
CREATININE CLEARANCE: 104 ML/MIN (ref 100–140)
CREATININE URINE: 84.2 MG/DL (ref 39–259)
CRP SERPL HS-MCNC: 120.9 MG/L
CULTURE: ABNORMAL
CULTURE: NORMAL
DIFFERENTIAL TYPE: ABNORMAL
DOHLE BODIES: PRESENT
DOSE AMOUNT: ABNORMAL
DOSE AMOUNT: ABNORMAL
DOSE AMOUNT: NORMAL
DOSE TIME: ABNORMAL
DOSE TIME: ABNORMAL
DOSE TIME: NORMAL
EGFR, POC: >60 ML/MIN/1.73M2
EGFR, POC: >60 ML/MIN/1.73M2
EKG ATRIAL RATE: 113 BPM
EKG DIAGNOSIS: NORMAL
EKG P AXIS: 21 DEGREES
EKG P-R INTERVAL: 192 MS
EKG Q-T INTERVAL: 318 MS
EKG QRS DURATION: 82 MS
EKG QTC CALCULATION (BAZETT): 436 MS
EKG R AXIS: -1 DEGREES
EKG T AXIS: 12 DEGREES
EKG VENTRICULAR RATE: 113 BPM
EOSINOPHILS ABSOLUTE: 0 K/CU MM
EOSINOPHILS ABSOLUTE: 0.3 K/CU MM
EOSINOPHILS RELATIVE PERCENT: 0 % (ref 0–3)
EOSINOPHILS RELATIVE PERCENT: 0.1 % (ref 0–3)
EOSINOPHILS RELATIVE PERCENT: 2 % (ref 0–3)
FLUAV H1 2009 PAN RNA NPH NAA+NON-PROBE: NOT DETECTED
FLUAV H1 RNA NPH QL NAA+NON-PROBE: NOT DETECTED
FLUAV H3 RNA NPH QL NAA+NON-PROBE: NOT DETECTED
FLUAV RNA NPH QL NAA+NON-PROBE: NOT DETECTED
FLUBV RNA NPH QL NAA+NON-PROBE: NOT DETECTED
GFR SERPL CREATININE-BSD FRML MDRD: >60 ML/MIN/1.73M2
GLUCOSE BLD-MCNC: 111 MG/DL (ref 70–99)
GLUCOSE BLD-MCNC: 112 MG/DL (ref 70–99)
GLUCOSE BLD-MCNC: 113 MG/DL (ref 70–99)
GLUCOSE BLD-MCNC: 125 MG/DL (ref 70–99)
GLUCOSE BLD-MCNC: 149 MG/DL (ref 70–99)
GLUCOSE BLD-MCNC: 219 MG/DL (ref 70–99)
GLUCOSE SERPL-MCNC: 100 MG/DL (ref 70–99)
GLUCOSE SERPL-MCNC: 101 MG/DL (ref 70–99)
GLUCOSE SERPL-MCNC: 103 MG/DL (ref 70–99)
GLUCOSE SERPL-MCNC: 104 MG/DL (ref 70–99)
GLUCOSE SERPL-MCNC: 105 MG/DL (ref 70–99)
GLUCOSE SERPL-MCNC: 108 MG/DL (ref 70–99)
GLUCOSE SERPL-MCNC: 109 MG/DL (ref 70–99)
GLUCOSE SERPL-MCNC: 110 MG/DL (ref 70–99)
GLUCOSE SERPL-MCNC: 111 MG/DL (ref 70–99)
GLUCOSE SERPL-MCNC: 111 MG/DL (ref 70–99)
GLUCOSE SERPL-MCNC: 112 MG/DL (ref 70–99)
GLUCOSE SERPL-MCNC: 114 MG/DL (ref 70–99)
GLUCOSE SERPL-MCNC: 115 MG/DL (ref 70–99)
GLUCOSE SERPL-MCNC: 116 MG/DL (ref 70–99)
GLUCOSE SERPL-MCNC: 117 MG/DL (ref 70–99)
GLUCOSE SERPL-MCNC: 117 MG/DL (ref 70–99)
GLUCOSE SERPL-MCNC: 118 MG/DL (ref 70–99)
GLUCOSE SERPL-MCNC: 119 MG/DL (ref 70–99)
GLUCOSE SERPL-MCNC: 120 MG/DL (ref 70–99)
GLUCOSE SERPL-MCNC: 123 MG/DL (ref 70–99)
GLUCOSE SERPL-MCNC: 135 MG/DL (ref 70–99)
GLUCOSE SERPL-MCNC: 136 MG/DL (ref 70–99)
GLUCOSE SERPL-MCNC: 137 MG/DL (ref 70–99)
GLUCOSE SERPL-MCNC: 137 MG/DL (ref 70–99)
GLUCOSE SERPL-MCNC: 170 MG/DL (ref 70–99)
GLUCOSE SERPL-MCNC: 99 MG/DL (ref 70–99)
GRAM SMEAR: ABNORMAL
HADV DNA NPH QL NAA+NON-PROBE: NOT DETECTED
HCO3 ARTERIAL: 26.3 MMOL/L (ref 18–23)
HCO3 ARTERIAL: 30.1 MMOL/L (ref 18–23)
HCO3 ARTERIAL: 32.7 MMOL/L (ref 18–23)
HCO3 ARTERIAL: 34.2 MMOL/L (ref 18–23)
HCO3 ARTERIAL: 35.7 MMOL/L (ref 18–23)
HCO3 ARTERIAL: 36.1 MMOL/L (ref 18–23)
HCO3 ARTERIAL: 36.5 MMOL/L (ref 18–23)
HCO3 ARTERIAL: 37.7 MMOL/L (ref 18–23)
HCO3 ARTERIAL: 38.1 MMOL/L (ref 18–23)
HCO3 ARTERIAL: 38.7 MMOL/L (ref 18–23)
HCO3 ARTERIAL: 39.1 MMOL/L (ref 18–23)
HCO3 ARTERIAL: 39.5 MMOL/L (ref 18–23)
HCO3 ARTERIAL: 39.6 MMOL/L (ref 18–23)
HCO3 ARTERIAL: 40.2 MMOL/L (ref 18–23)
HCO3 ARTERIAL: 41.2 MMOL/L (ref 18–23)
HCO3 ARTERIAL: 42.7 MMOL/L (ref 18–23)
HCO3 ARTERIAL: 43.5 MMOL/L (ref 18–23)
HCO3 ARTERIAL: 43.6 MMOL/L (ref 18–23)
HCO3 ARTERIAL: 44.2 MMOL/L (ref 18–23)
HCO3 ARTERIAL: 45.8 MMOL/L (ref 18–23)
HCO3 ARTERIAL: 45.8 MMOL/L (ref 18–23)
HCO3 ARTERIAL: 45.9 MMOL/L (ref 18–23)
HCO3 ARTERIAL: 46.3 MMOL/L (ref 18–23)
HCO3 ARTERIAL: 46.4 MMOL/L (ref 18–23)
HCO3 ARTERIAL: 46.5 MMOL/L (ref 18–23)
HCO3 ARTERIAL: 48.5 MMOL/L (ref 18–23)
HCO3 ARTERIAL: 48.9 MMOL/L (ref 18–23)
HCO3 ARTERIAL: 50.1 MMOL/L (ref 18–23)
HCO3 VENOUS: 23.5 MMOL/L (ref 19–25)
HCO3 VENOUS: 34 MMOL/L (ref 19–25)
HCO3 VENOUS: 35.1 MMOL/L (ref 19–25)
HCOV 229E RNA NPH QL NAA+NON-PROBE: NOT DETECTED
HCOV HKU1 RNA NPH QL NAA+NON-PROBE: NOT DETECTED
HCOV NL63 RNA NPH QL NAA+NON-PROBE: NOT DETECTED
HCOV OC43 RNA NPH QL NAA+NON-PROBE: NOT DETECTED
HCT VFR BLD CALC: 29.5 % (ref 42–52)
HCT VFR BLD CALC: 30 % (ref 42–52)
HCT VFR BLD CALC: 30.7 % (ref 42–52)
HCT VFR BLD CALC: 31.3 % (ref 42–52)
HCT VFR BLD CALC: 31.5 % (ref 42–52)
HCT VFR BLD CALC: 32 % (ref 42–52)
HCT VFR BLD CALC: 32.4 % (ref 42–52)
HCT VFR BLD CALC: 33 % (ref 42–52)
HCT VFR BLD CALC: 33.1 % (ref 42–52)
HCT VFR BLD CALC: 33.8 % (ref 42–52)
HCT VFR BLD CALC: 33.9 % (ref 42–52)
HCT VFR BLD CALC: 34 % (ref 42–52)
HCT VFR BLD CALC: 34.3 % (ref 42–52)
HCT VFR BLD CALC: 35.7 % (ref 42–52)
HCT VFR BLD CALC: 35.9 % (ref 42–52)
HCT VFR BLD CALC: 37 % (ref 42–52)
HCT VFR BLD CALC: 37.6 % (ref 42–52)
HCT VFR BLD CALC: 37.7 % (ref 42–52)
HCT VFR BLD CALC: 37.9 % (ref 42–52)
HCT VFR BLD CALC: 38.7 % (ref 42–52)
HCT VFR BLD CALC: 39 % (ref 42–52)
HEIGHT, INCHES: NORMAL INCHES
HEMOGLOBIN: 10 GM/DL (ref 13.5–18)
HEMOGLOBIN: 10.1 GM/DL (ref 13.5–18)
HEMOGLOBIN: 10.5 GM/DL (ref 13.5–18)
HEMOGLOBIN: 10.7 GM/DL (ref 13.5–18)
HEMOGLOBIN: 11.4 GM/DL (ref 13.5–18)
HEMOGLOBIN: 11.6 GM/DL (ref 13.5–18)
HEMOGLOBIN: 12 GM/DL (ref 13.5–18)
HEMOGLOBIN: 12.2 GM/DL (ref 13.5–18)
HEMOGLOBIN: 12.3 GM/DL (ref 13.5–18)
HEMOGLOBIN: 12.4 GM/DL (ref 13.5–18)
HEMOGLOBIN: 8.9 GM/DL (ref 13.5–18)
HEMOGLOBIN: 9 GM/DL (ref 13.5–18)
HEMOGLOBIN: 9.4 GM/DL (ref 13.5–18)
HEMOGLOBIN: 9.5 GM/DL (ref 13.5–18)
HEMOGLOBIN: 9.6 GM/DL (ref 13.5–18)
HEMOGLOBIN: 9.8 GM/DL (ref 13.5–18)
HEMOGLOBIN: 9.9 GM/DL (ref 13.5–18)
HMPV RNA NPH QL NAA+NON-PROBE: NOT DETECTED
HPIV1 RNA NPH QL NAA+NON-PROBE: NOT DETECTED
HPIV2 RNA NPH QL NAA+NON-PROBE: NOT DETECTED
HPIV3 RNA NPH QL NAA+NON-PROBE: NOT DETECTED
HPIV4 RNA NPH QL NAA+NON-PROBE: NOT DETECTED
HYPOCHROMIA: ABNORMAL
IMMATURE NEUTROPHIL %: 0.4 % (ref 0–0.43)
IMMATURE NEUTROPHIL %: 0.6 % (ref 0–0.43)
IMMATURE NEUTROPHIL %: 1.6 % (ref 0–0.43)
IMMATURE NEUTROPHIL %: 1.7 % (ref 0–0.43)
IMMATURE NEUTROPHIL %: 1.7 % (ref 0–0.43)
IMMATURE NEUTROPHIL %: 1.8 % (ref 0–0.43)
IMMATURE NEUTROPHIL %: 1.8 % (ref 0–0.43)
IMMATURE NEUTROPHIL %: 3.5 % (ref 0–0.43)
IMMATURE NEUTROPHIL %: 3.5 % (ref 0–0.43)
INR BLD: 1.2 INDEX
INR BLD: 1.3 INDEX
L PNEUMO AG UR QL IA: NEGATIVE
LACTATE: 1.8 MMOL/L (ref 0.5–1.9)
LACTATE: 2.1 MMOL/L (ref 0.5–1.9)
LACTATE: 2.4 MMOL/L (ref 0.5–1.9)
LACTIC ACID, SEPSIS: 1.4 MMOL/L (ref 0.5–1.9)
LACTIC ACID, SEPSIS: 1.5 MMOL/L (ref 0.5–1.9)
LACTIC ACID, SEPSIS: 1.8 MMOL/L (ref 0.5–1.9)
LV EF: 58 %
LVEF MODALITY: NORMAL
LYMPHOCYTES ABSOLUTE: 0.8 K/CU MM
LYMPHOCYTES ABSOLUTE: 1.1 K/CU MM
LYMPHOCYTES ABSOLUTE: 1.1 K/CU MM
LYMPHOCYTES ABSOLUTE: 1.2 K/CU MM
LYMPHOCYTES ABSOLUTE: 1.2 K/CU MM
LYMPHOCYTES ABSOLUTE: 1.4 K/CU MM
LYMPHOCYTES ABSOLUTE: 1.6 K/CU MM
LYMPHOCYTES ABSOLUTE: 1.8 K/CU MM
LYMPHOCYTES ABSOLUTE: 2.1 K/CU MM
LYMPHOCYTES ABSOLUTE: 2.1 K/CU MM
LYMPHOCYTES ABSOLUTE: 2.2 K/CU MM
LYMPHOCYTES ABSOLUTE: 2.3 K/CU MM
LYMPHOCYTES ABSOLUTE: 3 K/CU MM
LYMPHOCYTES ABSOLUTE: 3.1 K/CU MM
LYMPHOCYTES RELATIVE PERCENT: 11.3 % (ref 24–44)
LYMPHOCYTES RELATIVE PERCENT: 12.9 % (ref 24–44)
LYMPHOCYTES RELATIVE PERCENT: 15 % (ref 24–44)
LYMPHOCYTES RELATIVE PERCENT: 15 % (ref 24–44)
LYMPHOCYTES RELATIVE PERCENT: 16 % (ref 24–44)
LYMPHOCYTES RELATIVE PERCENT: 20 % (ref 24–44)
LYMPHOCYTES RELATIVE PERCENT: 22 % (ref 24–44)
LYMPHOCYTES RELATIVE PERCENT: 4 % (ref 24–44)
LYMPHOCYTES RELATIVE PERCENT: 6.2 % (ref 24–44)
LYMPHOCYTES RELATIVE PERCENT: 7 % (ref 24–44)
LYMPHOCYTES RELATIVE PERCENT: 7.2 % (ref 24–44)
LYMPHOCYTES RELATIVE PERCENT: 7.9 % (ref 24–44)
LYMPHOCYTES RELATIVE PERCENT: 8.7 % (ref 24–44)
LYMPHOCYTES RELATIVE PERCENT: 9.7 % (ref 24–44)
Lab: 24 HRS
Lab: ABNORMAL
Lab: NORMAL
M PNEUMO DNA NPH QL NAA+NON-PROBE: NOT DETECTED
MACROCYTES: ABNORMAL
MAGNESIUM: 1.9 MG/DL (ref 1.8–2.4)
MAGNESIUM: 2 MG/DL (ref 1.8–2.4)
MAGNESIUM: 2 MG/DL (ref 1.8–2.4)
MAGNESIUM: 2.1 MG/DL (ref 1.8–2.4)
MAGNESIUM: 2.1 MG/DL (ref 1.8–2.4)
MAGNESIUM: 2.2 MG/DL (ref 1.8–2.4)
MAGNESIUM: 2.3 MG/DL (ref 1.8–2.4)
MAGNESIUM: 2.4 MG/DL (ref 1.8–2.4)
MCH RBC QN AUTO: 28.8 PG (ref 27–31)
MCH RBC QN AUTO: 29 PG (ref 27–31)
MCH RBC QN AUTO: 29 PG (ref 27–31)
MCH RBC QN AUTO: 29.1 PG (ref 27–31)
MCH RBC QN AUTO: 29.2 PG (ref 27–31)
MCH RBC QN AUTO: 29.2 PG (ref 27–31)
MCH RBC QN AUTO: 29.3 PG (ref 27–31)
MCH RBC QN AUTO: 29.4 PG (ref 27–31)
MCH RBC QN AUTO: 29.5 PG (ref 27–31)
MCH RBC QN AUTO: 29.5 PG (ref 27–31)
MCH RBC QN AUTO: 29.8 PG (ref 27–31)
MCH RBC QN AUTO: 29.9 PG (ref 27–31)
MCH RBC QN AUTO: 30 PG (ref 27–31)
MCH RBC QN AUTO: 30.1 PG (ref 27–31)
MCH RBC QN AUTO: 30.1 PG (ref 27–31)
MCHC RBC AUTO-ENTMCNC: 29 % (ref 32–36)
MCHC RBC AUTO-ENTMCNC: 29 % (ref 32–36)
MCHC RBC AUTO-ENTMCNC: 30 % (ref 32–36)
MCHC RBC AUTO-ENTMCNC: 30 % (ref 32–36)
MCHC RBC AUTO-ENTMCNC: 30.2 % (ref 32–36)
MCHC RBC AUTO-ENTMCNC: 30.8 % (ref 32–36)
MCHC RBC AUTO-ENTMCNC: 30.9 % (ref 32–36)
MCHC RBC AUTO-ENTMCNC: 30.9 % (ref 32–36)
MCHC RBC AUTO-ENTMCNC: 31 % (ref 32–36)
MCHC RBC AUTO-ENTMCNC: 31.2 % (ref 32–36)
MCHC RBC AUTO-ENTMCNC: 31.2 % (ref 32–36)
MCHC RBC AUTO-ENTMCNC: 31.3 % (ref 32–36)
MCHC RBC AUTO-ENTMCNC: 31.4 % (ref 32–36)
MCHC RBC AUTO-ENTMCNC: 31.8 % (ref 32–36)
MCHC RBC AUTO-ENTMCNC: 31.8 % (ref 32–36)
MCHC RBC AUTO-ENTMCNC: 31.9 % (ref 32–36)
MCHC RBC AUTO-ENTMCNC: 32.2 % (ref 32–36)
MCHC RBC AUTO-ENTMCNC: 32.3 % (ref 32–36)
MCHC RBC AUTO-ENTMCNC: 32.4 % (ref 32–36)
MCV RBC AUTO: 100 FL (ref 78–100)
MCV RBC AUTO: 101.2 FL (ref 78–100)
MCV RBC AUTO: 91.9 FL (ref 78–100)
MCV RBC AUTO: 92.2 FL (ref 78–100)
MCV RBC AUTO: 92.3 FL (ref 78–100)
MCV RBC AUTO: 92.6 FL (ref 78–100)
MCV RBC AUTO: 92.7 FL (ref 78–100)
MCV RBC AUTO: 93 FL (ref 78–100)
MCV RBC AUTO: 93.8 FL (ref 78–100)
MCV RBC AUTO: 94.2 FL (ref 78–100)
MCV RBC AUTO: 94.2 FL (ref 78–100)
MCV RBC AUTO: 95 FL (ref 78–100)
MCV RBC AUTO: 95 FL (ref 78–100)
MCV RBC AUTO: 95.7 FL (ref 78–100)
MCV RBC AUTO: 95.9 FL (ref 78–100)
MCV RBC AUTO: 96.2 FL (ref 78–100)
MCV RBC AUTO: 96.6 FL (ref 78–100)
MCV RBC AUTO: 96.8 FL (ref 78–100)
MCV RBC AUTO: 99 FL (ref 78–100)
METAMYELOCYTES ABSOLUTE COUNT: 0.11 K/CU MM
METAMYELOCYTES ABSOLUTE COUNT: 0.19 K/CU MM
METAMYELOCYTES ABSOLUTE COUNT: 0.29 K/CU MM
METAMYELOCYTES ABSOLUTE COUNT: 0.55 K/CU MM
METAMYELOCYTES PERCENT: 1 %
METAMYELOCYTES PERCENT: 1 %
METAMYELOCYTES PERCENT: 2 %
METAMYELOCYTES PERCENT: 4 %
METHEMOGLOBIN ARTERIAL: 0.8 %
METHEMOGLOBIN ARTERIAL: 0.9 %
METHEMOGLOBIN ARTERIAL: 1 %
METHEMOGLOBIN ARTERIAL: 1.1 %
METHEMOGLOBIN ARTERIAL: 1.2 %
METHEMOGLOBIN ARTERIAL: 1.3 %
METHEMOGLOBIN ARTERIAL: 1.4 %
METHEMOGLOBIN ARTERIAL: 1.5 %
METHEMOGLOBIN ARTERIAL: 1.6 %
METHEMOGLOBIN ARTERIAL: 1.6 %
METHEMOGLOBIN ARTERIAL: 1.8 %
METHEMOGLOBIN ARTERIAL: 1.8 %
MONOCYTES ABSOLUTE: 0.4 K/CU MM
MONOCYTES ABSOLUTE: 0.5 K/CU MM
MONOCYTES ABSOLUTE: 0.6 K/CU MM
MONOCYTES ABSOLUTE: 0.7 K/CU MM
MONOCYTES ABSOLUTE: 0.8 K/CU MM
MONOCYTES ABSOLUTE: 0.9 K/CU MM
MONOCYTES ABSOLUTE: 1.1 K/CU MM
MONOCYTES ABSOLUTE: 1.2 K/CU MM
MONOCYTES ABSOLUTE: 1.3 K/CU MM
MONOCYTES ABSOLUTE: 2 K/CU MM
MONOCYTES ABSOLUTE: 2.2 K/CU MM
MONOCYTES RELATIVE PERCENT: 3 % (ref 0–4)
MONOCYTES RELATIVE PERCENT: 3.9 % (ref 0–4)
MONOCYTES RELATIVE PERCENT: 4 % (ref 0–4)
MONOCYTES RELATIVE PERCENT: 4 % (ref 0–4)
MONOCYTES RELATIVE PERCENT: 5 % (ref 0–4)
MONOCYTES RELATIVE PERCENT: 5 % (ref 0–4)
MONOCYTES RELATIVE PERCENT: 5.5 % (ref 0–4)
MONOCYTES RELATIVE PERCENT: 5.7 % (ref 0–4)
MONOCYTES RELATIVE PERCENT: 7.1 % (ref 0–4)
MONOCYTES RELATIVE PERCENT: 7.2 % (ref 0–4)
MONOCYTES RELATIVE PERCENT: 7.7 % (ref 0–4)
MONOCYTES RELATIVE PERCENT: 8.4 % (ref 0–4)
MONOCYTES RELATIVE PERCENT: 8.7 % (ref 0–4)
MRSA, DNA, NASAL: NEGATIVE
MRSA, DNA, NASAL: NEGATIVE
NUCLEAR IGG SER QL IA: NORMAL
NUCLEATED RBC %: 0 %
NUCLEATED RBC %: 0.1 %
O2 SAT, VEN: 27.6 % (ref 50–70)
O2 SAT, VEN: 92.9 % (ref 50–70)
O2 SAT, VEN: 93.7 % (ref 50–70)
O2 SATURATION: 76.1 % (ref 96–97)
O2 SATURATION: 87.1 % (ref 96–97)
O2 SATURATION: 87.7 % (ref 96–97)
O2 SATURATION: 87.7 % (ref 96–97)
O2 SATURATION: 87.8 % (ref 96–97)
O2 SATURATION: 88 % (ref 96–97)
O2 SATURATION: 88.6 % (ref 96–97)
O2 SATURATION: 89.2 % (ref 96–97)
O2 SATURATION: 89.4 % (ref 96–97)
O2 SATURATION: 89.6 % (ref 96–97)
O2 SATURATION: 91.5 % (ref 96–97)
O2 SATURATION: 92.2 % (ref 96–97)
O2 SATURATION: 92.7 % (ref 96–97)
O2 SATURATION: 92.8 % (ref 96–97)
O2 SATURATION: 92.9 % (ref 96–97)
O2 SATURATION: 93.2 % (ref 96–97)
O2 SATURATION: 93.2 % (ref 96–97)
O2 SATURATION: 93.4 % (ref 96–97)
O2 SATURATION: 93.7 % (ref 96–97)
O2 SATURATION: 94.2 % (ref 96–97)
O2 SATURATION: 94.3 % (ref 96–97)
O2 SATURATION: 94.8 % (ref 96–97)
O2 SATURATION: 95 % (ref 96–97)
O2 SATURATION: 95.3 % (ref 96–97)
O2 SATURATION: 95.5 % (ref 96–97)
O2 SATURATION: 95.5 % (ref 96–97)
O2 SATURATION: 95.6 % (ref 96–97)
O2 SATURATION: 95.6 % (ref 96–97)
PATIENT WEIGHT (LBS): NORMAL LBS
PCO2 ARTERIAL: 104 MMHG (ref 32–45)
PCO2 ARTERIAL: 105.7 MMHG (ref 32–45)
PCO2 ARTERIAL: 108 MMHG (ref 32–45)
PCO2 ARTERIAL: 33 MMHG (ref 32–45)
PCO2 ARTERIAL: 36 MMHG (ref 32–45)
PCO2 ARTERIAL: 41 MMHG (ref 32–45)
PCO2 ARTERIAL: 51 MMHG (ref 32–45)
PCO2 ARTERIAL: 53 MMHG (ref 32–45)
PCO2 ARTERIAL: 55 MMHG (ref 32–45)
PCO2 ARTERIAL: 55 MMHG (ref 32–45)
PCO2 ARTERIAL: 57 MMHG (ref 32–45)
PCO2 ARTERIAL: 60 MMHG (ref 32–45)
PCO2 ARTERIAL: 62 MMHG (ref 32–45)
PCO2 ARTERIAL: 62 MMHG (ref 32–45)
PCO2 ARTERIAL: 63 MMHG (ref 32–45)
PCO2 ARTERIAL: 64 MMHG (ref 32–45)
PCO2 ARTERIAL: 67 MMHG (ref 32–45)
PCO2 ARTERIAL: 73 MMHG (ref 32–45)
PCO2 ARTERIAL: 75.3 MMHG (ref 32–45)
PCO2 ARTERIAL: 77 MMHG (ref 32–45)
PCO2 ARTERIAL: 79 MMHG (ref 32–45)
PCO2 ARTERIAL: 81 MMHG (ref 32–45)
PCO2 ARTERIAL: 88 MMHG (ref 32–45)
PCO2 ARTERIAL: 89 MMHG (ref 32–45)
PCO2 ARTERIAL: 90 MMHG (ref 32–45)
PCO2 ARTERIAL: 91 MMHG (ref 32–45)
PCO2 ARTERIAL: 92 MMHG (ref 32–45)
PCO2 ARTERIAL: 99 MMHG (ref 32–45)
PCO2, VEN: 33 MMHG (ref 38–52)
PCO2, VEN: 58 MMHG (ref 38–52)
PCO2, VEN: 63 MMHG (ref 38–52)
PDW BLD-RTO: 14.4 % (ref 11.7–14.9)
PDW BLD-RTO: 14.5 % (ref 11.7–14.9)
PDW BLD-RTO: 14.5 % (ref 11.7–14.9)
PDW BLD-RTO: 14.6 % (ref 11.7–14.9)
PDW BLD-RTO: 14.7 % (ref 11.7–14.9)
PDW BLD-RTO: 14.8 % (ref 11.7–14.9)
PDW BLD-RTO: 15.1 % (ref 11.7–14.9)
PDW BLD-RTO: 15.1 % (ref 11.7–14.9)
PDW BLD-RTO: 16 % (ref 11.7–14.9)
PDW BLD-RTO: 16.5 % (ref 11.7–14.9)
PDW BLD-RTO: 16.7 % (ref 11.7–14.9)
PDW BLD-RTO: 16.9 % (ref 11.7–14.9)
PH BLOOD: 7.19 (ref 7.34–7.45)
PH BLOOD: 7.26 (ref 7.34–7.45)
PH BLOOD: 7.26 (ref 7.34–7.45)
PH BLOOD: 7.28 (ref 7.34–7.45)
PH BLOOD: 7.28 (ref 7.34–7.45)
PH BLOOD: 7.29 (ref 7.34–7.45)
PH BLOOD: 7.31 (ref 7.34–7.45)
PH BLOOD: 7.31 (ref 7.34–7.45)
PH BLOOD: 7.32 (ref 7.34–7.45)
PH BLOOD: 7.32 (ref 7.34–7.45)
PH BLOOD: 7.35 (ref 7.34–7.45)
PH BLOOD: 7.35 (ref 7.34–7.45)
PH BLOOD: 7.36 (ref 7.34–7.45)
PH BLOOD: 7.36 (ref 7.34–7.45)
PH BLOOD: 7.37 (ref 7.34–7.45)
PH BLOOD: 7.39 (ref 7.34–7.45)
PH BLOOD: 7.39 (ref 7.34–7.45)
PH BLOOD: 7.4 (ref 7.34–7.45)
PH BLOOD: 7.41 (ref 7.34–7.45)
PH BLOOD: 7.42 (ref 7.34–7.45)
PH BLOOD: 7.43 (ref 7.34–7.45)
PH BLOOD: 7.46 (ref 7.34–7.45)
PH BLOOD: 7.51 (ref 7.34–7.45)
PH BLOOD: 7.51 (ref 7.34–7.45)
PH BLOOD: 7.53 (ref 7.34–7.45)
PH BLOOD: 7.6 (ref 7.34–7.45)
PH VENOUS: 7.34 (ref 7.32–7.42)
PH VENOUS: 7.39 (ref 7.32–7.42)
PH VENOUS: 7.46 (ref 7.32–7.42)
PHOSPHORUS: 2 MG/DL (ref 2.5–4.9)
PHOSPHORUS: 2.2 MG/DL (ref 2.5–4.9)
PHOSPHORUS: 2.3 MG/DL (ref 2.5–4.9)
PHOSPHORUS: 2.4 MG/DL (ref 2.5–4.9)
PHOSPHORUS: 2.5 MG/DL (ref 2.5–4.9)
PHOSPHORUS: 2.7 MG/DL (ref 2.5–4.9)
PHOSPHORUS: 2.8 MG/DL (ref 2.5–4.9)
PHOSPHORUS: 2.8 MG/DL (ref 2.5–4.9)
PHOSPHORUS: 2.9 MG/DL (ref 2.5–4.9)
PHOSPHORUS: 3 MG/DL (ref 2.5–4.9)
PHOSPHORUS: 3.1 MG/DL (ref 2.5–4.9)
PHOSPHORUS: 3.2 MG/DL (ref 2.5–4.9)
PHOSPHORUS: 3.3 MG/DL (ref 2.5–4.9)
PHOSPHORUS: 3.4 MG/DL (ref 2.5–4.9)
PHOSPHORUS: 3.7 MG/DL (ref 2.5–4.9)
PHOSPHORUS: 3.8 MG/DL (ref 2.5–4.9)
PHOSPHORUS: 4.2 MG/DL (ref 2.5–4.9)
PHOSPHORUS: 4.4 MG/DL (ref 2.5–4.9)
PHOSPHORUS: 4.5 MG/DL (ref 2.5–4.9)
PLATELET # BLD: 165 K/CU MM (ref 140–440)
PLATELET # BLD: 194 K/CU MM (ref 140–440)
PLATELET # BLD: 206 K/CU MM (ref 140–440)
PLATELET # BLD: 207 K/CU MM (ref 140–440)
PLATELET # BLD: 233 K/CU MM (ref 140–440)
PLATELET # BLD: 239 K/CU MM (ref 140–440)
PLATELET # BLD: 243 K/CU MM (ref 140–440)
PLATELET # BLD: 244 K/CU MM (ref 140–440)
PLATELET # BLD: 259 K/CU MM (ref 140–440)
PLATELET # BLD: 264 K/CU MM (ref 140–440)
PLATELET # BLD: 274 K/CU MM (ref 140–440)
PLATELET # BLD: 278 K/CU MM (ref 140–440)
PLATELET # BLD: 280 K/CU MM (ref 140–440)
PLATELET # BLD: 282 K/CU MM (ref 140–440)
PLATELET # BLD: 285 K/CU MM (ref 140–440)
PLATELET # BLD: 293 K/CU MM (ref 140–440)
PLATELET # BLD: 323 K/CU MM (ref 140–440)
PLATELET # BLD: 332 K/CU MM (ref 140–440)
PLATELET # BLD: 349 K/CU MM (ref 140–440)
PLT MORPHOLOGY: ABNORMAL
PMV BLD AUTO: 10 FL (ref 7.5–11.1)
PMV BLD AUTO: 10 FL (ref 7.5–11.1)
PMV BLD AUTO: 10.1 FL (ref 7.5–11.1)
PMV BLD AUTO: 9.2 FL (ref 7.5–11.1)
PMV BLD AUTO: 9.3 FL (ref 7.5–11.1)
PMV BLD AUTO: 9.3 FL (ref 7.5–11.1)
PMV BLD AUTO: 9.4 FL (ref 7.5–11.1)
PMV BLD AUTO: 9.4 FL (ref 7.5–11.1)
PMV BLD AUTO: 9.5 FL (ref 7.5–11.1)
PMV BLD AUTO: 9.6 FL (ref 7.5–11.1)
PMV BLD AUTO: 9.7 FL (ref 7.5–11.1)
PMV BLD AUTO: 9.8 FL (ref 7.5–11.1)
PMV BLD AUTO: 9.9 FL (ref 7.5–11.1)
PO2 ARTERIAL: 100 MMHG (ref 75–100)
PO2 ARTERIAL: 117 MMHG (ref 75–100)
PO2 ARTERIAL: 133 MMHG (ref 75–100)
PO2 ARTERIAL: 141 MMHG (ref 75–100)
PO2 ARTERIAL: 201 MMHG (ref 75–100)
PO2 ARTERIAL: 50 MMHG (ref 75–100)
PO2 ARTERIAL: 53 MMHG (ref 75–100)
PO2 ARTERIAL: 54 MMHG (ref 75–100)
PO2 ARTERIAL: 54.1 MMHG (ref 75–100)
PO2 ARTERIAL: 55 MMHG (ref 75–100)
PO2 ARTERIAL: 57 MMHG (ref 75–100)
PO2 ARTERIAL: 60 MMHG (ref 75–100)
PO2 ARTERIAL: 62 MMHG (ref 75–100)
PO2 ARTERIAL: 69 MMHG (ref 75–100)
PO2 ARTERIAL: 70 MMHG (ref 75–100)
PO2 ARTERIAL: 73 MMHG (ref 75–100)
PO2 ARTERIAL: 74 MMHG (ref 75–100)
PO2 ARTERIAL: 78 MMHG (ref 75–100)
PO2 ARTERIAL: 80 MMHG (ref 75–100)
PO2 ARTERIAL: 81 MMHG (ref 75–100)
PO2 ARTERIAL: 83 MMHG (ref 75–100)
PO2 ARTERIAL: 91.6 MMHG (ref 75–100)
PO2 ARTERIAL: 97 MMHG (ref 75–100)
PO2, VEN: 20 MMHG (ref 28–48)
PO2, VEN: 80 MMHG (ref 28–48)
PO2, VEN: 84 MMHG (ref 28–48)
POC CALCIUM: 1.21 MMOL/L (ref 1.12–1.32)
POC CALCIUM: 1.22 MMOL/L (ref 1.12–1.32)
POC CHLORIDE: 97 MMOL/L (ref 98–109)
POC CHLORIDE: 99 MMOL/L (ref 98–109)
POC CREATININE: 0.8 MG/DL (ref 0.9–1.3)
POC CREATININE: 0.8 MG/DL (ref 0.9–1.3)
POTASSIUM SERPL-SCNC: 3 MMOL/L (ref 3.5–4.5)
POTASSIUM SERPL-SCNC: 3.2 MMOL/L (ref 3.5–5.1)
POTASSIUM SERPL-SCNC: 3.6 MMOL/L (ref 3.5–5.1)
POTASSIUM SERPL-SCNC: 3.7 MMOL/L (ref 3.5–5.1)
POTASSIUM SERPL-SCNC: 3.8 MMOL/L (ref 3.5–5.1)
POTASSIUM SERPL-SCNC: 3.8 MMOL/L (ref 3.5–5.1)
POTASSIUM SERPL-SCNC: 3.9 MMOL/L (ref 3.5–5.1)
POTASSIUM SERPL-SCNC: 4 MMOL/L (ref 3.5–5.1)
POTASSIUM SERPL-SCNC: 4 MMOL/L (ref 3.5–5.1)
POTASSIUM SERPL-SCNC: 4.1 MMOL/L (ref 3.5–5.1)
POTASSIUM SERPL-SCNC: 4.2 MMOL/L (ref 3.5–4.5)
POTASSIUM SERPL-SCNC: 4.2 MMOL/L (ref 3.5–5.1)
POTASSIUM SERPL-SCNC: 4.2 MMOL/L (ref 3.5–5.1)
POTASSIUM SERPL-SCNC: 4.3 MMOL/L (ref 3.5–5.1)
POTASSIUM SERPL-SCNC: 4.4 MMOL/L (ref 3.5–5.1)
POTASSIUM SERPL-SCNC: 4.7 MMOL/L (ref 3.5–5.1)
PRO-BNP: 2366 PG/ML
PRO-BNP: 503.3 PG/ML
PROCALCITONIN SERPL-MCNC: 0.05 NG/ML
PROCALCITONIN SERPL-MCNC: 0.1 NG/ML
PROCALCITONIN SERPL-MCNC: 0.16 NG/ML
PROCALCITONIN SERPL-MCNC: 0.46 NG/ML
PROTHROMBIN TIME: 15.4 SECONDS (ref 11.7–14.5)
PROTHROMBIN TIME: 16.7 SECONDS (ref 11.7–14.5)
RBC # BLD: 2.98 M/CU MM (ref 4.6–6.2)
RBC # BLD: 3.12 M/CU MM (ref 4.6–6.2)
RBC # BLD: 3.17 M/CU MM (ref 4.6–6.2)
RBC # BLD: 3.24 M/CU MM (ref 4.6–6.2)
RBC # BLD: 3.27 M/CU MM (ref 4.6–6.2)
RBC # BLD: 3.29 M/CU MM (ref 4.6–6.2)
RBC # BLD: 3.38 M/CU MM (ref 4.6–6.2)
RBC # BLD: 3.38 M/CU MM (ref 4.6–6.2)
RBC # BLD: 3.41 M/CU MM (ref 4.6–6.2)
RBC # BLD: 3.57 M/CU MM (ref 4.6–6.2)
RBC # BLD: 3.64 M/CU MM (ref 4.6–6.2)
RBC # BLD: 3.86 M/CU MM (ref 4.6–6.2)
RBC # BLD: 3.87 M/CU MM (ref 4.6–6.2)
RBC # BLD: 3.97 M/CU MM (ref 4.6–6.2)
RBC # BLD: 3.99 M/CU MM (ref 4.6–6.2)
RBC # BLD: 4.01 M/CU MM (ref 4.6–6.2)
RBC # BLD: 4.09 M/CU MM (ref 4.6–6.2)
RBC # BLD: 4.21 M/CU MM (ref 4.6–6.2)
RBC # BLD: 4.21 M/CU MM (ref 4.6–6.2)
RBC # BLD: ABNORMAL 10*6/UL
RSV RNA NPH QL NAA+NON-PROBE: NOT DETECTED
RV+EV RNA NPH QL NAA+NON-PROBE: NOT DETECTED
S PNEUM AG CSF QL: NORMAL
SARS-COV-2 RNA NPH QL NAA+NON-PROBE: NOT DETECTED
SEGMENTED NEUTROPHILS ABSOLUTE COUNT: 10.4 K/CU MM
SEGMENTED NEUTROPHILS ABSOLUTE COUNT: 10.5 K/CU MM
SEGMENTED NEUTROPHILS ABSOLUTE COUNT: 10.5 K/CU MM
SEGMENTED NEUTROPHILS ABSOLUTE COUNT: 12.3 K/CU MM
SEGMENTED NEUTROPHILS ABSOLUTE COUNT: 13 K/CU MM
SEGMENTED NEUTROPHILS ABSOLUTE COUNT: 13.2 K/CU MM
SEGMENTED NEUTROPHILS ABSOLUTE COUNT: 13.8 K/CU MM
SEGMENTED NEUTROPHILS ABSOLUTE COUNT: 14.4 K/CU MM
SEGMENTED NEUTROPHILS ABSOLUTE COUNT: 16.8 K/CU MM
SEGMENTED NEUTROPHILS ABSOLUTE COUNT: 17.8 K/CU MM
SEGMENTED NEUTROPHILS ABSOLUTE COUNT: 19 K/CU MM
SEGMENTED NEUTROPHILS ABSOLUTE COUNT: 20.8 K/CU MM
SEGMENTED NEUTROPHILS ABSOLUTE COUNT: 6 K/CU MM
SEGMENTED NEUTROPHILS ABSOLUTE COUNT: 7.7 K/CU MM
SEGMENTED NEUTROPHILS RELATIVE PERCENT: 70 % (ref 36–66)
SEGMENTED NEUTROPHILS RELATIVE PERCENT: 73 % (ref 36–66)
SEGMENTED NEUTROPHILS RELATIVE PERCENT: 74 % (ref 36–66)
SEGMENTED NEUTROPHILS RELATIVE PERCENT: 76 % (ref 36–66)
SEGMENTED NEUTROPHILS RELATIVE PERCENT: 76.1 % (ref 36–66)
SEGMENTED NEUTROPHILS RELATIVE PERCENT: 78.2 % (ref 36–66)
SEGMENTED NEUTROPHILS RELATIVE PERCENT: 78.8 % (ref 36–66)
SEGMENTED NEUTROPHILS RELATIVE PERCENT: 82.7 % (ref 36–66)
SEGMENTED NEUTROPHILS RELATIVE PERCENT: 83.2 % (ref 36–66)
SEGMENTED NEUTROPHILS RELATIVE PERCENT: 83.4 % (ref 36–66)
SEGMENTED NEUTROPHILS RELATIVE PERCENT: 83.5 % (ref 36–66)
SEGMENTED NEUTROPHILS RELATIVE PERCENT: 85.6 % (ref 36–66)
SEGMENTED NEUTROPHILS RELATIVE PERCENT: 86.4 % (ref 36–66)
SEGMENTED NEUTROPHILS RELATIVE PERCENT: 89 % (ref 36–66)
SODIUM BLD-SCNC: 127 MMOL/L (ref 135–145)
SODIUM BLD-SCNC: 130 MMOL/L (ref 135–145)
SODIUM BLD-SCNC: 131 MMOL/L (ref 135–145)
SODIUM BLD-SCNC: 132 MMOL/L (ref 135–145)
SODIUM BLD-SCNC: 132 MMOL/L (ref 135–145)
SODIUM BLD-SCNC: 133 MMOL/L (ref 135–145)
SODIUM BLD-SCNC: 134 MMOL/L (ref 135–145)
SODIUM BLD-SCNC: 135 MMOL/L (ref 135–145)
SODIUM BLD-SCNC: 137 MMOL/L (ref 135–145)
SODIUM BLD-SCNC: 138 MMOL/L (ref 135–145)
SODIUM BLD-SCNC: 139 MMOL/L (ref 135–145)
SODIUM BLD-SCNC: 139 MMOL/L (ref 138–146)
SODIUM BLD-SCNC: 141 MMOL/L (ref 135–145)
SODIUM BLD-SCNC: 141 MMOL/L (ref 138–146)
SODIUM BLD-SCNC: 142 MMOL/L (ref 135–145)
SODIUM BLD-SCNC: 142 MMOL/L (ref 135–145)
SODIUM BLD-SCNC: 143 MMOL/L (ref 135–145)
SODIUM BLD-SCNC: 143 MMOL/L (ref 135–145)
SODIUM BLD-SCNC: 147 MMOL/L (ref 135–145)
SOURCE, BLOOD GAS: ABNORMAL
SOURCE, BLOOD GAS: ABNORMAL
SPECIMEN DESCRIPTION: NORMAL
SPECIMEN DESCRIPTION: NORMAL
SPECIMEN: ABNORMAL
SPECIMEN: NORMAL
TOTAL IMMATURE NEUTOROPHIL: 0.03 K/CU MM
TOTAL IMMATURE NEUTOROPHIL: 0.09 K/CU MM
TOTAL IMMATURE NEUTOROPHIL: 0.28 K/CU MM
TOTAL IMMATURE NEUTOROPHIL: 0.29 K/CU MM
TOTAL IMMATURE NEUTOROPHIL: 0.33 K/CU MM
TOTAL IMMATURE NEUTOROPHIL: 0.39 K/CU MM
TOTAL IMMATURE NEUTOROPHIL: 0.48 K/CU MM
TOTAL IMMATURE NEUTOROPHIL: 0.57 K/CU MM
TOTAL IMMATURE NEUTOROPHIL: 0.59 K/CU MM
TOTAL NUCLEATED RBC: 0 K/CU MM
TOTAL PROTEIN: 4.5 GM/DL (ref 6.4–8.2)
TOTAL PROTEIN: 5 GM/DL (ref 6.4–8.2)
TOTAL PROTEIN: 5.8 GM/DL (ref 6.4–8.2)
TOTAL PROTEIN: 6.9 GM/DL (ref 6.4–8.2)
TRIGL SERPL-MCNC: 137 MG/DL
TROPONIN T: <0.01 NG/ML
VANCOMYCIN RANDOM: 9.8 UG/ML
VANCOMYCIN TROUGH: 10.5 UG/ML (ref 10–20)
VANCOMYCIN TROUGH: 16.5 UG/ML (ref 10–20)
VANCOMYCIN TROUGH: 8.4 UG/ML (ref 10–20)
VANCOMYCIN TROUGH: 9.7 UG/ML (ref 10–20)
VOLUME, (UVOL): 1200 MLS
WBC # BLD: 10.1 K/CU MM (ref 4–10.5)
WBC # BLD: 10.9 K/CU MM (ref 4–10.5)
WBC # BLD: 11 K/CU MM (ref 4–10.5)
WBC # BLD: 13.8 K/CU MM (ref 4–10.5)
WBC # BLD: 14.1 K/CU MM (ref 4–10.5)
WBC # BLD: 14.4 K/CU MM (ref 4–10.5)
WBC # BLD: 15.1 K/CU MM (ref 4–10.5)
WBC # BLD: 15.8 K/CU MM (ref 4–10.5)
WBC # BLD: 16.1 K/CU MM (ref 4–10.5)
WBC # BLD: 16.2 K/CU MM (ref 4–10.5)
WBC # BLD: 16.4 K/CU MM (ref 4–10.5)
WBC # BLD: 16.7 K/CU MM (ref 4–10.5)
WBC # BLD: 17.3 K/CU MM (ref 4–10.5)
WBC # BLD: 17.4 K/CU MM (ref 4–10.5)
WBC # BLD: 18.9 K/CU MM (ref 4–10.5)
WBC # BLD: 20.6 K/CU MM (ref 4–10.5)
WBC # BLD: 22.8 K/CU MM (ref 4–10.5)
WBC # BLD: 26.6 K/CU MM (ref 4–10.5)
WBC # BLD: 7.6 K/CU MM (ref 4–10.5)

## 2023-01-01 PROCEDURE — 36592 COLLECT BLOOD FROM PICC: CPT

## 2023-01-01 PROCEDURE — 6360000002 HC RX W HCPCS: Performed by: HOSPITALIST

## 2023-01-01 PROCEDURE — 86140 C-REACTIVE PROTEIN: CPT

## 2023-01-01 PROCEDURE — 2580000003 HC RX 258

## 2023-01-01 PROCEDURE — 6370000000 HC RX 637 (ALT 250 FOR IP): Performed by: STUDENT IN AN ORGANIZED HEALTH CARE EDUCATION/TRAINING PROGRAM

## 2023-01-01 PROCEDURE — 4A133J1 MONITORING OF ARTERIAL PULSE, PERIPHERAL, PERCUTANEOUS APPROACH: ICD-10-PCS | Performed by: SPECIALIST

## 2023-01-01 PROCEDURE — 2000000000 HC ICU R&B

## 2023-01-01 PROCEDURE — 93010 ELECTROCARDIOGRAM REPORT: CPT | Performed by: INTERNAL MEDICINE

## 2023-01-01 PROCEDURE — 83735 ASSAY OF MAGNESIUM: CPT

## 2023-01-01 PROCEDURE — 6360000002 HC RX W HCPCS: Performed by: INTERNAL MEDICINE

## 2023-01-01 PROCEDURE — 6360000002 HC RX W HCPCS: Performed by: STUDENT IN AN ORGANIZED HEALTH CARE EDUCATION/TRAINING PROGRAM

## 2023-01-01 PROCEDURE — 6370000000 HC RX 637 (ALT 250 FOR IP): Performed by: HOSPITALIST

## 2023-01-01 PROCEDURE — 94667 MNPJ CHEST WALL 1ST: CPT

## 2023-01-01 PROCEDURE — 94640 AIRWAY INHALATION TREATMENT: CPT

## 2023-01-01 PROCEDURE — 2720000010 HC SURG SUPPLY STERILE

## 2023-01-01 PROCEDURE — 2700000000 HC OXYGEN THERAPY PER DAY

## 2023-01-01 PROCEDURE — 2500000003 HC RX 250 WO HCPCS

## 2023-01-01 PROCEDURE — 82803 BLOOD GASES ANY COMBINATION: CPT

## 2023-01-01 PROCEDURE — 89220 SPUTUM SPECIMEN COLLECTION: CPT

## 2023-01-01 PROCEDURE — 2580000003 HC RX 258: Performed by: HOSPITALIST

## 2023-01-01 PROCEDURE — 85007 BL SMEAR W/DIFF WBC COUNT: CPT

## 2023-01-01 PROCEDURE — 80202 ASSAY OF VANCOMYCIN: CPT

## 2023-01-01 PROCEDURE — 84484 ASSAY OF TROPONIN QUANT: CPT

## 2023-01-01 PROCEDURE — 96367 TX/PROPH/DG ADDL SEQ IV INF: CPT

## 2023-01-01 PROCEDURE — 71045 X-RAY EXAM CHEST 1 VIEW: CPT

## 2023-01-01 PROCEDURE — 2580000003 HC RX 258: Performed by: STUDENT IN AN ORGANIZED HEALTH CARE EDUCATION/TRAINING PROGRAM

## 2023-01-01 PROCEDURE — 96365 THER/PROPH/DIAG IV INF INIT: CPT

## 2023-01-01 PROCEDURE — 6370000000 HC RX 637 (ALT 250 FOR IP): Performed by: SPECIALIST

## 2023-01-01 PROCEDURE — 0202U NFCT DS 22 TRGT SARS-COV-2: CPT

## 2023-01-01 PROCEDURE — 36415 COLL VENOUS BLD VENIPUNCTURE: CPT

## 2023-01-01 PROCEDURE — 6370000000 HC RX 637 (ALT 250 FOR IP): Performed by: INTERNAL MEDICINE

## 2023-01-01 PROCEDURE — 85027 COMPLETE CBC AUTOMATED: CPT

## 2023-01-01 PROCEDURE — 99233 SBSQ HOSP IP/OBS HIGH 50: CPT | Performed by: INTERNAL MEDICINE

## 2023-01-01 PROCEDURE — 2500000003 HC RX 250 WO HCPCS: Performed by: STUDENT IN AN ORGANIZED HEALTH CARE EDUCATION/TRAINING PROGRAM

## 2023-01-01 PROCEDURE — 84100 ASSAY OF PHOSPHORUS: CPT

## 2023-01-01 PROCEDURE — 85610 PROTHROMBIN TIME: CPT

## 2023-01-01 PROCEDURE — 94668 MNPJ CHEST WALL SBSQ: CPT

## 2023-01-01 PROCEDURE — 36600 WITHDRAWAL OF ARTERIAL BLOOD: CPT

## 2023-01-01 PROCEDURE — 83605 ASSAY OF LACTIC ACID: CPT

## 2023-01-01 PROCEDURE — 6360000004 HC RX CONTRAST MEDICATION: Performed by: EMERGENCY MEDICINE

## 2023-01-01 PROCEDURE — 94660 CPAP INITIATION&MGMT: CPT

## 2023-01-01 PROCEDURE — 6360000002 HC RX W HCPCS: Performed by: SPECIALIST

## 2023-01-01 PROCEDURE — 87205 SMEAR GRAM STAIN: CPT

## 2023-01-01 PROCEDURE — 80048 BASIC METABOLIC PNL TOTAL CA: CPT

## 2023-01-01 PROCEDURE — 85018 HEMOGLOBIN: CPT

## 2023-01-01 PROCEDURE — 85025 COMPLETE CBC W/AUTO DIFF WBC: CPT

## 2023-01-01 PROCEDURE — 97530 THERAPEUTIC ACTIVITIES: CPT

## 2023-01-01 PROCEDURE — 93005 ELECTROCARDIOGRAM TRACING: CPT

## 2023-01-01 PROCEDURE — 84145 PROCALCITONIN (PCT): CPT

## 2023-01-01 PROCEDURE — 76937 US GUIDE VASCULAR ACCESS: CPT

## 2023-01-01 PROCEDURE — 94150 VITAL CAPACITY TEST: CPT

## 2023-01-01 PROCEDURE — 82805 BLOOD GASES W/O2 SATURATION: CPT

## 2023-01-01 PROCEDURE — 0B9K8ZX DRAINAGE OF RIGHT LUNG, VIA NATURAL OR ARTIFICIAL OPENING ENDOSCOPIC, DIAGNOSTIC: ICD-10-PCS | Performed by: STUDENT IN AN ORGANIZED HEALTH CARE EDUCATION/TRAINING PROGRAM

## 2023-01-01 PROCEDURE — C1769 GUIDE WIRE: HCPCS

## 2023-01-01 PROCEDURE — 87040 BLOOD CULTURE FOR BACTERIA: CPT

## 2023-01-01 PROCEDURE — 2500000003 HC RX 250 WO HCPCS: Performed by: NURSE PRACTITIONER

## 2023-01-01 PROCEDURE — 04HY32Z INSERTION OF MONITORING DEVICE INTO LOWER ARTERY, PERCUTANEOUS APPROACH: ICD-10-PCS | Performed by: SPECIALIST

## 2023-01-01 PROCEDURE — 36569 INSJ PICC 5 YR+ W/O IMAGING: CPT

## 2023-01-01 PROCEDURE — 94761 N-INVAS EAR/PLS OXIMETRY MLT: CPT

## 2023-01-01 PROCEDURE — 87899 AGENT NOS ASSAY W/OPTIC: CPT

## 2023-01-01 PROCEDURE — 99223 1ST HOSP IP/OBS HIGH 75: CPT | Performed by: INTERNAL MEDICINE

## 2023-01-01 PROCEDURE — 37799 UNLISTED PX VASCULAR SURGERY: CPT

## 2023-01-01 PROCEDURE — 31645 BRNCHSC W/THER ASPIR 1ST: CPT

## 2023-01-01 PROCEDURE — 6370000000 HC RX 637 (ALT 250 FOR IP)

## 2023-01-01 PROCEDURE — 51702 INSERT TEMP BLADDER CATH: CPT

## 2023-01-01 PROCEDURE — 87081 CULTURE SCREEN ONLY: CPT

## 2023-01-01 PROCEDURE — 82248 BILIRUBIN DIRECT: CPT

## 2023-01-01 PROCEDURE — 87070 CULTURE OTHR SPECIMN AEROBIC: CPT

## 2023-01-01 PROCEDURE — 99232 SBSQ HOSP IP/OBS MODERATE 35: CPT | Performed by: INTERNAL MEDICINE

## 2023-01-01 PROCEDURE — 97116 GAIT TRAINING THERAPY: CPT

## 2023-01-01 PROCEDURE — 2140000000 HC CCU INTERMEDIATE R&B

## 2023-01-01 PROCEDURE — 94002 VENT MGMT INPAT INIT DAY: CPT

## 2023-01-01 PROCEDURE — 6360000002 HC RX W HCPCS

## 2023-01-01 PROCEDURE — 82962 GLUCOSE BLOOD TEST: CPT

## 2023-01-01 PROCEDURE — 80053 COMPREHEN METABOLIC PANEL: CPT

## 2023-01-01 PROCEDURE — 87449 NOS EACH ORGANISM AG IA: CPT

## 2023-01-01 PROCEDURE — 86038 ANTINUCLEAR ANTIBODIES: CPT

## 2023-01-01 PROCEDURE — 94003 VENT MGMT INPAT SUBQ DAY: CPT

## 2023-01-01 PROCEDURE — 85730 THROMBOPLASTIN TIME PARTIAL: CPT

## 2023-01-01 PROCEDURE — 2500000003 HC RX 250 WO HCPCS: Performed by: SPECIALIST

## 2023-01-01 PROCEDURE — 6370000000 HC RX 637 (ALT 250 FOR IP): Performed by: EMERGENCY MEDICINE

## 2023-01-01 PROCEDURE — 31624 DX BRONCHOSCOPE/LAVAGE: CPT

## 2023-01-01 PROCEDURE — C1751 CATH, INF, PER/CENT/MIDLINE: HCPCS

## 2023-01-01 PROCEDURE — 80051 ELECTROLYTE PANEL: CPT

## 2023-01-01 PROCEDURE — 80076 HEPATIC FUNCTION PANEL: CPT

## 2023-01-01 PROCEDURE — 94664 DEMO&/EVAL PT USE INHALER: CPT

## 2023-01-01 PROCEDURE — 85014 HEMATOCRIT: CPT

## 2023-01-01 PROCEDURE — 6360000002 HC RX W HCPCS: Performed by: PHYSICIAN ASSISTANT

## 2023-01-01 PROCEDURE — 84478 ASSAY OF TRIGLYCERIDES: CPT

## 2023-01-01 PROCEDURE — 6370000000 HC RX 637 (ALT 250 FOR IP): Performed by: SURGERY

## 2023-01-01 PROCEDURE — 31500 INSERT EMERGENCY AIRWAY: CPT

## 2023-01-01 PROCEDURE — 5A09557 ASSISTANCE WITH RESPIRATORY VENTILATION, GREATER THAN 96 CONSECUTIVE HOURS, CONTINUOUS POSITIVE AIRWAY PRESSURE: ICD-10-PCS | Performed by: SPECIALIST

## 2023-01-01 PROCEDURE — 93306 TTE W/DOPPLER COMPLETE: CPT

## 2023-01-01 PROCEDURE — 97110 THERAPEUTIC EXERCISES: CPT

## 2023-01-01 PROCEDURE — 31622 DX BRONCHOSCOPE/WASH: CPT

## 2023-01-01 PROCEDURE — 82575 CREATININE CLEARANCE TEST: CPT

## 2023-01-01 PROCEDURE — 74018 RADEX ABDOMEN 1 VIEW: CPT

## 2023-01-01 PROCEDURE — 87641 MR-STAPH DNA AMP PROBE: CPT

## 2023-01-01 PROCEDURE — 71250 CT THORAX DX C-: CPT

## 2023-01-01 PROCEDURE — 92610 EVALUATE SWALLOWING FUNCTION: CPT

## 2023-01-01 PROCEDURE — 0DH67UZ INSERTION OF FEEDING DEVICE INTO STOMACH, VIA NATURAL OR ARTIFICIAL OPENING: ICD-10-PCS | Performed by: SPECIALIST

## 2023-01-01 PROCEDURE — 2580000003 HC RX 258: Performed by: EMERGENCY MEDICINE

## 2023-01-01 PROCEDURE — 5A1955Z RESPIRATORY VENTILATION, GREATER THAN 96 CONSECUTIVE HOURS: ICD-10-PCS | Performed by: STUDENT IN AN ORGANIZED HEALTH CARE EDUCATION/TRAINING PROGRAM

## 2023-01-01 PROCEDURE — 4A133B1 MONITORING OF ARTERIAL PRESSURE, PERIPHERAL, PERCUTANEOUS APPROACH: ICD-10-PCS | Performed by: SPECIALIST

## 2023-01-01 PROCEDURE — 2580000003 HC RX 258: Performed by: NURSE PRACTITIONER

## 2023-01-01 PROCEDURE — P9047 ALBUMIN (HUMAN), 25%, 50ML: HCPCS | Performed by: STUDENT IN AN ORGANIZED HEALTH CARE EDUCATION/TRAINING PROGRAM

## 2023-01-01 PROCEDURE — 96375 TX/PRO/DX INJ NEW DRUG ADDON: CPT

## 2023-01-01 PROCEDURE — 6360000002 HC RX W HCPCS: Performed by: EMERGENCY MEDICINE

## 2023-01-01 PROCEDURE — 93005 ELECTROCARDIOGRAM TRACING: CPT | Performed by: EMERGENCY MEDICINE

## 2023-01-01 PROCEDURE — 02HV33Z INSERTION OF INFUSION DEVICE INTO SUPERIOR VENA CAVA, PERCUTANEOUS APPROACH: ICD-10-PCS | Performed by: SPECIALIST

## 2023-01-01 PROCEDURE — 97162 PT EVAL MOD COMPLEX 30 MIN: CPT

## 2023-01-01 PROCEDURE — 93970 EXTREMITY STUDY: CPT

## 2023-01-01 PROCEDURE — 5A1935Z RESPIRATORY VENTILATION, LESS THAN 24 CONSECUTIVE HOURS: ICD-10-PCS | Performed by: STUDENT IN AN ORGANIZED HEALTH CARE EDUCATION/TRAINING PROGRAM

## 2023-01-01 PROCEDURE — 82565 ASSAY OF CREATININE: CPT

## 2023-01-01 PROCEDURE — 99285 EMERGENCY DEPT VISIT HI MDM: CPT

## 2023-01-01 PROCEDURE — 6360000002 HC RX W HCPCS: Performed by: NURSE PRACTITIONER

## 2023-01-01 PROCEDURE — 71275 CT ANGIOGRAPHY CHEST: CPT

## 2023-01-01 PROCEDURE — 99222 1ST HOSP IP/OBS MODERATE 55: CPT | Performed by: SURGERY

## 2023-01-01 PROCEDURE — 0BH17EZ INSERTION OF ENDOTRACHEAL AIRWAY INTO TRACHEA, VIA NATURAL OR ARTIFICIAL OPENING: ICD-10-PCS | Performed by: STUDENT IN AN ORGANIZED HEALTH CARE EDUCATION/TRAINING PROGRAM

## 2023-01-01 PROCEDURE — 83880 ASSAY OF NATRIURETIC PEPTIDE: CPT

## 2023-01-01 PROCEDURE — 97166 OT EVAL MOD COMPLEX 45 MIN: CPT

## 2023-01-01 RX ORDER — WATER 1000 ML/1000ML
INJECTION, SOLUTION INTRAVENOUS
Status: COMPLETED
Start: 2023-01-01 | End: 2023-01-01

## 2023-01-01 RX ORDER — PROPOFOL 10 MG/ML
50 INJECTION, EMULSION INTRAVENOUS ONCE
Status: DISCONTINUED | OUTPATIENT
Start: 2023-01-01 | End: 2023-01-01

## 2023-01-01 RX ORDER — HEPARIN SODIUM 5000 [USP'U]/ML
5000 INJECTION, SOLUTION INTRAVENOUS; SUBCUTANEOUS EVERY 8 HOURS SCHEDULED
Status: DISCONTINUED | OUTPATIENT
Start: 2023-01-01 | End: 2023-01-01

## 2023-01-01 RX ORDER — METHYLPREDNISOLONE SODIUM SUCCINATE 40 MG/ML
60 INJECTION, POWDER, LYOPHILIZED, FOR SOLUTION INTRAMUSCULAR; INTRAVENOUS EVERY 6 HOURS
Status: DISCONTINUED | OUTPATIENT
Start: 2023-01-01 | End: 2023-01-01

## 2023-01-01 RX ORDER — ONDANSETRON 4 MG/1
4 TABLET, ORALLY DISINTEGRATING ORAL EVERY 8 HOURS PRN
Status: DISCONTINUED | OUTPATIENT
Start: 2023-01-01 | End: 2023-01-01 | Stop reason: HOSPADM

## 2023-01-01 RX ORDER — DIAZEPAM 5 MG/1
5 TABLET ORAL EVERY 6 HOURS
Status: COMPLETED | OUTPATIENT
Start: 2023-01-01 | End: 2023-01-01

## 2023-01-01 RX ORDER — PROPOFOL 10 MG/ML
40 INJECTION, EMULSION INTRAVENOUS ONCE
Status: COMPLETED | OUTPATIENT
Start: 2023-01-01 | End: 2023-01-01

## 2023-01-01 RX ORDER — TRAZODONE HYDROCHLORIDE 50 MG/1
50 TABLET ORAL NIGHTLY
Status: DISCONTINUED | OUTPATIENT
Start: 2023-01-01 | End: 2023-01-01

## 2023-01-01 RX ORDER — SODIUM CHLORIDE FOR INHALATION 3 %
4 VIAL, NEBULIZER (ML) INHALATION EVERY 4 HOURS
Status: DISCONTINUED | OUTPATIENT
Start: 2023-01-01 | End: 2023-01-01

## 2023-01-01 RX ORDER — MIDAZOLAM HYDROCHLORIDE 2 MG/2ML
2 INJECTION, SOLUTION INTRAMUSCULAR; INTRAVENOUS
Status: DISCONTINUED | OUTPATIENT
Start: 2023-01-01 | End: 2023-01-01

## 2023-01-01 RX ORDER — METHYLPREDNISOLONE SODIUM SUCCINATE 125 MG/2ML
125 INJECTION, POWDER, LYOPHILIZED, FOR SOLUTION INTRAMUSCULAR; INTRAVENOUS EVERY 6 HOURS
Status: DISCONTINUED | OUTPATIENT
Start: 2023-01-01 | End: 2023-01-01

## 2023-01-01 RX ORDER — SENNA AND DOCUSATE SODIUM 50; 8.6 MG/1; MG/1
2 TABLET, FILM COATED ORAL 2 TIMES DAILY
Status: DISCONTINUED | OUTPATIENT
Start: 2023-01-01 | End: 2023-01-01

## 2023-01-01 RX ORDER — FENTANYL CITRATE-0.9 % NACL/PF 10 MCG/ML
25-200 PLASTIC BAG, INJECTION (ML) INTRAVENOUS CONTINUOUS
Status: DISCONTINUED | OUTPATIENT
Start: 2023-01-01 | End: 2023-01-01

## 2023-01-01 RX ORDER — SODIUM CHLORIDE 0.9 % (FLUSH) 0.9 %
5-40 SYRINGE (ML) INJECTION PRN
Status: DISCONTINUED | OUTPATIENT
Start: 2023-01-01 | End: 2023-01-01 | Stop reason: HOSPADM

## 2023-01-01 RX ORDER — HYDROXYZINE HYDROCHLORIDE 25 MG/1
25 TABLET, FILM COATED ORAL 3 TIMES DAILY PRN
Status: DISCONTINUED | OUTPATIENT
Start: 2023-01-01 | End: 2023-01-01

## 2023-01-01 RX ORDER — 0.9 % SODIUM CHLORIDE 0.9 %
1000 INTRAVENOUS SOLUTION INTRAVENOUS ONCE
Status: COMPLETED | OUTPATIENT
Start: 2023-01-01 | End: 2023-01-01

## 2023-01-01 RX ORDER — FUROSEMIDE 10 MG/ML
40 INJECTION INTRAMUSCULAR; INTRAVENOUS 2 TIMES DAILY
Status: COMPLETED | OUTPATIENT
Start: 2023-01-01 | End: 2023-01-01

## 2023-01-01 RX ORDER — DEXAMETHASONE SODIUM PHOSPHATE 4 MG/ML
10 INJECTION, SOLUTION INTRA-ARTICULAR; INTRALESIONAL; INTRAMUSCULAR; INTRAVENOUS; SOFT TISSUE DAILY
Status: DISCONTINUED | OUTPATIENT
Start: 2023-08-03 | End: 2023-01-01

## 2023-01-01 RX ORDER — POLYETHYLENE GLYCOL 3350 17 G/17G
17 POWDER, FOR SOLUTION ORAL NIGHTLY
Status: DISCONTINUED | OUTPATIENT
Start: 2023-01-01 | End: 2023-01-01

## 2023-01-01 RX ORDER — MIDAZOLAM HYDROCHLORIDE 2 MG/2ML
2 INJECTION, SOLUTION INTRAMUSCULAR; INTRAVENOUS
Status: ACTIVE | OUTPATIENT
Start: 2023-01-01 | End: 2023-01-01

## 2023-01-01 RX ORDER — ACETAMINOPHEN 325 MG/1
650 TABLET ORAL EVERY 6 HOURS PRN
Status: DISCONTINUED | OUTPATIENT
Start: 2023-01-01 | End: 2023-01-01

## 2023-01-01 RX ORDER — ASCORBIC ACID 500 MG
500 TABLET ORAL DAILY
Status: DISCONTINUED | OUTPATIENT
Start: 2023-01-01 | End: 2023-01-01

## 2023-01-01 RX ORDER — CALCIUM CARBONATE 500 MG/1
1000 TABLET, CHEWABLE ORAL
Status: DISCONTINUED | OUTPATIENT
Start: 2023-01-01 | End: 2023-01-01

## 2023-01-01 RX ORDER — IPRATROPIUM BROMIDE AND ALBUTEROL SULFATE 2.5; .5 MG/3ML; MG/3ML
1 SOLUTION RESPIRATORY (INHALATION)
Status: DISCONTINUED | OUTPATIENT
Start: 2023-01-01 | End: 2023-01-01

## 2023-01-01 RX ORDER — CISATRACURIUM BESYLATE 2 MG/ML
0.15 INJECTION, SOLUTION INTRAVENOUS ONCE
Status: DISCONTINUED | OUTPATIENT
Start: 2023-01-01 | End: 2023-01-01

## 2023-01-01 RX ORDER — FENTANYL CITRATE-0.9 % NACL/PF 10 MCG/ML
200 PLASTIC BAG, INJECTION (ML) INTRAVENOUS CONTINUOUS
Status: DISCONTINUED | OUTPATIENT
Start: 2023-01-01 | End: 2023-01-01

## 2023-01-01 RX ORDER — ACETYLCYSTEINE 200 MG/ML
2 SOLUTION ORAL; RESPIRATORY (INHALATION)
Status: DISPENSED | OUTPATIENT
Start: 2023-01-01 | End: 2023-01-01

## 2023-01-01 RX ORDER — SODIUM CHLORIDE 9 MG/ML
INJECTION INTRAVENOUS
Status: DISCONTINUED
Start: 2023-01-01 | End: 2023-01-01 | Stop reason: WASHOUT

## 2023-01-01 RX ORDER — IPRATROPIUM BROMIDE AND ALBUTEROL SULFATE 2.5; .5 MG/3ML; MG/3ML
1 SOLUTION RESPIRATORY (INHALATION) EVERY 6 HOURS
Status: DISCONTINUED | OUTPATIENT
Start: 2023-01-01 | End: 2023-01-01

## 2023-01-01 RX ORDER — LEVOFLOXACIN 5 MG/ML
750 INJECTION, SOLUTION INTRAVENOUS EVERY 24 HOURS
Status: DISCONTINUED | OUTPATIENT
Start: 2023-01-01 | End: 2023-01-01

## 2023-01-01 RX ORDER — CISATRACURIUM BESYLATE 2 MG/ML
0.15 INJECTION, SOLUTION INTRAVENOUS ONCE
Status: COMPLETED | OUTPATIENT
Start: 2023-01-01 | End: 2023-01-01

## 2023-01-01 RX ORDER — ACETYLCYSTEINE 200 MG/ML
600 SOLUTION ORAL; RESPIRATORY (INHALATION)
Status: DISCONTINUED | OUTPATIENT
Start: 2023-01-01 | End: 2023-01-01

## 2023-01-01 RX ORDER — SODIUM CHLORIDE 9 MG/ML
INJECTION, SOLUTION INTRAVENOUS PRN
Status: DISCONTINUED | OUTPATIENT
Start: 2023-01-01 | End: 2023-01-01 | Stop reason: SDUPTHER

## 2023-01-01 RX ORDER — PROPOFOL 10 MG/ML
INJECTION, EMULSION INTRAVENOUS
Status: COMPLETED
Start: 2023-01-01 | End: 2023-01-01

## 2023-01-01 RX ORDER — PROPOFOL 10 MG/ML
5-50 INJECTION, EMULSION INTRAVENOUS CONTINUOUS
Status: DISCONTINUED | OUTPATIENT
Start: 2023-01-01 | End: 2023-01-01

## 2023-01-01 RX ORDER — FENTANYL CITRATE 50 UG/ML
50 INJECTION, SOLUTION INTRAMUSCULAR; INTRAVENOUS ONCE
Status: COMPLETED | OUTPATIENT
Start: 2023-01-01 | End: 2023-01-01

## 2023-01-01 RX ORDER — GABAPENTIN 100 MG/1
200 CAPSULE ORAL 3 TIMES DAILY
Status: DISCONTINUED | OUTPATIENT
Start: 2023-01-01 | End: 2023-01-01

## 2023-01-01 RX ORDER — IPRATROPIUM BROMIDE AND ALBUTEROL SULFATE 2.5; .5 MG/3ML; MG/3ML
3 SOLUTION RESPIRATORY (INHALATION) ONCE
Status: COMPLETED | OUTPATIENT
Start: 2023-01-01 | End: 2023-01-01

## 2023-01-01 RX ORDER — POTASSIUM CHLORIDE 20 MEQ/1
20 TABLET, EXTENDED RELEASE ORAL DAILY
Status: DISCONTINUED | OUTPATIENT
Start: 2023-01-01 | End: 2023-01-01

## 2023-01-01 RX ORDER — FENTANYL CITRATE-0.9 % NACL/PF 10 MCG/ML
25-300 PLASTIC BAG, INJECTION (ML) INTRAVENOUS CONTINUOUS
Status: DISCONTINUED | OUTPATIENT
Start: 2023-01-01 | End: 2023-01-01

## 2023-01-01 RX ORDER — MIDAZOLAM HYDROCHLORIDE 2 MG/2ML
2 INJECTION, SOLUTION INTRAMUSCULAR; INTRAVENOUS ONCE
Status: COMPLETED | OUTPATIENT
Start: 2023-01-01 | End: 2023-01-01

## 2023-01-01 RX ORDER — SODIUM CHLORIDE 9 MG/ML
INJECTION, SOLUTION INTRAVENOUS PRN
Status: DISCONTINUED | OUTPATIENT
Start: 2023-01-01 | End: 2023-01-01 | Stop reason: HOSPADM

## 2023-01-01 RX ORDER — METHYLPREDNISOLONE SODIUM SUCCINATE 40 MG/ML
40 INJECTION, POWDER, LYOPHILIZED, FOR SOLUTION INTRAMUSCULAR; INTRAVENOUS EVERY 8 HOURS
Status: DISCONTINUED | OUTPATIENT
Start: 2023-01-01 | End: 2023-01-01

## 2023-01-01 RX ORDER — SODIUM CHLORIDE 9 MG/ML
INJECTION, SOLUTION INTRAVENOUS CONTINUOUS
Status: DISCONTINUED | OUTPATIENT
Start: 2023-01-01 | End: 2023-01-01

## 2023-01-01 RX ORDER — POLYETHYLENE GLYCOL 3350 17 G/17G
17 POWDER, FOR SOLUTION ORAL 2 TIMES DAILY
Status: DISCONTINUED | OUTPATIENT
Start: 2023-01-01 | End: 2023-01-01

## 2023-01-01 RX ORDER — SODIUM CHLORIDE 0.9 % (FLUSH) 0.9 %
5-40 SYRINGE (ML) INJECTION EVERY 12 HOURS SCHEDULED
Status: DISCONTINUED | OUTPATIENT
Start: 2023-01-01 | End: 2023-01-01 | Stop reason: SDUPTHER

## 2023-01-01 RX ORDER — SODIUM CHLORIDE 0.9 % (FLUSH) 0.9 %
5-40 SYRINGE (ML) INJECTION EVERY 12 HOURS SCHEDULED
Status: DISCONTINUED | OUTPATIENT
Start: 2023-01-01 | End: 2023-01-01 | Stop reason: HOSPADM

## 2023-01-01 RX ORDER — IPRATROPIUM BROMIDE AND ALBUTEROL SULFATE 2.5; .5 MG/3ML; MG/3ML
1 SOLUTION RESPIRATORY (INHALATION)
Status: DISCONTINUED | OUTPATIENT
Start: 2023-01-01 | End: 2023-01-01 | Stop reason: HOSPADM

## 2023-01-01 RX ORDER — FUROSEMIDE 10 MG/ML
40 INJECTION INTRAMUSCULAR; INTRAVENOUS ONCE
Status: COMPLETED | OUTPATIENT
Start: 2023-01-01 | End: 2023-01-01

## 2023-01-01 RX ORDER — ALBUMIN (HUMAN) 12.5 G/50ML
25 SOLUTION INTRAVENOUS ONCE
Status: COMPLETED | OUTPATIENT
Start: 2023-01-01 | End: 2023-01-01

## 2023-01-01 RX ORDER — SODIUM CHLORIDE FOR INHALATION 3 %
4 VIAL, NEBULIZER (ML) INHALATION EVERY 8 HOURS
Status: DISCONTINUED | OUTPATIENT
Start: 2023-01-01 | End: 2023-01-01

## 2023-01-01 RX ORDER — ROCURONIUM BROMIDE 10 MG/ML
0.6 INJECTION, SOLUTION INTRAVENOUS ONCE
Status: COMPLETED | OUTPATIENT
Start: 2023-01-01 | End: 2023-01-01

## 2023-01-01 RX ORDER — FUROSEMIDE 20 MG/1
20 TABLET ORAL 2 TIMES DAILY
Status: DISCONTINUED | OUTPATIENT
Start: 2023-01-01 | End: 2023-01-01

## 2023-01-01 RX ORDER — OXYCODONE HYDROCHLORIDE 5 MG/1
5 TABLET ORAL EVERY 6 HOURS
Status: DISCONTINUED | OUTPATIENT
Start: 2023-01-01 | End: 2023-01-01

## 2023-01-01 RX ORDER — ENOXAPARIN SODIUM 150 MG/ML
1 INJECTION SUBCUTANEOUS 2 TIMES DAILY
Status: DISCONTINUED | OUTPATIENT
Start: 2023-01-01 | End: 2023-01-01

## 2023-01-01 RX ORDER — METHYLPREDNISOLONE SODIUM SUCCINATE 40 MG/ML
40 INJECTION, POWDER, LYOPHILIZED, FOR SOLUTION INTRAMUSCULAR; INTRAVENOUS EVERY 12 HOURS
Status: DISCONTINUED | OUTPATIENT
Start: 2023-01-01 | End: 2023-01-01

## 2023-01-01 RX ORDER — LACTULOSE 10 G/15ML
20 SOLUTION ORAL 3 TIMES DAILY
Status: DISCONTINUED | OUTPATIENT
Start: 2023-01-01 | End: 2023-01-01

## 2023-01-01 RX ORDER — HYDROCORTISONE ACETATE 25 MG/1
25 SUPPOSITORY RECTAL 2 TIMES DAILY PRN
Status: DISCONTINUED | OUTPATIENT
Start: 2023-01-01 | End: 2023-01-01 | Stop reason: HOSPADM

## 2023-01-01 RX ORDER — FENTANYL CITRATE 50 UG/ML
50 INJECTION, SOLUTION INTRAMUSCULAR; INTRAVENOUS
Status: DISCONTINUED | OUTPATIENT
Start: 2023-01-01 | End: 2023-01-01

## 2023-01-01 RX ORDER — ACETYLCYSTEINE 200 MG/ML
2 SOLUTION ORAL; RESPIRATORY (INHALATION)
Status: DISCONTINUED | OUTPATIENT
Start: 2023-01-01 | End: 2023-01-01

## 2023-01-01 RX ORDER — CIPROFLOXACIN AND DEXAMETHASONE 3; 1 MG/ML; MG/ML
5 SUSPENSION/ DROPS AURICULAR (OTIC) WEEKLY
Status: DISCONTINUED | OUTPATIENT
Start: 2023-01-01 | End: 2023-01-01 | Stop reason: CLARIF

## 2023-01-01 RX ORDER — CHLORHEXIDINE GLUCONATE 0.12 MG/ML
15 RINSE ORAL 2 TIMES DAILY
Status: DISCONTINUED | OUTPATIENT
Start: 2023-01-01 | End: 2023-01-01

## 2023-01-01 RX ORDER — GUAIFENESIN/DEXTROMETHORPHAN 100-10MG/5
10 SYRUP ORAL EVERY 4 HOURS PRN
Status: DISCONTINUED | OUTPATIENT
Start: 2023-01-01 | End: 2023-01-01 | Stop reason: HOSPADM

## 2023-01-01 RX ORDER — POTASSIUM CHLORIDE 20 MEQ/1
20 TABLET, EXTENDED RELEASE ORAL ONCE
Status: COMPLETED | OUTPATIENT
Start: 2023-01-01 | End: 2023-01-01

## 2023-01-01 RX ORDER — FUROSEMIDE 10 MG/ML
40 INJECTION INTRAMUSCULAR; INTRAVENOUS 2 TIMES DAILY
Status: DISCONTINUED | OUTPATIENT
Start: 2023-01-01 | End: 2023-01-01

## 2023-01-01 RX ORDER — SODIUM CHLORIDE, SODIUM LACTATE, POTASSIUM CHLORIDE, CALCIUM CHLORIDE 600; 310; 30; 20 MG/100ML; MG/100ML; MG/100ML; MG/100ML
INJECTION, SOLUTION INTRAVENOUS CONTINUOUS
Status: DISCONTINUED | OUTPATIENT
Start: 2023-01-01 | End: 2023-01-01

## 2023-01-01 RX ORDER — POTASSIUM CHLORIDE 29.8 MG/ML
20 INJECTION INTRAVENOUS
Status: COMPLETED | OUTPATIENT
Start: 2023-01-01 | End: 2023-01-01

## 2023-01-01 RX ORDER — MIDAZOLAM HYDROCHLORIDE 2 MG/2ML
2 INJECTION, SOLUTION INTRAMUSCULAR; INTRAVENOUS ONCE
Status: DISCONTINUED | OUTPATIENT
Start: 2023-01-01 | End: 2023-01-01

## 2023-01-01 RX ORDER — CIPROFLOXACIN AND FLUOCINOLONE ACETONIDE .75; .0625 MG/.25ML; MG/.25ML
0.25 SOLUTION AURICULAR (OTIC) WEEKLY
Status: DISCONTINUED | OUTPATIENT
Start: 2023-01-01 | End: 2023-01-01

## 2023-01-01 RX ORDER — ACETAMINOPHEN 160 MG/5ML
650 SOLUTION ORAL EVERY 6 HOURS SCHEDULED
Status: DISCONTINUED | OUTPATIENT
Start: 2023-01-01 | End: 2023-01-01

## 2023-01-01 RX ORDER — MIDAZOLAM HYDROCHLORIDE 2 MG/2ML
0.5 INJECTION, SOLUTION INTRAMUSCULAR; INTRAVENOUS
Status: DISCONTINUED | OUTPATIENT
Start: 2023-01-01 | End: 2023-01-01 | Stop reason: HOSPADM

## 2023-01-01 RX ORDER — PANTOPRAZOLE SODIUM 40 MG/1
40 TABLET, DELAYED RELEASE ORAL
Status: DISCONTINUED | OUTPATIENT
Start: 2023-01-01 | End: 2023-01-01

## 2023-01-01 RX ORDER — CALCIUM CARBONATE 500 MG/1
500 TABLET, CHEWABLE ORAL 3 TIMES DAILY PRN
Status: DISCONTINUED | OUTPATIENT
Start: 2023-01-01 | End: 2023-01-01

## 2023-01-01 RX ORDER — GLYCOPYRROLATE 0.2 MG/ML
0.2 INJECTION INTRAMUSCULAR; INTRAVENOUS EVERY 4 HOURS PRN
Status: DISCONTINUED | OUTPATIENT
Start: 2023-01-01 | End: 2023-01-01 | Stop reason: HOSPADM

## 2023-01-01 RX ORDER — MIDAZOLAM HYDROCHLORIDE 1 MG/ML
INJECTION INTRAMUSCULAR; INTRAVENOUS
Status: COMPLETED
Start: 2023-01-01 | End: 2023-01-01

## 2023-01-01 RX ORDER — FENTANYL CITRATE 50 UG/ML
25 INJECTION, SOLUTION INTRAMUSCULAR; INTRAVENOUS EVERY 10 MIN PRN
Status: DISCONTINUED | OUTPATIENT
Start: 2023-01-01 | End: 2023-01-01 | Stop reason: HOSPADM

## 2023-01-01 RX ORDER — MIDAZOLAM HYDROCHLORIDE 2 MG/2ML
1 INJECTION, SOLUTION INTRAMUSCULAR; INTRAVENOUS
Status: DISCONTINUED | OUTPATIENT
Start: 2023-01-01 | End: 2023-01-01 | Stop reason: HOSPADM

## 2023-01-01 RX ORDER — ONDANSETRON 2 MG/ML
4 INJECTION INTRAMUSCULAR; INTRAVENOUS EVERY 6 HOURS PRN
Status: DISCONTINUED | OUTPATIENT
Start: 2023-01-01 | End: 2023-01-01 | Stop reason: HOSPADM

## 2023-01-01 RX ORDER — MAGNESIUM SULFATE IN WATER 40 MG/ML
2000 INJECTION, SOLUTION INTRAVENOUS ONCE
Status: COMPLETED | OUTPATIENT
Start: 2023-01-01 | End: 2023-01-01

## 2023-01-01 RX ORDER — BUDESONIDE AND FORMOTEROL FUMARATE DIHYDRATE 160; 4.5 UG/1; UG/1
2 AEROSOL RESPIRATORY (INHALATION)
Status: DISCONTINUED | OUTPATIENT
Start: 2023-01-01 | End: 2023-01-01

## 2023-01-01 RX ORDER — BISACODYL 10 MG
10 SUPPOSITORY, RECTAL RECTAL ONCE
Status: COMPLETED | OUTPATIENT
Start: 2023-01-01 | End: 2023-01-01

## 2023-01-01 RX ORDER — NOREPINEPHRINE BITARTRATE 0.06 MG/ML
1-100 INJECTION, SOLUTION INTRAVENOUS CONTINUOUS
Status: DISCONTINUED | OUTPATIENT
Start: 2023-01-01 | End: 2023-01-01

## 2023-01-01 RX ORDER — LEVOFLOXACIN 500 MG/1
750 TABLET, FILM COATED ORAL DAILY
Status: COMPLETED | OUTPATIENT
Start: 2023-01-01 | End: 2023-01-01

## 2023-01-01 RX ORDER — CALCIUM CARBONATE 500 MG/1
1000 TABLET, CHEWABLE ORAL 4 TIMES DAILY PRN
Status: DISCONTINUED | OUTPATIENT
Start: 2023-01-01 | End: 2023-01-01 | Stop reason: HOSPADM

## 2023-01-01 RX ORDER — LOSARTAN POTASSIUM 25 MG/1
25 TABLET ORAL NIGHTLY
Status: DISCONTINUED | OUTPATIENT
Start: 2023-01-01 | End: 2023-01-01

## 2023-01-01 RX ORDER — FENTANYL CITRATE 50 UG/ML
50 INJECTION, SOLUTION INTRAMUSCULAR; INTRAVENOUS EVERY 10 MIN PRN
Status: DISCONTINUED | OUTPATIENT
Start: 2023-01-01 | End: 2023-01-01 | Stop reason: HOSPADM

## 2023-01-01 RX ORDER — DOCUSATE SODIUM 100 MG/1
100 CAPSULE, LIQUID FILLED ORAL DAILY
Status: DISCONTINUED | OUTPATIENT
Start: 2023-01-01 | End: 2023-01-01

## 2023-01-01 RX ORDER — CALCIUM GLUCONATE 20 MG/ML
1000 INJECTION, SOLUTION INTRAVENOUS ONCE
Status: COMPLETED | OUTPATIENT
Start: 2023-01-01 | End: 2023-01-01

## 2023-01-01 RX ORDER — FLUTICASONE PROPIONATE 50 MCG
1 SPRAY, SUSPENSION (ML) NASAL NIGHTLY
Status: DISCONTINUED | OUTPATIENT
Start: 2023-01-01 | End: 2023-01-01

## 2023-01-01 RX ORDER — PROPOFOL 10 MG/ML
50 INJECTION, EMULSION INTRAVENOUS ONCE
Status: COMPLETED | OUTPATIENT
Start: 2023-01-01 | End: 2023-01-01

## 2023-01-01 RX ORDER — SODIUM CHLORIDE FOR INHALATION 3 %
4 VIAL, NEBULIZER (ML) INHALATION
Status: DISCONTINUED | OUTPATIENT
Start: 2023-01-01 | End: 2023-01-01

## 2023-01-01 RX ORDER — LANSOPRAZOLE 30 MG/1
30 TABLET, ORALLY DISINTEGRATING, DELAYED RELEASE ORAL
Status: DISCONTINUED | OUTPATIENT
Start: 2023-01-01 | End: 2023-01-01

## 2023-01-01 RX ORDER — POLYETHYLENE GLYCOL 3350 17 G/17G
17 POWDER, FOR SOLUTION ORAL DAILY PRN
Status: DISCONTINUED | OUTPATIENT
Start: 2023-01-01 | End: 2023-01-01

## 2023-01-01 RX ORDER — ENOXAPARIN SODIUM 100 MG/ML
40 INJECTION SUBCUTANEOUS DAILY
Status: CANCELLED | OUTPATIENT
Start: 2023-01-01

## 2023-01-01 RX ADMIN — CALCIUM CARBONATE 1000 MG: 500 TABLET, CHEWABLE ORAL at 17:57

## 2023-01-01 RX ADMIN — OXYCODONE HYDROCHLORIDE 5 MG: 5 TABLET ORAL at 08:10

## 2023-01-01 RX ADMIN — PIPERACILLIN AND TAZOBACTAM 4500 MG: 4; .5 INJECTION, POWDER, FOR SOLUTION INTRAVENOUS at 21:15

## 2023-01-01 RX ADMIN — TRAZODONE HYDROCHLORIDE 50 MG: 50 TABLET ORAL at 23:23

## 2023-01-01 RX ADMIN — BUDESONIDE AND FORMOTEROL FUMARATE DIHYDRATE 2 PUFF: 160; 4.5 AEROSOL RESPIRATORY (INHALATION) at 19:37

## 2023-01-01 RX ADMIN — LOSARTAN POTASSIUM 25 MG: 25 TABLET, FILM COATED ORAL at 22:06

## 2023-01-01 RX ADMIN — Medication 175 MCG/HR: at 18:01

## 2023-01-01 RX ADMIN — DIAZEPAM 5 MG: 5 TABLET ORAL at 21:39

## 2023-01-01 RX ADMIN — VANCOMYCIN HYDROCHLORIDE 1250 MG: 1.25 INJECTION, POWDER, LYOPHILIZED, FOR SOLUTION INTRAVENOUS at 06:10

## 2023-01-01 RX ADMIN — CIPROFLOXACIN AND FLUOCINOLONE ACETONIDE 0.25 ML: .75; .0625 SOLUTION AURICULAR (OTIC) at 08:56

## 2023-01-01 RX ADMIN — GABAPENTIN 200 MG: 100 CAPSULE ORAL at 08:28

## 2023-01-01 RX ADMIN — TRAZODONE HYDROCHLORIDE 50 MG: 50 TABLET ORAL at 21:09

## 2023-01-01 RX ADMIN — PANTOPRAZOLE SODIUM 40 MG: 40 TABLET, DELAYED RELEASE ORAL at 05:05

## 2023-01-01 RX ADMIN — ACETYLCYSTEINE 400 MG: 200 SOLUTION ORAL; RESPIRATORY (INHALATION) at 19:36

## 2023-01-01 RX ADMIN — MIDAZOLAM 7 MG/HR: 5 INJECTION INTRAMUSCULAR; INTRAVENOUS at 06:29

## 2023-01-01 RX ADMIN — LACTULOSE 20 G: 20 SOLUTION ORAL at 14:09

## 2023-01-01 RX ADMIN — ENOXAPARIN SODIUM 120 MG: 150 INJECTION SUBCUTANEOUS at 17:25

## 2023-01-01 RX ADMIN — WATER 2 ML: 1 INJECTION INTRAMUSCULAR; INTRAVENOUS; SUBCUTANEOUS at 06:40

## 2023-01-01 RX ADMIN — Medication 200 MCG/HR: at 02:12

## 2023-01-01 RX ADMIN — IPRATROPIUM BROMIDE AND ALBUTEROL SULFATE 3 DOSE: 2.5; .5 SOLUTION RESPIRATORY (INHALATION) at 14:44

## 2023-01-01 RX ADMIN — SODIUM CHLORIDE 2 MCG/KG/MIN: 9 INJECTION, SOLUTION INTRAVENOUS at 09:39

## 2023-01-01 RX ADMIN — IPRATROPIUM BROMIDE AND ALBUTEROL SULFATE 1 DOSE: .5; 2.5 SOLUTION RESPIRATORY (INHALATION) at 11:55

## 2023-01-01 RX ADMIN — FUROSEMIDE 40 MG: 10 INJECTION, SOLUTION INTRAMUSCULAR; INTRAVENOUS at 08:57

## 2023-01-01 RX ADMIN — SODIUM CHLORIDE, PRESERVATIVE FREE 10 ML: 5 INJECTION INTRAVENOUS at 09:08

## 2023-01-01 RX ADMIN — VANCOMYCIN HYDROCHLORIDE 2000 MG: 5 INJECTION, POWDER, LYOPHILIZED, FOR SOLUTION INTRAVENOUS at 17:25

## 2023-01-01 RX ADMIN — DIAZEPAM 5 MG: 5 TABLET ORAL at 18:44

## 2023-01-01 RX ADMIN — IPRATROPIUM BROMIDE AND ALBUTEROL SULFATE 1 DOSE: 2.5; .5 SOLUTION RESPIRATORY (INHALATION) at 19:35

## 2023-01-01 RX ADMIN — ACETYLCYSTEINE 400 MG: 200 SOLUTION ORAL; RESPIRATORY (INHALATION) at 19:58

## 2023-01-01 RX ADMIN — CHLORHEXIDINE GLUCONATE 0.12% ORAL RINSE 15 ML: 1.2 LIQUID ORAL at 22:25

## 2023-01-01 RX ADMIN — SODIUM CHLORIDE, PRESERVATIVE FREE 10 ML: 5 INJECTION INTRAVENOUS at 21:36

## 2023-01-01 RX ADMIN — TRAZODONE HYDROCHLORIDE 50 MG: 50 TABLET ORAL at 20:47

## 2023-01-01 RX ADMIN — LEVOTHYROXINE SODIUM 175 MCG: 150 TABLET ORAL at 05:57

## 2023-01-01 RX ADMIN — PANTOPRAZOLE SODIUM 40 MG: 40 TABLET, DELAYED RELEASE ORAL at 06:07

## 2023-01-01 RX ADMIN — SODIUM CHLORIDE, PRESERVATIVE FREE 10 ML: 5 INJECTION INTRAVENOUS at 09:07

## 2023-01-01 RX ADMIN — LEVOTHYROXINE SODIUM 175 MCG: 150 TABLET ORAL at 06:15

## 2023-01-01 RX ADMIN — OXYCODONE HYDROCHLORIDE 5 MG: 5 TABLET ORAL at 17:14

## 2023-01-01 RX ADMIN — DIAZEPAM 5 MG: 5 TABLET ORAL at 08:31

## 2023-01-01 RX ADMIN — PIPERACILLIN AND TAZOBACTAM 4500 MG: 4; .5 INJECTION, POWDER, FOR SOLUTION INTRAVENOUS at 15:12

## 2023-01-01 RX ADMIN — SODIUM CHLORIDE, PRESERVATIVE FREE 10 ML: 5 INJECTION INTRAVENOUS at 08:35

## 2023-01-01 RX ADMIN — MIDAZOLAM 2 MG/HR: 5 INJECTION INTRAMUSCULAR; INTRAVENOUS at 09:02

## 2023-01-01 RX ADMIN — IPRATROPIUM BROMIDE AND ALBUTEROL SULFATE 1 DOSE: 2.5; .5 SOLUTION RESPIRATORY (INHALATION) at 07:30

## 2023-01-01 RX ADMIN — FLUTICASONE PROPIONATE 1 SPRAY: 50 SPRAY, METERED NASAL at 22:01

## 2023-01-01 RX ADMIN — PIPERACILLIN AND TAZOBACTAM 4500 MG: 4; .5 INJECTION, POWDER, FOR SOLUTION INTRAVENOUS at 23:59

## 2023-01-01 RX ADMIN — CISATRACURIUM BESYLATE 5 MCG/KG/MIN: 10 INJECTION INTRAVENOUS at 23:23

## 2023-01-01 RX ADMIN — IPRATROPIUM BROMIDE AND ALBUTEROL SULFATE 1 DOSE: .5; 2.5 SOLUTION RESPIRATORY (INHALATION) at 20:54

## 2023-01-01 RX ADMIN — Medication 250 MCG/HR: at 13:28

## 2023-01-01 RX ADMIN — WATER 2 ML: 1 INJECTION INTRAMUSCULAR; INTRAVENOUS; SUBCUTANEOUS at 23:23

## 2023-01-01 RX ADMIN — IPRATROPIUM BROMIDE AND ALBUTEROL SULFATE 1 DOSE: 2.5; .5 SOLUTION RESPIRATORY (INHALATION) at 23:33

## 2023-01-01 RX ADMIN — Medication 200 MCG/HR: at 16:07

## 2023-01-01 RX ADMIN — POTASSIUM CHLORIDE 20 MEQ: 29.8 INJECTION, SOLUTION INTRAVENOUS at 18:11

## 2023-01-01 RX ADMIN — SODIUM CHLORIDE, PRESERVATIVE FREE 10 ML: 5 INJECTION INTRAVENOUS at 08:43

## 2023-01-01 RX ADMIN — CHLORHEXIDINE GLUCONATE 0.12% ORAL RINSE 15 ML: 1.2 LIQUID ORAL at 08:07

## 2023-01-01 RX ADMIN — ACETAMINOPHEN 650 MG: 650 SOLUTION ORAL at 01:25

## 2023-01-01 RX ADMIN — APIXABAN 5 MG: 5 TABLET, FILM COATED ORAL at 21:00

## 2023-01-01 RX ADMIN — FLUTICASONE PROPIONATE 1 SPRAY: 50 SPRAY, METERED NASAL at 22:33

## 2023-01-01 RX ADMIN — TRAZODONE HYDROCHLORIDE 50 MG: 50 TABLET ORAL at 21:33

## 2023-01-01 RX ADMIN — Medication 4 ML: at 14:17

## 2023-01-01 RX ADMIN — WATER 1 ML: 1 INJECTION INTRAMUSCULAR; INTRAVENOUS; SUBCUTANEOUS at 22:34

## 2023-01-01 RX ADMIN — IPRATROPIUM BROMIDE AND ALBUTEROL SULFATE 1 DOSE: 2.5; .5 SOLUTION RESPIRATORY (INHALATION) at 16:52

## 2023-01-01 RX ADMIN — PIPERACILLIN AND TAZOBACTAM 4500 MG: 4; .5 INJECTION, POWDER, FOR SOLUTION INTRAVENOUS at 12:20

## 2023-01-01 RX ADMIN — OXYCODONE HYDROCHLORIDE AND ACETAMINOPHEN 500 MG: 500 TABLET ORAL at 09:22

## 2023-01-01 RX ADMIN — APIXABAN 10 MG: 5 TABLET, FILM COATED ORAL at 21:09

## 2023-01-01 RX ADMIN — POLYETHYLENE GLYCOL (3350) 17 G: 17 POWDER, FOR SOLUTION ORAL at 21:09

## 2023-01-01 RX ADMIN — METHYLPREDNISOLONE SODIUM SUCCINATE 60 MG: 40 INJECTION, POWDER, LYOPHILIZED, FOR SOLUTION INTRAMUSCULAR; INTRAVENOUS at 05:33

## 2023-01-01 RX ADMIN — IPRATROPIUM BROMIDE AND ALBUTEROL SULFATE 1 DOSE: 2.5; .5 SOLUTION RESPIRATORY (INHALATION) at 16:13

## 2023-01-01 RX ADMIN — IPRATROPIUM BROMIDE AND ALBUTEROL SULFATE 1 DOSE: 2.5; .5 SOLUTION RESPIRATORY (INHALATION) at 19:25

## 2023-01-01 RX ADMIN — LANSOPRAZOLE 30 MG: 30 TABLET, ORALLY DISINTEGRATING, DELAYED RELEASE ORAL at 06:15

## 2023-01-01 RX ADMIN — IPRATROPIUM BROMIDE AND ALBUTEROL SULFATE 1 DOSE: 2.5; .5 SOLUTION RESPIRATORY (INHALATION) at 07:27

## 2023-01-01 RX ADMIN — LACTULOSE 20 G: 20 SOLUTION ORAL at 22:27

## 2023-01-01 RX ADMIN — CALCIUM CARBONATE 1000 MG: 500 TABLET, CHEWABLE ORAL at 06:40

## 2023-01-01 RX ADMIN — CALCIUM CARBONATE 1000 MG: 500 TABLET, CHEWABLE ORAL at 06:38

## 2023-01-01 RX ADMIN — VANCOMYCIN HYDROCHLORIDE 1500 MG: 1.5 INJECTION, POWDER, LYOPHILIZED, FOR SOLUTION INTRAVENOUS at 20:19

## 2023-01-01 RX ADMIN — IOPAMIDOL 75 ML: 755 INJECTION, SOLUTION INTRAVENOUS at 16:31

## 2023-01-01 RX ADMIN — METHYLPREDNISOLONE SODIUM SUCCINATE 40 MG: 40 INJECTION, POWDER, LYOPHILIZED, FOR SOLUTION INTRAMUSCULAR; INTRAVENOUS at 15:43

## 2023-01-01 RX ADMIN — SODIUM CHLORIDE, PRESERVATIVE FREE 10 ML: 5 INJECTION INTRAVENOUS at 22:01

## 2023-01-01 RX ADMIN — PROPOFOL 50 MCG/KG/MIN: 10 INJECTION, EMULSION INTRAVENOUS at 21:56

## 2023-01-01 RX ADMIN — PROPOFOL 50 MG: 10 INJECTION, EMULSION INTRAVENOUS at 04:10

## 2023-01-01 RX ADMIN — OXYCODONE HYDROCHLORIDE 5 MG: 5 TABLET ORAL at 15:08

## 2023-01-01 RX ADMIN — LOSARTAN POTASSIUM 25 MG: 25 TABLET, FILM COATED ORAL at 21:22

## 2023-01-01 RX ADMIN — VANCOMYCIN HYDROCHLORIDE 1500 MG: 1.5 INJECTION, POWDER, LYOPHILIZED, FOR SOLUTION INTRAVENOUS at 20:56

## 2023-01-01 RX ADMIN — CALCIUM CARBONATE 1000 MG: 500 TABLET, CHEWABLE ORAL at 13:22

## 2023-01-01 RX ADMIN — DOCUSATE SODIUM LIQUID 100 MG: 100 LIQUID ORAL at 18:28

## 2023-01-01 RX ADMIN — Medication 200 MCG/HR: at 13:04

## 2023-01-01 RX ADMIN — WATER 10 ML: 1 INJECTION INTRAMUSCULAR; INTRAVENOUS; SUBCUTANEOUS at 06:22

## 2023-01-01 RX ADMIN — Medication 250 MCG/HR: at 19:36

## 2023-01-01 RX ADMIN — Medication 4 ML: at 23:09

## 2023-01-01 RX ADMIN — DEXAMETHASONE SODIUM PHOSPHATE 20 MG: 4 INJECTION, SOLUTION INTRAMUSCULAR; INTRAVENOUS at 12:33

## 2023-01-01 RX ADMIN — DOCUSATE SODIUM 100 MG: 100 CAPSULE, LIQUID FILLED ORAL at 08:25

## 2023-01-01 RX ADMIN — SENNOSIDES AND DOCUSATE SODIUM 2 TABLET: 50; 8.6 TABLET ORAL at 21:47

## 2023-01-01 RX ADMIN — APIXABAN 10 MG: 5 TABLET, FILM COATED ORAL at 12:59

## 2023-01-01 RX ADMIN — IPRATROPIUM BROMIDE AND ALBUTEROL SULFATE 1 DOSE: 2.5; .5 SOLUTION RESPIRATORY (INHALATION) at 07:29

## 2023-01-01 RX ADMIN — WATER 1 ML: 1 INJECTION INTRAMUSCULAR; INTRAVENOUS; SUBCUTANEOUS at 06:38

## 2023-01-01 RX ADMIN — Medication 4 ML: at 12:24

## 2023-01-01 RX ADMIN — IPRATROPIUM BROMIDE AND ALBUTEROL SULFATE 1 DOSE: 2.5; .5 SOLUTION RESPIRATORY (INHALATION) at 07:41

## 2023-01-01 RX ADMIN — PIPERACILLIN AND TAZOBACTAM 4500 MG: 4; .5 INJECTION, POWDER, FOR SOLUTION INTRAVENOUS at 04:37

## 2023-01-01 RX ADMIN — CISATRACURIUM BESYLATE 5 MCG/KG/MIN: 10 INJECTION INTRAVENOUS at 10:07

## 2023-01-01 RX ADMIN — POLYETHYLENE GLYCOL (3350) 17 G: 17 POWDER, FOR SOLUTION ORAL at 08:56

## 2023-01-01 RX ADMIN — LACTULOSE 20 G: 20 SOLUTION ORAL at 08:34

## 2023-01-01 RX ADMIN — Medication 200 MCG/HR: at 14:35

## 2023-01-01 RX ADMIN — AMIODARONE HYDROCHLORIDE 1 MG/MIN: 50 INJECTION, SOLUTION INTRAVENOUS at 07:56

## 2023-01-01 RX ADMIN — APIXABAN 10 MG: 5 TABLET, FILM COATED ORAL at 22:06

## 2023-01-01 RX ADMIN — TRAZODONE HYDROCHLORIDE 50 MG: 50 TABLET ORAL at 22:26

## 2023-01-01 RX ADMIN — Medication 200 MCG/HR: at 11:47

## 2023-01-01 RX ADMIN — POLYETHYLENE GLYCOL (3350) 17 G: 17 POWDER, FOR SOLUTION ORAL at 08:42

## 2023-01-01 RX ADMIN — OXYCODONE HYDROCHLORIDE 5 MG: 5 TABLET ORAL at 20:06

## 2023-01-01 RX ADMIN — METHYLPREDNISOLONE SODIUM SUCCINATE 40 MG: 40 INJECTION, POWDER, LYOPHILIZED, FOR SOLUTION INTRAMUSCULAR; INTRAVENOUS at 17:19

## 2023-01-01 RX ADMIN — BUDESONIDE AND FORMOTEROL FUMARATE DIHYDRATE 2 PUFF: 160; 4.5 AEROSOL RESPIRATORY (INHALATION) at 20:22

## 2023-01-01 RX ADMIN — Medication 200 MCG/HR: at 18:28

## 2023-01-01 RX ADMIN — DIAZEPAM 5 MG: 5 TABLET ORAL at 04:20

## 2023-01-01 RX ADMIN — METHYLPREDNISOLONE SODIUM SUCCINATE 40 MG: 40 INJECTION, POWDER, LYOPHILIZED, FOR SOLUTION INTRAMUSCULAR; INTRAVENOUS at 06:37

## 2023-01-01 RX ADMIN — LACTULOSE 20 G: 20 SOLUTION ORAL at 21:46

## 2023-01-01 RX ADMIN — Medication 200 MCG/HR: at 10:44

## 2023-01-01 RX ADMIN — ACETAMINOPHEN 650 MG: 325 TABLET ORAL at 21:42

## 2023-01-01 RX ADMIN — ACETYLCYSTEINE 400 MG: 200 SOLUTION ORAL; RESPIRATORY (INHALATION) at 08:50

## 2023-01-01 RX ADMIN — MIDAZOLAM 2 MG/HR: 5 INJECTION INTRAMUSCULAR; INTRAVENOUS at 19:23

## 2023-01-01 RX ADMIN — Medication 6 MCG/MIN: at 13:59

## 2023-01-01 RX ADMIN — GUAIFENESIN AND DEXTROMETHORPHAN 10 ML: 100; 10 SYRUP ORAL at 17:24

## 2023-01-01 RX ADMIN — ACETAMINOPHEN 650 MG: 325 TABLET ORAL at 17:25

## 2023-01-01 RX ADMIN — LEVOTHYROXINE SODIUM 175 MCG: 150 TABLET ORAL at 06:07

## 2023-01-01 RX ADMIN — FUROSEMIDE 40 MG: 10 INJECTION, SOLUTION INTRAMUSCULAR; INTRAVENOUS at 12:26

## 2023-01-01 RX ADMIN — GABAPENTIN 200 MG: 100 CAPSULE ORAL at 20:05

## 2023-01-01 RX ADMIN — APIXABAN 5 MG: 5 TABLET, FILM COATED ORAL at 08:07

## 2023-01-01 RX ADMIN — LOSARTAN POTASSIUM 25 MG: 25 TABLET, FILM COATED ORAL at 21:34

## 2023-01-01 RX ADMIN — PIPERACILLIN AND TAZOBACTAM 4500 MG: 4; .5 INJECTION, POWDER, FOR SOLUTION INTRAVENOUS at 06:30

## 2023-01-01 RX ADMIN — IPRATROPIUM BROMIDE AND ALBUTEROL SULFATE 1 DOSE: 2.5; .5 SOLUTION RESPIRATORY (INHALATION) at 02:50

## 2023-01-01 RX ADMIN — METHYLPREDNISOLONE SODIUM SUCCINATE 60 MG: 40 INJECTION, POWDER, LYOPHILIZED, FOR SOLUTION INTRAMUSCULAR; INTRAVENOUS at 12:34

## 2023-01-01 RX ADMIN — VANCOMYCIN HYDROCHLORIDE 1000 MG: 1 INJECTION, POWDER, LYOPHILIZED, FOR SOLUTION INTRAVENOUS at 21:35

## 2023-01-01 RX ADMIN — METHYLPREDNISOLONE SODIUM SUCCINATE 40 MG: 40 INJECTION, POWDER, FOR SOLUTION INTRAMUSCULAR; INTRAVENOUS at 21:24

## 2023-01-01 RX ADMIN — LEVOTHYROXINE SODIUM 175 MCG: 150 TABLET ORAL at 05:34

## 2023-01-01 RX ADMIN — METHYLPREDNISOLONE SODIUM SUCCINATE 60 MG: 40 INJECTION, POWDER, LYOPHILIZED, FOR SOLUTION INTRAMUSCULAR; INTRAVENOUS at 17:46

## 2023-01-01 RX ADMIN — MIDAZOLAM 3 MG/HR: 5 INJECTION INTRAMUSCULAR; INTRAVENOUS at 12:18

## 2023-01-01 RX ADMIN — GABAPENTIN 200 MG: 100 CAPSULE ORAL at 21:00

## 2023-01-01 RX ADMIN — LOSARTAN POTASSIUM 25 MG: 25 TABLET, FILM COATED ORAL at 22:02

## 2023-01-01 RX ADMIN — METHYLPREDNISOLONE SODIUM SUCCINATE 40 MG: 40 INJECTION, POWDER, LYOPHILIZED, FOR SOLUTION INTRAMUSCULAR; INTRAVENOUS at 06:07

## 2023-01-01 RX ADMIN — METHYLPREDNISOLONE SODIUM SUCCINATE 60 MG: 40 INJECTION, POWDER, LYOPHILIZED, FOR SOLUTION INTRAMUSCULAR; INTRAVENOUS at 18:24

## 2023-01-01 RX ADMIN — SODIUM CHLORIDE, PRESERVATIVE FREE 10 ML: 5 INJECTION INTRAVENOUS at 12:15

## 2023-01-01 RX ADMIN — APIXABAN 10 MG: 5 TABLET, FILM COATED ORAL at 08:26

## 2023-01-01 RX ADMIN — METHYLPREDNISOLONE SODIUM SUCCINATE 40 MG: 40 INJECTION, POWDER, LYOPHILIZED, FOR SOLUTION INTRAMUSCULAR; INTRAVENOUS at 18:44

## 2023-01-01 RX ADMIN — IPRATROPIUM BROMIDE AND ALBUTEROL SULFATE 1 DOSE: 2.5; .5 SOLUTION RESPIRATORY (INHALATION) at 02:02

## 2023-01-01 RX ADMIN — POLYETHYLENE GLYCOL (3350) 17 G: 17 POWDER, FOR SOLUTION ORAL at 08:09

## 2023-01-01 RX ADMIN — LACTULOSE 20 G: 20 SOLUTION ORAL at 11:16

## 2023-01-01 RX ADMIN — ACETYLCYSTEINE 600 MG: 200 INHALANT RESPIRATORY (INHALATION) at 20:52

## 2023-01-01 RX ADMIN — LANSOPRAZOLE 30 MG: 30 TABLET, ORALLY DISINTEGRATING, DELAYED RELEASE ORAL at 06:02

## 2023-01-01 RX ADMIN — IPRATROPIUM BROMIDE AND ALBUTEROL SULFATE 1 DOSE: 2.5; .5 SOLUTION RESPIRATORY (INHALATION) at 07:18

## 2023-01-01 RX ADMIN — LEVOTHYROXINE SODIUM 175 MCG: 150 TABLET ORAL at 06:00

## 2023-01-01 RX ADMIN — POLYETHYLENE GLYCOL (3350) 17 G: 17 POWDER, FOR SOLUTION ORAL at 20:59

## 2023-01-01 RX ADMIN — LANSOPRAZOLE 30 MG: 30 TABLET, ORALLY DISINTEGRATING, DELAYED RELEASE ORAL at 05:58

## 2023-01-01 RX ADMIN — POTASSIUM CHLORIDE 20 MEQ: 1500 TABLET, EXTENDED RELEASE ORAL at 08:26

## 2023-01-01 RX ADMIN — AMIODARONE HYDROCHLORIDE 150 MG: 50 INJECTION, SOLUTION INTRAVENOUS at 07:20

## 2023-01-01 RX ADMIN — VANCOMYCIN HYDROCHLORIDE 1500 MG: 1.5 INJECTION, POWDER, LYOPHILIZED, FOR SOLUTION INTRAVENOUS at 17:50

## 2023-01-01 RX ADMIN — VANCOMYCIN HYDROCHLORIDE 1250 MG: 1.25 INJECTION, POWDER, LYOPHILIZED, FOR SOLUTION INTRAVENOUS at 11:47

## 2023-01-01 RX ADMIN — SODIUM CHLORIDE, PRESERVATIVE FREE 10 ML: 5 INJECTION INTRAVENOUS at 21:45

## 2023-01-01 RX ADMIN — MIDAZOLAM 7 MG/HR: 5 INJECTION INTRAMUSCULAR; INTRAVENOUS at 17:32

## 2023-01-01 RX ADMIN — SODIUM CHLORIDE, PRESERVATIVE FREE 10 ML: 5 INJECTION INTRAVENOUS at 08:27

## 2023-01-01 RX ADMIN — Medication 250 MCG/HR: at 17:23

## 2023-01-01 RX ADMIN — LACTULOSE 20 G: 20 SOLUTION ORAL at 10:15

## 2023-01-01 RX ADMIN — METHYLPREDNISOLONE SODIUM SUCCINATE 40 MG: 40 INJECTION, POWDER, LYOPHILIZED, FOR SOLUTION INTRAMUSCULAR; INTRAVENOUS at 06:21

## 2023-01-01 RX ADMIN — FLUTICASONE PROPIONATE 1 SPRAY: 50 SPRAY, METERED NASAL at 21:37

## 2023-01-01 RX ADMIN — IPRATROPIUM BROMIDE AND ALBUTEROL SULFATE 1 DOSE: 2.5; .5 SOLUTION RESPIRATORY (INHALATION) at 15:06

## 2023-01-01 RX ADMIN — FUROSEMIDE 40 MG: 10 INJECTION, SOLUTION INTRAMUSCULAR; INTRAVENOUS at 11:17

## 2023-01-01 RX ADMIN — PIPERACILLIN AND TAZOBACTAM 4500 MG: 4; .5 INJECTION, POWDER, FOR SOLUTION INTRAVENOUS at 04:22

## 2023-01-01 RX ADMIN — GABAPENTIN 200 MG: 100 CAPSULE ORAL at 20:06

## 2023-01-01 RX ADMIN — APIXABAN 5 MG: 5 TABLET, FILM COATED ORAL at 20:05

## 2023-01-01 RX ADMIN — SODIUM CHLORIDE 1000 ML: 9 INJECTION, SOLUTION INTRAVENOUS at 17:23

## 2023-01-01 RX ADMIN — DEXAMETHASONE SODIUM PHOSPHATE 20 MG: 4 INJECTION, SOLUTION INTRAMUSCULAR; INTRAVENOUS at 15:41

## 2023-01-01 RX ADMIN — Medication 4 ML: at 07:42

## 2023-01-01 RX ADMIN — SODIUM CHLORIDE, PRESERVATIVE FREE 10 ML: 5 INJECTION INTRAVENOUS at 21:11

## 2023-01-01 RX ADMIN — METHYLPREDNISOLONE SODIUM SUCCINATE 60 MG: 40 INJECTION, POWDER, LYOPHILIZED, FOR SOLUTION INTRAMUSCULAR; INTRAVENOUS at 00:10

## 2023-01-01 RX ADMIN — SENNOSIDES AND DOCUSATE SODIUM 2 TABLET: 50; 8.6 TABLET ORAL at 08:09

## 2023-01-01 RX ADMIN — CISATRACURIUM BESYLATE 5 MCG/KG/MIN: 10 INJECTION INTRAVENOUS at 06:42

## 2023-01-01 RX ADMIN — OXYCODONE HYDROCHLORIDE 5 MG: 5 TABLET ORAL at 15:13

## 2023-01-01 RX ADMIN — IPRATROPIUM BROMIDE AND ALBUTEROL SULFATE 1 DOSE: 2.5; .5 SOLUTION RESPIRATORY (INHALATION) at 07:34

## 2023-01-01 RX ADMIN — IPRATROPIUM BROMIDE AND ALBUTEROL SULFATE 1 DOSE: 2.5; .5 SOLUTION RESPIRATORY (INHALATION) at 07:17

## 2023-01-01 RX ADMIN — METHYLPREDNISOLONE SODIUM SUCCINATE 40 MG: 40 INJECTION, POWDER, LYOPHILIZED, FOR SOLUTION INTRAMUSCULAR; INTRAVENOUS at 22:34

## 2023-01-01 RX ADMIN — Medication 200 MCG/HR: at 13:08

## 2023-01-01 RX ADMIN — BUDESONIDE AND FORMOTEROL FUMARATE DIHYDRATE 2 PUFF: 160; 4.5 AEROSOL RESPIRATORY (INHALATION) at 19:35

## 2023-01-01 RX ADMIN — BUDESONIDE AND FORMOTEROL FUMARATE DIHYDRATE 2 PUFF: 160; 4.5 AEROSOL RESPIRATORY (INHALATION) at 18:57

## 2023-01-01 RX ADMIN — TRAZODONE HYDROCHLORIDE 50 MG: 50 TABLET ORAL at 22:11

## 2023-01-01 RX ADMIN — MAGNESIUM SULFATE HEPTAHYDRATE 2000 MG: 40 INJECTION, SOLUTION INTRAVENOUS at 14:40

## 2023-01-01 RX ADMIN — Medication 0.35 MG/KG/HR: at 07:48

## 2023-01-01 RX ADMIN — DEXAMETHASONE SODIUM PHOSPHATE 20 MG: 4 INJECTION, SOLUTION INTRAMUSCULAR; INTRAVENOUS at 08:09

## 2023-01-01 RX ADMIN — MIDAZOLAM HYDROCHLORIDE 2 MG: 2 INJECTION, SOLUTION INTRAMUSCULAR; INTRAVENOUS at 09:50

## 2023-01-01 RX ADMIN — METHYLPREDNISOLONE SODIUM SUCCINATE 60 MG: 40 INJECTION, POWDER, LYOPHILIZED, FOR SOLUTION INTRAMUSCULAR; INTRAVENOUS at 05:57

## 2023-01-01 RX ADMIN — PIPERACILLIN AND TAZOBACTAM 4500 MG: 4; .5 INJECTION, POWDER, FOR SOLUTION INTRAVENOUS at 12:58

## 2023-01-01 RX ADMIN — Medication 225 MCG/HR: at 04:35

## 2023-01-01 RX ADMIN — Medication 150 MCG/HR: at 00:42

## 2023-01-01 RX ADMIN — IPRATROPIUM BROMIDE AND ALBUTEROL SULFATE 1 DOSE: 2.5; .5 SOLUTION RESPIRATORY (INHALATION) at 03:13

## 2023-01-01 RX ADMIN — DOCUSATE SODIUM 100 MG: 100 CAPSULE, LIQUID FILLED ORAL at 08:57

## 2023-01-01 RX ADMIN — Medication 250 MCG/HR: at 06:20

## 2023-01-01 RX ADMIN — CHLORHEXIDINE GLUCONATE 0.12% ORAL RINSE 15 ML: 1.2 LIQUID ORAL at 21:44

## 2023-01-01 RX ADMIN — POTASSIUM CHLORIDE 20 MEQ: 1500 TABLET, EXTENDED RELEASE ORAL at 10:33

## 2023-01-01 RX ADMIN — PIPERACILLIN AND TAZOBACTAM 4500 MG: 4; .5 INJECTION, POWDER, FOR SOLUTION INTRAVENOUS at 20:03

## 2023-01-01 RX ADMIN — PIPERACILLIN AND TAZOBACTAM 4500 MG: 4; .5 INJECTION, POWDER, FOR SOLUTION INTRAVENOUS at 18:43

## 2023-01-01 RX ADMIN — FUROSEMIDE 40 MG: 10 INJECTION, SOLUTION INTRAMUSCULAR; INTRAVENOUS at 10:15

## 2023-01-01 RX ADMIN — LOSARTAN POTASSIUM 25 MG: 25 TABLET, FILM COATED ORAL at 20:33

## 2023-01-01 RX ADMIN — Medication 4 ML: at 23:34

## 2023-01-01 RX ADMIN — METHYLPREDNISOLONE SODIUM SUCCINATE 60 MG: 40 INJECTION, POWDER, LYOPHILIZED, FOR SOLUTION INTRAMUSCULAR; INTRAVENOUS at 00:14

## 2023-01-01 RX ADMIN — OXYCODONE HYDROCHLORIDE 5 MG: 5 TABLET ORAL at 14:54

## 2023-01-01 RX ADMIN — VANCOMYCIN HYDROCHLORIDE 1500 MG: 1.5 INJECTION, POWDER, LYOPHILIZED, FOR SOLUTION INTRAVENOUS at 11:06

## 2023-01-01 RX ADMIN — Medication 200 MCG/HR: at 08:03

## 2023-01-01 RX ADMIN — VANCOMYCIN HYDROCHLORIDE 1500 MG: 1.5 INJECTION, POWDER, LYOPHILIZED, FOR SOLUTION INTRAVENOUS at 12:13

## 2023-01-01 RX ADMIN — TRAZODONE HYDROCHLORIDE 50 MG: 50 TABLET ORAL at 21:41

## 2023-01-01 RX ADMIN — PROPOFOL 50 MCG/KG/MIN: 10 INJECTION, EMULSION INTRAVENOUS at 03:01

## 2023-01-01 RX ADMIN — VANCOMYCIN HYDROCHLORIDE 1250 MG: 1.25 INJECTION, POWDER, LYOPHILIZED, FOR SOLUTION INTRAVENOUS at 18:50

## 2023-01-01 RX ADMIN — IPRATROPIUM BROMIDE AND ALBUTEROL SULFATE 1 DOSE: 2.5; .5 SOLUTION RESPIRATORY (INHALATION) at 18:59

## 2023-01-01 RX ADMIN — MIDAZOLAM 5 MG/HR: 5 INJECTION INTRAMUSCULAR; INTRAVENOUS at 20:46

## 2023-01-01 RX ADMIN — PANTOPRAZOLE SODIUM 40 MG: 40 TABLET, DELAYED RELEASE ORAL at 06:38

## 2023-01-01 RX ADMIN — PANTOPRAZOLE SODIUM 40 MG: 40 TABLET, DELAYED RELEASE ORAL at 16:02

## 2023-01-01 RX ADMIN — GABAPENTIN 200 MG: 100 CAPSULE ORAL at 08:42

## 2023-01-01 RX ADMIN — IPRATROPIUM BROMIDE AND ALBUTEROL SULFATE 1 DOSE: 2.5; .5 SOLUTION RESPIRATORY (INHALATION) at 07:42

## 2023-01-01 RX ADMIN — Medication 200 MCG/HR: at 18:41

## 2023-01-01 RX ADMIN — METHYLPREDNISOLONE SODIUM SUCCINATE 125 MG: 125 INJECTION INTRAMUSCULAR; INTRAVENOUS at 17:24

## 2023-01-01 RX ADMIN — IPRATROPIUM BROMIDE AND ALBUTEROL SULFATE 1 DOSE: 2.5; .5 SOLUTION RESPIRATORY (INHALATION) at 01:23

## 2023-01-01 RX ADMIN — IPRATROPIUM BROMIDE AND ALBUTEROL SULFATE 1 DOSE: 2.5; .5 SOLUTION RESPIRATORY (INHALATION) at 15:24

## 2023-01-01 RX ADMIN — OXYCODONE HYDROCHLORIDE 5 MG: 5 TABLET ORAL at 10:15

## 2023-01-01 RX ADMIN — Medication 12 MCG/MIN: at 14:26

## 2023-01-01 RX ADMIN — PROPOFOL 50 MCG/KG/MIN: 10 INJECTION, EMULSION INTRAVENOUS at 18:47

## 2023-01-01 RX ADMIN — LACTULOSE 20 G: 20 SOLUTION ORAL at 14:25

## 2023-01-01 RX ADMIN — LACTULOSE 20 G: 20 SOLUTION ORAL at 08:59

## 2023-01-01 RX ADMIN — FUROSEMIDE 40 MG: 10 INJECTION, SOLUTION INTRAMUSCULAR; INTRAVENOUS at 17:25

## 2023-01-01 RX ADMIN — FUROSEMIDE 20 MG: 20 TABLET ORAL at 17:19

## 2023-01-01 RX ADMIN — ANTACID TABLETS 500 MG: 500 TABLET, CHEWABLE ORAL at 21:49

## 2023-01-01 RX ADMIN — Medication 4 ML: at 15:28

## 2023-01-01 RX ADMIN — ACETAMINOPHEN 650 MG: 325 TABLET ORAL at 02:00

## 2023-01-01 RX ADMIN — ACETAMINOPHEN 650 MG: 325 TABLET ORAL at 22:44

## 2023-01-01 RX ADMIN — LEVOTHYROXINE SODIUM 175 MCG: 150 TABLET ORAL at 06:02

## 2023-01-01 RX ADMIN — OXYCODONE HYDROCHLORIDE 5 MG: 5 TABLET ORAL at 21:43

## 2023-01-01 RX ADMIN — VANCOMYCIN HYDROCHLORIDE 1250 MG: 1.25 INJECTION, POWDER, LYOPHILIZED, FOR SOLUTION INTRAVENOUS at 10:39

## 2023-01-01 RX ADMIN — Medication 150 MCG/HR: at 08:30

## 2023-01-01 RX ADMIN — MIDAZOLAM 8 MG/HR: 5 INJECTION INTRAMUSCULAR; INTRAVENOUS at 10:59

## 2023-01-01 RX ADMIN — CISATRACURIUM BESYLATE 4.5 MCG/KG/MIN: 10 INJECTION INTRAVENOUS at 03:45

## 2023-01-01 RX ADMIN — TRAZODONE HYDROCHLORIDE 50 MG: 50 TABLET ORAL at 21:16

## 2023-01-01 RX ADMIN — CISATRACURIUM BESYLATE 4.5 MCG/KG/MIN: 10 INJECTION INTRAVENOUS at 18:54

## 2023-01-01 RX ADMIN — POLYETHYLENE GLYCOL (3350) 17 G: 17 POWDER, FOR SOLUTION ORAL at 22:27

## 2023-01-01 RX ADMIN — TRAZODONE HYDROCHLORIDE 50 MG: 50 TABLET ORAL at 21:39

## 2023-01-01 RX ADMIN — DIAZEPAM 5 MG: 5 TABLET ORAL at 16:53

## 2023-01-01 RX ADMIN — SODIUM CHLORIDE, PRESERVATIVE FREE 10 ML: 5 INJECTION INTRAVENOUS at 20:48

## 2023-01-01 RX ADMIN — Medication 225 MCG/HR: at 03:22

## 2023-01-01 RX ADMIN — PROPOFOL 20 MCG/KG/MIN: 10 INJECTION, EMULSION INTRAVENOUS at 10:04

## 2023-01-01 RX ADMIN — CHLORHEXIDINE GLUCONATE 0.12% ORAL RINSE 15 ML: 1.2 LIQUID ORAL at 09:31

## 2023-01-01 RX ADMIN — CIPROFLOXACIN AND FLUOCINOLONE ACETONIDE 0.25 ML: .75; .0625 SOLUTION AURICULAR (OTIC) at 11:19

## 2023-01-01 RX ADMIN — POTASSIUM CHLORIDE 20 MEQ: 1500 TABLET, EXTENDED RELEASE ORAL at 10:08

## 2023-01-01 RX ADMIN — TRAZODONE HYDROCHLORIDE 50 MG: 50 TABLET ORAL at 20:35

## 2023-01-01 RX ADMIN — PIPERACILLIN AND TAZOBACTAM 4500 MG: 4; .5 INJECTION, POWDER, FOR SOLUTION INTRAVENOUS at 22:47

## 2023-01-01 RX ADMIN — POTASSIUM CHLORIDE 20 MEQ: 1500 TABLET, EXTENDED RELEASE ORAL at 09:06

## 2023-01-01 RX ADMIN — PIPERACILLIN AND TAZOBACTAM 4500 MG: 4; .5 INJECTION, POWDER, FOR SOLUTION INTRAVENOUS at 23:55

## 2023-01-01 RX ADMIN — SENNOSIDES AND DOCUSATE SODIUM 2 TABLET: 50; 8.6 TABLET ORAL at 11:16

## 2023-01-01 RX ADMIN — SODIUM CHLORIDE, PRESERVATIVE FREE 10 ML: 5 INJECTION INTRAVENOUS at 21:25

## 2023-01-01 RX ADMIN — IPRATROPIUM BROMIDE AND ALBUTEROL SULFATE 1 DOSE: 2.5; .5 SOLUTION RESPIRATORY (INHALATION) at 11:30

## 2023-01-01 RX ADMIN — APIXABAN 5 MG: 5 TABLET, FILM COATED ORAL at 20:48

## 2023-01-01 RX ADMIN — IPRATROPIUM BROMIDE AND ALBUTEROL SULFATE 1 DOSE: 2.5; .5 SOLUTION RESPIRATORY (INHALATION) at 16:04

## 2023-01-01 RX ADMIN — OXYCODONE HYDROCHLORIDE 5 MG: 5 TABLET ORAL at 04:00

## 2023-01-01 RX ADMIN — OXYCODONE HYDROCHLORIDE AND ACETAMINOPHEN 500 MG: 500 TABLET ORAL at 10:08

## 2023-01-01 RX ADMIN — METHYLPREDNISOLONE SODIUM SUCCINATE 125 MG: 125 INJECTION, POWDER, FOR SOLUTION INTRAMUSCULAR; INTRAVENOUS at 14:41

## 2023-01-01 RX ADMIN — Medication 250 MCG/HR: at 04:16

## 2023-01-01 RX ADMIN — Medication 200 MCG/HR: at 05:16

## 2023-01-01 RX ADMIN — ANTACID TABLETS 500 MG: 500 TABLET, CHEWABLE ORAL at 09:22

## 2023-01-01 RX ADMIN — Medication 225 MCG/HR: at 01:30

## 2023-01-01 RX ADMIN — POLYETHYLENE GLYCOL (3350) 17 G: 17 POWDER, FOR SOLUTION ORAL at 08:31

## 2023-01-01 RX ADMIN — METHYLPREDNISOLONE SODIUM SUCCINATE 40 MG: 40 INJECTION, POWDER, LYOPHILIZED, FOR SOLUTION INTRAMUSCULAR; INTRAVENOUS at 06:06

## 2023-01-01 RX ADMIN — LEVOTHYROXINE SODIUM 175 MCG: 150 TABLET ORAL at 06:19

## 2023-01-01 RX ADMIN — LEVOTHYROXINE SODIUM 175 MCG: 150 TABLET ORAL at 08:30

## 2023-01-01 RX ADMIN — Medication 150 MCG/HR: at 21:54

## 2023-01-01 RX ADMIN — DOCUSATE SODIUM 100 MG: 100 CAPSULE, LIQUID FILLED ORAL at 08:26

## 2023-01-01 RX ADMIN — SENNOSIDES AND DOCUSATE SODIUM 2 TABLET: 50; 8.6 TABLET ORAL at 21:42

## 2023-01-01 RX ADMIN — Medication 200 MCG/HR: at 07:30

## 2023-01-01 RX ADMIN — MIDAZOLAM 10 MG/HR: 5 INJECTION INTRAMUSCULAR; INTRAVENOUS at 06:21

## 2023-01-01 RX ADMIN — DIAZEPAM 5 MG: 5 TABLET ORAL at 21:12

## 2023-01-01 RX ADMIN — CHLORHEXIDINE GLUCONATE 0.12% ORAL RINSE 15 ML: 1.2 LIQUID ORAL at 21:46

## 2023-01-01 RX ADMIN — POLYETHYLENE GLYCOL (3350) 17 G: 17 POWDER, FOR SOLUTION ORAL at 11:16

## 2023-01-01 RX ADMIN — FUROSEMIDE 40 MG: 10 INJECTION, SOLUTION INTRAMUSCULAR; INTRAVENOUS at 08:27

## 2023-01-01 RX ADMIN — PIPERACILLIN AND TAZOBACTAM 4500 MG: 4; .5 INJECTION, POWDER, FOR SOLUTION INTRAVENOUS at 00:16

## 2023-01-01 RX ADMIN — IPRATROPIUM BROMIDE AND ALBUTEROL SULFATE 1 DOSE: 2.5; .5 SOLUTION RESPIRATORY (INHALATION) at 21:53

## 2023-01-01 RX ADMIN — CHLORHEXIDINE GLUCONATE 0.12% ORAL RINSE 15 ML: 1.2 LIQUID ORAL at 20:06

## 2023-01-01 RX ADMIN — APIXABAN 10 MG: 5 TABLET, FILM COATED ORAL at 20:34

## 2023-01-01 RX ADMIN — MIDAZOLAM HYDROCHLORIDE 2 MG: 1 INJECTION, SOLUTION INTRAMUSCULAR; INTRAVENOUS at 17:19

## 2023-01-01 RX ADMIN — ACETAMINOPHEN 650 MG: 325 TABLET ORAL at 09:31

## 2023-01-01 RX ADMIN — Medication 200 MCG/HR: at 15:26

## 2023-01-01 RX ADMIN — DIAZEPAM 5 MG: 5 TABLET ORAL at 08:07

## 2023-01-01 RX ADMIN — LEVOFLOXACIN 750 MG: 500 TABLET, FILM COATED ORAL at 12:33

## 2023-01-01 RX ADMIN — Medication 225 MCG/HR: at 08:56

## 2023-01-01 RX ADMIN — CALCIUM CARBONATE 1000 MG: 500 TABLET, CHEWABLE ORAL at 21:42

## 2023-01-01 RX ADMIN — SODIUM CHLORIDE, PRESERVATIVE FREE 10 ML: 5 INJECTION INTRAVENOUS at 21:16

## 2023-01-01 RX ADMIN — CALCIUM CARBONATE 1000 MG: 500 TABLET, CHEWABLE ORAL at 22:06

## 2023-01-01 RX ADMIN — POLYETHYLENE GLYCOL (3350) 17 G: 17 POWDER, FOR SOLUTION ORAL at 21:39

## 2023-01-01 RX ADMIN — IPRATROPIUM BROMIDE AND ALBUTEROL SULFATE 1 DOSE: 2.5; .5 SOLUTION RESPIRATORY (INHALATION) at 23:31

## 2023-01-01 RX ADMIN — LEVOFLOXACIN 750 MG: 500 TABLET, FILM COATED ORAL at 08:24

## 2023-01-01 RX ADMIN — CISATRACURIUM BESYLATE 4.5 MCG/KG/MIN: 10 INJECTION INTRAVENOUS at 12:49

## 2023-01-01 RX ADMIN — SODIUM CHLORIDE, PRESERVATIVE FREE 10 ML: 5 INJECTION INTRAVENOUS at 21:00

## 2023-01-01 RX ADMIN — APIXABAN 5 MG: 5 TABLET, FILM COATED ORAL at 22:26

## 2023-01-01 RX ADMIN — SODIUM CHLORIDE, PRESERVATIVE FREE 10 ML: 5 INJECTION INTRAVENOUS at 20:08

## 2023-01-01 RX ADMIN — GABAPENTIN 200 MG: 100 CAPSULE ORAL at 08:31

## 2023-01-01 RX ADMIN — GABAPENTIN 200 MG: 100 CAPSULE ORAL at 20:48

## 2023-01-01 RX ADMIN — Medication 200 MCG/HR: at 12:49

## 2023-01-01 RX ADMIN — ROCURONIUM BROMIDE 40 MG: 10 SOLUTION INTRAVENOUS at 04:08

## 2023-01-01 RX ADMIN — ENOXAPARIN SODIUM 120 MG: 150 INJECTION SUBCUTANEOUS at 08:57

## 2023-01-01 RX ADMIN — LOSARTAN POTASSIUM 25 MG: 25 TABLET, FILM COATED ORAL at 21:09

## 2023-01-01 RX ADMIN — Medication 5 MCG/MIN: at 22:39

## 2023-01-01 RX ADMIN — DIAZEPAM 5 MG: 5 TABLET ORAL at 04:01

## 2023-01-01 RX ADMIN — IPRATROPIUM BROMIDE AND ALBUTEROL SULFATE 1 DOSE: 2.5; .5 SOLUTION RESPIRATORY (INHALATION) at 19:58

## 2023-01-01 RX ADMIN — POLYETHYLENE GLYCOL (3350) 17 G: 17 POWDER, FOR SOLUTION ORAL at 22:07

## 2023-01-01 RX ADMIN — IPRATROPIUM BROMIDE AND ALBUTEROL SULFATE 1 DOSE: 2.5; .5 SOLUTION RESPIRATORY (INHALATION) at 23:08

## 2023-01-01 RX ADMIN — SODIUM CHLORIDE 100 MG: 9 INJECTION, SOLUTION INTRAVENOUS at 13:57

## 2023-01-01 RX ADMIN — LEVOTHYROXINE SODIUM 175 MCG: 150 TABLET ORAL at 06:37

## 2023-01-01 RX ADMIN — CHLORHEXIDINE GLUCONATE 0.12% ORAL RINSE 15 ML: 1.2 LIQUID ORAL at 10:15

## 2023-01-01 RX ADMIN — IPRATROPIUM BROMIDE AND ALBUTEROL SULFATE 1 DOSE: 2.5; .5 SOLUTION RESPIRATORY (INHALATION) at 00:02

## 2023-01-01 RX ADMIN — Medication 200 MCG/HR: at 20:24

## 2023-01-01 RX ADMIN — IPRATROPIUM BROMIDE AND ALBUTEROL SULFATE 1 DOSE: 2.5; .5 SOLUTION RESPIRATORY (INHALATION) at 08:01

## 2023-01-01 RX ADMIN — ACETYLCYSTEINE 400 MG: 200 SOLUTION ORAL; RESPIRATORY (INHALATION) at 20:03

## 2023-01-01 RX ADMIN — VANCOMYCIN HYDROCHLORIDE 1500 MG: 1.5 INJECTION, POWDER, LYOPHILIZED, FOR SOLUTION INTRAVENOUS at 02:28

## 2023-01-01 RX ADMIN — PIPERACILLIN AND TAZOBACTAM 4500 MG: 4; .5 INJECTION, POWDER, FOR SOLUTION INTRAVENOUS at 22:44

## 2023-01-01 RX ADMIN — LEVOTHYROXINE SODIUM 175 MCG: 150 TABLET ORAL at 05:05

## 2023-01-01 RX ADMIN — GABAPENTIN 200 MG: 100 CAPSULE ORAL at 21:47

## 2023-01-01 RX ADMIN — MIDAZOLAM 2 MG: 1 INJECTION INTRAMUSCULAR; INTRAVENOUS at 03:32

## 2023-01-01 RX ADMIN — SODIUM CHLORIDE, PRESERVATIVE FREE 10 ML: 5 INJECTION INTRAVENOUS at 09:45

## 2023-01-01 RX ADMIN — OXYCODONE HYDROCHLORIDE 5 MG: 5 TABLET ORAL at 22:27

## 2023-01-01 RX ADMIN — METHYLPREDNISOLONE SODIUM SUCCINATE 40 MG: 40 INJECTION, POWDER, LYOPHILIZED, FOR SOLUTION INTRAMUSCULAR; INTRAVENOUS at 17:14

## 2023-01-01 RX ADMIN — GABAPENTIN 200 MG: 100 CAPSULE ORAL at 20:26

## 2023-01-01 RX ADMIN — GABAPENTIN 200 MG: 100 CAPSULE ORAL at 13:28

## 2023-01-01 RX ADMIN — SODIUM CHLORIDE, PRESERVATIVE FREE 10 ML: 5 INJECTION INTRAVENOUS at 06:11

## 2023-01-01 RX ADMIN — Medication 250 MCG/HR: at 22:07

## 2023-01-01 RX ADMIN — METHYLPREDNISOLONE SODIUM SUCCINATE 40 MG: 40 INJECTION, POWDER, LYOPHILIZED, FOR SOLUTION INTRAMUSCULAR; INTRAVENOUS at 21:42

## 2023-01-01 RX ADMIN — LEVOTHYROXINE SODIUM 175 MCG: 150 TABLET ORAL at 05:58

## 2023-01-01 RX ADMIN — BUDESONIDE AND FORMOTEROL FUMARATE DIHYDRATE 2 PUFF: 160; 4.5 AEROSOL RESPIRATORY (INHALATION) at 18:59

## 2023-01-01 RX ADMIN — SENNOSIDES AND DOCUSATE SODIUM 2 TABLET: 50; 8.6 TABLET ORAL at 22:26

## 2023-01-01 RX ADMIN — PROPOFOL 50 MCG/KG/MIN: 10 INJECTION, EMULSION INTRAVENOUS at 00:30

## 2023-01-01 RX ADMIN — CIPROFLOXACIN AND FLUOCINOLONE ACETONIDE 0.25 ML: .75; .0625 SOLUTION AURICULAR (OTIC) at 08:07

## 2023-01-01 RX ADMIN — Medication 4 ML: at 15:06

## 2023-01-01 RX ADMIN — GABAPENTIN 200 MG: 100 CAPSULE ORAL at 14:55

## 2023-01-01 RX ADMIN — GABAPENTIN 200 MG: 100 CAPSULE ORAL at 15:13

## 2023-01-01 RX ADMIN — VANCOMYCIN HYDROCHLORIDE 1500 MG: 1.5 INJECTION, POWDER, LYOPHILIZED, FOR SOLUTION INTRAVENOUS at 21:59

## 2023-01-01 RX ADMIN — LANSOPRAZOLE 30 MG: 30 TABLET, ORALLY DISINTEGRATING, DELAYED RELEASE ORAL at 05:57

## 2023-01-01 RX ADMIN — METHYLPREDNISOLONE SODIUM SUCCINATE 125 MG: 125 INJECTION INTRAMUSCULAR; INTRAVENOUS at 12:26

## 2023-01-01 RX ADMIN — OXYCODONE HYDROCHLORIDE AND ACETAMINOPHEN 500 MG: 500 TABLET ORAL at 08:57

## 2023-01-01 RX ADMIN — VANCOMYCIN HYDROCHLORIDE 1500 MG: 1.5 INJECTION, POWDER, LYOPHILIZED, FOR SOLUTION INTRAVENOUS at 12:22

## 2023-01-01 RX ADMIN — OXYCODONE HYDROCHLORIDE 5 MG: 5 TABLET ORAL at 08:31

## 2023-01-01 RX ADMIN — Medication 200 MCG/HR: at 23:38

## 2023-01-01 RX ADMIN — IPRATROPIUM BROMIDE AND ALBUTEROL SULFATE 1 DOSE: 2.5; .5 SOLUTION RESPIRATORY (INHALATION) at 07:46

## 2023-01-01 RX ADMIN — MIDAZOLAM 8 MG/HR: 5 INJECTION INTRAMUSCULAR; INTRAVENOUS at 02:15

## 2023-01-01 RX ADMIN — SODIUM CHLORIDE, PRESERVATIVE FREE 10 ML: 5 INJECTION INTRAVENOUS at 08:50

## 2023-01-01 RX ADMIN — METHYLPREDNISOLONE SODIUM SUCCINATE 60 MG: 40 INJECTION, POWDER, LYOPHILIZED, FOR SOLUTION INTRAMUSCULAR; INTRAVENOUS at 01:11

## 2023-01-01 RX ADMIN — TRAZODONE HYDROCHLORIDE 50 MG: 50 TABLET ORAL at 22:38

## 2023-01-01 RX ADMIN — DIAZEPAM 5 MG: 5 TABLET ORAL at 21:47

## 2023-01-01 RX ADMIN — SODIUM CHLORIDE, PRESERVATIVE FREE 10 ML: 5 INJECTION INTRAVENOUS at 13:14

## 2023-01-01 RX ADMIN — VANCOMYCIN HYDROCHLORIDE 2250 MG: 1 INJECTION, POWDER, LYOPHILIZED, FOR SOLUTION INTRAVENOUS at 18:46

## 2023-01-01 RX ADMIN — METHYLPREDNISOLONE SODIUM SUCCINATE 60 MG: 40 INJECTION, POWDER, LYOPHILIZED, FOR SOLUTION INTRAMUSCULAR; INTRAVENOUS at 01:16

## 2023-01-01 RX ADMIN — ANTACID TABLETS 500 MG: 500 TABLET, CHEWABLE ORAL at 13:34

## 2023-01-01 RX ADMIN — METHYLPREDNISOLONE SODIUM SUCCINATE 60 MG: 40 INJECTION, POWDER, LYOPHILIZED, FOR SOLUTION INTRAMUSCULAR; INTRAVENOUS at 00:05

## 2023-01-01 RX ADMIN — Medication 50 MCG/HR: at 21:08

## 2023-01-01 RX ADMIN — LEVOFLOXACIN 750 MG: 500 TABLET, FILM COATED ORAL at 08:38

## 2023-01-01 RX ADMIN — CHLORHEXIDINE GLUCONATE 0.12% ORAL RINSE 15 ML: 1.2 LIQUID ORAL at 20:04

## 2023-01-01 RX ADMIN — IPRATROPIUM BROMIDE AND ALBUTEROL SULFATE 1 DOSE: 2.5; .5 SOLUTION RESPIRATORY (INHALATION) at 07:31

## 2023-01-01 RX ADMIN — CHLORHEXIDINE GLUCONATE 0.12% ORAL RINSE 15 ML: 1.2 LIQUID ORAL at 08:31

## 2023-01-01 RX ADMIN — METHYLPREDNISOLONE SODIUM SUCCINATE 125 MG: 125 INJECTION INTRAMUSCULAR; INTRAVENOUS at 23:24

## 2023-01-01 RX ADMIN — IPRATROPIUM BROMIDE AND ALBUTEROL SULFATE 1 DOSE: 2.5; .5 SOLUTION RESPIRATORY (INHALATION) at 02:15

## 2023-01-01 RX ADMIN — OXYCODONE HYDROCHLORIDE 5 MG: 5 TABLET ORAL at 20:48

## 2023-01-01 RX ADMIN — BISACODYL 10 MG: 10 SUPPOSITORY RECTAL at 08:59

## 2023-01-01 RX ADMIN — METHYLPREDNISOLONE SODIUM SUCCINATE 60 MG: 40 INJECTION, POWDER, LYOPHILIZED, FOR SOLUTION INTRAMUSCULAR; INTRAVENOUS at 17:14

## 2023-01-01 RX ADMIN — ACETAMINOPHEN 650 MG: 325 TABLET ORAL at 11:19

## 2023-01-01 RX ADMIN — LEVOFLOXACIN 750 MG: 500 TABLET, FILM COATED ORAL at 09:08

## 2023-01-01 RX ADMIN — SODIUM CHLORIDE, PRESERVATIVE FREE 10 ML: 5 INJECTION INTRAVENOUS at 08:07

## 2023-01-01 RX ADMIN — LANSOPRAZOLE 30 MG: 30 TABLET, ORALLY DISINTEGRATING, DELAYED RELEASE ORAL at 05:34

## 2023-01-01 RX ADMIN — DOCUSATE SODIUM LIQUID 60 MG: 100 LIQUID ORAL at 10:33

## 2023-01-01 RX ADMIN — IPRATROPIUM BROMIDE AND ALBUTEROL SULFATE 1 DOSE: 2.5; .5 SOLUTION RESPIRATORY (INHALATION) at 02:05

## 2023-01-01 RX ADMIN — IPRATROPIUM BROMIDE AND ALBUTEROL SULFATE 1 DOSE: 2.5; .5 SOLUTION RESPIRATORY (INHALATION) at 19:28

## 2023-01-01 RX ADMIN — POLYETHYLENE GLYCOL (3350) 17 G: 17 POWDER, FOR SOLUTION ORAL at 20:12

## 2023-01-01 RX ADMIN — METHYLPREDNISOLONE SODIUM SUCCINATE 40 MG: 40 INJECTION, POWDER, FOR SOLUTION INTRAMUSCULAR; INTRAVENOUS at 22:01

## 2023-01-01 RX ADMIN — BUDESONIDE AND FORMOTEROL FUMARATE DIHYDRATE 2 PUFF: 160; 4.5 AEROSOL RESPIRATORY (INHALATION) at 20:03

## 2023-01-01 RX ADMIN — PANTOPRAZOLE SODIUM 40 MG: 40 TABLET, DELAYED RELEASE ORAL at 06:00

## 2023-01-01 RX ADMIN — Medication 4 ML: at 02:27

## 2023-01-01 RX ADMIN — IPRATROPIUM BROMIDE AND ALBUTEROL SULFATE 1 DOSE: 2.5; .5 SOLUTION RESPIRATORY (INHALATION) at 19:19

## 2023-01-01 RX ADMIN — Medication 200 MCG/HR: at 08:32

## 2023-01-01 RX ADMIN — DOCUSATE SODIUM LIQUID 100 MG: 100 LIQUID ORAL at 09:13

## 2023-01-01 RX ADMIN — LEVOTHYROXINE SODIUM 175 MCG: 150 TABLET ORAL at 06:29

## 2023-01-01 RX ADMIN — METHYLPREDNISOLONE SODIUM SUCCINATE 60 MG: 40 INJECTION, POWDER, LYOPHILIZED, FOR SOLUTION INTRAMUSCULAR; INTRAVENOUS at 12:14

## 2023-01-01 RX ADMIN — Medication 125 MCG/HR: at 14:08

## 2023-01-01 RX ADMIN — CHLORHEXIDINE GLUCONATE 0.12% ORAL RINSE 15 ML: 1.2 LIQUID ORAL at 08:34

## 2023-01-01 RX ADMIN — SODIUM CHLORIDE, PRESERVATIVE FREE 10 ML: 5 INJECTION INTRAVENOUS at 20:27

## 2023-01-01 RX ADMIN — METHYLPREDNISOLONE SODIUM SUCCINATE 60 MG: 40 INJECTION, POWDER, LYOPHILIZED, FOR SOLUTION INTRAMUSCULAR; INTRAVENOUS at 12:05

## 2023-01-01 RX ADMIN — PIPERACILLIN AND TAZOBACTAM 4500 MG: 4; .5 INJECTION, POWDER, FOR SOLUTION INTRAVENOUS at 06:05

## 2023-01-01 RX ADMIN — BUDESONIDE AND FORMOTEROL FUMARATE DIHYDRATE 2 PUFF: 160; 4.5 AEROSOL RESPIRATORY (INHALATION) at 07:21

## 2023-01-01 RX ADMIN — CHLORHEXIDINE GLUCONATE 0.12% ORAL RINSE 15 ML: 1.2 LIQUID ORAL at 12:49

## 2023-01-01 RX ADMIN — LOSARTAN POTASSIUM 25 MG: 25 TABLET, FILM COATED ORAL at 21:39

## 2023-01-01 RX ADMIN — FUROSEMIDE 20 MG: 20 TABLET ORAL at 09:14

## 2023-01-01 RX ADMIN — CHLORHEXIDINE GLUCONATE 0.12% ORAL RINSE 15 ML: 1.2 LIQUID ORAL at 20:47

## 2023-01-01 RX ADMIN — VANCOMYCIN HYDROCHLORIDE 1000 MG: 1 INJECTION, POWDER, LYOPHILIZED, FOR SOLUTION INTRAVENOUS at 15:18

## 2023-01-01 RX ADMIN — ALBUMIN (HUMAN) 25 G: 0.25 INJECTION, SOLUTION INTRAVENOUS at 15:55

## 2023-01-01 RX ADMIN — FUROSEMIDE 40 MG: 10 INJECTION, SOLUTION INTRAMUSCULAR; INTRAVENOUS at 08:25

## 2023-01-01 RX ADMIN — POLYETHYLENE GLYCOL (3350) 17 G: 17 POWDER, FOR SOLUTION ORAL at 22:06

## 2023-01-01 RX ADMIN — SODIUM CHLORIDE, PRESERVATIVE FREE 10 ML: 5 INJECTION INTRAVENOUS at 21:22

## 2023-01-01 RX ADMIN — CHLORHEXIDINE GLUCONATE 0.12% ORAL RINSE 15 ML: 1.2 LIQUID ORAL at 20:59

## 2023-01-01 RX ADMIN — PIPERACILLIN AND TAZOBACTAM 4500 MG: 4; .5 INJECTION, POWDER, FOR SOLUTION INTRAVENOUS at 04:01

## 2023-01-01 RX ADMIN — CHLORHEXIDINE GLUCONATE 0.12% ORAL RINSE 15 ML: 1.2 LIQUID ORAL at 22:05

## 2023-01-01 RX ADMIN — IPRATROPIUM BROMIDE AND ALBUTEROL SULFATE 1 DOSE: 2.5; .5 SOLUTION RESPIRATORY (INHALATION) at 02:26

## 2023-01-01 RX ADMIN — ACETAMINOPHEN 650 MG: 325 TABLET ORAL at 19:17

## 2023-01-01 RX ADMIN — ACETAMINOPHEN 650 MG: 325 TABLET ORAL at 17:24

## 2023-01-01 RX ADMIN — METHYLPREDNISOLONE SODIUM SUCCINATE 125 MG: 125 INJECTION INTRAMUSCULAR; INTRAVENOUS at 12:59

## 2023-01-01 RX ADMIN — Medication 250 MCG/HR: at 10:52

## 2023-01-01 RX ADMIN — Medication 150 MCG/HR: at 15:21

## 2023-01-01 RX ADMIN — FLUTICASONE PROPIONATE 1 SPRAY: 50 SPRAY, METERED NASAL at 22:11

## 2023-01-01 RX ADMIN — BUDESONIDE AND FORMOTEROL FUMARATE DIHYDRATE 2 PUFF: 160; 4.5 AEROSOL RESPIRATORY (INHALATION) at 07:40

## 2023-01-01 RX ADMIN — ACETAMINOPHEN 650 MG: 325 TABLET ORAL at 19:22

## 2023-01-01 RX ADMIN — WATER 10 ML: 1 INJECTION INTRAMUSCULAR; INTRAVENOUS; SUBCUTANEOUS at 05:06

## 2023-01-01 RX ADMIN — METHYLPREDNISOLONE SODIUM SUCCINATE 40 MG: 40 INJECTION, POWDER, FOR SOLUTION INTRAMUSCULAR; INTRAVENOUS at 04:52

## 2023-01-01 RX ADMIN — Medication 200 MCG/HR: at 20:54

## 2023-01-01 RX ADMIN — APIXABAN 5 MG: 5 TABLET, FILM COATED ORAL at 08:31

## 2023-01-01 RX ADMIN — BUDESONIDE AND FORMOTEROL FUMARATE DIHYDRATE 2 PUFF: 160; 4.5 AEROSOL RESPIRATORY (INHALATION) at 07:25

## 2023-01-01 RX ADMIN — FUROSEMIDE 40 MG: 10 INJECTION, SOLUTION INTRAMUSCULAR; INTRAVENOUS at 18:09

## 2023-01-01 RX ADMIN — ACETYLCYSTEINE 400 MG: 200 SOLUTION ORAL; RESPIRATORY (INHALATION) at 21:37

## 2023-01-01 RX ADMIN — LACTULOSE 20 G: 20 SOLUTION ORAL at 18:14

## 2023-01-01 RX ADMIN — METHYLPREDNISOLONE SODIUM SUCCINATE 40 MG: 40 INJECTION, POWDER, FOR SOLUTION INTRAMUSCULAR; INTRAVENOUS at 06:14

## 2023-01-01 RX ADMIN — Medication 4 ML: at 03:34

## 2023-01-01 RX ADMIN — SODIUM CHLORIDE, PRESERVATIVE FREE 10 ML: 5 INJECTION INTRAVENOUS at 12:05

## 2023-01-01 RX ADMIN — VANCOMYCIN HYDROCHLORIDE 1250 MG: 1.25 INJECTION, POWDER, LYOPHILIZED, FOR SOLUTION INTRAVENOUS at 11:38

## 2023-01-01 RX ADMIN — FUROSEMIDE 20 MG: 20 TABLET ORAL at 06:07

## 2023-01-01 RX ADMIN — MIDAZOLAM HYDROCHLORIDE 8 MG/HR: 5 INJECTION, SOLUTION INTRAMUSCULAR; INTRAVENOUS at 17:49

## 2023-01-01 RX ADMIN — BUDESONIDE AND FORMOTEROL FUMARATE DIHYDRATE 2 PUFF: 160; 4.5 AEROSOL RESPIRATORY (INHALATION) at 20:23

## 2023-01-01 RX ADMIN — APIXABAN 10 MG: 5 TABLET, FILM COATED ORAL at 09:07

## 2023-01-01 RX ADMIN — ACETYLCYSTEINE 400 MG: 200 SOLUTION ORAL; RESPIRATORY (INHALATION) at 07:34

## 2023-01-01 RX ADMIN — IPRATROPIUM BROMIDE AND ALBUTEROL SULFATE 1 DOSE: 2.5; .5 SOLUTION RESPIRATORY (INHALATION) at 15:05

## 2023-01-01 RX ADMIN — CHLORHEXIDINE GLUCONATE 0.12% ORAL RINSE 15 ML: 1.2 LIQUID ORAL at 20:33

## 2023-01-01 RX ADMIN — SODIUM CHLORIDE, PRESERVATIVE FREE 10 ML: 5 INJECTION INTRAVENOUS at 22:05

## 2023-01-01 RX ADMIN — IPRATROPIUM BROMIDE AND ALBUTEROL SULFATE 1 DOSE: 2.5; .5 SOLUTION RESPIRATORY (INHALATION) at 03:16

## 2023-01-01 RX ADMIN — TRAZODONE HYDROCHLORIDE 50 MG: 50 TABLET ORAL at 20:06

## 2023-01-01 RX ADMIN — ANTACID TABLETS 500 MG: 500 TABLET, CHEWABLE ORAL at 05:05

## 2023-01-01 RX ADMIN — ACETYLCYSTEINE 400 MG: 200 SOLUTION ORAL; RESPIRATORY (INHALATION) at 07:29

## 2023-01-01 RX ADMIN — POTASSIUM PHOSPHATE, MONOBASIC AND POTASSIUM PHOSPHATE, DIBASIC 30 MMOL: 224; 236 INJECTION, SOLUTION, CONCENTRATE INTRAVENOUS at 09:22

## 2023-01-01 RX ADMIN — PIPERACILLIN AND TAZOBACTAM 4500 MG: 4; .5 INJECTION, POWDER, FOR SOLUTION INTRAVENOUS at 17:21

## 2023-01-01 RX ADMIN — PIPERACILLIN AND TAZOBACTAM 3375 MG: 3; .375 INJECTION, POWDER, LYOPHILIZED, FOR SOLUTION INTRAVENOUS at 16:03

## 2023-01-01 RX ADMIN — SODIUM CHLORIDE, PRESERVATIVE FREE 10 ML: 5 INJECTION INTRAVENOUS at 08:09

## 2023-01-01 RX ADMIN — FENTANYL CITRATE 50 MCG: 50 INJECTION, SOLUTION INTRAMUSCULAR; INTRAVENOUS at 19:34

## 2023-01-01 RX ADMIN — CISATRACURIUM BESYLATE 5 MCG/KG/MIN: 10 INJECTION INTRAVENOUS at 20:22

## 2023-01-01 RX ADMIN — HYDROXYZINE HYDROCHLORIDE 25 MG: 25 TABLET, FILM COATED ORAL at 22:00

## 2023-01-01 RX ADMIN — PIPERACILLIN AND TAZOBACTAM 4500 MG: 4; .5 INJECTION, POWDER, FOR SOLUTION INTRAVENOUS at 11:48

## 2023-01-01 RX ADMIN — GABAPENTIN 200 MG: 100 CAPSULE ORAL at 14:08

## 2023-01-01 RX ADMIN — APIXABAN 5 MG: 5 TABLET, FILM COATED ORAL at 20:06

## 2023-01-01 RX ADMIN — POLYETHYLENE GLYCOL (3350) 17 G: 17 POWDER, FOR SOLUTION ORAL at 08:34

## 2023-01-01 RX ADMIN — DOCUSATE SODIUM 100 MG: 100 CAPSULE, LIQUID FILLED ORAL at 09:08

## 2023-01-01 RX ADMIN — IPRATROPIUM BROMIDE AND ALBUTEROL SULFATE 1 DOSE: 2.5; .5 SOLUTION RESPIRATORY (INHALATION) at 03:33

## 2023-01-01 RX ADMIN — SODIUM PHOSPHATE, MONOBASIC, MONOHYDRATE AND SODIUM PHOSPHATE, DIBASIC, ANHYDROUS 15 MMOL: 142; 276 INJECTION, SOLUTION INTRAVENOUS at 17:33

## 2023-01-01 RX ADMIN — LOSARTAN POTASSIUM 25 MG: 25 TABLET, FILM COATED ORAL at 20:13

## 2023-01-01 RX ADMIN — CALCIUM CARBONATE 1000 MG: 500 TABLET, CHEWABLE ORAL at 08:44

## 2023-01-01 RX ADMIN — APIXABAN 5 MG: 5 TABLET, FILM COATED ORAL at 08:42

## 2023-01-01 RX ADMIN — WATER 1 ML: 1 INJECTION INTRAMUSCULAR; INTRAVENOUS; SUBCUTANEOUS at 21:43

## 2023-01-01 RX ADMIN — PIPERACILLIN AND TAZOBACTAM 4500 MG: 4; .5 INJECTION, POWDER, FOR SOLUTION INTRAVENOUS at 12:13

## 2023-01-01 RX ADMIN — GABAPENTIN 200 MG: 100 CAPSULE ORAL at 08:34

## 2023-01-01 RX ADMIN — IPRATROPIUM BROMIDE AND ALBUTEROL SULFATE 1 DOSE: 2.5; .5 SOLUTION RESPIRATORY (INHALATION) at 12:23

## 2023-01-01 RX ADMIN — METHYLPREDNISOLONE SODIUM SUCCINATE 125 MG: 125 INJECTION INTRAMUSCULAR; INTRAVENOUS at 06:40

## 2023-01-01 RX ADMIN — MIDAZOLAM 10 MG/HR: 5 INJECTION INTRAMUSCULAR; INTRAVENOUS at 20:45

## 2023-01-01 RX ADMIN — PROPOFOL 50 MCG/KG/MIN: 10 INJECTION, EMULSION INTRAVENOUS at 12:48

## 2023-01-01 RX ADMIN — PIPERACILLIN AND TAZOBACTAM 4500 MG: 4; .5 INJECTION, POWDER, FOR SOLUTION INTRAVENOUS at 20:52

## 2023-01-01 RX ADMIN — SODIUM CHLORIDE, PRESERVATIVE FREE 10 ML: 5 INJECTION INTRAVENOUS at 15:43

## 2023-01-01 RX ADMIN — POLYETHYLENE GLYCOL (3350) 17 G: 17 POWDER, FOR SOLUTION ORAL at 21:44

## 2023-01-01 RX ADMIN — PIPERACILLIN AND TAZOBACTAM 4500 MG: 4; .5 INJECTION, POWDER, FOR SOLUTION INTRAVENOUS at 20:55

## 2023-01-01 RX ADMIN — METHYLPREDNISOLONE SODIUM SUCCINATE 40 MG: 40 INJECTION, POWDER, LYOPHILIZED, FOR SOLUTION INTRAMUSCULAR; INTRAVENOUS at 06:10

## 2023-01-01 RX ADMIN — FUROSEMIDE 40 MG: 10 INJECTION, SOLUTION INTRAMUSCULAR; INTRAVENOUS at 12:05

## 2023-01-01 RX ADMIN — PROPOFOL 50 MCG/KG/MIN: 10 INJECTION, EMULSION INTRAVENOUS at 06:19

## 2023-01-01 RX ADMIN — IPRATROPIUM BROMIDE AND ALBUTEROL SULFATE 1 DOSE: 2.5; .5 SOLUTION RESPIRATORY (INHALATION) at 11:21

## 2023-01-01 RX ADMIN — IPRATROPIUM BROMIDE AND ALBUTEROL SULFATE 1 DOSE: 2.5; .5 SOLUTION RESPIRATORY (INHALATION) at 07:33

## 2023-01-01 RX ADMIN — BUDESONIDE AND FORMOTEROL FUMARATE DIHYDRATE 2 PUFF: 160; 4.5 AEROSOL RESPIRATORY (INHALATION) at 07:38

## 2023-01-01 RX ADMIN — LEVOTHYROXINE SODIUM 175 MCG: 150 TABLET ORAL at 06:27

## 2023-01-01 RX ADMIN — BUDESONIDE AND FORMOTEROL FUMARATE DIHYDRATE 2 PUFF: 160; 4.5 AEROSOL RESPIRATORY (INHALATION) at 18:54

## 2023-01-01 RX ADMIN — LEVOTHYROXINE SODIUM 175 MCG: 150 TABLET ORAL at 06:41

## 2023-01-01 RX ADMIN — BUDESONIDE AND FORMOTEROL FUMARATE DIHYDRATE 2 PUFF: 160; 4.5 AEROSOL RESPIRATORY (INHALATION) at 07:35

## 2023-01-01 RX ADMIN — DIAZEPAM 5 MG: 5 TABLET ORAL at 15:08

## 2023-01-01 RX ADMIN — DEXAMETHASONE SODIUM PHOSPHATE 20 MG: 4 INJECTION, SOLUTION INTRAMUSCULAR; INTRAVENOUS at 08:39

## 2023-01-01 RX ADMIN — IPRATROPIUM BROMIDE AND ALBUTEROL SULFATE 1 DOSE: 2.5; .5 SOLUTION RESPIRATORY (INHALATION) at 07:39

## 2023-01-01 RX ADMIN — IPRATROPIUM BROMIDE AND ALBUTEROL SULFATE 1 DOSE: 2.5; .5 SOLUTION RESPIRATORY (INHALATION) at 14:18

## 2023-01-01 RX ADMIN — SENNOSIDES AND DOCUSATE SODIUM 2 TABLET: 50; 8.6 TABLET ORAL at 21:00

## 2023-01-01 RX ADMIN — Medication 150 MCG/HR: at 22:28

## 2023-01-01 RX ADMIN — Medication 250 MCG/HR: at 15:15

## 2023-01-01 RX ADMIN — SALINE NASAL SPRAY: 1.5 SOLUTION NASAL at 14:09

## 2023-01-01 RX ADMIN — LACTULOSE 20 G: 20 SOLUTION ORAL at 21:44

## 2023-01-01 RX ADMIN — MIDAZOLAM HYDROCHLORIDE 8 MG/HR: 5 INJECTION, SOLUTION INTRAMUSCULAR; INTRAVENOUS at 08:24

## 2023-01-01 RX ADMIN — SENNOSIDES AND DOCUSATE SODIUM 2 TABLET: 50; 8.6 TABLET ORAL at 20:47

## 2023-01-01 RX ADMIN — BUDESONIDE AND FORMOTEROL FUMARATE DIHYDRATE 2 PUFF: 160; 4.5 AEROSOL RESPIRATORY (INHALATION) at 19:58

## 2023-01-01 RX ADMIN — SENNOSIDES AND DOCUSATE SODIUM 2 TABLET: 50; 8.6 TABLET ORAL at 10:30

## 2023-01-01 RX ADMIN — MIDAZOLAM 2 MG: 1 INJECTION INTRAMUSCULAR; INTRAVENOUS at 02:24

## 2023-01-01 RX ADMIN — METHYLPREDNISOLONE SODIUM SUCCINATE 60 MG: 40 INJECTION, POWDER, LYOPHILIZED, FOR SOLUTION INTRAMUSCULAR; INTRAVENOUS at 17:25

## 2023-01-01 RX ADMIN — CALCIUM CARBONATE 1000 MG: 500 TABLET, CHEWABLE ORAL at 18:10

## 2023-01-01 RX ADMIN — IPRATROPIUM BROMIDE AND ALBUTEROL SULFATE 1 DOSE: 2.5; .5 SOLUTION RESPIRATORY (INHALATION) at 19:16

## 2023-01-01 RX ADMIN — FUROSEMIDE 40 MG: 10 INJECTION, SOLUTION INTRAMUSCULAR; INTRAVENOUS at 17:23

## 2023-01-01 RX ADMIN — Medication 0.35 MG/KG/HR: at 18:54

## 2023-01-01 RX ADMIN — IPRATROPIUM BROMIDE AND ALBUTEROL SULFATE 1 DOSE: 2.5; .5 SOLUTION RESPIRATORY (INHALATION) at 08:50

## 2023-01-01 RX ADMIN — SODIUM CHLORIDE, PRESERVATIVE FREE 10 ML: 5 INJECTION INTRAVENOUS at 22:30

## 2023-01-01 RX ADMIN — GABAPENTIN 200 MG: 100 CAPSULE ORAL at 08:56

## 2023-01-01 RX ADMIN — PIPERACILLIN AND TAZOBACTAM 4500 MG: 4; .5 INJECTION, POWDER, FOR SOLUTION INTRAVENOUS at 23:58

## 2023-01-01 RX ADMIN — METHYLPREDNISOLONE SODIUM SUCCINATE 125 MG: 125 INJECTION INTRAMUSCULAR; INTRAVENOUS at 05:05

## 2023-01-01 RX ADMIN — Medication 250 MCG/HR: at 00:08

## 2023-01-01 RX ADMIN — Medication 225 MCG/HR: at 17:25

## 2023-01-01 RX ADMIN — IPRATROPIUM BROMIDE AND ALBUTEROL SULFATE 1 DOSE: 2.5; .5 SOLUTION RESPIRATORY (INHALATION) at 15:27

## 2023-01-01 RX ADMIN — PIPERACILLIN AND TAZOBACTAM 4500 MG: 4; .5 INJECTION, POWDER, FOR SOLUTION INTRAVENOUS at 12:42

## 2023-01-01 RX ADMIN — LEVOFLOXACIN 750 MG: 5 INJECTION, SOLUTION INTRAVENOUS at 13:11

## 2023-01-01 RX ADMIN — IPRATROPIUM BROMIDE AND ALBUTEROL SULFATE 1 DOSE: 2.5; .5 SOLUTION RESPIRATORY (INHALATION) at 19:30

## 2023-01-01 RX ADMIN — OXYCODONE HYDROCHLORIDE 5 MG: 5 TABLET ORAL at 15:02

## 2023-01-01 RX ADMIN — GABAPENTIN 200 MG: 100 CAPSULE ORAL at 21:41

## 2023-01-01 RX ADMIN — MIDAZOLAM 10 MG/HR: 5 INJECTION INTRAMUSCULAR; INTRAVENOUS at 18:23

## 2023-01-01 RX ADMIN — VANCOMYCIN HYDROCHLORIDE 1250 MG: 1.25 INJECTION, POWDER, LYOPHILIZED, FOR SOLUTION INTRAVENOUS at 06:08

## 2023-01-01 RX ADMIN — CALCIUM CARBONATE 1000 MG: 500 TABLET, CHEWABLE ORAL at 18:08

## 2023-01-01 RX ADMIN — PIPERACILLIN AND TAZOBACTAM 4500 MG: 4; .5 INJECTION, POWDER, FOR SOLUTION INTRAVENOUS at 06:00

## 2023-01-01 RX ADMIN — IPRATROPIUM BROMIDE AND ALBUTEROL SULFATE 1 DOSE: 2.5; .5 SOLUTION RESPIRATORY (INHALATION) at 21:37

## 2023-01-01 RX ADMIN — IPRATROPIUM BROMIDE AND ALBUTEROL SULFATE 1 DOSE: 2.5; .5 SOLUTION RESPIRATORY (INHALATION) at 03:15

## 2023-01-01 RX ADMIN — POLYETHYLENE GLYCOL (3350) 17 G: 17 POWDER, FOR SOLUTION ORAL at 20:06

## 2023-01-01 RX ADMIN — SODIUM CHLORIDE, PRESERVATIVE FREE 10 ML: 5 INJECTION INTRAVENOUS at 22:43

## 2023-01-01 RX ADMIN — LANSOPRAZOLE 30 MG: 30 TABLET, ORALLY DISINTEGRATING, DELAYED RELEASE ORAL at 06:19

## 2023-01-01 RX ADMIN — CHLORHEXIDINE GLUCONATE 0.12% ORAL RINSE 15 ML: 1.2 LIQUID ORAL at 12:24

## 2023-01-01 RX ADMIN — OXYCODONE HYDROCHLORIDE 5 MG: 5 TABLET ORAL at 03:32

## 2023-01-01 RX ADMIN — CHLORHEXIDINE GLUCONATE 0.12% ORAL RINSE 15 ML: 1.2 LIQUID ORAL at 21:35

## 2023-01-01 RX ADMIN — OXYCODONE HYDROCHLORIDE 5 MG: 5 TABLET ORAL at 04:06

## 2023-01-01 RX ADMIN — POLYETHYLENE GLYCOL (3350) 17 G: 17 POWDER, FOR SOLUTION ORAL at 20:33

## 2023-01-01 RX ADMIN — LOSARTAN POTASSIUM 25 MG: 25 TABLET, FILM COATED ORAL at 22:05

## 2023-01-01 RX ADMIN — CISATRACURIUM BESYLATE 18.8 MG: 2 INJECTION INTRAVENOUS at 09:29

## 2023-01-01 RX ADMIN — MIDAZOLAM HYDROCHLORIDE 8 MG/HR: 5 INJECTION, SOLUTION INTRAMUSCULAR; INTRAVENOUS at 04:58

## 2023-01-01 RX ADMIN — MIDAZOLAM 2 MG: 1 INJECTION INTRAMUSCULAR; INTRAVENOUS at 11:12

## 2023-01-01 RX ADMIN — SODIUM CHLORIDE, PRESERVATIVE FREE 10 ML: 5 INJECTION INTRAVENOUS at 08:32

## 2023-01-01 RX ADMIN — PIPERACILLIN AND TAZOBACTAM 4500 MG: 4; .5 INJECTION, POWDER, FOR SOLUTION INTRAVENOUS at 11:32

## 2023-01-01 RX ADMIN — Medication 200 MCG/HR: at 22:32

## 2023-01-01 RX ADMIN — GABAPENTIN 200 MG: 100 CAPSULE ORAL at 11:17

## 2023-01-01 RX ADMIN — METHYLPREDNISOLONE SODIUM SUCCINATE 125 MG: 125 INJECTION INTRAMUSCULAR; INTRAVENOUS at 00:58

## 2023-01-01 RX ADMIN — Medication 200 MCG/HR: at 06:31

## 2023-01-01 RX ADMIN — BUDESONIDE AND FORMOTEROL FUMARATE DIHYDRATE 2 PUFF: 160; 4.5 AEROSOL RESPIRATORY (INHALATION) at 11:33

## 2023-01-01 RX ADMIN — FUROSEMIDE 40 MG: 10 INJECTION, SOLUTION INTRAMUSCULAR; INTRAVENOUS at 17:31

## 2023-01-01 RX ADMIN — FENTANYL CITRATE 50 MCG: 50 INJECTION, SOLUTION INTRAMUSCULAR; INTRAVENOUS at 13:20

## 2023-01-01 RX ADMIN — PIPERACILLIN AND TAZOBACTAM 4500 MG: 4; .5 INJECTION, POWDER, FOR SOLUTION INTRAVENOUS at 05:01

## 2023-01-01 RX ADMIN — BUDESONIDE AND FORMOTEROL FUMARATE DIHYDRATE 2 PUFF: 160; 4.5 AEROSOL RESPIRATORY (INHALATION) at 19:00

## 2023-01-01 RX ADMIN — SODIUM CHLORIDE, PRESERVATIVE FREE 10 ML: 5 INJECTION INTRAVENOUS at 20:35

## 2023-01-01 RX ADMIN — SODIUM CHLORIDE, PRESERVATIVE FREE 10 ML: 5 INJECTION INTRAVENOUS at 08:26

## 2023-01-01 RX ADMIN — PIPERACILLIN AND TAZOBACTAM 4500 MG: 4; .5 INJECTION, POWDER, FOR SOLUTION INTRAVENOUS at 04:36

## 2023-01-01 RX ADMIN — FUROSEMIDE 40 MG: 10 INJECTION, SOLUTION INTRAMUSCULAR; INTRAVENOUS at 09:06

## 2023-01-01 RX ADMIN — APIXABAN 10 MG: 5 TABLET, FILM COATED ORAL at 08:38

## 2023-01-01 RX ADMIN — SODIUM CHLORIDE, PRESERVATIVE FREE 10 ML: 5 INJECTION INTRAVENOUS at 00:11

## 2023-01-01 RX ADMIN — POTASSIUM CHLORIDE 20 MEQ: 1500 TABLET, EXTENDED RELEASE ORAL at 20:01

## 2023-01-01 RX ADMIN — VANCOMYCIN HYDROCHLORIDE 1500 MG: 1.5 INJECTION, POWDER, LYOPHILIZED, FOR SOLUTION INTRAVENOUS at 04:22

## 2023-01-01 RX ADMIN — BUDESONIDE AND FORMOTEROL FUMARATE DIHYDRATE 2 PUFF: 160; 4.5 AEROSOL RESPIRATORY (INHALATION) at 07:19

## 2023-01-01 RX ADMIN — CHLORHEXIDINE GLUCONATE 0.12% ORAL RINSE 15 ML: 1.2 LIQUID ORAL at 20:27

## 2023-01-01 RX ADMIN — MIDAZOLAM 2 MG: 1 INJECTION INTRAMUSCULAR; INTRAVENOUS at 09:50

## 2023-01-01 RX ADMIN — SODIUM CHLORIDE, POTASSIUM CHLORIDE, SODIUM LACTATE AND CALCIUM CHLORIDE: 600; 310; 30; 20 INJECTION, SOLUTION INTRAVENOUS at 12:16

## 2023-01-01 RX ADMIN — METHYLPREDNISOLONE SODIUM SUCCINATE 60 MG: 40 INJECTION, POWDER, LYOPHILIZED, FOR SOLUTION INTRAMUSCULAR; INTRAVENOUS at 11:49

## 2023-01-01 RX ADMIN — SENNOSIDES AND DOCUSATE SODIUM 2 TABLET: 50; 8.6 TABLET ORAL at 08:42

## 2023-01-01 RX ADMIN — VANCOMYCIN HYDROCHLORIDE 1250 MG: 1.25 INJECTION, POWDER, LYOPHILIZED, FOR SOLUTION INTRAVENOUS at 04:57

## 2023-01-01 RX ADMIN — LANSOPRAZOLE 30 MG: 30 TABLET, ORALLY DISINTEGRATING, DELAYED RELEASE ORAL at 06:20

## 2023-01-01 RX ADMIN — Medication 4 ML: at 19:59

## 2023-01-01 RX ADMIN — POLYETHYLENE GLYCOL (3350) 17 G: 17 POWDER, FOR SOLUTION ORAL at 21:46

## 2023-01-01 RX ADMIN — APIXABAN 10 MG: 5 TABLET, FILM COATED ORAL at 09:06

## 2023-01-01 RX ADMIN — PIPERACILLIN AND TAZOBACTAM 4500 MG: 4; .5 INJECTION, POWDER, FOR SOLUTION INTRAVENOUS at 15:04

## 2023-01-01 RX ADMIN — IPRATROPIUM BROMIDE AND ALBUTEROL SULFATE 1 DOSE: 2.5; .5 SOLUTION RESPIRATORY (INHALATION) at 14:45

## 2023-01-01 RX ADMIN — Medication 225 MCG/HR: at 22:36

## 2023-01-01 RX ADMIN — DOCUSATE SODIUM LIQUID 100 MG: 100 LIQUID ORAL at 08:07

## 2023-01-01 RX ADMIN — LANSOPRAZOLE 30 MG: 30 TABLET, ORALLY DISINTEGRATING, DELAYED RELEASE ORAL at 06:23

## 2023-01-01 RX ADMIN — PIPERACILLIN AND TAZOBACTAM 4500 MG: 4; .5 INJECTION, POWDER, FOR SOLUTION INTRAVENOUS at 12:31

## 2023-01-01 RX ADMIN — SODIUM CHLORIDE 200 MG: 9 INJECTION, SOLUTION INTRAVENOUS at 15:39

## 2023-01-01 RX ADMIN — GABAPENTIN 200 MG: 100 CAPSULE ORAL at 14:25

## 2023-01-01 RX ADMIN — OXYCODONE HYDROCHLORIDE 5 MG: 5 TABLET ORAL at 04:34

## 2023-01-01 RX ADMIN — GABAPENTIN 200 MG: 100 CAPSULE ORAL at 08:09

## 2023-01-01 RX ADMIN — IPRATROPIUM BROMIDE AND ALBUTEROL SULFATE 1 DOSE: 2.5; .5 SOLUTION RESPIRATORY (INHALATION) at 15:59

## 2023-01-01 RX ADMIN — IPRATROPIUM BROMIDE AND ALBUTEROL SULFATE 1 DOSE: 2.5; .5 SOLUTION RESPIRATORY (INHALATION) at 15:14

## 2023-01-01 RX ADMIN — TRAZODONE HYDROCHLORIDE 50 MG: 50 TABLET ORAL at 22:33

## 2023-01-01 RX ADMIN — LEVOFLOXACIN 750 MG: 5 INJECTION, SOLUTION INTRAVENOUS at 13:26

## 2023-01-01 RX ADMIN — LANSOPRAZOLE 30 MG: 30 TABLET, ORALLY DISINTEGRATING, DELAYED RELEASE ORAL at 08:31

## 2023-01-01 RX ADMIN — MIDAZOLAM 2 MG/HR: 5 INJECTION INTRAMUSCULAR; INTRAVENOUS at 10:22

## 2023-01-01 RX ADMIN — LOSARTAN POTASSIUM 25 MG: 25 TABLET, FILM COATED ORAL at 21:16

## 2023-01-01 RX ADMIN — LEVOTHYROXINE SODIUM 175 MCG: 150 TABLET ORAL at 09:22

## 2023-01-01 RX ADMIN — SODIUM CHLORIDE, PRESERVATIVE FREE 10 ML: 5 INJECTION INTRAVENOUS at 08:08

## 2023-01-01 RX ADMIN — OXYCODONE HYDROCHLORIDE 5 MG: 5 TABLET ORAL at 04:03

## 2023-01-01 RX ADMIN — Medication 200 MCG/HR: at 02:57

## 2023-01-01 RX ADMIN — SODIUM CHLORIDE, PRESERVATIVE FREE 10 ML: 5 INJECTION INTRAVENOUS at 18:24

## 2023-01-01 RX ADMIN — BUDESONIDE AND FORMOTEROL FUMARATE DIHYDRATE 2 PUFF: 160; 4.5 AEROSOL RESPIRATORY (INHALATION) at 07:28

## 2023-01-01 RX ADMIN — MIDAZOLAM 2 MG: 1 INJECTION INTRAMUSCULAR; INTRAVENOUS at 15:01

## 2023-01-01 RX ADMIN — LEVOFLOXACIN 750 MG: 5 INJECTION, SOLUTION INTRAVENOUS at 13:09

## 2023-01-01 RX ADMIN — POLYETHYLENE GLYCOL (3350) 17 G: 17 POWDER, FOR SOLUTION ORAL at 10:15

## 2023-01-01 RX ADMIN — PIPERACILLIN AND TAZOBACTAM 4500 MG: 4; .5 INJECTION, POWDER, FOR SOLUTION INTRAVENOUS at 00:40

## 2023-01-01 RX ADMIN — PIPERACILLIN AND TAZOBACTAM 4500 MG: 4; .5 INJECTION, POWDER, FOR SOLUTION INTRAVENOUS at 09:25

## 2023-01-01 RX ADMIN — Medication 200 MCG/HR: at 05:12

## 2023-01-01 RX ADMIN — IPRATROPIUM BROMIDE AND ALBUTEROL SULFATE 1 DOSE: 2.5; .5 SOLUTION RESPIRATORY (INHALATION) at 19:59

## 2023-01-01 RX ADMIN — CHLORHEXIDINE GLUCONATE 0.12% ORAL RINSE 15 ML: 1.2 LIQUID ORAL at 08:50

## 2023-01-01 RX ADMIN — DIAZEPAM 5 MG: 5 TABLET ORAL at 09:31

## 2023-01-01 RX ADMIN — METHYLPREDNISOLONE SODIUM SUCCINATE 60 MG: 40 INJECTION, POWDER, LYOPHILIZED, FOR SOLUTION INTRAMUSCULAR; INTRAVENOUS at 05:58

## 2023-01-01 RX ADMIN — VANCOMYCIN HYDROCHLORIDE 1500 MG: 1.5 INJECTION, POWDER, LYOPHILIZED, FOR SOLUTION INTRAVENOUS at 04:05

## 2023-01-01 RX ADMIN — ACETAMINOPHEN 650 MG: 325 TABLET ORAL at 02:46

## 2023-01-01 RX ADMIN — METHYLPREDNISOLONE SODIUM SUCCINATE 60 MG: 40 INJECTION, POWDER, LYOPHILIZED, FOR SOLUTION INTRAMUSCULAR; INTRAVENOUS at 05:49

## 2023-01-01 RX ADMIN — LOSARTAN POTASSIUM 25 MG: 25 TABLET, FILM COATED ORAL at 20:01

## 2023-01-01 RX ADMIN — DOCUSATE SODIUM 100 MG: 100 CAPSULE, LIQUID FILLED ORAL at 09:06

## 2023-01-01 RX ADMIN — Medication 4 ML: at 11:21

## 2023-01-01 RX ADMIN — APIXABAN 5 MG: 5 TABLET, FILM COATED ORAL at 21:39

## 2023-01-01 RX ADMIN — Medication 200 MCG/HR: at 10:09

## 2023-01-01 RX ADMIN — SENNOSIDES AND DOCUSATE SODIUM 2 TABLET: 50; 8.6 TABLET ORAL at 08:28

## 2023-01-01 RX ADMIN — METHYLPREDNISOLONE SODIUM SUCCINATE 60 MG: 40 INJECTION, POWDER, LYOPHILIZED, FOR SOLUTION INTRAMUSCULAR; INTRAVENOUS at 11:25

## 2023-01-01 RX ADMIN — VANCOMYCIN HYDROCHLORIDE 1250 MG: 1.25 INJECTION, POWDER, LYOPHILIZED, FOR SOLUTION INTRAVENOUS at 21:12

## 2023-01-01 RX ADMIN — BUDESONIDE AND FORMOTEROL FUMARATE DIHYDRATE 2 PUFF: 160; 4.5 AEROSOL RESPIRATORY (INHALATION) at 19:30

## 2023-01-01 RX ADMIN — ACETAZOLAMIDE 250 MG: 500 INJECTION, POWDER, LYOPHILIZED, FOR SOLUTION INTRAVENOUS at 16:57

## 2023-01-01 RX ADMIN — OXYCODONE HYDROCHLORIDE 5 MG: 5 TABLET ORAL at 08:42

## 2023-01-01 RX ADMIN — IPRATROPIUM BROMIDE AND ALBUTEROL SULFATE 1 DOSE: 2.5; .5 SOLUTION RESPIRATORY (INHALATION) at 01:44

## 2023-01-01 RX ADMIN — SENNOSIDES AND DOCUSATE SODIUM 2 TABLET: 50; 8.6 TABLET ORAL at 20:06

## 2023-01-01 RX ADMIN — FLUTICASONE PROPIONATE 1 SPRAY: 50 SPRAY, METERED NASAL at 21:43

## 2023-01-01 RX ADMIN — PROPOFOL 25 MCG/KG/MIN: 10 INJECTION, EMULSION INTRAVENOUS at 14:25

## 2023-01-01 RX ADMIN — TRAZODONE HYDROCHLORIDE 50 MG: 50 TABLET ORAL at 20:05

## 2023-01-01 RX ADMIN — IPRATROPIUM BROMIDE AND ALBUTEROL SULFATE 1 DOSE: 2.5; .5 SOLUTION RESPIRATORY (INHALATION) at 19:34

## 2023-01-01 RX ADMIN — METHYLPREDNISOLONE SODIUM SUCCINATE 40 MG: 40 INJECTION, POWDER, LYOPHILIZED, FOR SOLUTION INTRAMUSCULAR; INTRAVENOUS at 16:54

## 2023-01-01 RX ADMIN — Medication 150 MCG/HR: at 06:05

## 2023-01-01 RX ADMIN — MIDAZOLAM HYDROCHLORIDE 2 MG: 1 INJECTION, SOLUTION INTRAMUSCULAR; INTRAVENOUS at 19:35

## 2023-01-01 RX ADMIN — FUROSEMIDE 40 MG: 10 INJECTION, SOLUTION INTRAMUSCULAR; INTRAVENOUS at 11:18

## 2023-01-01 RX ADMIN — METHYLPREDNISOLONE SODIUM SUCCINATE 40 MG: 40 INJECTION, POWDER, FOR SOLUTION INTRAMUSCULAR; INTRAVENOUS at 15:07

## 2023-01-01 RX ADMIN — SENNOSIDES AND DOCUSATE SODIUM 2 TABLET: 50; 8.6 TABLET ORAL at 20:04

## 2023-01-01 RX ADMIN — IPRATROPIUM BROMIDE AND ALBUTEROL SULFATE 1 DOSE: 2.5; .5 SOLUTION RESPIRATORY (INHALATION) at 20:22

## 2023-01-01 RX ADMIN — METHYLPREDNISOLONE SODIUM SUCCINATE 40 MG: 40 INJECTION, POWDER, LYOPHILIZED, FOR SOLUTION INTRAMUSCULAR; INTRAVENOUS at 06:00

## 2023-01-01 RX ADMIN — SODIUM CHLORIDE, PRESERVATIVE FREE 10 ML: 5 INJECTION INTRAVENOUS at 08:10

## 2023-01-01 RX ADMIN — CHLORHEXIDINE GLUCONATE 0.12% ORAL RINSE 15 ML: 1.2 LIQUID ORAL at 21:11

## 2023-01-01 RX ADMIN — POTASSIUM CHLORIDE 20 MEQ: 1500 TABLET, EXTENDED RELEASE ORAL at 14:55

## 2023-01-01 RX ADMIN — APIXABAN 10 MG: 5 TABLET, FILM COATED ORAL at 21:16

## 2023-01-01 RX ADMIN — POLYETHYLENE GLYCOL (3350) 17 G: 17 POWDER, FOR SOLUTION ORAL at 08:28

## 2023-01-01 RX ADMIN — IPRATROPIUM BROMIDE AND ALBUTEROL SULFATE 1 DOSE: 2.5; .5 SOLUTION RESPIRATORY (INHALATION) at 20:02

## 2023-01-01 RX ADMIN — SENNOSIDES AND DOCUSATE SODIUM 2 TABLET: 50; 8.6 TABLET ORAL at 08:56

## 2023-01-01 RX ADMIN — Medication 4 ML: at 19:29

## 2023-01-01 RX ADMIN — Medication 4 ML: at 03:13

## 2023-01-01 RX ADMIN — IPRATROPIUM BROMIDE AND ALBUTEROL SULFATE 1 DOSE: 2.5; .5 SOLUTION RESPIRATORY (INHALATION) at 18:54

## 2023-01-01 RX ADMIN — IPRATROPIUM BROMIDE AND ALBUTEROL SULFATE 1 DOSE: 2.5; .5 SOLUTION RESPIRATORY (INHALATION) at 19:00

## 2023-01-01 RX ADMIN — METHYLPREDNISOLONE SODIUM SUCCINATE 40 MG: 40 INJECTION, POWDER, LYOPHILIZED, FOR SOLUTION INTRAMUSCULAR; INTRAVENOUS at 17:35

## 2023-01-01 RX ADMIN — METHYLPREDNISOLONE SODIUM SUCCINATE 60 MG: 40 INJECTION, POWDER, LYOPHILIZED, FOR SOLUTION INTRAMUSCULAR; INTRAVENOUS at 06:03

## 2023-01-01 RX ADMIN — PANTOPRAZOLE SODIUM 40 MG: 40 TABLET, DELAYED RELEASE ORAL at 06:41

## 2023-01-01 RX ADMIN — POTASSIUM CHLORIDE 20 MEQ: 1500 TABLET, EXTENDED RELEASE ORAL at 09:22

## 2023-01-01 RX ADMIN — BUDESONIDE AND FORMOTEROL FUMARATE DIHYDRATE 2 PUFF: 160; 4.5 AEROSOL RESPIRATORY (INHALATION) at 07:31

## 2023-01-01 RX ADMIN — METHYLPREDNISOLONE SODIUM SUCCINATE 40 MG: 40 INJECTION, POWDER, LYOPHILIZED, FOR SOLUTION INTRAMUSCULAR; INTRAVENOUS at 06:22

## 2023-01-01 RX ADMIN — SODIUM CHLORIDE, POTASSIUM CHLORIDE, SODIUM LACTATE AND CALCIUM CHLORIDE: 600; 310; 30; 20 INJECTION, SOLUTION INTRAVENOUS at 20:00

## 2023-01-01 RX ADMIN — TRAZODONE HYDROCHLORIDE 50 MG: 50 TABLET ORAL at 22:06

## 2023-01-01 RX ADMIN — CHLORHEXIDINE GLUCONATE 0.12% ORAL RINSE 15 ML: 1.2 LIQUID ORAL at 08:56

## 2023-01-01 RX ADMIN — MIDAZOLAM 10 MG/HR: 5 INJECTION INTRAMUSCULAR; INTRAVENOUS at 05:42

## 2023-01-01 RX ADMIN — APIXABAN 10 MG: 5 TABLET, FILM COATED ORAL at 09:13

## 2023-01-01 RX ADMIN — GUAIFENESIN AND DEXTROMETHORPHAN 10 ML: 100; 10 SYRUP ORAL at 02:33

## 2023-01-01 RX ADMIN — OXYCODONE HYDROCHLORIDE 5 MG: 5 TABLET ORAL at 04:01

## 2023-01-01 RX ADMIN — CALCIUM CARBONATE 1000 MG: 500 TABLET, CHEWABLE ORAL at 13:09

## 2023-01-01 RX ADMIN — IPRATROPIUM BROMIDE AND ALBUTEROL SULFATE 1 DOSE: 2.5; .5 SOLUTION RESPIRATORY (INHALATION) at 08:20

## 2023-01-01 RX ADMIN — VANCOMYCIN HYDROCHLORIDE 1000 MG: 1 INJECTION, POWDER, LYOPHILIZED, FOR SOLUTION INTRAVENOUS at 05:42

## 2023-01-01 RX ADMIN — PROPOFOL 40 MG: 10 INJECTION, EMULSION INTRAVENOUS at 13:20

## 2023-01-01 RX ADMIN — LEVOTHYROXINE SODIUM 175 MCG: 150 TABLET ORAL at 06:05

## 2023-01-01 RX ADMIN — APIXABAN 5 MG: 5 TABLET, FILM COATED ORAL at 10:54

## 2023-01-01 RX ADMIN — POLYETHYLENE GLYCOL (3350) 17 G: 17 POWDER, FOR SOLUTION ORAL at 21:17

## 2023-01-01 RX ADMIN — CHLORHEXIDINE GLUCONATE 0.12% ORAL RINSE 15 ML: 1.2 LIQUID ORAL at 08:37

## 2023-01-01 RX ADMIN — IPRATROPIUM BROMIDE AND ALBUTEROL SULFATE 1 DOSE: 2.5; .5 SOLUTION RESPIRATORY (INHALATION) at 02:45

## 2023-01-01 RX ADMIN — PIPERACILLIN AND TAZOBACTAM 4500 MG: 4; .5 INJECTION, POWDER, FOR SOLUTION INTRAVENOUS at 04:24

## 2023-01-01 RX ADMIN — OXYCODONE HYDROCHLORIDE AND ACETAMINOPHEN 500 MG: 500 TABLET ORAL at 10:33

## 2023-01-01 RX ADMIN — Medication 200 MCG/HR: at 10:55

## 2023-01-01 RX ADMIN — POLYETHYLENE GLYCOL (3350) 17 G: 17 POWDER, FOR SOLUTION ORAL at 20:27

## 2023-01-01 RX ADMIN — GABAPENTIN 200 MG: 100 CAPSULE ORAL at 15:07

## 2023-01-01 RX ADMIN — PIPERACILLIN AND TAZOBACTAM 4500 MG: 4; .5 INJECTION, POWDER, FOR SOLUTION INTRAVENOUS at 06:09

## 2023-01-01 RX ADMIN — SODIUM CHLORIDE, PRESERVATIVE FREE 10 ML: 5 INJECTION INTRAVENOUS at 08:59

## 2023-01-01 RX ADMIN — Medication 0.2 MG/KG/HR: at 09:09

## 2023-01-01 RX ADMIN — PIPERACILLIN AND TAZOBACTAM 4500 MG: 4; .5 INJECTION, POWDER, FOR SOLUTION INTRAVENOUS at 12:17

## 2023-01-01 RX ADMIN — APIXABAN 10 MG: 5 TABLET, FILM COATED ORAL at 20:12

## 2023-01-01 RX ADMIN — PIPERACILLIN AND TAZOBACTAM 4500 MG: 4; .5 INJECTION, POWDER, FOR SOLUTION INTRAVENOUS at 17:29

## 2023-01-01 RX ADMIN — IPRATROPIUM BROMIDE AND ALBUTEROL SULFATE 1 DOSE: 2.5; .5 SOLUTION RESPIRATORY (INHALATION) at 19:26

## 2023-01-01 RX ADMIN — OXYCODONE HYDROCHLORIDE AND ACETAMINOPHEN 500 MG: 500 TABLET ORAL at 20:01

## 2023-01-01 RX ADMIN — PROPOFOL 40 MCG/KG/MIN: 10 INJECTION, EMULSION INTRAVENOUS at 10:19

## 2023-01-01 RX ADMIN — METHYLPREDNISOLONE SODIUM SUCCINATE 125 MG: 125 INJECTION INTRAMUSCULAR; INTRAVENOUS at 17:45

## 2023-01-01 RX ADMIN — POTASSIUM CHLORIDE 20 MEQ: 1500 TABLET, EXTENDED RELEASE ORAL at 08:57

## 2023-01-01 RX ADMIN — Medication 4 ML: at 07:39

## 2023-01-01 RX ADMIN — BUDESONIDE AND FORMOTEROL FUMARATE DIHYDRATE 2 PUFF: 160; 4.5 AEROSOL RESPIRATORY (INHALATION) at 08:21

## 2023-01-01 RX ADMIN — POLYETHYLENE GLYCOL (3350) 17 G: 17 POWDER, FOR SOLUTION ORAL at 20:04

## 2023-01-01 RX ADMIN — Medication 200 MCG/HR: at 03:40

## 2023-01-01 RX ADMIN — LEVOTHYROXINE SODIUM 175 MCG: 150 TABLET ORAL at 06:04

## 2023-01-01 RX ADMIN — GABAPENTIN 200 MG: 100 CAPSULE ORAL at 10:16

## 2023-01-01 RX ADMIN — HEPARIN SODIUM 5000 UNITS: 5000 INJECTION INTRAVENOUS; SUBCUTANEOUS at 22:11

## 2023-01-01 RX ADMIN — OXYCODONE HYDROCHLORIDE 5 MG: 5 TABLET ORAL at 08:56

## 2023-01-01 RX ADMIN — POTASSIUM CHLORIDE 20 MEQ: 29.8 INJECTION, SOLUTION INTRAVENOUS at 17:24

## 2023-01-01 RX ADMIN — FLUTICASONE PROPIONATE 1 SPRAY: 50 SPRAY, METERED NASAL at 21:17

## 2023-01-01 RX ADMIN — SODIUM CHLORIDE, PRESERVATIVE FREE 10 ML: 5 INJECTION INTRAVENOUS at 22:08

## 2023-01-01 RX ADMIN — SENNOSIDES AND DOCUSATE SODIUM 2 TABLET: 50; 8.6 TABLET ORAL at 12:32

## 2023-01-01 RX ADMIN — VANCOMYCIN HYDROCHLORIDE 1250 MG: 1.25 INJECTION, POWDER, LYOPHILIZED, FOR SOLUTION INTRAVENOUS at 17:39

## 2023-01-01 RX ADMIN — SENNOSIDES AND DOCUSATE SODIUM 2 TABLET: 50; 8.6 TABLET ORAL at 08:34

## 2023-01-01 RX ADMIN — KETAMINE HYDROCHLORIDE 0.35 MG/KG/HR: 100 INJECTION INTRAMUSCULAR; INTRAVENOUS at 19:46

## 2023-01-01 RX ADMIN — IPRATROPIUM BROMIDE AND ALBUTEROL SULFATE 1 DOSE: 2.5; .5 SOLUTION RESPIRATORY (INHALATION) at 20:20

## 2023-01-01 RX ADMIN — PANTOPRAZOLE SODIUM 40 MG: 40 TABLET, DELAYED RELEASE ORAL at 09:22

## 2023-01-01 RX ADMIN — IPRATROPIUM BROMIDE AND ALBUTEROL SULFATE 1 DOSE: 2.5; .5 SOLUTION RESPIRATORY (INHALATION) at 15:42

## 2023-01-01 RX ADMIN — APIXABAN 5 MG: 5 TABLET, FILM COATED ORAL at 08:09

## 2023-01-01 RX ADMIN — OXYCODONE HYDROCHLORIDE 5 MG: 5 TABLET ORAL at 08:29

## 2023-01-01 RX ADMIN — CALCIUM GLUCONATE 1000 MG: 20 INJECTION, SOLUTION INTRAVENOUS at 23:32

## 2023-01-01 RX ADMIN — WATER 2 ML: 1 INJECTION INTRAMUSCULAR; INTRAVENOUS; SUBCUTANEOUS at 00:58

## 2023-01-01 RX ADMIN — PIPERACILLIN AND TAZOBACTAM 4500 MG: 4; .5 INJECTION, POWDER, FOR SOLUTION INTRAVENOUS at 21:33

## 2023-01-01 RX ADMIN — ACETAMINOPHEN 650 MG: 325 TABLET ORAL at 23:24

## 2023-01-01 RX ADMIN — OXYCODONE HYDROCHLORIDE 5 MG: 5 TABLET ORAL at 21:02

## 2023-01-01 RX ADMIN — CALCIUM CARBONATE 1000 MG: 500 TABLET, CHEWABLE ORAL at 17:31

## 2023-01-01 RX ADMIN — ACETYLCYSTEINE 400 MG: 200 SOLUTION ORAL; RESPIRATORY (INHALATION) at 15:46

## 2023-01-01 RX ADMIN — POTASSIUM PHOSPHATE, MONOBASIC AND POTASSIUM PHOSPHATE, DIBASIC 15 MMOL: 224; 236 INJECTION, SOLUTION, CONCENTRATE INTRAVENOUS at 10:07

## 2023-01-01 RX ADMIN — Medication 150 MCG/HR: at 14:21

## 2023-01-01 RX ADMIN — APIXABAN 5 MG: 5 TABLET, FILM COATED ORAL at 08:29

## 2023-01-01 RX ADMIN — APIXABAN 5 MG: 5 TABLET, FILM COATED ORAL at 21:41

## 2023-01-01 RX ADMIN — FUROSEMIDE 40 MG: 10 INJECTION, SOLUTION INTRAMUSCULAR; INTRAVENOUS at 17:49

## 2023-01-01 RX ADMIN — ACETAMINOPHEN 650 MG: 325 TABLET ORAL at 10:20

## 2023-01-01 RX ADMIN — FUROSEMIDE 20 MG: 20 TABLET ORAL at 17:35

## 2023-01-01 RX ADMIN — VANCOMYCIN HYDROCHLORIDE 1500 MG: 1.5 INJECTION, POWDER, LYOPHILIZED, FOR SOLUTION INTRAVENOUS at 04:40

## 2023-01-01 RX ADMIN — HEPARIN SODIUM 5000 UNITS: 5000 INJECTION INTRAVENOUS; SUBCUTANEOUS at 13:30

## 2023-01-01 RX ADMIN — SODIUM CHLORIDE 25 ML: 9 INJECTION, SOLUTION INTRAVENOUS at 11:32

## 2023-01-01 RX ADMIN — LACTULOSE 20 G: 20 SOLUTION ORAL at 20:26

## 2023-01-01 RX ADMIN — PIPERACILLIN AND TAZOBACTAM 4500 MG: 4; .5 INJECTION, POWDER, FOR SOLUTION INTRAVENOUS at 18:27

## 2023-01-01 RX ADMIN — SODIUM CHLORIDE, PRESERVATIVE FREE 10 ML: 5 INJECTION INTRAVENOUS at 06:21

## 2023-01-01 RX ADMIN — SODIUM CHLORIDE: 9 INJECTION, SOLUTION INTRAVENOUS at 18:37

## 2023-01-01 RX ADMIN — Medication 225 MCG/HR: at 08:45

## 2023-01-01 RX ADMIN — PIPERACILLIN AND TAZOBACTAM 4500 MG: 4; .5 INJECTION, POWDER, FOR SOLUTION INTRAVENOUS at 06:17

## 2023-01-01 RX ADMIN — Medication 4 ML: at 19:17

## 2023-01-01 RX ADMIN — Medication 250 MCG/HR: at 02:18

## 2023-01-01 RX ADMIN — FUROSEMIDE 20 MG: 20 TABLET ORAL at 08:38

## 2023-01-01 RX ADMIN — KETAMINE HYDROCHLORIDE 0.35 MG/KG/HR: 100 INJECTION INTRAMUSCULAR; INTRAVENOUS at 07:33

## 2023-01-01 RX ADMIN — IPRATROPIUM BROMIDE AND ALBUTEROL SULFATE 1 DOSE: 2.5; .5 SOLUTION RESPIRATORY (INHALATION) at 18:57

## 2023-01-01 RX ADMIN — OXYCODONE HYDROCHLORIDE 5 MG: 5 TABLET ORAL at 20:05

## 2023-01-01 RX ADMIN — SENNOSIDES AND DOCUSATE SODIUM 2 TABLET: 50; 8.6 TABLET ORAL at 20:27

## 2023-01-01 RX ADMIN — WATER 1 ML: 1 INJECTION INTRAMUSCULAR; INTRAVENOUS; SUBCUTANEOUS at 06:07

## 2023-01-01 RX ADMIN — Medication 225 MCG/HR: at 23:29

## 2023-01-01 RX ADMIN — CHLORHEXIDINE GLUCONATE 0.12% ORAL RINSE 15 ML: 1.2 LIQUID ORAL at 08:11

## 2023-01-01 RX ADMIN — LANSOPRAZOLE 30 MG: 30 TABLET, ORALLY DISINTEGRATING, DELAYED RELEASE ORAL at 06:05

## 2023-01-01 RX ADMIN — IPRATROPIUM BROMIDE AND ALBUTEROL SULFATE 1 DOSE: .5; 2.5 SOLUTION RESPIRATORY (INHALATION) at 07:30

## 2023-01-01 RX ADMIN — CALCIUM CARBONATE 1000 MG: 500 TABLET, CHEWABLE ORAL at 09:06

## 2023-01-01 RX ADMIN — LANSOPRAZOLE 30 MG: 30 TABLET, ORALLY DISINTEGRATING, DELAYED RELEASE ORAL at 06:04

## 2023-01-01 RX ADMIN — ACETYLCYSTEINE 400 MG: 200 SOLUTION ORAL; RESPIRATORY (INHALATION) at 16:00

## 2023-01-01 RX ADMIN — METHYLPREDNISOLONE SODIUM SUCCINATE 40 MG: 40 INJECTION, POWDER, LYOPHILIZED, FOR SOLUTION INTRAMUSCULAR; INTRAVENOUS at 14:08

## 2023-01-01 RX ADMIN — METHYLPREDNISOLONE SODIUM SUCCINATE 40 MG: 40 INJECTION, POWDER, FOR SOLUTION INTRAMUSCULAR; INTRAVENOUS at 14:56

## 2023-01-01 RX ADMIN — APIXABAN 5 MG: 5 TABLET, FILM COATED ORAL at 20:26

## 2023-01-01 RX ADMIN — PIPERACILLIN AND TAZOBACTAM 4500 MG: 4; .5 INJECTION, POWDER, FOR SOLUTION INTRAVENOUS at 06:18

## 2023-01-01 RX ADMIN — SODIUM CHLORIDE, PRESERVATIVE FREE 10 ML: 5 INJECTION INTRAVENOUS at 09:14

## 2023-01-01 RX ADMIN — CALCIUM CARBONATE 1000 MG: 500 TABLET, CHEWABLE ORAL at 11:19

## 2023-01-01 RX ADMIN — TRAZODONE HYDROCHLORIDE 50 MG: 50 TABLET ORAL at 22:42

## 2023-01-01 RX ADMIN — IPRATROPIUM BROMIDE AND ALBUTEROL SULFATE 1 DOSE: 2.5; .5 SOLUTION RESPIRATORY (INHALATION) at 14:55

## 2023-01-01 RX ADMIN — FLUTICASONE PROPIONATE 1 SPRAY: 50 SPRAY, METERED NASAL at 22:51

## 2023-01-01 RX ADMIN — PROPOFOL 50 MG: 10 INJECTION, EMULSION INTRAVENOUS at 03:45

## 2023-01-01 RX ADMIN — CHLORHEXIDINE GLUCONATE 0.12% ORAL RINSE 15 ML: 1.2 LIQUID ORAL at 08:44

## 2023-01-01 RX ADMIN — SODIUM CHLORIDE, POTASSIUM CHLORIDE, SODIUM LACTATE AND CALCIUM CHLORIDE: 600; 310; 30; 20 INJECTION, SOLUTION INTRAVENOUS at 10:43

## 2023-01-01 RX ADMIN — LANSOPRAZOLE 30 MG: 30 TABLET, ORALLY DISINTEGRATING, DELAYED RELEASE ORAL at 06:29

## 2023-01-01 RX ADMIN — VANCOMYCIN HYDROCHLORIDE 1000 MG: 1 INJECTION, POWDER, LYOPHILIZED, FOR SOLUTION INTRAVENOUS at 06:24

## 2023-01-01 RX ADMIN — TRAZODONE HYDROCHLORIDE 50 MG: 50 TABLET ORAL at 20:27

## 2023-01-01 RX ADMIN — GABAPENTIN 200 MG: 100 CAPSULE ORAL at 22:27

## 2023-01-01 RX ADMIN — APIXABAN 10 MG: 5 TABLET, FILM COATED ORAL at 08:24

## 2023-01-01 RX ADMIN — PROPOFOL 50 MCG/KG/MIN: 10 INJECTION, EMULSION INTRAVENOUS at 15:46

## 2023-01-01 RX ADMIN — POLYETHYLENE GLYCOL (3350) 17 G: 17 POWDER, FOR SOLUTION ORAL at 20:47

## 2023-01-01 RX ADMIN — APIXABAN 5 MG: 5 TABLET, FILM COATED ORAL at 08:56

## 2023-01-01 RX ADMIN — FLUTICASONE PROPIONATE 1 SPRAY: 50 SPRAY, METERED NASAL at 22:09

## 2023-01-01 RX ADMIN — POTASSIUM CHLORIDE 20 MEQ: 1500 TABLET, EXTENDED RELEASE ORAL at 08:25

## 2023-01-01 ASSESSMENT — PULMONARY FUNCTION TESTS
PIF_VALUE: 54
PIF_VALUE: 40
PIF_VALUE: 35
PIF_VALUE: 33
PIF_VALUE: 22
PIF_VALUE: 20
PIF_VALUE: 25
PIF_VALUE: 27
PIF_VALUE: 33
PIF_VALUE: 35
PIF_VALUE: 37
PIF_VALUE: 42
PIF_VALUE: 29
PIF_VALUE: 30
PIF_VALUE: 37
PIF_VALUE: 42
PIF_VALUE: 35
PIF_VALUE: 25
PIF_VALUE: 42
PIF_VALUE: 43
PIF_VALUE: 33
PIF_VALUE: 30
PIF_VALUE: 29
PIF_VALUE: 19
PIF_VALUE: 34
PIF_VALUE: 32
PIF_VALUE: 41
PIF_VALUE: 28
PIF_VALUE: 55
PIF_VALUE: 30
PIF_VALUE: 28
PIF_VALUE: 40
PIF_VALUE: 37
PIF_VALUE: 32
PIF_VALUE: 33
PIF_VALUE: 26
PIF_VALUE: 31
PIF_VALUE: 46
PIF_VALUE: 28
PIF_VALUE: 32
PIF_VALUE: 33
PIF_VALUE: 38
PIF_VALUE: 37
PIF_VALUE: 28
PIF_VALUE: 44
PIF_VALUE: 45
PIF_VALUE: 25
PIF_VALUE: 33
PIF_VALUE: 33
PIF_VALUE: 40
PIF_VALUE: 32
PIF_VALUE: 28
PIF_VALUE: 36
PIF_VALUE: 28
PIF_VALUE: 35
PIF_VALUE: 41
PIF_VALUE: 37
PIF_VALUE: 36
PIF_VALUE: 36
PIF_VALUE: 35
PIF_VALUE: 33
PIF_VALUE: 37
PIF_VALUE: 35
PIF_VALUE: 32
PIF_VALUE: 32
PIF_VALUE: 38
PIF_VALUE: 27
PIF_VALUE: 25
PIF_VALUE: 41
PIF_VALUE: 27
PIF_VALUE: 43
PIF_VALUE: 34
PIF_VALUE: 32
PIF_VALUE: 32
PIF_VALUE: 27
PIF_VALUE: 30
PIF_VALUE: 37
PIF_VALUE: 33
PIF_VALUE: 28
PIF_VALUE: 32
PIF_VALUE: 31
PIF_VALUE: 37
PIF_VALUE: 29
PIF_VALUE: 29
PIF_VALUE: 31
PIF_VALUE: 31
PIF_VALUE: 38
PIF_VALUE: 32
PIF_VALUE: 32
PIF_VALUE: 14
PIF_VALUE: 37
PIF_VALUE: 43
PIF_VALUE: 42
PIF_VALUE: 33
PIF_VALUE: 42
PIF_VALUE: 46
PIF_VALUE: 30
PIF_VALUE: 33
PIF_VALUE: 37
PIF_VALUE: 29
PIF_VALUE: 37
PIF_VALUE: 36
PIF_VALUE: 40
PIF_VALUE: 33
PIF_VALUE: 24
PIF_VALUE: 43
PIF_VALUE: 42
PIF_VALUE: 16
PIF_VALUE: 27
PIF_VALUE: 31
PIF_VALUE: 41
PIF_VALUE: 43
PIF_VALUE: 28
PIF_VALUE: 42
PIF_VALUE: 38
PIF_VALUE: 44
PIF_VALUE: 36
PIF_VALUE: 32
PIF_VALUE: 34
PIF_VALUE: 36
PIF_VALUE: 47
PIF_VALUE: 27
PIF_VALUE: 42
PIF_VALUE: 35
PIF_VALUE: 32
PIF_VALUE: 42
PIF_VALUE: 34
PIF_VALUE: 41
PIF_VALUE: 31
PIF_VALUE: 34
PIF_VALUE: 37
PIF_VALUE: 41
PIF_VALUE: 35
PIF_VALUE: 29
PIF_VALUE: 33
PIF_VALUE: 27
PIF_VALUE: 23
PIF_VALUE: 23
PIF_VALUE: 36
PIF_VALUE: 31
PIF_VALUE: 27
PIF_VALUE: 32
PIF_VALUE: 33
PIF_VALUE: 42
PIF_VALUE: 29
PIF_VALUE: 36
PIF_VALUE: 29
PIF_VALUE: 40
PIF_VALUE: 16
PIF_VALUE: 37
PIF_VALUE: 36
PIF_VALUE: 34
PIF_VALUE: 36
PIF_VALUE: 43
PIF_VALUE: 35
PIF_VALUE: 30
PIF_VALUE: 31
PIF_VALUE: 27
PIF_VALUE: 28
PIF_VALUE: 22
PIF_VALUE: 31
PIF_VALUE: 31
PIF_VALUE: 41
PIF_VALUE: 36
PIF_VALUE: 33
PIF_VALUE: 29
PIF_VALUE: 33
PIF_VALUE: 29
PIF_VALUE: 27
PIF_VALUE: 26
PIF_VALUE: 43
PIF_VALUE: 43
PIF_VALUE: 35
PIF_VALUE: 34
PIF_VALUE: 26
PIF_VALUE: 40
PIF_VALUE: 27
PIF_VALUE: 41
PIF_VALUE: 36
PIF_VALUE: 43
PIF_VALUE: 29
PIF_VALUE: 40
PIF_VALUE: 43
PIF_VALUE: 25
PIF_VALUE: 35
PIF_VALUE: 29
PIF_VALUE: 32
PIF_VALUE: 40
PIF_VALUE: 41
PIF_VALUE: 33
PIF_VALUE: 33
PIF_VALUE: 37
PIF_VALUE: 37
PIF_VALUE: 33
PIF_VALUE: 35
PIF_VALUE: 44
PIF_VALUE: 25
PIF_VALUE: 36
PIF_VALUE: 29
PIF_VALUE: 29
PIF_VALUE: 33
PIF_VALUE: 25
PIF_VALUE: 28
PIF_VALUE: 30
PIF_VALUE: 30
PIF_VALUE: 43
PIF_VALUE: 29
PIF_VALUE: 18
PIF_VALUE: 30
PIF_VALUE: 28
PIF_VALUE: 31
PIF_VALUE: 36
PIF_VALUE: 47
PIF_VALUE: 46
PIF_VALUE: 43
PIF_VALUE: 32
PIF_VALUE: 31
PIF_VALUE: 43
PIF_VALUE: 41
PIF_VALUE: 29
PIF_VALUE: 37
PIF_VALUE: 0
PIF_VALUE: 36
PIF_VALUE: 39
PIF_VALUE: 44
PIF_VALUE: 33
PIF_VALUE: 33
PIF_VALUE: 38
PIF_VALUE: 44
PIF_VALUE: 36
PIF_VALUE: 45
PIF_VALUE: 3
PIF_VALUE: 17
PIF_VALUE: 32
PIF_VALUE: 35
PIF_VALUE: 35
PIF_VALUE: 32
PIF_VALUE: 45
PIF_VALUE: 31
PIF_VALUE: 26
PIF_VALUE: 26
PIF_VALUE: 33
PIF_VALUE: 22
PIF_VALUE: 32
PIF_VALUE: 37
PIF_VALUE: 32
PIF_VALUE: 24
PIF_VALUE: 39
PIF_VALUE: 25
PIF_VALUE: 11
PIF_VALUE: 32
PIF_VALUE: 33
PIF_VALUE: 38
PIF_VALUE: 28
PIF_VALUE: 27
PIF_VALUE: 29
PIF_VALUE: 34
PIF_VALUE: 41
PIF_VALUE: 31
PIF_VALUE: 43
PIF_VALUE: 27
PIF_VALUE: 40
PIF_VALUE: 27
PIF_VALUE: 36
PIF_VALUE: 36
PIF_VALUE: 43
PIF_VALUE: 27
PIF_VALUE: 43
PIF_VALUE: 43
PIF_VALUE: 26
PIF_VALUE: 28
PIF_VALUE: 32
PIF_VALUE: 42
PIF_VALUE: 38
PIF_VALUE: 42
PIF_VALUE: 24
PIF_VALUE: 32
PIF_VALUE: 40
PIF_VALUE: 39
PIF_VALUE: 41
PIF_VALUE: 38
PIF_VALUE: 27
PIF_VALUE: 43
PIF_VALUE: 32
PIF_VALUE: 30
PIF_VALUE: 32
PIF_VALUE: 33
PIF_VALUE: 23
PIF_VALUE: 25
PIF_VALUE: 26
PIF_VALUE: 41
PIF_VALUE: 42
PIF_VALUE: 43
PIF_VALUE: 32
PIF_VALUE: 26
PIF_VALUE: 24
PIF_VALUE: 34
PIF_VALUE: 32
PIF_VALUE: 45
PIF_VALUE: 33
PIF_VALUE: 37
PIF_VALUE: 35
PIF_VALUE: 42
PIF_VALUE: 32
PIF_VALUE: 41
PIF_VALUE: 32
PIF_VALUE: 40
PIF_VALUE: 43
PIF_VALUE: 37
PIF_VALUE: 33
PIF_VALUE: 31
PIF_VALUE: 26
PIF_VALUE: 38
PIF_VALUE: 43
PIF_VALUE: 31
PIF_VALUE: 43
PIF_VALUE: 16
PIF_VALUE: 34
PIF_VALUE: 47
PIF_VALUE: 20
PIF_VALUE: 30
PIF_VALUE: 37
PIF_VALUE: 28
PIF_VALUE: 30
PIF_VALUE: 32
PIF_VALUE: 38
PIF_VALUE: 36
PIF_VALUE: 37
PIF_VALUE: 29
PIF_VALUE: 33
PIF_VALUE: 46
PIF_VALUE: 26
PIF_VALUE: 34
PIF_VALUE: 47
PIF_VALUE: 26
PIF_VALUE: 32
PIF_VALUE: 46
PIF_VALUE: 34
PIF_VALUE: 25
PIF_VALUE: 43
PIF_VALUE: 27
PIF_VALUE: 33
PIF_VALUE: 43
PIF_VALUE: 42
PIF_VALUE: 27
PIF_VALUE: 28
PIF_VALUE: 33
PIF_VALUE: 23
PIF_VALUE: 29
PIF_VALUE: 38
PIF_VALUE: 29
PIF_VALUE: 32
PIF_VALUE: 37
PIF_VALUE: 41
PIF_VALUE: 36
PIF_VALUE: 32
PIF_VALUE: 44
PIF_VALUE: 41
PIF_VALUE: 42
PIF_VALUE: 38
PIF_VALUE: 24
PIF_VALUE: 33
PIF_VALUE: 26
PIF_VALUE: 26
PIF_VALUE: 41
PIF_VALUE: 34
PIF_VALUE: 32
PIF_VALUE: 43
PIF_VALUE: 33
PIF_VALUE: 36
PIF_VALUE: 25
PIF_VALUE: 32
PIF_VALUE: 29
PIF_VALUE: 33
PIF_VALUE: 30
PIF_VALUE: 38
PIF_VALUE: 29
PIF_VALUE: 30
PIF_VALUE: 38
PIF_VALUE: 32
PIF_VALUE: 32
PIF_VALUE: 30
PIF_VALUE: 32
PIF_VALUE: 28
PIF_VALUE: 42
PIF_VALUE: 40
PIF_VALUE: 32
PIF_VALUE: 35
PIF_VALUE: 36
PIF_VALUE: 36
PIF_VALUE: 22
PIF_VALUE: 28
PIF_VALUE: 40
PIF_VALUE: 30
PIF_VALUE: 29
PIF_VALUE: 25
PIF_VALUE: 26
PIF_VALUE: 27
PIF_VALUE: 37
PIF_VALUE: 32
PIF_VALUE: 20
PIF_VALUE: 27
PIF_VALUE: 43
PIF_VALUE: 26
PIF_VALUE: 32
PIF_VALUE: 22
PIF_VALUE: 37
PIF_VALUE: 18
PIF_VALUE: 33
PIF_VALUE: 26
PIF_VALUE: 35
PIF_VALUE: 41
PIF_VALUE: 19
PIF_VALUE: 29
PIF_VALUE: 27
PIF_VALUE: 36
PIF_VALUE: 25
PIF_VALUE: 32
PIF_VALUE: 38
PIF_VALUE: 36
PIF_VALUE: 42
PIF_VALUE: 30
PIF_VALUE: 30
PIF_VALUE: 35
PIF_VALUE: 34
PIF_VALUE: 31
PIF_VALUE: 42
PIF_VALUE: 29
PIF_VALUE: 34
PIF_VALUE: 28
PIF_VALUE: 32
PIF_VALUE: 37
PIF_VALUE: 41
PIF_VALUE: 33
PIF_VALUE: 25
PIF_VALUE: 42
PIF_VALUE: 41
PIF_VALUE: 34

## 2023-01-01 ASSESSMENT — ENCOUNTER SYMPTOMS
ABDOMINAL PAIN: 0
ALLERGIC/IMMUNOLOGIC NEGATIVE: 1
ALLERGIC/IMMUNOLOGIC NEGATIVE: 1
COUGH: 1
GASTROINTESTINAL NEGATIVE: 1
ALLERGIC/IMMUNOLOGIC NEGATIVE: 1
COUGH: 1
GASTROINTESTINAL NEGATIVE: 1
DIARRHEA: 1
RHINORRHEA: 1
SHORTNESS OF BREATH: 1
ALLERGIC/IMMUNOLOGIC NEGATIVE: 1
EYES NEGATIVE: 1
VOMITING: 0
EYES NEGATIVE: 1
CHEST TIGHTNESS: 1
COUGH: 1
SHORTNESS OF BREATH: 1
EYES NEGATIVE: 1
EYES NEGATIVE: 1
ALLERGIC/IMMUNOLOGIC NEGATIVE: 1
CHEST TIGHTNESS: 1
WHEEZING: 1
ALLERGIC/IMMUNOLOGIC NEGATIVE: 1
COUGH: 1
COUGH: 1
EYES NEGATIVE: 1
GASTROINTESTINAL NEGATIVE: 1
SHORTNESS OF BREATH: 1
CHEST TIGHTNESS: 1
GASTROINTESTINAL NEGATIVE: 1
CHEST TIGHTNESS: 1
COUGH: 1
SORE THROAT: 0
COUGH: 1
COLOR CHANGE: 0
CHEST TIGHTNESS: 1
CHEST TIGHTNESS: 1
SHORTNESS OF BREATH: 1
GASTROINTESTINAL NEGATIVE: 1
CHEST TIGHTNESS: 1
GASTROINTESTINAL NEGATIVE: 1
EYES NEGATIVE: 1

## 2023-01-01 ASSESSMENT — PAIN SCALES - GENERAL
PAINLEVEL_OUTOF10: 4
PAINLEVEL_OUTOF10: 0
PAINLEVEL_OUTOF10: 4
PAINLEVEL_OUTOF10: 0
PAINLEVEL_OUTOF10: 3
PAINLEVEL_OUTOF10: 0
PAINLEVEL_OUTOF10: 4
PAINLEVEL_OUTOF10: 0
PAINLEVEL_OUTOF10: 4
PAINLEVEL_OUTOF10: 0
PAINLEVEL_OUTOF10: 3
PAINLEVEL_OUTOF10: 0
PAINLEVEL_OUTOF10: 1
PAINLEVEL_OUTOF10: 4
PAINLEVEL_OUTOF10: 2
PAINLEVEL_OUTOF10: 0
PAINLEVEL_OUTOF10: 3
PAINLEVEL_OUTOF10: 0
PAINLEVEL_OUTOF10: 3
PAINLEVEL_OUTOF10: 0
PAINLEVEL_OUTOF10: 1
PAINLEVEL_OUTOF10: 0
PAINLEVEL_OUTOF10: 3
PAINLEVEL_OUTOF10: 0
PAINLEVEL_OUTOF10: 3
PAINLEVEL_OUTOF10: 5
PAINLEVEL_OUTOF10: 0
PAINLEVEL_OUTOF10: 1
PAINLEVEL_OUTOF10: 0
PAINLEVEL_OUTOF10: 2
PAINLEVEL_OUTOF10: 4
PAINLEVEL_OUTOF10: 0

## 2023-01-01 ASSESSMENT — PAIN DESCRIPTION - FREQUENCY
FREQUENCY: INTERMITTENT
FREQUENCY: CONTINUOUS
FREQUENCY: CONTINUOUS

## 2023-01-01 ASSESSMENT — PAIN DESCRIPTION - LOCATION
LOCATION: CHEST
LOCATION: ABDOMEN
LOCATION: BACK
LOCATION: CHEST
LOCATION: STERNUM
LOCATION: OTHER (COMMENT)
LOCATION: CHEST
LOCATION: CHEST
LOCATION: OTHER (COMMENT)
LOCATION: CHEST
LOCATION: OTHER (COMMENT)
LOCATION: OTHER (COMMENT)

## 2023-01-01 ASSESSMENT — PAIN - FUNCTIONAL ASSESSMENT

## 2023-01-01 ASSESSMENT — PAIN DESCRIPTION - ORIENTATION
ORIENTATION: LOWER
ORIENTATION: OTHER (COMMENT)
ORIENTATION: MID
ORIENTATION: MID;UPPER
ORIENTATION: OTHER (COMMENT)
ORIENTATION: MID;UPPER
ORIENTATION: MID;LOWER
ORIENTATION: MID
ORIENTATION: POSTERIOR;MID;LOWER
ORIENTATION: OTHER (COMMENT)
ORIENTATION: MID

## 2023-01-01 ASSESSMENT — PAIN DESCRIPTION - DESCRIPTORS
DESCRIPTORS: BURNING;OTHER (COMMENT)
DESCRIPTORS: DISCOMFORT;ACHING
DESCRIPTORS: DISCOMFORT
DESCRIPTORS: ACHING
DESCRIPTORS: ACHING
DESCRIPTORS: ACHING;DISCOMFORT
DESCRIPTORS: ACHING;BURNING
DESCRIPTORS: BURNING;DISCOMFORT
DESCRIPTORS: ACHING;BURNING
DESCRIPTORS: ACHING
DESCRIPTORS: OTHER (COMMENT)

## 2023-01-01 ASSESSMENT — PAIN SCALES - WONG BAKER

## 2023-01-01 ASSESSMENT — PAIN DESCRIPTION - ONSET
ONSET: ON-GOING

## 2023-01-01 ASSESSMENT — PAIN DESCRIPTION - PAIN TYPE
TYPE: CHRONIC PAIN
TYPE: CHRONIC PAIN
TYPE: ACUTE PAIN
TYPE: ACUTE PAIN

## 2023-01-29 DIAGNOSIS — I10 PRIMARY HYPERTENSION: ICD-10-CM

## 2023-01-31 RX ORDER — LOSARTAN POTASSIUM 25 MG/1
TABLET ORAL
Qty: 90 TABLET | Refills: 3 | Status: SHIPPED | OUTPATIENT
Start: 2023-01-31

## 2023-04-11 NOTE — PROGRESS NOTES
Patient add on for surgery schedule tomorrow attempted to contact patient on Mobile phone and Home phone listed, no answer, surgery instructions, procedure and arrival times left via VM with callback number for PAT nurse for any questions

## 2023-04-11 NOTE — PROGRESS NOTES
.Surgery @ [facility] on 4/12/23 1100 with arrival at 0900                1. Do not eat or drink anything after midnight - unless instructed by your doctor prior to surgery. This includes                   no water, chewing gum or mints. 2. Follow your directions as prescribed by the doctor for your procedure and medications. Morning of surgery take: Use your inhaler & bring it with you, take Synthroid with sips of water    3. Check with your Doctor regarding stopping vitamins, supplements, blood thinners and follow their instructions. Stop vitamins, supplements and NSAIDS: 4/5/23   4. Do not smoke, vape or use chewing tobacco morning of surgery. Do not drink any alcoholic beverages 24 hours prior to surgery. This includes NA Beer. No street drugs 7 days prior to surgery. 5. You may brush your teeth and gargle the morning of surgery. DO NOT SWALLOW WATER   6. You MUST make arrangements for a responsible adult to take you home after your surgery and be able to check on you every couple                   hours for the day. You will not be allowed to leave alone or drive yourself home. It is strongly suggested someone stay with you the first 24                   hrs. Your surgery will be cancelled if you do not have a ride home. 7. Please wear simple, loose fitting clothing to the hospital.  Pablo Glendy not bring valuables (money, credit cards, checkbooks, etc.) Do not wear any                   makeup (including no eye makeup) or nail polish on your fingers or toes. 8. DO NOT wear any jewelry or piercings on day of surgery. All body piercing jewelry must be removed. 9. If you have dentures, they will be removed before going to the OR; we will provide you a container. If you wear contact lenses or glasses,                  they will be removed; please bring a case for them.            10. If you  have a Living Will and Durable Power of  for Healthcare, please bring in a

## 2023-04-12 ENCOUNTER — HOSPITAL ENCOUNTER (OUTPATIENT)
Age: 73
Setting detail: OUTPATIENT SURGERY
Discharge: HOME | End: 2023-04-12
Attending: SURGERY | Admitting: SURGERY
Payer: MEDICARE

## 2023-04-12 VITALS
HEIGHT: 69 IN | DIASTOLIC BLOOD PRESSURE: 73 MMHG | TEMPERATURE: 97.7 F | HEART RATE: 48 BPM | SYSTOLIC BLOOD PRESSURE: 135 MMHG | RESPIRATION RATE: 16 BRPM | BODY MASS INDEX: 41.77 KG/M2 | OXYGEN SATURATION: 96 % | WEIGHT: 282 LBS

## 2023-04-12 DIAGNOSIS — N50.89 CYST OF PERINEUM IN MALE: ICD-10-CM

## 2023-04-12 PROCEDURE — 3700000000 HC ANESTHESIA ATTENDED CARE: Performed by: SURGERY

## 2023-04-12 PROCEDURE — 3600000002 HC SURGERY LEVEL 2 BASE: Performed by: SURGERY

## 2023-04-12 PROCEDURE — 2709999900 HC NON-CHARGEABLE SUPPLY: Performed by: SURGERY

## 2023-04-12 PROCEDURE — 7100000001 HC PACU RECOVERY - ADDTL 15 MIN: Performed by: SURGERY

## 2023-04-12 PROCEDURE — 6360000002 HC RX W HCPCS: Performed by: SURGERY

## 2023-04-12 PROCEDURE — 2580000003 HC RX 258: Performed by: SURGERY

## 2023-04-12 PROCEDURE — C9290 INJ, BUPIVACAINE LIPOSOME: HCPCS | Performed by: SURGERY

## 2023-04-12 PROCEDURE — 27048 EXC HIP/PELV TUM DEEP < 5 CM: CPT | Performed by: SURGERY

## 2023-04-12 PROCEDURE — 3600000012 HC SURGERY LEVEL 2 ADDTL 15MIN: Performed by: SURGERY

## 2023-04-12 PROCEDURE — 7100000000 HC PACU RECOVERY - FIRST 15 MIN: Performed by: SURGERY

## 2023-04-12 PROCEDURE — 3700000001 HC ADD 15 MINUTES (ANESTHESIA): Performed by: SURGERY

## 2023-04-12 PROCEDURE — 7100000011 HC PHASE II RECOVERY - ADDTL 15 MIN: Performed by: SURGERY

## 2023-04-12 PROCEDURE — 88304 TISSUE EXAM BY PATHOLOGIST: CPT | Performed by: PATHOLOGY

## 2023-04-12 PROCEDURE — 7100000010 HC PHASE II RECOVERY - FIRST 15 MIN: Performed by: SURGERY

## 2023-04-12 RX ORDER — SODIUM CHLORIDE 9 MG/ML
INJECTION, SOLUTION INTRAVENOUS PRN
Status: DISCONTINUED | OUTPATIENT
Start: 2023-04-12 | End: 2023-04-12 | Stop reason: HOSPADM

## 2023-04-12 RX ORDER — IPRATROPIUM BROMIDE AND ALBUTEROL SULFATE 2.5; .5 MG/3ML; MG/3ML
1 SOLUTION RESPIRATORY (INHALATION)
Status: DISCONTINUED | OUTPATIENT
Start: 2023-04-12 | End: 2023-04-12 | Stop reason: HOSPADM

## 2023-04-12 RX ORDER — SODIUM CHLORIDE, SODIUM LACTATE, POTASSIUM CHLORIDE, CALCIUM CHLORIDE 600; 310; 30; 20 MG/100ML; MG/100ML; MG/100ML; MG/100ML
INJECTION, SOLUTION INTRAVENOUS CONTINUOUS
Status: DISCONTINUED | OUTPATIENT
Start: 2023-04-12 | End: 2023-04-12 | Stop reason: HOSPADM

## 2023-04-12 RX ORDER — OXYCODONE HYDROCHLORIDE 5 MG/1
5 TABLET ORAL EVERY 6 HOURS PRN
Qty: 12 TABLET | Refills: 0 | Status: SHIPPED | OUTPATIENT
Start: 2023-04-12 | End: 2023-04-15

## 2023-04-12 RX ORDER — SODIUM CHLORIDE 0.9 % (FLUSH) 0.9 %
5-40 SYRINGE (ML) INJECTION PRN
Status: DISCONTINUED | OUTPATIENT
Start: 2023-04-12 | End: 2023-04-12 | Stop reason: HOSPADM

## 2023-04-12 RX ORDER — ONDANSETRON 2 MG/ML
4 INJECTION INTRAMUSCULAR; INTRAVENOUS
Status: DISCONTINUED | OUTPATIENT
Start: 2023-04-12 | End: 2023-04-12 | Stop reason: HOSPADM

## 2023-04-12 RX ORDER — DROPERIDOL 2.5 MG/ML
0.62 INJECTION, SOLUTION INTRAMUSCULAR; INTRAVENOUS
Status: DISCONTINUED | OUTPATIENT
Start: 2023-04-12 | End: 2023-04-12 | Stop reason: HOSPADM

## 2023-04-12 RX ORDER — LABETALOL HYDROCHLORIDE 5 MG/ML
10 INJECTION, SOLUTION INTRAVENOUS
Status: DISCONTINUED | OUTPATIENT
Start: 2023-04-12 | End: 2023-04-12 | Stop reason: HOSPADM

## 2023-04-12 RX ORDER — SODIUM CHLORIDE 0.9 % (FLUSH) 0.9 %
5-40 SYRINGE (ML) INJECTION EVERY 12 HOURS SCHEDULED
Status: DISCONTINUED | OUTPATIENT
Start: 2023-04-12 | End: 2023-04-12 | Stop reason: HOSPADM

## 2023-04-12 RX ORDER — ONDANSETRON 4 MG/1
4 TABLET, FILM COATED ORAL DAILY PRN
Qty: 20 TABLET | Refills: 3 | Status: SHIPPED | OUTPATIENT
Start: 2023-04-12

## 2023-04-12 RX ORDER — HYDRALAZINE HYDROCHLORIDE 20 MG/ML
10 INJECTION INTRAMUSCULAR; INTRAVENOUS
Status: DISCONTINUED | OUTPATIENT
Start: 2023-04-12 | End: 2023-04-12 | Stop reason: HOSPADM

## 2023-04-12 RX ORDER — FENTANYL CITRATE 50 UG/ML
50 INJECTION, SOLUTION INTRAMUSCULAR; INTRAVENOUS EVERY 5 MIN PRN
Status: DISCONTINUED | OUTPATIENT
Start: 2023-04-12 | End: 2023-04-12 | Stop reason: HOSPADM

## 2023-04-12 RX ORDER — MEPERIDINE HYDROCHLORIDE 25 MG/ML
12.5 INJECTION INTRAMUSCULAR; INTRAVENOUS; SUBCUTANEOUS EVERY 5 MIN PRN
Status: DISCONTINUED | OUTPATIENT
Start: 2023-04-12 | End: 2023-04-12 | Stop reason: HOSPADM

## 2023-04-12 RX ORDER — AMOXICILLIN 250 MG
2 CAPSULE ORAL DAILY
Qty: 28 TABLET | Refills: 3 | Status: SHIPPED | OUTPATIENT
Start: 2023-04-12 | End: 2023-04-26

## 2023-04-12 RX ORDER — OXYCODONE HYDROCHLORIDE 5 MG/1
5 TABLET ORAL
Status: DISCONTINUED | OUTPATIENT
Start: 2023-04-12 | End: 2023-04-12 | Stop reason: HOSPADM

## 2023-04-12 RX ADMIN — SODIUM CHLORIDE, POTASSIUM CHLORIDE, SODIUM LACTATE AND CALCIUM CHLORIDE: 600; 310; 30; 20 INJECTION, SOLUTION INTRAVENOUS at 09:37

## 2023-04-12 ASSESSMENT — PAIN DESCRIPTION - ORIENTATION
ORIENTATION: RIGHT
ORIENTATION: RIGHT

## 2023-04-12 ASSESSMENT — PAIN DESCRIPTION - PAIN TYPE
TYPE: SURGICAL PAIN
TYPE: SURGICAL PAIN

## 2023-04-12 ASSESSMENT — PAIN DESCRIPTION - DESCRIPTORS
DESCRIPTORS: DISCOMFORT
DESCRIPTORS: DISCOMFORT

## 2023-04-12 ASSESSMENT — PAIN - FUNCTIONAL ASSESSMENT
PAIN_FUNCTIONAL_ASSESSMENT: ACTIVITIES ARE NOT PREVENTED
PAIN_FUNCTIONAL_ASSESSMENT: ACTIVITIES ARE NOT PREVENTED
PAIN_FUNCTIONAL_ASSESSMENT: 0-10

## 2023-04-12 ASSESSMENT — PAIN DESCRIPTION - LOCATION
LOCATION: BUTTOCKS
LOCATION: BUTTOCKS

## 2023-04-12 ASSESSMENT — PAIN DESCRIPTION - ONSET
ONSET: GRADUAL
ONSET: GRADUAL

## 2023-04-12 ASSESSMENT — PAIN DESCRIPTION - FREQUENCY
FREQUENCY: INTERMITTENT
FREQUENCY: INTERMITTENT

## 2023-04-12 ASSESSMENT — PAIN SCALES - GENERAL
PAINLEVEL_OUTOF10: 2
PAINLEVEL_OUTOF10: 2

## 2023-04-12 NOTE — DISCHARGE INSTRUCTIONS
Discharge Date:  4/12/2023    Discharged To: Home      RESUME ACTIVITY:      BATHING:   OK to shower but no bath tub or submerging incision under water    Wound:    Keep wound dry and clean. May shower as instructed above. Dressing:    Change outer dressing daily after shower or if soiled. DRIVING:   3-5 days. No driving until off narcotic pain medications and walking comfortably    RETURN TO WORK: when cleared after your follow up office visit    WALKING:    As tolerated     STAIRS:    As tolerated    LIFTING:   Less than 10 pounds for one week    DIET:    Verdia Warren diet on day of surgery then regular diet    SPECIAL INSTRUCTIONS:     Call the office at [phone]  if you have a fever greater than or equal to 101 oF or if your incision becomes red, tender, or has drainage of pus. If follow up appointment was not given to you, call the Surgical Clinic at [phone] for follow up appointment with Dr. Tanya Patel in:  1-2 weeks. [facility]  [phone]    Do not drive, work around [address] [place] [address] or use equipment. Do not drink any alcoholic beverages. Do not smoke while alone. Avoid making important decisions. Plan to spend a quiet, relaxed evening @ home. Resume normal activities as you begin to feel better. Eat lightly for your first meal, then gradually increase your diet to what is normal for you. In case of nausea, avoid food and drink only clear liquids. Resume food as nausea ceases. Notify your surgeon if you experience fever, chills, large amount of bleeding, difficulty breathing, persistent nausea and vomiting or any other disturbing problem. Call for a follow-up appointment with your surgeon. Advance Care Planning  People with COVID-19 may have no symptoms, mild symptoms, such as fever, cough, and shortness of breath or they may have more severe illness, developing severe and fatal pneumonia.   As a result, Advance Care Planning with

## 2023-04-12 NOTE — PROGRESS NOTES
1216 Patient returned to room from  pacu, A+Ox4,  VSS (see doc flow), assessment completed as per doc flow. Patient has 2/10 c/o pain, and denies any needs at this time. Beverage offered. Call light in reach, bed in low position. RN to continue to monitor. Friend at the bedside. 1259 Patient discharge instructions and prescriptions reviewed and verified. RN reviewed d/c instructions with patient and friend, Kami Lopez. All questions answered. Prescription information and side effects given to patient. Discharge paperwork signed by RN and patient's friend Kami Lopez. [phone] Discharged to car  via wheelchair, home with  friend Kami Lopez.

## 2023-04-12 NOTE — OP NOTE
Wilmer Divers  1950 4/12/23      Pre-operative Diagnosis:  Right gluteal cysts    Post-operative Diagnosis:  Same as above    Procedure:   1. Excision of right gluteal cysts measuring 4.5 cm x 3 cm cm down to muscle    2. Two layer closure of excision site    Surgeon: Ladon Dance, MD    Assistant: Lana Hopkins CNP The skilled assistance of the CNP was necessary for the successful completion of this case. She was essential for the proper positioning, manipulation of instruments, proper exposure, manipulation of tissue, and wound closure. Anesthesia:  General    Findings: Consistent with post-operative diagnosis    IVF:  300 mL    Estimated Blood Loss:  10 mL                  Specimens: Right gluteal mass containing soft tissue masses consistent clinically with cysts             Complications:  None; patient tolerated the procedure well. Disposition: PACU - hemodynamically stable. Operative Details: The patient was seen again in the pre-operative area. Informed consent was obtained. Again, the risks, benefits, complications, treatment options, and expected outcomes were discussed with the patient. There was concurrence with the proposed plan. The site of surgery was properly noted/marked. The patient was transported to the operating room. The patient was transferred to the operating room table and placed in the supine position. The patient was secured to the operating room table with multiple straps and all pressue points were well-padded. Bilateral sequential compression devices were applied and were working throughout the duration of the case. Anesthesia was induced without complications or changes in the patient's vital signs. The patient was transitioned to the lithotomy position with adequate padding and straps. Appropriate antibiotics were given in accordance with national protocols.     The patient's perineal region was prepped and draped in

## 2023-04-12 NOTE — H&P
History and Physical Update    Original H&P done in office on 4/11/2023 (less than 30 days ago). Pt reports the following changes in health since being seen last:  None    Vitals:    04/12/23 0858   BP: 114/65   Pulse: 61   Resp: 18   Temp: 98 °F (36.7 °C)   SpO2: 97%       Alert and oriented x 3, no apparent distress at rest  Atraumatic, normocephalic. EOMI. Breathing unlabored. RRR. Soft, non-tender, non-distended. Moves all extremities. Warm, dry. Pamela Kuhn is a 73 yo male here today for excision of right gluteal mass    -Consent obtained in office. -Abx ordered in office.  -Reviewed expected pre-operative, operative, and post-operative courses. -Answered questions to patient's satisfaction.   -Reviewed risks, benefits, alternative to procedure.   -Proceed as scheduled.         DEYA Tucker - CNP

## 2023-04-12 NOTE — PROGRESS NOTES
1146: Arrived to PACU from OR. Monitors applied, alarms on. [facility] 1215: Transported to hospitals.

## 2023-04-16 ENCOUNTER — HOSPITAL ENCOUNTER (EMERGENCY)
Age: 73
Discharge: HOME | End: 2023-04-16
Payer: MEDICARE

## 2023-04-16 VITALS
BODY MASS INDEX: 41.77 KG/M2 | HEIGHT: 69 IN | TEMPERATURE: 98.8 F | DIASTOLIC BLOOD PRESSURE: 79 MMHG | SYSTOLIC BLOOD PRESSURE: 141 MMHG | OXYGEN SATURATION: 96 % | HEART RATE: 84 BPM | RESPIRATION RATE: 20 BRPM | WEIGHT: 282 LBS

## 2023-04-16 DIAGNOSIS — L02.31 ABSCESS OF RIGHT BUTTOCK: Primary | ICD-10-CM

## 2023-04-16 PROCEDURE — 2500000003 HC RX 250 WO HCPCS: Performed by: PHYSICIAN ASSISTANT

## 2023-04-16 PROCEDURE — 99283 EMERGENCY DEPT VISIT LOW MDM: CPT

## 2023-04-16 RX ORDER — CLINDAMYCIN HYDROCHLORIDE 300 MG/1
300 CAPSULE ORAL 2 TIMES DAILY
Qty: 14 CAPSULE | Refills: 0 | Status: SHIPPED | OUTPATIENT
Start: 2023-04-16 | End: 2023-04-23

## 2023-04-16 RX ORDER — CLINDAMYCIN HYDROCHLORIDE 300 MG/1
300 CAPSULE ORAL 2 TIMES DAILY
Qty: 14 CAPSULE | Refills: 0 | Status: SHIPPED | OUTPATIENT
Start: 2023-04-16 | End: 2023-04-16 | Stop reason: SDUPTHER

## 2023-04-16 RX ORDER — LIDOCAINE HYDROCHLORIDE 10 MG/ML
5 INJECTION, SOLUTION INFILTRATION; PERINEURAL ONCE
Status: COMPLETED | OUTPATIENT
Start: 2023-04-16 | End: 2023-04-16

## 2023-04-16 RX ADMIN — LIDOCAINE HYDROCHLORIDE 5 ML: 10 INJECTION, SOLUTION INFILTRATION; PERINEURAL at 10:21

## 2023-04-18 ENCOUNTER — OFFICE VISIT (OUTPATIENT)
Dept: BARIATRICS/WEIGHT MGMT | Age: 73
End: 2023-04-18

## 2023-04-18 VITALS
HEART RATE: 76 BPM | WEIGHT: 283.7 LBS | HEIGHT: 69 IN | BODY MASS INDEX: 42.02 KG/M2 | SYSTOLIC BLOOD PRESSURE: 136 MMHG | OXYGEN SATURATION: 98 % | DIASTOLIC BLOOD PRESSURE: 78 MMHG

## 2023-04-18 DIAGNOSIS — N50.89 PERINEAL CYST IN MALE: Primary | ICD-10-CM

## 2023-04-18 PROCEDURE — G8417 CALC BMI ABV UP PARAM F/U: HCPCS | Performed by: SURGERY

## 2023-04-18 PROCEDURE — 3078F DIAST BP <80 MM HG: CPT | Performed by: SURGERY

## 2023-04-18 PROCEDURE — 3017F COLORECTAL CA SCREEN DOC REV: CPT | Performed by: SURGERY

## 2023-04-18 PROCEDURE — G8427 DOCREV CUR MEDS BY ELIG CLIN: HCPCS | Performed by: SURGERY

## 2023-04-18 PROCEDURE — 3075F SYST BP GE 130 - 139MM HG: CPT | Performed by: SURGERY

## 2023-04-18 PROCEDURE — 1123F ACP DISCUSS/DSCN MKR DOCD: CPT | Performed by: SURGERY

## 2023-04-18 PROCEDURE — 1036F TOBACCO NON-USER: CPT | Performed by: SURGERY

## 2023-04-18 PROCEDURE — 99024 POSTOP FOLLOW-UP VISIT: CPT | Performed by: SURGERY

## 2023-04-18 ASSESSMENT — PATIENT HEALTH QUESTIONNAIRE - PHQ9
2. FEELING DOWN, DEPRESSED OR HOPELESS: 0
SUM OF ALL RESPONSES TO PHQ9 QUESTIONS 1 & 2: 0
SUM OF ALL RESPONSES TO PHQ QUESTIONS 1-9: 0
SUM OF ALL RESPONSES TO PHQ QUESTIONS 1-9: 0
1. LITTLE INTEREST OR PLEASURE IN DOING THINGS: 0
SUM OF ALL RESPONSES TO PHQ QUESTIONS 1-9: 0
SUM OF ALL RESPONSES TO PHQ QUESTIONS 1-9: 0

## 2023-04-18 NOTE — PROGRESS NOTES
Post-Operative Clinic Note    Chief Complaint   Patient presents with    Post-Op Check     1st PO EXC. of RT Gluteal cyst to muscle @ [facility] 4/12/23         SUBJECTIVE:  Patient is here today for a post-operative visit. Patient is s/p excision of right gluteal soft tissue mass on 4/12/23. Went to ED due to low grade fevers and some yellow -bloody drainage from incision. The ED apparently opened the incision. He has been taking abx they prescribed. Otherwise denies new issues. Past Surgical History:   Procedure Laterality Date    CHOLECYSTECTOMY, LAPAROSCOPIC N/A 8/10/2022    CHOLECYSTECTOMY LAPAROSCOPIC performed by David Miramontes MD at [address]  2008    [facility]- normal.      COLONOSCOPY  12/02/2014    diverticulosis, internal hemorrhoids    COLONOSCOPY N/A 9/13/2022    COLONOSCOPY POLYPECTOMY SNARE/COLD BIOPSY performed by Pari Lizama MD at [facility] ABDOM ABSCESS OPEN  8/20/2022    CT DRAINAGE ABDOM ABSCESS OPEN 8/20/2022 [facility] CT SCAN    ERCP  08/09/2022    ERCP SPHINCTER/PAPILLOTOMY performed by Dr. Jeferson Linder at [address] [facility]    ERCP N/A 8/9/2022    ERCP SPHINCTER/PAPILLOTOMY and sweep and removal of debris, sludge and stones with use of 12-15 extractor balloon performed by Pari Lizama MD at [place] [address] Right 2012    JOINT REPLACEMENT Left 2011    KNEE SURGERY Bilateral     replacement    RECTAL SURGERY Right 4/12/2023    GLUTEAL SOFT TISSUE INCISION MASS EXCISION AND BIOPSY performed by David Miramontes MD at [facility] Bilateral     toe nail surgery Dr. Harper Bangura Right 2014    DR. Rascon      Past Medical History:   Diagnosis Date    Abdominal aneurysm ([address] 2012    4 cm x 3.6 cm followed at [facility]    Arthritis 01/21/2014    Ascending cholangitis 08/09/2022    Calculus of gallbladder with acute cholecystitis and obstruction 08/13/2022    Chronic back pain     [facility]

## 2023-04-19 ENCOUNTER — HOSPITAL ENCOUNTER (OUTPATIENT)
Dept: WOUND CARE | Age: 73
Discharge: HOME | End: 2023-04-19
Payer: MEDICARE

## 2023-04-19 VITALS
RESPIRATION RATE: 20 BRPM | HEART RATE: 65 BPM | DIASTOLIC BLOOD PRESSURE: 66 MMHG | TEMPERATURE: 98.1 F | SYSTOLIC BLOOD PRESSURE: 130 MMHG

## 2023-04-19 DIAGNOSIS — L02.31 ABSCESS OF BUTTOCK, RIGHT: ICD-10-CM

## 2023-04-19 DIAGNOSIS — T81.89XA NON-HEALING SURGICAL WOUND, INITIAL ENCOUNTER: ICD-10-CM

## 2023-04-19 PROCEDURE — 11042 DBRDMT SUBQ TIS 1ST 20SQCM/<: CPT

## 2023-04-19 PROCEDURE — 99213 OFFICE O/P EST LOW 20 MIN: CPT

## 2023-04-19 RX ORDER — CLOBETASOL PROPIONATE 0.5 MG/G
OINTMENT TOPICAL ONCE
OUTPATIENT
Start: 2023-04-19 | End: 2023-04-19

## 2023-04-19 RX ORDER — GENTAMICIN SULFATE 1 MG/G
OINTMENT TOPICAL ONCE
OUTPATIENT
Start: 2023-04-19 | End: 2023-04-19

## 2023-04-19 RX ORDER — LIDOCAINE 50 MG/G
OINTMENT TOPICAL ONCE
OUTPATIENT
Start: 2023-04-19 | End: 2023-04-19

## 2023-04-19 RX ORDER — LIDOCAINE HYDROCHLORIDE 20 MG/ML
JELLY TOPICAL ONCE
OUTPATIENT
Start: 2023-04-19 | End: 2023-04-19

## 2023-04-19 RX ORDER — LIDOCAINE HYDROCHLORIDE 40 MG/ML
SOLUTION TOPICAL ONCE
OUTPATIENT
Start: 2023-04-19 | End: 2023-04-19

## 2023-04-19 RX ORDER — BETAMETHASONE DIPROPIONATE 0.05 %
OINTMENT (GRAM) TOPICAL ONCE
OUTPATIENT
Start: 2023-04-19 | End: 2023-04-19

## 2023-04-19 RX ORDER — BACITRACIN, NEOMYCIN, POLYMYXIN B 400; 3.5; 5 [USP'U]/G; MG/G; [USP'U]/G
OINTMENT TOPICAL ONCE
OUTPATIENT
Start: 2023-04-19 | End: 2023-04-19

## 2023-04-19 RX ORDER — BACITRACIN ZINC AND POLYMYXIN B SULFATE 500; 1000 [USP'U]/G; [USP'U]/G
OINTMENT TOPICAL ONCE
OUTPATIENT
Start: 2023-04-19 | End: 2023-04-19

## 2023-04-19 RX ORDER — GINSENG 100 MG
CAPSULE ORAL ONCE
OUTPATIENT
Start: 2023-04-19 | End: 2023-04-19

## 2023-04-19 RX ORDER — LIDOCAINE 40 MG/G
CREAM TOPICAL ONCE
OUTPATIENT
Start: 2023-04-19 | End: 2023-04-19

## 2023-04-19 ASSESSMENT — PAIN SCALES - GENERAL: PAINLEVEL_OUTOF10: 2

## 2023-04-19 ASSESSMENT — PAIN DESCRIPTION - DESCRIPTORS: DESCRIPTORS: ACHING;SORE

## 2023-04-19 ASSESSMENT — PAIN DESCRIPTION - FREQUENCY: FREQUENCY: CONTINUOUS

## 2023-04-19 ASSESSMENT — PAIN DESCRIPTION - LOCATION: LOCATION: BUTTOCKS

## 2023-04-19 NOTE — PROGRESS NOTES
tissue. Devitalized Tissue Debrided:  fibrin, biofilm, slough, and exudate    Pre Debridement Measurements:  Are located in the Wound Documentation Flow Sheet    All active wounds listed below with today's date are evaluated  Wound(s)    debrided this date include # : 1     Post  Debridement Measurements:  Wound 04/19/23 #1 Right Buttock (Active)   Wound Image   04/19/23 1312   Wound Etiology Other 04/19/23 1312   Dressing Status Clean;Dry; Intact; New dressing applied 04/19/23 1401   Wound Cleansed Soap and water; Wound cleanser 04/19/23 1312   Dressing/Treatment Collagen;Alginate;Silicone border;Tegaderm/transparent film dressing 04/19/23 1401   Offloading for Diabetic Foot Ulcers Offloading not required 04/19/23 1312   Wound Length (cm) 2 cm 04/19/23 1312   Wound Width (cm) 1.3 cm 04/19/23 1312   Wound Depth (cm) 1.5 cm 04/19/23 1312   Wound Surface Area (cm^2) 2.6 cm^2 04/19/23 1312   Wound Volume (cm^3) 3.9 cm^3 04/19/23 1312   Post-Procedure Length (cm) 2 cm 04/19/23 1347   Post-Procedure Width (cm) 1.3 cm 04/19/23 1347   Post-Procedure Depth (cm) 1.5 cm 04/19/23 1347   Post-Procedure Surface Area (cm^2) 2.6 cm^2 04/19/23 1347   Post-Procedure Volume (cm^3) 3.9 cm^3 04/19/23 1347   Distance Tunneling (cm) 0 cm 04/19/23 1312   Tunneling Position ___ O'Clock 0 04/19/23 1312   Undermining Starts ___ O'Clock 0 04/19/23 1312   Undermining Ends___ O'Clock 0 04/19/23 1312   Undermining Maxium Distance (cm) 0 04/19/23 1312   Wound Assessment Sagar/red;Slough 04/19/23 1312   Drainage Amount Large 04/19/23 1312   Drainage Description Serosanguinous;Green;Purulent 04/19/23 1312   Odor None 04/19/23 1312   Meliza-wound Assessment Blanchable erythema; Hyperpigmented; Maceration 04/19/23 1312   Margins Defined edges 04/19/23 1312   Wound Thickness Description not for Pressure Injury Full thickness 04/19/23 1312   Number of days: 0       Percent of Wound(s) Debrided: 100%    Total  Area  Debrided:  2.6 sq cm     Bleeding:

## 2023-04-19 NOTE — DISCHARGE INSTRUCTIONS
PHYSICIAN ORDERS AND DISCHARGE INSTRUCTIONS    NOTE: Upon discharge from the [address], you will receive a patient experience survey. We would be grateful if you would take the time to fill this survey out. Wound care order history:     YAEL's   Right       Left    Date:   Cultures:     Grafts:     Antibiotics:        Continuing wound care orders and information:                Residence:                Continue home health care with:    Your wound-care supplies will be provided by: Wound cleansing:     Do not scrub or use excessive force. Wash hands with soap and water before and after dressing changes. Prior to applying a clean dressing, cleanse wound with normal saline, wound cleanser, or mild soap and water. Ask the physician or nurse before getting the wound(s) wet in a shower. General Wound management:   Keep weight off wounds and reposition every 2 hours. Avoid standing for long periods of time. Apply wraps/stockings in AM and remove at bedtime. If swelling is present, elevate legs to the level of the heart or above for 30 minutes 4-5 times a day and/or when sitting. When taking antibiotics take entire prescription as ordered by physician do not stop taking until medicine is all gone. Orders for this week: 4/19/23    [facility] care referral to [facility] for skilled nursing wound care. Follow up visit with Dr Ene Corcoran 5/9/23      Rx:    Right Buttock Wound - Wash with soap and water, pat dry. For the first week: pack with Mesalt only (Starting 4/26/23 consider adding Anasept and Stimulen to the wound bed). Cover with ca alginate. Use mastisol or skin prep to help adhere silicone border and Tegaderm. Change Fridays and Mondays. Wound care center will change on Wednesdays. Follow up with Chandler Duarte CNP in 1 week(s) in the wound care center. Call [phone] for any questions or concerns.   Date__________   Time____________

## 2023-04-20 ENCOUNTER — HOSPITAL ENCOUNTER (OUTPATIENT)
Dept: WOUND CARE | Age: 73
Discharge: HOME | End: 2023-04-20
Payer: MEDICARE

## 2023-04-20 VITALS
DIASTOLIC BLOOD PRESSURE: 55 MMHG | HEART RATE: 58 BPM | SYSTOLIC BLOOD PRESSURE: 138 MMHG | TEMPERATURE: 98.7 F | RESPIRATION RATE: 20 BRPM

## 2023-04-20 PROCEDURE — 99213 OFFICE O/P EST LOW 20 MIN: CPT

## 2023-04-20 ASSESSMENT — PAIN DESCRIPTION - ONSET: ONSET: ON-GOING

## 2023-04-20 ASSESSMENT — PAIN DESCRIPTION - LOCATION: LOCATION: BUTTOCKS

## 2023-04-20 ASSESSMENT — PAIN DESCRIPTION - PAIN TYPE: TYPE: SURGICAL PAIN

## 2023-04-20 ASSESSMENT — PAIN DESCRIPTION - ORIENTATION: ORIENTATION: RIGHT

## 2023-04-20 ASSESSMENT — PAIN - FUNCTIONAL ASSESSMENT: PAIN_FUNCTIONAL_ASSESSMENT: ACTIVITIES ARE NOT PREVENTED

## 2023-04-20 ASSESSMENT — PAIN DESCRIPTION - FREQUENCY: FREQUENCY: CONTINUOUS

## 2023-04-20 ASSESSMENT — PAIN SCALES - GENERAL: PAINLEVEL_OUTOF10: 2

## 2023-04-20 NOTE — PROGRESS NOTES
Patient returned to wound clinic this afternoon for c/o excessive drainage from wound of right buttock. Noted copious amount of serosanguinous and light yellow drainage. Wound cleansed and dressed as per order. Patient tolerated well.

## 2023-04-21 ENCOUNTER — HOSPITAL ENCOUNTER (OUTPATIENT)
Dept: WOUND CARE | Age: 73
Discharge: HOME | End: 2023-04-21
Payer: MEDICARE

## 2023-04-21 VITALS — HEART RATE: 62 BPM | TEMPERATURE: 98.1 F | DIASTOLIC BLOOD PRESSURE: 73 MMHG | SYSTOLIC BLOOD PRESSURE: 123 MMHG

## 2023-04-21 PROCEDURE — 99213 OFFICE O/P EST LOW 20 MIN: CPT

## 2023-04-21 ASSESSMENT — PAIN DESCRIPTION - ONSET: ONSET: ON-GOING

## 2023-04-21 ASSESSMENT — PAIN - FUNCTIONAL ASSESSMENT: PAIN_FUNCTIONAL_ASSESSMENT: ACTIVITIES ARE NOT PREVENTED

## 2023-04-21 ASSESSMENT — PAIN SCALES - GENERAL: PAINLEVEL_OUTOF10: 1

## 2023-04-21 ASSESSMENT — PAIN DESCRIPTION - ORIENTATION: ORIENTATION: RIGHT

## 2023-04-21 ASSESSMENT — PAIN DESCRIPTION - LOCATION: LOCATION: BUTTOCKS

## 2023-04-21 ASSESSMENT — PAIN DESCRIPTION - PAIN TYPE: TYPE: SURGICAL PAIN

## 2023-04-26 ENCOUNTER — HOSPITAL ENCOUNTER (OUTPATIENT)
Dept: WOUND CARE | Age: 73
Discharge: HOME | End: 2023-04-26
Payer: MEDICARE

## 2023-04-26 VITALS
TEMPERATURE: 97.7 F | HEART RATE: 58 BPM | SYSTOLIC BLOOD PRESSURE: 159 MMHG | RESPIRATION RATE: 20 BRPM | DIASTOLIC BLOOD PRESSURE: 80 MMHG

## 2023-04-26 DIAGNOSIS — L02.31 ABSCESS OF BUTTOCK, RIGHT: Primary | ICD-10-CM

## 2023-04-26 DIAGNOSIS — T81.89XD NON-HEALING SURGICAL WOUND, SUBSEQUENT ENCOUNTER: ICD-10-CM

## 2023-04-26 DIAGNOSIS — T81.89XA NON-HEALING SURGICAL WOUND, INITIAL ENCOUNTER: ICD-10-CM

## 2023-04-26 PROCEDURE — 11042 DBRDMT SUBQ TIS 1ST 20SQCM/<: CPT

## 2023-04-26 PROCEDURE — 87077 CULTURE AEROBIC IDENTIFY: CPT

## 2023-04-26 PROCEDURE — 11042 DBRDMT SUBQ TIS 1ST 20SQCM/<: CPT | Performed by: NURSE PRACTITIONER

## 2023-04-26 PROCEDURE — 87075 CULTR BACTERIA EXCEPT BLOOD: CPT

## 2023-04-26 PROCEDURE — 87186 SC STD MICRODIL/AGAR DIL: CPT

## 2023-04-26 PROCEDURE — 87070 CULTURE OTHR SPECIMN AEROBIC: CPT

## 2023-04-26 RX ORDER — CLOBETASOL PROPIONATE 0.5 MG/G
OINTMENT TOPICAL ONCE
OUTPATIENT
Start: 2023-04-26 | End: 2023-04-26

## 2023-04-26 RX ORDER — GINSENG 100 MG
CAPSULE ORAL ONCE
OUTPATIENT
Start: 2023-04-26 | End: 2023-04-26

## 2023-04-26 RX ORDER — LIDOCAINE HYDROCHLORIDE 20 MG/ML
JELLY TOPICAL ONCE
OUTPATIENT
Start: 2023-04-26 | End: 2023-04-26

## 2023-04-26 RX ORDER — BACITRACIN, NEOMYCIN, POLYMYXIN B 400; 3.5; 5 [USP'U]/G; MG/G; [USP'U]/G
OINTMENT TOPICAL ONCE
OUTPATIENT
Start: 2023-04-26 | End: 2023-04-26

## 2023-04-26 RX ORDER — GENTAMICIN SULFATE 1 MG/G
OINTMENT TOPICAL ONCE
OUTPATIENT
Start: 2023-04-26 | End: 2023-04-26

## 2023-04-26 RX ORDER — LIDOCAINE 40 MG/G
CREAM TOPICAL ONCE
OUTPATIENT
Start: 2023-04-26 | End: 2023-04-26

## 2023-04-26 RX ORDER — LIDOCAINE 50 MG/G
OINTMENT TOPICAL ONCE
OUTPATIENT
Start: 2023-04-26 | End: 2023-04-26

## 2023-04-26 RX ORDER — BETAMETHASONE DIPROPIONATE 0.05 %
OINTMENT (GRAM) TOPICAL ONCE
OUTPATIENT
Start: 2023-04-26 | End: 2023-04-26

## 2023-04-26 RX ORDER — BACITRACIN ZINC AND POLYMYXIN B SULFATE 500; 1000 [USP'U]/G; [USP'U]/G
OINTMENT TOPICAL ONCE
OUTPATIENT
Start: 2023-04-26 | End: 2023-04-26

## 2023-04-26 RX ORDER — LIDOCAINE HYDROCHLORIDE 40 MG/ML
SOLUTION TOPICAL ONCE
OUTPATIENT
Start: 2023-04-26 | End: 2023-04-26

## 2023-04-26 ASSESSMENT — PAIN DESCRIPTION - PAIN TYPE: TYPE: SURGICAL PAIN

## 2023-04-26 ASSESSMENT — PAIN DESCRIPTION - FREQUENCY: FREQUENCY: INTERMITTENT

## 2023-04-26 ASSESSMENT — PAIN SCALES - GENERAL: PAINLEVEL_OUTOF10: 4

## 2023-04-26 ASSESSMENT — PAIN DESCRIPTION - LOCATION: LOCATION: BUTTOCKS

## 2023-04-26 ASSESSMENT — PAIN DESCRIPTION - ONSET: ONSET: ON-GOING

## 2023-04-26 ASSESSMENT — PAIN DESCRIPTION - ORIENTATION: ORIENTATION: RIGHT

## 2023-04-26 ASSESSMENT — PAIN DESCRIPTION - DESCRIPTORS: DESCRIPTORS: ACHING

## 2023-04-26 NOTE — DISCHARGE INSTRUCTIONS
PHYSICIAN ORDERS AND DISCHARGE INSTRUCTIONS     NOTE: Upon discharge from the [address], you will receive a patient experience survey. We would be grateful if you would take the time to fill this survey out. Wound care order history:                 YAEL's   Right       Left               Date:              Cultures:                Grafts:                Antibiotics:           Continuing wound care orders and information:                 Residence:                Continue home health care with:               Your wound-care supplies will be provided by: Wound cleansing:               Do not scrub or use excessive force. Wash hands with soap and water before and after dressing changes. Prior to applying a clean dressing, cleanse wound with normal saline, wound cleanser, or mild soap and water. Ask the physician or nurse before getting the wound(s) wet in a shower. General Wound management:              Keep weight off wounds and reposition every 2 hours. Avoid standing for long periods of time. Apply wraps/stockings in AM and remove at bedtime. If swelling is present, elevate legs to the level of the heart or above for 30 minutes 4-5 times a day and/or when sitting. When taking antibiotics take entire prescription as ordered by physician do not stop taking until medicine is all gone. Orders for this week: 4/26/23     FAX ORDERS TO [facility]! Follow up visit with Dr Jonas Her 5/9/23        Rx:     Right Buttock Wound - Wash with soap and water, pat dry. Anasept and Stimulen dampened Mesalt to the wound bed. Cover with ca alginate. Use mastisol or skin prep to help adhere silicone border and Tegaderm. Change Friday (start applying vac when available). Wound vac ordered 4/26/23 for right buttock wound.       WOUND VAC THERAPY: Right Buttock Wound - Wash with soap and

## 2023-04-26 NOTE — PROGRESS NOTES
Wound Care Center Progress Note With Procedure    Thomas Patel  AGE: 74 y.o. GENDER: male  : 1950  EPISODE DATE:  2023     Subjective:     Chief Complaint   Patient presents with    Wound Check     buttock         HISTORY of PRESENT ILLNESS      Thomas Patel is a 74 y.o. male who presents to the [address] [place] [address] for a visit for evaluation and treatment of Acute non-healing surgical and abscess   ulcer(s) of  Buttocks R gluteal.  The condition is of moderate severity. The ulcer has been present for 1 weeks. The underlying cause is thought to be abscess. The patients care to date has included an I&D with sutures, and subsequent infection and sutures were removed. The patient has significant underlying medical conditions as below. Patient wound has more depth and undermining than last week. Wound was likely stabilizing last week and he is finished with antibiotics now.    Will culture as drainage persistent  Likely needs NPT  Home health established      Patient is not a smoker or a diabetic  He is not on blood thinners     Wound Pain Timing/Severity: waxing and waning  Quality of pain: aching, tender  Severity of pain:  3 / 10   Modifying Factors: chronic pressure and obesity  Associated Signs/Symptoms: edema, erythema, drainage, and pain        PAST MEDICAL HISTORY        Diagnosis Date    Abdominal aneurysm ([address]     4 cm x 3.6 cm [facility] 2014    Ascending cholangitis 2022    Calculus of gallbladder with acute cholecystitis and obstruction 2022    [place]     [facility] [place]    Chronic sinusitis 2014    Common bile duct stone 2022    COPD, mild ([address] 2014    Diverticulosis     Dr. Emelina ORTEGA Scope    Dyslipidemia 2014    Generalized weakness 2022    HTN (hypertension) 2014    Hyperlipidemia     Hypothyroidism 2014    Ingrown toenail ,     podiatry clinic at [facility]    Intra-abdominal abscess

## 2023-04-29 LAB
CULTURE: ABNORMAL
CULTURE: ABNORMAL
Lab: ABNORMAL
SPECIMEN: ABNORMAL

## 2023-05-01 LAB
CULTURE: ABNORMAL
CULTURE: ABNORMAL
Lab: ABNORMAL
SPECIMEN: ABNORMAL

## 2023-05-03 ENCOUNTER — HOSPITAL ENCOUNTER (OUTPATIENT)
Dept: WOUND CARE | Age: 73
Discharge: HOME | End: 2023-05-03
Payer: MEDICARE

## 2023-05-03 VITALS
DIASTOLIC BLOOD PRESSURE: 68 MMHG | SYSTOLIC BLOOD PRESSURE: 118 MMHG | HEART RATE: 65 BPM | TEMPERATURE: 98.5 F | RESPIRATION RATE: 18 BRPM

## 2023-05-03 DIAGNOSIS — T81.89XD NON-HEALING SURGICAL WOUND, SUBSEQUENT ENCOUNTER: ICD-10-CM

## 2023-05-03 DIAGNOSIS — T81.89XA NON-HEALING SURGICAL WOUND, INITIAL ENCOUNTER: ICD-10-CM

## 2023-05-03 DIAGNOSIS — L02.31 ABSCESS OF BUTTOCK, RIGHT: Primary | ICD-10-CM

## 2023-05-03 PROCEDURE — 97605 NEG PRS WND THER DME<=50SQCM: CPT

## 2023-05-03 PROCEDURE — 11042 DBRDMT SUBQ TIS 1ST 20SQCM/<: CPT

## 2023-05-03 RX ORDER — LIDOCAINE HYDROCHLORIDE 20 MG/ML
JELLY TOPICAL ONCE
OUTPATIENT
Start: 2023-05-03 | End: 2023-05-03

## 2023-05-03 RX ORDER — LEVOFLOXACIN 750 MG/1
750 TABLET ORAL DAILY
Qty: 10 TABLET | Refills: 0 | Status: SHIPPED | OUTPATIENT
Start: 2023-05-03 | End: 2023-05-13

## 2023-05-03 RX ORDER — GINSENG 100 MG
CAPSULE ORAL ONCE
OUTPATIENT
Start: 2023-05-03 | End: 2023-05-03

## 2023-05-03 RX ORDER — BETAMETHASONE DIPROPIONATE 0.05 %
OINTMENT (GRAM) TOPICAL ONCE
OUTPATIENT
Start: 2023-05-03 | End: 2023-05-03

## 2023-05-03 RX ORDER — CLOBETASOL PROPIONATE 0.5 MG/G
OINTMENT TOPICAL ONCE
OUTPATIENT
Start: 2023-05-03 | End: 2023-05-03

## 2023-05-03 RX ORDER — GENTAMICIN SULFATE 1 MG/G
OINTMENT TOPICAL ONCE
OUTPATIENT
Start: 2023-05-03 | End: 2023-05-03

## 2023-05-03 RX ORDER — LIDOCAINE 40 MG/G
CREAM TOPICAL ONCE
OUTPATIENT
Start: 2023-05-03 | End: 2023-05-03

## 2023-05-03 RX ORDER — LIDOCAINE 50 MG/G
OINTMENT TOPICAL ONCE
OUTPATIENT
Start: 2023-05-03 | End: 2023-05-03

## 2023-05-03 RX ORDER — LIDOCAINE HYDROCHLORIDE 40 MG/ML
SOLUTION TOPICAL ONCE
OUTPATIENT
Start: 2023-05-03 | End: 2023-05-03

## 2023-05-03 RX ORDER — BACITRACIN, NEOMYCIN, POLYMYXIN B 400; 3.5; 5 [USP'U]/G; MG/G; [USP'U]/G
OINTMENT TOPICAL ONCE
OUTPATIENT
Start: 2023-05-03 | End: 2023-05-03

## 2023-05-03 RX ORDER — BACITRACIN ZINC AND POLYMYXIN B SULFATE 500; 1000 [USP'U]/G; [USP'U]/G
OINTMENT TOPICAL ONCE
OUTPATIENT
Start: 2023-05-03 | End: 2023-05-03

## 2023-05-03 ASSESSMENT — PAIN DESCRIPTION - ONSET: ONSET: ON-GOING

## 2023-05-03 ASSESSMENT — PAIN DESCRIPTION - LOCATION: LOCATION: BUTTOCKS

## 2023-05-03 ASSESSMENT — PAIN DESCRIPTION - FREQUENCY: FREQUENCY: INTERMITTENT

## 2023-05-03 ASSESSMENT — PAIN DESCRIPTION - ORIENTATION: ORIENTATION: RIGHT

## 2023-05-03 ASSESSMENT — PAIN DESCRIPTION - DESCRIPTORS: DESCRIPTORS: TENDER

## 2023-05-03 ASSESSMENT — PAIN DESCRIPTION - PAIN TYPE: TYPE: SURGICAL PAIN

## 2023-05-03 ASSESSMENT — PAIN SCALES - GENERAL: PAINLEVEL_OUTOF10: 0

## 2023-05-03 ASSESSMENT — PAIN - FUNCTIONAL ASSESSMENT: PAIN_FUNCTIONAL_ASSESSMENT: ACTIVITIES ARE NOT PREVENTED

## 2023-05-03 NOTE — DISCHARGE INSTRUCTIONS
PHYSICIAN ORDERS AND DISCHARGE INSTRUCTIONS     NOTE: Upon discharge from the [address], you will receive a patient experience survey. We would be grateful if you would take the time to fill this survey out. Wound care order history:                 AYEL's   Right       Left               Date:              Cultures:                Grafts:                Antibiotics:           Continuing wound care orders and information:                 Residence:                Continue home health care with:               Your wound-care supplies will be provided by: Wound cleansing:               Do not scrub or use excessive force. Wash hands with soap and water before and after dressing changes. Prior to applying a clean dressing, cleanse wound with normal saline, wound cleanser, or mild soap and water. Ask the physician or nurse before getting the wound(s) wet in a shower. General Wound management:              Keep weight off wounds and reposition every 2 hours. Avoid standing for long periods of time. Apply wraps/stockings in AM and remove at bedtime. If swelling is present, elevate legs to the level of the heart or above for 30 minutes 4-5 times a day and/or when sitting. When taking antibiotics take entire prescription as ordered by physician do not stop taking until medicine is all gone. Orders for this week: 5/3/23     FAX ORDERS TO [facility]! Follow up visit with Dr Aidan Miranda 5/9/23        Rx:     Wound vac ordered 4/26/23 for right buttock wound. WOUND VAC THERAPY: Right Buttock Wound - Wash with soap and water, pat dry. (Apply vac when it is available)     DUODERM TO PERIWOUND FOR PROTECTION. APPLY ANASEPT, STIMULEN AND BLACK FOAM TO WOUND. SECURE VAC DRESSING WITH DRAPE. SET WOUND VAC  CONTINUOUS SUCTION.  CANISTER CHANGE WEEKLY OR ACCORDING TO VOLUME OF

## 2023-05-03 NOTE — PROGRESS NOTES
Wound Care Center Progress Note With Procedure    Ramon Terrazas  AGE: 74 y.o. GENDER: male  : 1950  EPISODE DATE:  5/3/2023     Subjective:     Chief Complaint   Patient presents with    Wound Check     Right Buttock         HISTORY of PRESENT ILLNESS         Ramon Terrazas is a 74 y.o. male who presents to the [address] [place] [address] for a visit for evaluation and treatment of Acute non-healing surgical and abscess   ulcer(s) of  Buttocks R gluteal.  The condition is of moderate severity. The ulcer has been present for 1 weeks. The underlying cause is thought to be abscess. The patients care to date has included an I&D with sutures, and subsequent infection and sutures were removed. The patient has significant underlying medical conditions as below. Patient had NPT on since Saturday  Looks much better  Patient culture needs addressed     Patient wound has more depth and undermining than last week. Wound was likely stabilizing last week and he is finished with antibiotics now.    Will culture as drainage persistent  Likely needs NPT  Home health established      Patient is not a smoker or a diabetic  He is not on blood thinners     Wound Pain Timing/Severity: waxing and waning  Quality of pain: aching, tender  Severity of pain:  3  10   Modifying Factors: chronic pressure and obesity  Associated Signs/Symptoms: edema, erythema, drainage, and pain        PAST MEDICAL HISTORY        Diagnosis Date    Abdominal aneurysm ([facility]     4 cm x 3.6 cm followed at [address] [place] [address] 2014    Ascending cholangitis 2022    Calculus of gallbladder with acute cholecystitis and obstruction 2022    [place]ronic [place]     [facility] [place]    Chronic sinusitis 2014    Common bile duct stone 2022    COPD, mild ([address] 2014    Diverticulosis     Dr. Fletcher Place C Scope    Dyslipidemia 2014    Generalized weakness 2022    HTN (hypertension) 2014    Hyperlipidemia

## 2023-05-03 NOTE — WOUND CARE
WOUND VAC THERAPY:     DUODERM TO PERIWOUND FOR PROTECTION. APPLY BLACK FOAM TO WOUND  TUNNELED AREAS [id number] O'CLOCK  MAY USE MEPITEL TO WOUND BED TO PREVENT BLACK FOAM FROM ADHERING TO WOUND BED. SECURE VAC. DRESSING WITH DRAPE. SET WOUND VAC  CONTINUOUS SUCTION. CANISTER CHANGE WITH EACH DRESSING CHANGE OR ACCORDING TO VOLUME OF DRAINAGE.     WOUND VAC DRESSING TO BE CHANGED MON, WED, FRI

## 2023-05-09 ENCOUNTER — OFFICE VISIT (OUTPATIENT)
Dept: BARIATRICS/WEIGHT MGMT | Age: 73
End: 2023-05-09

## 2023-05-09 ENCOUNTER — HOSPITAL ENCOUNTER (OUTPATIENT)
Age: 73
Discharge: HOME | End: 2023-05-09
Payer: MEDICARE

## 2023-05-09 VITALS
WEIGHT: 279.1 LBS | BODY MASS INDEX: 41.34 KG/M2 | DIASTOLIC BLOOD PRESSURE: 70 MMHG | HEIGHT: 69 IN | SYSTOLIC BLOOD PRESSURE: 124 MMHG

## 2023-05-09 DIAGNOSIS — R50.9 FEVER, UNSPECIFIED FEVER CAUSE: ICD-10-CM

## 2023-05-09 DIAGNOSIS — S31.809D WOUND OF GLUTEAL CLEFT, UNSPECIFIED LATERALITY, SUBSEQUENT ENCOUNTER: Primary | ICD-10-CM

## 2023-05-09 LAB
BASOPHILS ABSOLUTE: 0 K/CU MM
BASOPHILS RELATIVE PERCENT: 0.5 % (ref 0–1)
DIFFERENTIAL TYPE: ABNORMAL
EOSINOPHILS ABSOLUTE: 0.1 K/CU MM
EOSINOPHILS RELATIVE PERCENT: 1.7 % (ref 0–3)
HCT VFR BLD CALC: 36.9 % (ref 42–52)
HEMOGLOBIN: 11.9 GM/DL (ref 13.5–18)
IMMATURE NEUTROPHIL %: 0.3 % (ref 0–0.43)
LACTATE: 0.9 MMOL/L (ref 0.5–1.9)
LYMPHOCYTES ABSOLUTE: 1.3 K/CU MM
LYMPHOCYTES RELATIVE PERCENT: 22.5 % (ref 24–44)
MCH RBC QN AUTO: 31.9 PG (ref 27–31)
MCHC RBC AUTO-ENTMCNC: 32.2 % (ref 32–36)
MCV RBC AUTO: 98.9 FL (ref 78–100)
MONOCYTES ABSOLUTE: 0.6 K/CU MM
MONOCYTES RELATIVE PERCENT: 10.7 % (ref 0–4)
NUCLEATED RBC %: 0 %
PDW BLD-RTO: 14.2 % (ref 11.7–14.9)
PLATELET # BLD: 180 K/CU MM (ref 140–440)
PMV BLD AUTO: 10.9 FL (ref 7.5–11.1)
RBC # BLD: 3.73 M/CU MM (ref 4.6–6.2)
SEGMENTED NEUTROPHILS ABSOLUTE COUNT: 3.7 K/CU MM
SEGMENTED NEUTROPHILS RELATIVE PERCENT: 64.3 % (ref 36–66)
TOTAL IMMATURE NEUTOROPHIL: 0.02 K/CU MM
TOTAL NUCLEATED RBC: 0 K/CU MM
WBC # BLD: 5.8 K/CU MM (ref 4–10.5)

## 2023-05-09 PROCEDURE — 85025 COMPLETE CBC W/AUTO DIFF WBC: CPT

## 2023-05-09 PROCEDURE — 99024 POSTOP FOLLOW-UP VISIT: CPT | Performed by: SURGERY

## 2023-05-09 PROCEDURE — 36415 COLL VENOUS BLD VENIPUNCTURE: CPT

## 2023-05-09 PROCEDURE — 83605 ASSAY OF LACTIC ACID: CPT

## 2023-05-09 RX ORDER — LEVOFLOXACIN 750 MG/1
750 TABLET ORAL DAILY
COMMUNITY

## 2023-05-09 NOTE — PROGRESS NOTES
2022    COPD, mild ([facility] 2014    Diverticulosis     Dr. Sherry Gurrola C Scope    Dyslipidemia 2014    Generalized weakness 2022    HTN (hypertension) 2014    Hyperlipidemia     Hypothyroidism 2014    Ingrown toenail ,     podiatry clinic at [place]    Int[facility]-abdominal abscess (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 Senthil Fernandes    MEGHAN (obstructive sleep apnea) 2014    CPAP changed to bipap (2014)    Otitis media, serous, TM rupture 2014    ENT x 2 s/p PE tubes bilaterally, cipro Otic    Polyp of sigmoid colon 2022    Positive FIT (fecal immunochemical test) 5/3/2022    Postoperative infection 2022     Family History   Problem Relation Age of Onset    High Blood Pressure Mother     Allergies Mother     Anemia Mother     High Cholesterol Mother     Thyroid Disease Mother     Heart Disease Father     Allergies Father     Obesity Paternal Grandmother     Obesity Paternal Grandfather     Cancer Paternal Uncle         prostate cancer     Social History     Socioeconomic History    Marital status:      Spouse name: Not on file    Number of children: 2    Years of education: Not on file    Highest education level: Not on file   Occupational History    Not on file   Tobacco Use    Smoking status: Former     Packs/day: 2.00     Years: 36.00     Pack years: 72.00     Types: Cigarettes     Quit date: 2002     Years since quittin.1    Smokeless tobacco: Never    Tobacco comments:     quit 2002   Vaping Use    Vaping Use: Never used   Substance and Sexual Activity    Alcohol use: No     Alcohol/week: 0.0 standard drinks    Drug use: No    Sexual activity: Not on file   Other Topics Concern    Not on file   Social History Narrative    Not on file     Social

## 2023-05-10 ENCOUNTER — HOSPITAL ENCOUNTER (OUTPATIENT)
Dept: WOUND CARE | Age: 73
Discharge: HOME | End: 2023-05-10
Payer: MEDICARE

## 2023-05-10 VITALS
SYSTOLIC BLOOD PRESSURE: 144 MMHG | TEMPERATURE: 98 F | HEART RATE: 49 BPM | DIASTOLIC BLOOD PRESSURE: 79 MMHG | RESPIRATION RATE: 18 BRPM

## 2023-05-10 DIAGNOSIS — L02.31 ABSCESS OF BUTTOCK, RIGHT: ICD-10-CM

## 2023-05-10 DIAGNOSIS — T81.89XA NON-HEALING SURGICAL WOUND, INITIAL ENCOUNTER: Primary | ICD-10-CM

## 2023-05-10 PROCEDURE — 11042 DBRDMT SUBQ TIS 1ST 20SQCM/<: CPT

## 2023-05-10 PROCEDURE — 11042 DBRDMT SUBQ TIS 1ST 20SQCM/<: CPT | Performed by: NURSE PRACTITIONER

## 2023-05-10 PROCEDURE — 97605 NEG PRS WND THER DME<=50SQCM: CPT

## 2023-05-10 RX ORDER — LIDOCAINE HYDROCHLORIDE 40 MG/ML
SOLUTION TOPICAL ONCE
OUTPATIENT
Start: 2023-05-10 | End: 2023-05-10

## 2023-05-10 RX ORDER — BACITRACIN ZINC AND POLYMYXIN B SULFATE 500; 1000 [USP'U]/G; [USP'U]/G
OINTMENT TOPICAL ONCE
OUTPATIENT
Start: 2023-05-10 | End: 2023-05-10

## 2023-05-10 RX ORDER — BETAMETHASONE DIPROPIONATE 0.05 %
OINTMENT (GRAM) TOPICAL ONCE
OUTPATIENT
Start: 2023-05-10 | End: 2023-05-10

## 2023-05-10 RX ORDER — BACITRACIN, NEOMYCIN, POLYMYXIN B 400; 3.5; 5 [USP'U]/G; MG/G; [USP'U]/G
OINTMENT TOPICAL ONCE
OUTPATIENT
Start: 2023-05-10 | End: 2023-05-10

## 2023-05-10 RX ORDER — GENTAMICIN SULFATE 1 MG/G
OINTMENT TOPICAL ONCE
OUTPATIENT
Start: 2023-05-10 | End: 2023-05-10

## 2023-05-10 RX ORDER — LIDOCAINE 40 MG/G
CREAM TOPICAL ONCE
OUTPATIENT
Start: 2023-05-10 | End: 2023-05-10

## 2023-05-10 RX ORDER — LIDOCAINE 50 MG/G
OINTMENT TOPICAL ONCE
OUTPATIENT
Start: 2023-05-10 | End: 2023-05-10

## 2023-05-10 RX ORDER — LIDOCAINE HYDROCHLORIDE 20 MG/ML
JELLY TOPICAL ONCE
OUTPATIENT
Start: 2023-05-10 | End: 2023-05-10

## 2023-05-10 RX ORDER — CLOBETASOL PROPIONATE 0.5 MG/G
OINTMENT TOPICAL ONCE
OUTPATIENT
Start: 2023-05-10 | End: 2023-05-10

## 2023-05-10 RX ORDER — GINSENG 100 MG
CAPSULE ORAL ONCE
OUTPATIENT
Start: 2023-05-10 | End: 2023-05-10

## 2023-05-10 ASSESSMENT — PAIN SCALES - GENERAL: PAINLEVEL_OUTOF10: 0

## 2023-05-10 NOTE — PROGRESS NOTES
Wound Care Center Progress Note With Procedure    Dionna Dominguez  AGE: 74 y.o. GENDER: male  : 1950  EPISODE DATE:  5/10/2023     Subjective:     Chief Complaint   Patient presents with    Wound Check     Buttocks          HISTORY of PRESENT ILLNESS         Dionna Dominguez is a 74 y.o. male who presents to the [address] [place] [address] for a visit for evaluation and treatment of Acute non-healing surgical and abscess   ulcer(s) of  Buttocks R gluteal.  The condition is of moderate severity. The ulcer has been present for 1 weeks. The underlying cause is thought to be abscess. The patients care to date has included an I&D with sutures, and subsequent infection and sutures were removed. The patient has significant underlying medical conditions as below. Patient continued improvement. Taking levaquin as ordered   Wound granulating, clean and filling in  No issues with home health or NPT device     5-3-2023: Patient had NPT on since Saturday  Looks much better  Patient culture needs addressed     Patient wound has more depth and undermining than last week. Wound was likely stabilizing last week and he is finished with antibiotics now.    Will culture as drainage persistent  Likely needs NPT  Home health established      Patient is not a smoker or a diabetic  He is not on blood thinners     Wound Pain Timing/Severity: waxing and waning  Quality of pain: aching, tender  Severity of pain:  3 / 10   Modifying Factors: chronic pressure and obesity  Associated Signs/Symptoms: edema, erythema, drainage, and pain                                              PAST MEDICAL HISTORY        Diagnosis Date    Abdominal aneurysm ([facility]     4 cm x 3.6 cm followed at [address] 2014    Ascending cholangitis 2022    Calculus of gallbladder with acute cholecystitis and obstruction 2022    [place]    Chronic sinusitis 2014    Common bile duct stone 2022    COPD,

## 2023-05-10 NOTE — DISCHARGE INSTRUCTIONS
PHYSICIAN ORDERS AND DISCHARGE INSTRUCTIONS     NOTE: Upon discharge from the [address], you will receive a patient experience survey. We would be grateful if you would take the time to fill this survey out. Wound care order history:                 YAEL's   Right       Left               Date:              Cultures:                Grafts:                Antibiotics:           Continuing wound care orders and information:                 Residence:                Continue home health care with:               Your wound-care supplies will be provided by: Wound cleansing:               Do not scrub or use excessive force. Wash hands with soap and water before and after dressing changes. Prior to applying a clean dressing, cleanse wound with normal saline, wound cleanser, or mild soap and water. Ask the physician or nurse before getting the wound(s) wet in a shower. General Wound management:              Keep weight off wounds and reposition every 2 hours. Avoid standing for long periods of time. Apply wraps/stockings in AM and remove at bedtime. If swelling is present, elevate legs to the level of the heart or above for 30 minutes 4-5 times a day and/or when sitting. When taking antibiotics take entire prescription as ordered by physician do not stop taking until medicine is all gone. Orders for this week: 5/3/23     FAX ORDERS TO [facility]! Follow up visit with Dr Cathern Libman 5/9/23        Rx:     Wound vac ordered 4/26/23 for right buttock wound. WOUND VAC THERAPY: Right Buttock Wound - Wash with soap and water, pat dry. (Apply vac when it is available)     DUODERM TO PERIWOUND FOR PROTECTION. APPLY ANASEPT, STIMULEN AND BLACK FOAM TO WOUND. SECURE VAC DRESSING WITH DRAPE. SET WOUND VAC  CONTINUOUS SUCTION.  CANISTER CHANGE WEEKLY OR ACCORDING TO VOLUME OF

## 2023-05-10 NOTE — WOUND CARE
Negative Pressure Wound Therapy    NAME:  Annabella Osorio  YOB: 1950  MEDICAL RECORD NUMBER:  [id number]  DATE:  5/10/2023    Applied Negative Pressure to buttocks. [x] Applied skin barrier prep to ace-wound. [x] Cut strips of plastic drape to picture frame wound so that ace-wound is     covered with the drape. [x] If bridging dressing to less prominent site, cover any intact skin that will come in contact with the Negative Pressure Therapy sponge, gauze or channel drain with plastic drape. The sponge should never touch intact skin. [x] Cut sponge, gauze or channel drain to size which will fit into the wound/ulcer bed without being forced. [x] Be sure the sponge is large enough to hold the entire round plastic flange which is attached to the tubing. Never allow flange to be larger than the sponge or it will produce suction damaging intact skin. Total number of individual pieces of foam used within the wound bed: 1    [x] If bridging the dressing away from the primary site, be sure the bridge leads to a piece of sponge large enough to hold the entire flange without allowing any of the flange to overlap onto intact skin. [x] Covered sponge, gauze or channel drain with plastic drape. [x] Cut a hole in this plastic drape directly over the sponge the same size as the plastic drain tubing. [x] Removed plastic liner from flange and apply it directly over the hole you cut. [x] Removed the plastic cover from the flange. [x] Attached the tubing to the wound/ulcer Negative Pressure Therapy and turn it on to be sure a vacuum is created and that there are no leaks. [x] If air leaks occur, use plastic drape to patch them. [x] Secured Negative Pressure Therapy dressing with ace wrap loosely if located on an extremity. Maintain tubing outside of ace wrap. Tubing must not exert pressure on intact skin.     Applied per  Guidelines        Electronically signed by Shellie Anthony

## 2023-05-11 ENCOUNTER — CARE COORDINATION (OUTPATIENT)
Dept: CARE COORDINATION | Age: 73
End: 2023-05-11

## 2023-05-11 NOTE — CARE COORDINATION
Attempted patient contact. First number listed is invalid. Attempted second contact/ [employer]IP[employer]. No answer to phone. Message left with M contact information and request for return call. UTR letter sent via MY Chart. Yes...

## 2023-05-17 ENCOUNTER — HOSPITAL ENCOUNTER (OUTPATIENT)
Dept: WOUND CARE | Age: 73
Discharge: HOME | End: 2023-05-17
Payer: MEDICARE

## 2023-05-17 VITALS
HEART RATE: 57 BPM | TEMPERATURE: 98.2 F | DIASTOLIC BLOOD PRESSURE: 72 MMHG | RESPIRATION RATE: 18 BRPM | SYSTOLIC BLOOD PRESSURE: 128 MMHG

## 2023-05-17 DIAGNOSIS — L02.31 ABSCESS OF BUTTOCK, RIGHT: ICD-10-CM

## 2023-05-17 DIAGNOSIS — T81.89XA NON-HEALING SURGICAL WOUND, INITIAL ENCOUNTER: Primary | ICD-10-CM

## 2023-05-17 PROCEDURE — 11042 DBRDMT SUBQ TIS 1ST 20SQCM/<: CPT

## 2023-05-17 PROCEDURE — 11042 DBRDMT SUBQ TIS 1ST 20SQCM/<: CPT | Performed by: NURSE PRACTITIONER

## 2023-05-17 PROCEDURE — 97605 NEG PRS WND THER DME<=50SQCM: CPT

## 2023-05-17 RX ORDER — LIDOCAINE HYDROCHLORIDE 40 MG/ML
SOLUTION TOPICAL ONCE
OUTPATIENT
Start: 2023-05-17 | End: 2023-05-17

## 2023-05-17 RX ORDER — BACITRACIN, NEOMYCIN, POLYMYXIN B 400; 3.5; 5 [USP'U]/G; MG/G; [USP'U]/G
OINTMENT TOPICAL ONCE
OUTPATIENT
Start: 2023-05-17 | End: 2023-05-17

## 2023-05-17 RX ORDER — BETAMETHASONE DIPROPIONATE 0.05 %
OINTMENT (GRAM) TOPICAL ONCE
OUTPATIENT
Start: 2023-05-17 | End: 2023-05-17

## 2023-05-17 RX ORDER — LIDOCAINE 40 MG/G
CREAM TOPICAL ONCE
OUTPATIENT
Start: 2023-05-17 | End: 2023-05-17

## 2023-05-17 RX ORDER — BACITRACIN ZINC AND POLYMYXIN B SULFATE 500; 1000 [USP'U]/G; [USP'U]/G
OINTMENT TOPICAL ONCE
OUTPATIENT
Start: 2023-05-17 | End: 2023-05-17

## 2023-05-17 RX ORDER — GENTAMICIN SULFATE 1 MG/G
OINTMENT TOPICAL ONCE
OUTPATIENT
Start: 2023-05-17 | End: 2023-05-17

## 2023-05-17 RX ORDER — LIDOCAINE 50 MG/G
OINTMENT TOPICAL ONCE
OUTPATIENT
Start: 2023-05-17 | End: 2023-05-17

## 2023-05-17 RX ORDER — GINSENG 100 MG
CAPSULE ORAL ONCE
OUTPATIENT
Start: 2023-05-17 | End: 2023-05-17

## 2023-05-17 RX ORDER — CLOBETASOL PROPIONATE 0.5 MG/G
OINTMENT TOPICAL ONCE
OUTPATIENT
Start: 2023-05-17 | End: 2023-05-17

## 2023-05-17 RX ORDER — LIDOCAINE HYDROCHLORIDE 20 MG/ML
JELLY TOPICAL ONCE
OUTPATIENT
Start: 2023-05-17 | End: 2023-05-17

## 2023-05-17 ASSESSMENT — PAIN DESCRIPTION - LOCATION: LOCATION: BUTTOCKS

## 2023-05-17 ASSESSMENT — PAIN DESCRIPTION - DESCRIPTORS: DESCRIPTORS: ACHING

## 2023-05-17 ASSESSMENT — PAIN DESCRIPTION - FREQUENCY: FREQUENCY: INTERMITTENT

## 2023-05-17 ASSESSMENT — PAIN SCALES - GENERAL: PAINLEVEL_OUTOF10: 1

## 2023-05-17 NOTE — PROGRESS NOTES
Negative Pressure Wound Therapy    NAME:  Aleyda Leary  YOB: 1950  MEDICAL RECORD NUMBER:  [id number]  DATE:  5/17/2023    Applied Negative Pressure to Right Buttock wound(s)/ulcer(s). [x] Applied skin barrier prep to ace-wound. [x] Cut strips of plastic drape to picture frame wound so that ace-wound is     covered with the drape. [x] If bridging dressing to less prominent site, cover any intact skin that will come in contact with the Negative Pressure Therapy sponge, gauze or channel drain with plastic drape. The sponge should never touch intact skin. [x] Cut sponge, gauze or channel drain to size which will fit into the wound/ulcer bed without being forced. [x] Be sure the sponge is large enough to hold the entire round plastic flange which is attached to the tubing. Never allow flange to be larger than the sponge or it will produce suction damaging intact skin. Total number of individual pieces of foam used within the wound bed: 2  [x] If bridging the dressing away from the primary site, be sure the bridge leads to a piece of sponge large enough to hold the entire flange without allowing any of the flange to overlap onto intact skin. [x] Covered sponge, gauze or channel drain with plastic drape. [x] Cut a hole in this plastic drape directly over the sponge the same size as the plastic drain tubing. [x] Removed plastic liner from flange and apply it directly over the hole you cut. [x] Removed the plastic cover from the flange. [x] Attached the tubing to the wound/ulcer Negative Pressure Therapy and turn it on to be sure a vacuum is created and that there are no leaks. [x] If air leaks occur, use plastic drape to patch them. [x] Secured Negative Pressure Therapy dressing with ace wrap loosely if located on an extremity. Maintain tubing outside of ace wrap. Tubing must not exert pressure on intact skin.     Applied per  Guidelines      Electronically signed
signed by DEYA Salinas - CNP on 5/17/2023 at 1:37 PM

## 2023-05-17 NOTE — DISCHARGE INSTRUCTIONS
OF DRAINAGE. WOUND VAC DRESSING TO BE CHANGED MON & FRI. Wound care center will change on Wednesdays if supplies brought to appointment (need to bring black foam suction kit and canister)           Follow up with Robbie NurseDEEPIKA in 1 week(s) in the wound care center. Call [phone] for any questions or concerns.   Date__________   Time____________

## 2023-05-23 ENCOUNTER — CARE COORDINATION (OUTPATIENT)
Dept: CARE COORDINATION | Age: 73
End: 2023-05-23

## 2023-05-23 SDOH — ECONOMIC STABILITY: FOOD INSECURITY: WITHIN THE PAST 12 MONTHS, THE FOOD YOU BOUGHT JUST DIDN'T LAST AND YOU DIDN'T HAVE MONEY TO GET MORE.: NEVER TRUE

## 2023-05-23 SDOH — SOCIAL STABILITY: SOCIAL NETWORK: HOW OFTEN DO YOU GET TOGETHER WITH FRIENDS OR RELATIVES?: THREE TIMES A WEEK

## 2023-05-23 SDOH — SOCIAL STABILITY: SOCIAL NETWORK: ARE YOU MARRIED, WIDOWED, DIVORCED, SEPARATED, NEVER MARRIED, OR LIVING WITH A PARTNER?: DIVORCED

## 2023-05-23 SDOH — ECONOMIC STABILITY: TRANSPORTATION INSECURITY
IN THE PAST 12 MONTHS, HAS LACK OF TRANSPORTATION KEPT YOU FROM MEETINGS, WORK, OR FROM GETTING THINGS NEEDED FOR DAILY LIVING?: NO

## 2023-05-23 SDOH — HEALTH STABILITY: PHYSICAL HEALTH: ON AVERAGE, HOW MANY DAYS PER WEEK DO YOU ENGAGE IN MODERATE TO STRENUOUS EXERCISE (LIKE A BRISK WALK)?: 0 DAYS

## 2023-05-23 SDOH — ECONOMIC STABILITY: FOOD INSECURITY: WITHIN THE PAST 12 MONTHS, YOU WORRIED THAT YOUR FOOD WOULD RUN OUT BEFORE YOU GOT MONEY TO BUY MORE.: NEVER TRUE

## 2023-05-23 SDOH — HEALTH STABILITY: MENTAL HEALTH: HOW OFTEN DO YOU HAVE A DRINK CONTAINING ALCOHOL?: PATIENT DECLINED

## 2023-05-23 SDOH — HEALTH STABILITY: MENTAL HEALTH: HOW MANY STANDARD DRINKS CONTAINING ALCOHOL DO YOU HAVE ON A TYPICAL DAY?: PATIENT DECLINED

## 2023-05-23 SDOH — ECONOMIC STABILITY: INCOME INSECURITY: IN THE LAST 12 MONTHS, WAS THERE A TIME WHEN YOU WERE NOT ABLE TO PAY THE MORTGAGE OR RENT ON TIME?: NO

## 2023-05-23 SDOH — SOCIAL STABILITY: SOCIAL NETWORK: IN A TYPICAL WEEK, HOW MANY TIMES DO YOU TALK ON THE PHONE WITH FAMILY, FRIENDS, OR NEIGHBORS?: THREE TIMES A WEEK

## 2023-05-23 SDOH — ECONOMIC STABILITY: HOUSING INSECURITY
IN THE LAST 12 MONTHS, WAS THERE A TIME WHEN YOU DID NOT HAVE A STEADY PLACE TO SLEEP OR SLEPT IN A SHELTER (INCLUDING NOW)?: NO

## 2023-05-23 SDOH — ECONOMIC STABILITY: HOUSING INSECURITY: IN THE LAST 12 MONTHS, HOW MANY PLACES HAVE YOU LIVED?: 1

## 2023-05-23 SDOH — ECONOMIC STABILITY: TRANSPORTATION INSECURITY
IN THE PAST 12 MONTHS, HAS THE LACK OF TRANSPORTATION KEPT YOU FROM MEDICAL APPOINTMENTS OR FROM GETTING MEDICATIONS?: NO

## 2023-05-23 SDOH — SOCIAL STABILITY: SOCIAL NETWORK
DO YOU BELONG TO ANY CLUBS OR ORGANIZATIONS SUCH AS CHURCH GROUPS UNIONS, FRATERNAL OR ATHLETIC GROUPS, OR SCHOOL GROUPS?: YES

## 2023-05-23 SDOH — ECONOMIC STABILITY: INCOME INSECURITY: HOW HARD IS IT FOR YOU TO PAY FOR THE VERY BASICS LIKE FOOD, HOUSING, MEDICAL CARE, AND HEATING?: NOT VERY HARD

## 2023-05-23 SDOH — HEALTH STABILITY: PHYSICAL HEALTH: ON AVERAGE, HOW MANY MINUTES DO YOU ENGAGE IN EXERCISE AT THIS LEVEL?: 0 MIN

## 2023-05-23 SDOH — SOCIAL STABILITY: SOCIAL NETWORK: HOW OFTEN DO YOU ATTENT MEETINGS OF THE CLUB OR ORGANIZATION YOU BELONG TO?: MORE THAN 4 TIMES PER YEAR

## 2023-05-23 NOTE — CARE COORDINATION
Attempted to reach patient for ACM follow up. No answer to phone. Message left with ACM contact information and request for call back. Ambulatory Care Coordination Note  2023    Patient Current Location:  Home: [address] [place] [id number]    ACM contacted the patient by telephone. Verified name and  with patient and family as identifiers. Provided introduction to self, and explanation of the ACM role. ACM: Javan Spence RN    Challenges to be reviewed by the provider   Additional needs identified to be addressed with provider: No  none               Method of communication with provider: none. Patient consents to ongoing outreach. Patient reports that he is feeling ok. Patient confirms that he has a wound vac to Right buttock wound. Reports that he is following as recommended at the [address] [place] [address] Road and is active with [facility]. Reminded patient of the  availability of a [place] [address] nurse on call for any changes in patient condition. Medications:  med rec completed. Patient reports that he manages his own medications and is taking them as directed. Denies any questions or issue with the cost of his prescriptions. COPD:  Breathing is at baseline. Patient denies increased SOB, CP, wheezing or cough. Reports that he is taking his medications as directed. Encouraged routine Provider follow up. Instructed patient to avoid triggers for exacerbation. Reviewed COPD zone tool and s/s to report to MD.  Copy sent via My Chart. Discussed support needs:  Patient lives at [place] [address] at [facility]. Gets meals and has a . Facility provides transport. Patient has a call cord in the BR in the event he needs a nurse. Patient is a [employer]el and is well supported by this group. Patient denies financial barriers. Patient denies any questions or concerns at this time. ACM contact information provided should questions arise.     Plan for

## 2023-05-24 ENCOUNTER — HOSPITAL ENCOUNTER (OUTPATIENT)
Dept: WOUND CARE | Age: 73
Discharge: HOME | End: 2023-05-24
Payer: MEDICARE

## 2023-05-24 VITALS
HEART RATE: 63 BPM | SYSTOLIC BLOOD PRESSURE: 139 MMHG | TEMPERATURE: 98.2 F | DIASTOLIC BLOOD PRESSURE: 79 MMHG | RESPIRATION RATE: 18 BRPM

## 2023-05-24 DIAGNOSIS — L02.31 ABSCESS OF BUTTOCK, RIGHT: ICD-10-CM

## 2023-05-24 DIAGNOSIS — T81.89XA NON-HEALING SURGICAL WOUND, INITIAL ENCOUNTER: Primary | ICD-10-CM

## 2023-05-24 PROCEDURE — 97605 NEG PRS WND THER DME<=50SQCM: CPT

## 2023-05-24 PROCEDURE — 11042 DBRDMT SUBQ TIS 1ST 20SQCM/<: CPT | Performed by: NURSE PRACTITIONER

## 2023-05-24 PROCEDURE — 11042 DBRDMT SUBQ TIS 1ST 20SQCM/<: CPT

## 2023-05-24 RX ORDER — BETAMETHASONE DIPROPIONATE 0.05 %
OINTMENT (GRAM) TOPICAL ONCE
OUTPATIENT
Start: 2023-05-24 | End: 2023-05-24

## 2023-05-24 RX ORDER — LIDOCAINE 40 MG/G
CREAM TOPICAL ONCE
OUTPATIENT
Start: 2023-05-24 | End: 2023-05-24

## 2023-05-24 RX ORDER — CLOBETASOL PROPIONATE 0.5 MG/G
OINTMENT TOPICAL ONCE
OUTPATIENT
Start: 2023-05-24 | End: 2023-05-24

## 2023-05-24 RX ORDER — LIDOCAINE HYDROCHLORIDE 40 MG/ML
SOLUTION TOPICAL ONCE
OUTPATIENT
Start: 2023-05-24 | End: 2023-05-24

## 2023-05-24 RX ORDER — LIDOCAINE 50 MG/G
OINTMENT TOPICAL ONCE
OUTPATIENT
Start: 2023-05-24 | End: 2023-05-24

## 2023-05-24 RX ORDER — GENTAMICIN SULFATE 1 MG/G
OINTMENT TOPICAL ONCE
OUTPATIENT
Start: 2023-05-24 | End: 2023-05-24

## 2023-05-24 RX ORDER — LIDOCAINE HYDROCHLORIDE 20 MG/ML
JELLY TOPICAL ONCE
OUTPATIENT
Start: 2023-05-24 | End: 2023-05-24

## 2023-05-24 RX ORDER — GINSENG 100 MG
CAPSULE ORAL ONCE
OUTPATIENT
Start: 2023-05-24 | End: 2023-05-24

## 2023-05-24 RX ORDER — BACITRACIN ZINC AND POLYMYXIN B SULFATE 500; 1000 [USP'U]/G; [USP'U]/G
OINTMENT TOPICAL ONCE
OUTPATIENT
Start: 2023-05-24 | End: 2023-05-24

## 2023-05-24 RX ORDER — BACITRACIN, NEOMYCIN, POLYMYXIN B 400; 3.5; 5 [USP'U]/G; MG/G; [USP'U]/G
OINTMENT TOPICAL ONCE
OUTPATIENT
Start: 2023-05-24 | End: 2023-05-24

## 2023-05-24 ASSESSMENT — PAIN DESCRIPTION - DESCRIPTORS: DESCRIPTORS: SORE

## 2023-05-24 ASSESSMENT — PAIN DESCRIPTION - ORIENTATION: ORIENTATION: RIGHT

## 2023-05-24 ASSESSMENT — PAIN SCALES - GENERAL: PAINLEVEL_OUTOF10: 2

## 2023-05-24 NOTE — PROGRESS NOTES
Negative Pressure Wound Therapy    NAME:  Armando Farris  YOB: 1950  MEDICAL RECORD NUMBER:  [id number]  DATE:  5/24/2023    Applied Negative Pressure to Right Buttock wound(s)/ulcer(s). [x] Applied skin barrier prep to ace-wound. [x] Cut strips of plastic drape to picture frame wound so that ace-wound is covered with the drape. [x] If bridging dressing to less prominent site, cover any intact skin that will come in contact with the Negative Pressure Therapy sponge, gauze or channel drain with plastic drape. The sponge should never touch intact skin. [x] Cut sponge, gauze or channel drain to size which will fit into the wound/ulcer bed without being forced. [x] Be sure the sponge is large enough to hold the entire round plastic flange which is attached to the tubing. Never allow flange to be larger than the sponge or it will produce suction damaging intact skin. Total number of individual pieces of foam used within the wound bed: 1    [x] If bridging the dressing away from the primary site, be sure the bridge leads to a piece of sponge large enough to hold the entire flange without allowing any of the flange to overlap onto intact skin. [x] Covered sponge, gauze or channel drain with plastic drape. [x] Cut a hole in this plastic drape directly over the sponge the same size as the plastic drain tubing. [x] Removed plastic liner from flange and apply it directly over the hole you cut. [x] Removed the plastic cover from the flange. [x] Attached the tubing to the wound/ulcer Negative Pressure Therapy and turn it on to be sure a vacuum is created and that there are no leaks. [x] If air leaks occur, use plastic drape to patch them. [x] Secured Negative Pressure Therapy dressing with ace wrap loosely if located on an extremity. Maintain tubing outside of ace wrap. Tubing must not exert pressure on intact skin.     Applied per  Guidelines      Electronically signed by
performed by Lisseth Leija MD at [place] [address] Right 2012    JOINT REPLACEMENT Left 2011    KNEE SURGERY Bilateral     replacement    RECTAL SURGERY Right 2023    GLUTEAL SOFT TISSUE INCISION MASS EXCISION AND BIOPSY performed by Ej Marshall MD at [facility] Bilateral     toe nail surgery Dr. Atif Rod Right 2014    DR. Rascon        FAMILY HISTORY    Family History   Problem Relation Age of Onset    High Blood Pressure Mother     Allergies Mother     Anemia Mother     High Cholesterol Mother     Thyroid Disease Mother     Heart Disease Father     Allergies Father     Obesity Paternal Grandmother     Obesity Paternal Grandfather     Cancer Paternal Uncle         prostate cancer       SOCIAL HISTORY    Social History     Tobacco Use    Smoking status: Former     Packs/day: 2.00     Years: 36.00     Pack years: 72.00     Types: Cigarettes     Quit date: 2002     Years since quittin.1    Smokeless tobacco: Never    Tobacco comments:     quit 2002   Vaping Use    Vaping Use: Never used   Substance Use Topics    Alcohol use: No     Alcohol/week: 0.0 standard drinks    Drug use: No       ALLERGIES    Allergies   Allergen Reactions    Morphine Itching     Pt states he only has itching with morphine    Dilaudid [Hydromorphone Hcl] Itching    Hydrocodone Itching    Keflex [Cephalexin] Other (See Comments)     Extreme hyperacitivity    Sulfa Antibiotics Itching and Rash    Ultram [Tramadol] Itching    Vicodin [Hydrocodone-Acetaminophen] Itching       MEDICATIONS    Current Outpatient Medications on File Prior to Encounter   Medication Sig Dispense Refill    losartan (COZAAR) 25 MG tablet TAKE 1 TABLET AT BEDTIME (TO REPLACE HYDROCHLOROTHIAZIDE).  90 tablet 3    COMBIVENT RESPIMAT  MCG/ACT AERS inhaler USE 1 INHALATION TWICE A DAY 12 g 5    carboxymethylcellulose (ARTIFICIAL TEARS) 1 % ophthalmic solution 1 drop 3 times daily

## 2023-05-24 NOTE — DISCHARGE INSTRUCTIONS
PHYSICIAN ORDERS AND DISCHARGE INSTRUCTIONS     NOTE: Upon discharge from the [address], you will receive a patient experience survey. We would be grateful if you would take the time to fill this survey out. Wound care order history:                 YAEL's   Right       Left               Date:              Cultures:                Grafts:                Antibiotics:           Continuing wound care orders and information:                 Residence:                Continue home health care with:               Your wound-care supplies will be provided by: Wound cleansing:               Do not scrub or use excessive force. Wash hands with soap and water before and after dressing changes. Prior to applying a clean dressing, cleanse wound with normal saline, wound cleanser, or mild soap and water. Ask the physician or nurse before getting the wound(s) wet in a shower. General Wound management:              Keep weight off wounds and reposition every 2 hours. Avoid standing for long periods of time. Apply wraps/stockings in AM and remove at bedtime. If swelling is present, elevate legs to the level of the heart or above for 30 minutes 4-5 times a day and/or when sitting. When taking antibiotics take entire prescription as ordered by physician do not stop taking until medicine is all gone. Orders for this week: 5/24/23     FAX ORDERS TO [facility]! Follow up visit with Dr Jose Antonio Folwer 5/9/23        Rx:     Wound vac ordered 4/26/23 for right buttock wound. WOUND VAC THERAPY: Right Buttock Wound - Wash with soap and water, pat dry. (Apply vac when it is available)     DUODERM TO PERIWOUND FOR PROTECTION. APPLY ANASEPT, STIMULEN AND BLACK FOAM TO WOUND. SECURE VAC DRESSING WITH DRAPE. SET WOUND VAC  CONTINUOUS SUCTION.  CANISTER CHANGE WEEKLY OR ACCORDING TO VOLUME

## 2023-05-26 ENCOUNTER — OFFICE VISIT (OUTPATIENT)
Dept: FAMILY MEDICINE CLINIC | Age: 73
End: 2023-05-26
Payer: MEDICARE

## 2023-05-26 VITALS
HEIGHT: 69 IN | SYSTOLIC BLOOD PRESSURE: 138 MMHG | HEART RATE: 73 BPM | OXYGEN SATURATION: 94 % | DIASTOLIC BLOOD PRESSURE: 76 MMHG | BODY MASS INDEX: 41.92 KG/M2 | WEIGHT: 283 LBS

## 2023-05-26 DIAGNOSIS — L02.31 ABSCESS OF BUTTOCK, RIGHT: ICD-10-CM

## 2023-05-26 DIAGNOSIS — H60.93 RECURRENT OTITIS EXTERNA OF BOTH EARS: ICD-10-CM

## 2023-05-26 DIAGNOSIS — I10 PRIMARY HYPERTENSION: Primary | ICD-10-CM

## 2023-05-26 DIAGNOSIS — E87.6 HYPOKALEMIA: ICD-10-CM

## 2023-05-26 DIAGNOSIS — I71.40 ABDOMINAL AORTIC ANEURYSM (AAA) WITHOUT RUPTURE, UNSPECIFIED PART (HCC): ICD-10-CM

## 2023-05-26 DIAGNOSIS — J44.9 COPD, MILD (HCC): ICD-10-CM

## 2023-05-26 DIAGNOSIS — G47.33 OSA (OBSTRUCTIVE SLEEP APNEA): ICD-10-CM

## 2023-05-26 DIAGNOSIS — E03.9 ACQUIRED HYPOTHYROIDISM: ICD-10-CM

## 2023-05-26 DIAGNOSIS — F51.01 PRIMARY INSOMNIA: ICD-10-CM

## 2023-05-26 PROCEDURE — 1123F ACP DISCUSS/DSCN MKR DOCD: CPT | Performed by: NURSE PRACTITIONER

## 2023-05-26 PROCEDURE — G8427 DOCREV CUR MEDS BY ELIG CLIN: HCPCS | Performed by: NURSE PRACTITIONER

## 2023-05-26 PROCEDURE — 1036F TOBACCO NON-USER: CPT | Performed by: NURSE PRACTITIONER

## 2023-05-26 PROCEDURE — G8417 CALC BMI ABV UP PARAM F/U: HCPCS | Performed by: NURSE PRACTITIONER

## 2023-05-26 PROCEDURE — 3075F SYST BP GE 130 - 139MM HG: CPT | Performed by: NURSE PRACTITIONER

## 2023-05-26 PROCEDURE — 3017F COLORECTAL CA SCREEN DOC REV: CPT | Performed by: NURSE PRACTITIONER

## 2023-05-26 PROCEDURE — 3078F DIAST BP <80 MM HG: CPT | Performed by: NURSE PRACTITIONER

## 2023-05-26 PROCEDURE — 99214 OFFICE O/P EST MOD 30 MIN: CPT | Performed by: NURSE PRACTITIONER

## 2023-05-26 PROCEDURE — 4130F TOPICAL PREP RX AOE: CPT | Performed by: NURSE PRACTITIONER

## 2023-05-26 PROCEDURE — 3023F SPIROM DOC REV: CPT | Performed by: NURSE PRACTITIONER

## 2023-05-26 NOTE — PROGRESS NOTES
2023     Tito Vang (:  1950) is a 74 y.o. male, here for evaluation of the following medical concerns: Follow-up on chronic's     COPD  uses Combivent twice a day  Albuterol as needed  Follows with [place] pulmonology, but PCP has been refilling meds. Reports shortness of breath with extreme exertion, but no regular cough     Smoker - stopped         History of PE tubes, none current. Has had recurrent ear infections and is on ciprodex once weekly-following with ENT       Hypothyroid-taking Synthroid 25 mcg daily   TSH elevated 2022 - no changes made. Trazodone 50 mg nightly for insomnia- has been on it 20+ years. No falls or AM grogginess. BIPAP [facility] --- [facility]      Nasal sprays. Alternates nasalide and nasonex seasonal allergies rhinitis      hypertension- taking hydrochlorothiazide      He is taking potassium 20 MDQ twice daily for history of hypokalemia     He is on niacin for triglycerides     Has lost weight since cholecystectomy -unable to eat fatty foods. AAA- 2020 imaging   3.1 cm abdominal aortic aneurysm. Recommend follow-up every 3 years  3.7 cm on abd ct aug 2022    Currently following with wound care for wound right buttocks. s/p excision gluteal soft tissue mass on 23. Wound vac - states he may come off next week. Review of Systems    Prior to Visit Medications    Medication Sig Taking? Authorizing Provider   losartan (COZAAR) 25 MG tablet TAKE 1 TABLET AT BEDTIME (TO REPLACE HYDROCHLOROTHIAZIDE).  Yes Vahe Martienz MD   COMBIVENT RESPIMAT  MCG/ACT AERS inhaler USE 1 Damon Sunshine MD   carboxymethylcellulose (ARTIFICIAL TEARS) 1 % ophthalmic solution 1 drop 3 times daily Yes Historical Provider, MD   hydrocortisone (ANUSOL-HC) 25 MG suppository Place 1 suppository rectally 2 times daily as needed for Hemorrhoids Yes Vaeh Martinez MD

## 2023-05-31 ENCOUNTER — HOSPITAL ENCOUNTER (OUTPATIENT)
Age: 73
Discharge: HOME | End: 2023-05-31
Payer: MEDICARE

## 2023-05-31 ENCOUNTER — HOSPITAL ENCOUNTER (OUTPATIENT)
Dept: WOUND CARE | Age: 73
Discharge: HOME | End: 2023-05-31
Payer: MEDICARE

## 2023-05-31 VITALS
TEMPERATURE: 97.4 F | HEART RATE: 74 BPM | RESPIRATION RATE: 18 BRPM | DIASTOLIC BLOOD PRESSURE: 60 MMHG | SYSTOLIC BLOOD PRESSURE: 134 MMHG

## 2023-05-31 DIAGNOSIS — T81.89XA NON-HEALING SURGICAL WOUND, INITIAL ENCOUNTER: ICD-10-CM

## 2023-05-31 DIAGNOSIS — I10 PRIMARY HYPERTENSION: ICD-10-CM

## 2023-05-31 DIAGNOSIS — T81.89XD NON-HEALING SURGICAL WOUND, SUBSEQUENT ENCOUNTER: ICD-10-CM

## 2023-05-31 DIAGNOSIS — E03.9 ACQUIRED HYPOTHYROIDISM: ICD-10-CM

## 2023-05-31 DIAGNOSIS — L02.31 ABSCESS OF BUTTOCK, RIGHT: Primary | ICD-10-CM

## 2023-05-31 LAB
ALBUMIN SERPL-MCNC: 4 GM/DL (ref 3.4–5)
ALP BLD-CCNC: 91 IU/L (ref 40–128)
ALT SERPL-CCNC: 27 U/L (ref 10–40)
ANION GAP SERPL CALCULATED.3IONS-SCNC: 12 MMOL/L (ref 4–16)
AST SERPL-CCNC: 26 IU/L (ref 15–37)
BILIRUB SERPL-MCNC: 0.4 MG/DL (ref 0–1)
BUN SERPL-MCNC: 13 MG/DL (ref 6–23)
CALCIUM SERPL-MCNC: 8.8 MG/DL (ref 8.3–10.6)
CHLORIDE BLD-SCNC: 104 MMOL/L (ref 99–110)
CO2: 24 MMOL/L (ref 21–32)
CREAT SERPL-MCNC: 0.7 MG/DL (ref 0.9–1.3)
GFR SERPL CREATININE-BSD FRML MDRD: >60 ML/MIN/1.73M2
GLUCOSE SERPL-MCNC: 93 MG/DL (ref 70–99)
POTASSIUM SERPL-SCNC: 4 MMOL/L (ref 3.5–5.1)
SODIUM BLD-SCNC: 140 MMOL/L (ref 135–145)
TOTAL PROTEIN: 6.3 GM/DL (ref 6.4–8.2)
TSH SERPL DL<=0.005 MIU/L-ACNC: 4.78 UIU/ML (ref 0.27–4.2)

## 2023-05-31 PROCEDURE — 84443 ASSAY THYROID STIM HORMONE: CPT

## 2023-05-31 PROCEDURE — 36415 COLL VENOUS BLD VENIPUNCTURE: CPT

## 2023-05-31 PROCEDURE — 11042 DBRDMT SUBQ TIS 1ST 20SQCM/<: CPT | Performed by: NURSE PRACTITIONER

## 2023-05-31 PROCEDURE — 80053 COMPREHEN METABOLIC PANEL: CPT

## 2023-05-31 PROCEDURE — 11042 DBRDMT SUBQ TIS 1ST 20SQCM/<: CPT

## 2023-05-31 RX ORDER — LIDOCAINE 50 MG/G
OINTMENT TOPICAL ONCE
OUTPATIENT
Start: 2023-05-31 | End: 2023-05-31

## 2023-05-31 RX ORDER — LIDOCAINE HYDROCHLORIDE 20 MG/ML
JELLY TOPICAL ONCE
OUTPATIENT
Start: 2023-05-31 | End: 2023-05-31

## 2023-05-31 RX ORDER — GINSENG 100 MG
CAPSULE ORAL ONCE
OUTPATIENT
Start: 2023-05-31 | End: 2023-05-31

## 2023-05-31 RX ORDER — LIDOCAINE HYDROCHLORIDE 40 MG/ML
SOLUTION TOPICAL ONCE
OUTPATIENT
Start: 2023-05-31 | End: 2023-05-31

## 2023-05-31 RX ORDER — BACITRACIN ZINC AND POLYMYXIN B SULFATE 500; 1000 [USP'U]/G; [USP'U]/G
OINTMENT TOPICAL ONCE
OUTPATIENT
Start: 2023-05-31 | End: 2023-05-31

## 2023-05-31 RX ORDER — CLOBETASOL PROPIONATE 0.5 MG/G
OINTMENT TOPICAL ONCE
OUTPATIENT
Start: 2023-05-31 | End: 2023-05-31

## 2023-05-31 RX ORDER — GENTAMICIN SULFATE 1 MG/G
OINTMENT TOPICAL ONCE
OUTPATIENT
Start: 2023-05-31 | End: 2023-05-31

## 2023-05-31 RX ORDER — BACITRACIN, NEOMYCIN, POLYMYXIN B 400; 3.5; 5 [USP'U]/G; MG/G; [USP'U]/G
OINTMENT TOPICAL ONCE
OUTPATIENT
Start: 2023-05-31 | End: 2023-05-31

## 2023-05-31 RX ORDER — LIDOCAINE 40 MG/G
CREAM TOPICAL ONCE
OUTPATIENT
Start: 2023-05-31 | End: 2023-05-31

## 2023-05-31 RX ORDER — BETAMETHASONE DIPROPIONATE 0.05 %
OINTMENT (GRAM) TOPICAL ONCE
OUTPATIENT
Start: 2023-05-31 | End: 2023-05-31

## 2023-05-31 ASSESSMENT — PAIN DESCRIPTION - DESCRIPTORS: DESCRIPTORS: SORE;ACHING

## 2023-05-31 ASSESSMENT — PAIN DESCRIPTION - LOCATION: LOCATION: BUTTOCKS

## 2023-05-31 ASSESSMENT — PAIN DESCRIPTION - PAIN TYPE: TYPE: SURGICAL PAIN

## 2023-05-31 ASSESSMENT — PAIN DESCRIPTION - ORIENTATION: ORIENTATION: RIGHT

## 2023-05-31 ASSESSMENT — PAIN SCALES - GENERAL: PAINLEVEL_OUTOF10: 1

## 2023-05-31 ASSESSMENT — PAIN DESCRIPTION - FREQUENCY: FREQUENCY: INTERMITTENT

## 2023-05-31 ASSESSMENT — PAIN - FUNCTIONAL ASSESSMENT: PAIN_FUNCTIONAL_ASSESSMENT: ACTIVITIES ARE NOT PREVENTED

## 2023-05-31 ASSESSMENT — PAIN DESCRIPTION - ONSET: ONSET: ON-GOING

## 2023-05-31 NOTE — DISCHARGE INSTRUCTIONS
PHYSICIAN ORDERS AND DISCHARGE INSTRUCTIONS     NOTE: Upon discharge from the [address], you will receive a patient experience survey. We would be grateful if you would take the time to fill this survey out. Wound care order history:                 YAEL's   Right       Left               Date:              Cultures:                Grafts:                Antibiotics:           Continuing wound care orders and information:                 Residence:                Continue home health care with:               Your wound-care supplies will be provided by: Wound cleansing:               Do not scrub or use excessive force. Wash hands with soap and water before and after dressing changes. Prior to applying a clean dressing, cleanse wound with normal saline, wound cleanser, or mild soap and water. Ask the physician or nurse before getting the wound(s) wet in a shower. General Wound management:              Keep weight off wounds and reposition every 2 hours. Avoid standing for long periods of time. Apply wraps/stockings in AM and remove at bedtime. If swelling is present, elevate legs to the level of the heart or above for 30 minutes 4-5 times a day and/or when sitting. When taking antibiotics take entire prescription as ordered by physician do not stop taking until medicine is all gone. Orders for this week: 5/31/23     FAX ORDERS TO [facility]! Rx:     Wound vac ordered 4/26/23 for right buttock wound. Hold vac 5/31/23, but do not send back yet. Right Buttock Wound - Wash with soap and water, pat dry. Apply Anasept and Stimulen to the wound bed. Cover with silicone border (reinforce with drape or tape). Change Mon, Wed, Fri. Follow up with Chintan Roman CNP in 1 week(s) in the wound care center.   Call [phone] for any questions or

## 2023-05-31 NOTE — PROGRESS NOTES
Wound Care Center Progress Note With Procedure    Chelsy Sarkar  AGE: 74 y.o. GENDER: male  : 1950  EPISODE DATE:  2023     Subjective:     Chief Complaint   Patient presents with    Wound Check     Right Buttock         HISTORY of PRESENT ILLNESS     Chelsy Sarkar is a 74 y.o. male who presents to the [address] [place] [address] for a visit for evaluation and treatment of Acute non-healing surgical and abscess   ulcer(s) of  Buttocks R gluteal.  The condition is of moderate severity. The ulcer has been present for 1 weeks. The underlying cause is thought to be abscess. The patients care to date has included an I&D with sutures, and subsequent infection and sutures were removed. The patient has significant underlying medical conditions as below. We will remove NPT today and update orders for home health  Patient progressing very well  No issues reported and we will have him hold onto NPT device for 1 more week     2023: Patient great improvement and can likely have NPT off next week  Fully granulating. Some depth remains but no more tunneling or tracts and overall much much better. Hope to change plan of care next week      2023: Patient great week and the best week of progress so far  No issues and NPT functioning well and well supplied      5-: Patient continued improvement. Taking levaquin as ordered   Wound granulating, clean and filling in  No issues with home health or NPT device     5-3-2023: Patient had NPT on since Saturday  Looks much better  Patient culture needs addressed     Patient wound has more depth and undermining than last week. Wound was likely stabilizing last week and he is finished with antibiotics now.    Will culture as drainage persistent  Likely needs NPT  Home health established      Patient is not a smoker or a diabetic  He is not on blood thinners     Wound Pain Timing/Severity: waxing and waning  Quality of pain: aching, tender  Severity of pain:  3

## 2023-06-02 ENCOUNTER — PATIENT MESSAGE (OUTPATIENT)
Dept: FAMILY MEDICINE CLINIC | Age: 73
End: 2023-06-02

## 2023-06-02 DIAGNOSIS — E03.9 ACQUIRED HYPOTHYROIDISM: Primary | ICD-10-CM

## 2023-06-05 ENCOUNTER — CARE COORDINATION (OUTPATIENT)
Dept: CARE COORDINATION | Age: 73
End: 2023-06-05

## 2023-06-05 NOTE — TELEPHONE ENCOUNTER
From: Hetal Huizar  To: Roena Denver  Sent: 6/2/2023 7:44 PM EDT  Subject: Test results of 31 May 2023    The lab work I had done was totally fasting - nothing to eat from 9 PM the evening before and nothing to drink after 1 1 PM.  I took my evening meds on 30 May, but that was the last.  I did not take any meds the morning of the lab work.     Jose Pham

## 2023-06-07 ENCOUNTER — HOSPITAL ENCOUNTER (OUTPATIENT)
Dept: WOUND CARE | Age: 73
Discharge: HOME | End: 2023-06-07
Payer: MEDICARE

## 2023-06-07 VITALS
RESPIRATION RATE: 18 BRPM | HEART RATE: 67 BPM | SYSTOLIC BLOOD PRESSURE: 131 MMHG | TEMPERATURE: 97.4 F | DIASTOLIC BLOOD PRESSURE: 71 MMHG

## 2023-06-07 DIAGNOSIS — T81.89XD NON-HEALING SURGICAL WOUND, SUBSEQUENT ENCOUNTER: ICD-10-CM

## 2023-06-07 DIAGNOSIS — L02.31 ABSCESS OF BUTTOCK, RIGHT: Primary | ICD-10-CM

## 2023-06-07 DIAGNOSIS — T81.89XA NON-HEALING SURGICAL WOUND, INITIAL ENCOUNTER: ICD-10-CM

## 2023-06-07 PROCEDURE — 11042 DBRDMT SUBQ TIS 1ST 20SQCM/<: CPT

## 2023-06-07 RX ORDER — BETAMETHASONE DIPROPIONATE 0.05 %
OINTMENT (GRAM) TOPICAL ONCE
OUTPATIENT
Start: 2023-06-07 | End: 2023-06-07

## 2023-06-07 RX ORDER — LIDOCAINE HYDROCHLORIDE 20 MG/ML
JELLY TOPICAL ONCE
OUTPATIENT
Start: 2023-06-07 | End: 2023-06-07

## 2023-06-07 RX ORDER — LIDOCAINE HYDROCHLORIDE 40 MG/ML
SOLUTION TOPICAL ONCE
OUTPATIENT
Start: 2023-06-07 | End: 2023-06-07

## 2023-06-07 RX ORDER — GENTAMICIN SULFATE 1 MG/G
OINTMENT TOPICAL ONCE
OUTPATIENT
Start: 2023-06-07 | End: 2023-06-07

## 2023-06-07 RX ORDER — BACITRACIN ZINC AND POLYMYXIN B SULFATE 500; 1000 [USP'U]/G; [USP'U]/G
OINTMENT TOPICAL ONCE
OUTPATIENT
Start: 2023-06-07 | End: 2023-06-07

## 2023-06-07 RX ORDER — LIDOCAINE 40 MG/G
CREAM TOPICAL ONCE
OUTPATIENT
Start: 2023-06-07 | End: 2023-06-07

## 2023-06-07 RX ORDER — IBUPROFEN 200 MG
TABLET ORAL ONCE
OUTPATIENT
Start: 2023-06-07 | End: 2023-06-07

## 2023-06-07 RX ORDER — LIDOCAINE 50 MG/G
OINTMENT TOPICAL ONCE
OUTPATIENT
Start: 2023-06-07 | End: 2023-06-07

## 2023-06-07 RX ORDER — GINSENG 100 MG
CAPSULE ORAL ONCE
OUTPATIENT
Start: 2023-06-07 | End: 2023-06-07

## 2023-06-07 RX ORDER — CLOBETASOL PROPIONATE 0.5 MG/G
OINTMENT TOPICAL ONCE
OUTPATIENT
Start: 2023-06-07 | End: 2023-06-07

## 2023-06-07 NOTE — DISCHARGE INSTRUCTIONS
PHYSICIAN ORDERS AND DISCHARGE INSTRUCTIONS     NOTE: Upon discharge from the [address], you will receive a patient experience survey. We would be grateful if you would take the time to fill this survey out. Wound care order history:                 YAEL's   Right       Left               Date:              Cultures:                Grafts:                Antibiotics:           Continuing wound care orders and information:                 Residence:                Continue home health care with:               Your wound-care supplies will be provided by: Wound cleansing:               Do not scrub or use excessive force. Wash hands with soap and water before and after dressing changes. Prior to applying a clean dressing, cleanse wound with normal saline, wound cleanser, or mild soap and water. Ask the physician or nurse before getting the wound(s) wet in a shower. General Wound management:              Keep weight off wounds and reposition every 2 hours. Avoid standing for long periods of time. Apply wraps/stockings in AM and remove at bedtime. If swelling is present, elevate legs to the level of the heart or above for 30 minutes 4-5 times a day and/or when sitting. When taking antibiotics take entire prescription as ordered by physician do not stop taking until medicine is all gone. Orders for this week: 6/7/23     FAX ORDERS TO [facility]! Rx:     Wound vac ordered 4/26/23 for right buttock wound. Hold vac 5/31/23, but do not send back yet. Right Buttock Wound - Wash with soap and water, pat dry. Apply Anasept and Stimulen to the wound bed. Cover with silicone border (reinforce with drape or tape). Change Mon, Wed, Fri. Follow up with Heydi Moya CNP in 1 week(s) in the wound care center.   Call [phone] for any questions or

## 2023-06-07 NOTE — PROGRESS NOTES
acute distress. Mental status:  Patient is appropriate, is  oriented to place and plan of care. Dermatologic exam: Visual inspection of the periwound reveals the skin to be normal in turgor and texture and dry  Wound exam: see wound description below in procedure note      Assessment:     Problem List Items Addressed This Visit          Other    WD-Nonhealing surgical wound    Relevant Orders    Initiate Outpatient Wound Care Protocol    WD-Abscess of buttock, right - Primary    Relevant Orders    Initiate Outpatient Wound Care Protocol     Procedure Note    Indications:  Based on my examination of this patient's wound(s) today, sharp excision into necrotic subcutaneous tissue is required to promote healing and evaluate the extent of previous healing. Performed by: DEYA Maria CNP    Consent obtained: Yes    Time out taken:  Yes    Pain Control: 4% Lidocaine Liquid Topical        Debridement:Excisional Debridement    Using curette the wound(s) was/were sharply debrided down through and including the removal of subcutaneous tissue. Devitalized Tissue Debrided:  fibrin, biofilm, slough, and exudate    Pre Debridement Measurements:  Are located in the Wound Documentation Flow Sheet    All active wounds listed below with today's date are evaluated  Wound(s)    debrided this date include # : 1     Post  Debridement Measurements:  Negative Pressure Wound Therapy Buttocks Right (Active)   Unit Type KCI 05/31/23 1256   Dressing Type Black Foam 05/31/23 1256   Number of pieces used 1 05/24/23 1434   Number of pieces removed 2 05/17/23 1300   Cycle Continuous 05/31/23 1256   Target Pressure (mmHg) 125 05/31/23 1256   Canister changed? Yes 05/31/23 1257   Drainage Amount None 05/31/23 1257   Drainage Description Serosanguinous 05/03/23 1304   Wound Assessment Granulation tissue 05/31/23 1257   Meliza-wound Assessment Fragile; Intact 05/31/23 1257   Shape Circular 05/31/23 1257   Odor None 05/31/23 1257

## 2023-06-21 ENCOUNTER — TELEPHONE (OUTPATIENT)
Dept: FAMILY MEDICINE CLINIC | Age: 73
End: 2023-06-21

## 2023-06-21 DIAGNOSIS — J44.9 COPD, MILD (HCC): ICD-10-CM

## 2023-06-21 DIAGNOSIS — J44.1 COPD WITH ACUTE EXACERBATION (HCC): Primary | ICD-10-CM
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
## 2023-06-22 RX ORDER — GUAIFENESIN AND DEXTROMETHORPHAN HYDROBROMIDE 1200; 60 MG/1; MG/1
TABLET, EXTENDED RELEASE ORAL
Qty: 28 TABLET | Refills: 0 | Status: SHIPPED | OUTPATIENT
Start: 2023-06-22

## 2023-06-22 RX ORDER — AZITHROMYCIN 250 MG/1
TABLET, FILM COATED ORAL
Qty: 6 TABLET | Refills: 0 | Status: SHIPPED | OUTPATIENT
Start: 2023-06-22 | End: 2023-07-02

## 2023-06-22 RX ORDER — PREDNISONE 20 MG/1
40 TABLET ORAL EVERY MORNING
Qty: 10 TABLET | Refills: 0 | Status: SHIPPED | OUTPATIENT
Start: 2023-06-22 | End: 2023-06-27

## 2023-06-27 ENCOUNTER — CARE COORDINATION (OUTPATIENT)
Dept: CARE COORDINATION | Age: 73
End: 2023-06-27

## 2023-06-29 ENCOUNTER — TELEPHONE (OUTPATIENT)
Dept: FAMILY MEDICINE CLINIC | Age: 73
End: 2023-06-29

## 2023-07-07 ENCOUNTER — HOSPITAL ENCOUNTER (INPATIENT)
Age: 73
LOS: 1 days | Discharge: HOME HEALTH | DRG: 193 | End: 2023-07-09
Attending: STUDENT IN AN ORGANIZED HEALTH CARE EDUCATION/TRAINING PROGRAM | Admitting: INTERNAL MEDICINE
Payer: MEDICARE

## 2023-07-07 ENCOUNTER — CARE COORDINATION (OUTPATIENT)
Dept: CARE COORDINATION | Age: 73
End: 2023-07-07

## 2023-07-07 ENCOUNTER — APPOINTMENT (OUTPATIENT)
Dept: GENERAL RADIOLOGY | Age: 73
DRG: 193 | End: 2023-07-07
Payer: MEDICARE

## 2023-07-07 DIAGNOSIS — J18.9 MULTIFOCAL PNEUMONIA: ICD-10-CM

## 2023-07-07 DIAGNOSIS — J18.9 PNEUMONIA DUE TO INFECTIOUS ORGANISM, UNSPECIFIED LATERALITY, UNSPECIFIED PART OF LUNG: Primary | ICD-10-CM

## 2023-07-07 DIAGNOSIS — J44.9 COPD, MILD (HCC): ICD-10-CM

## 2023-07-07 DIAGNOSIS — R06.89 DYSPNEA AND RESPIRATORY ABNORMALITIES: ICD-10-CM

## 2023-07-07 DIAGNOSIS — J44.9 CHRONIC OBSTRUCTIVE PULMONARY DISEASE, UNSPECIFIED COPD TYPE (HCC): ICD-10-CM

## 2023-07-07 DIAGNOSIS — R06.00 DYSPNEA AND RESPIRATORY ABNORMALITIES: ICD-10-CM

## 2023-07-07 PROBLEM — J96.21 ACUTE ON CHRONIC RESPIRATORY FAILURE WITH HYPOXIA (HCC): Status: ACTIVE | Noted: 2023-07-07

## 2023-07-07 LAB
ALBUMIN SERPL-MCNC: 3.7 GM/DL (ref 3.4–5)
ALP BLD-CCNC: 86 IU/L (ref 40–129)
ALT SERPL-CCNC: 16 U/L (ref 10–40)
ANION GAP SERPL CALCULATED.3IONS-SCNC: 11 MMOL/L (ref 4–16)
AST SERPL-CCNC: 23 IU/L (ref 15–37)
BASOPHILS ABSOLUTE: 0 K/CU MM
BASOPHILS RELATIVE PERCENT: 0.4 % (ref 0–1)
BILIRUB SERPL-MCNC: 0.4 MG/DL (ref 0–1)
BUN SERPL-MCNC: 16 MG/DL (ref 6–23)
CALCIUM SERPL-MCNC: 8.6 MG/DL (ref 8.3–10.6)
CHLORIDE BLD-SCNC: 100 MMOL/L (ref 99–110)
CO2: 23 MMOL/L (ref 21–32)
CREAT SERPL-MCNC: 0.7 MG/DL (ref 0.9–1.3)
DIFFERENTIAL TYPE: ABNORMAL
EKG ATRIAL RATE: 83 BPM
EKG DIAGNOSIS: NORMAL
EKG P AXIS: 42 DEGREES
EKG P-R INTERVAL: 202 MS
EKG Q-T INTERVAL: 386 MS
EKG QRS DURATION: 86 MS
EKG QTC CALCULATION (BAZETT): 453 MS
EKG R AXIS: -8 DEGREES
EKG T AXIS: 15 DEGREES
EKG VENTRICULAR RATE: 83 BPM
EOSINOPHILS ABSOLUTE: 0.2 K/CU MM
EOSINOPHILS RELATIVE PERCENT: 1.6 % (ref 0–3)
GFR SERPL CREATININE-BSD FRML MDRD: >60 ML/MIN/1.73M2
GLUCOSE SERPL-MCNC: 92 MG/DL (ref 70–99)
HCT VFR BLD CALC: 38.8 % (ref 42–52)
HEMOGLOBIN: 12.3 GM/DL (ref 13.5–18)
IMMATURE NEUTROPHIL %: 0.4 % (ref 0–0.43)
LYMPHOCYTES ABSOLUTE: 1.4 K/CU MM
LYMPHOCYTES RELATIVE PERCENT: 13.6 % (ref 24–44)
MCH RBC QN AUTO: 29.7 PG (ref 27–31)
MCHC RBC AUTO-ENTMCNC: 31.7 % (ref 32–36)
MCV RBC AUTO: 93.7 FL (ref 78–100)
MONOCYTES ABSOLUTE: 1 K/CU MM
MONOCYTES RELATIVE PERCENT: 9.3 % (ref 0–4)
NUCLEATED RBC %: 0 %
PDW BLD-RTO: 14.3 % (ref 11.7–14.9)
PLATELET # BLD: 307 K/CU MM (ref 140–440)
PMV BLD AUTO: 9.4 FL (ref 7.5–11.1)
POTASSIUM SERPL-SCNC: 4.2 MMOL/L (ref 3.5–5.1)
PRO-BNP: 98.69 PG/ML
PROCALCITONIN SERPL-MCNC: 0.05 NG/ML
RBC # BLD: 4.14 M/CU MM (ref 4.6–6.2)
SARS-COV-2 RDRP RESP QL NAA+PROBE: NOT DETECTED
SEGMENTED NEUTROPHILS ABSOLUTE COUNT: 7.7 K/CU MM
SEGMENTED NEUTROPHILS RELATIVE PERCENT: 74.7 % (ref 36–66)
SODIUM BLD-SCNC: 134 MMOL/L (ref 135–145)
SOURCE: NORMAL
TOTAL IMMATURE NEUTOROPHIL: 0.04 K/CU MM
TOTAL NUCLEATED RBC: 0 K/CU MM
TOTAL PROTEIN: 6.7 GM/DL (ref 6.4–8.2)
TROPONIN T: <0.01 NG/ML
TROPONIN T: <0.01 NG/ML
WBC # BLD: 10.3 K/CU MM (ref 4–10.5)

## 2023-07-07 PROCEDURE — G0378 HOSPITAL OBSERVATION PER HR: HCPCS

## 2023-07-07 PROCEDURE — 87449 NOS EACH ORGANISM AG IA: CPT

## 2023-07-07 PROCEDURE — 96365 THER/PROPH/DIAG IV INF INIT: CPT

## 2023-07-07 PROCEDURE — 71045 X-RAY EXAM CHEST 1 VIEW: CPT

## 2023-07-07 PROCEDURE — 84484 ASSAY OF TROPONIN QUANT: CPT

## 2023-07-07 PROCEDURE — 80053 COMPREHEN METABOLIC PANEL: CPT

## 2023-07-07 PROCEDURE — 2580000003 HC RX 258: Performed by: STUDENT IN AN ORGANIZED HEALTH CARE EDUCATION/TRAINING PROGRAM

## 2023-07-07 PROCEDURE — 6370000000 HC RX 637 (ALT 250 FOR IP): Performed by: STUDENT IN AN ORGANIZED HEALTH CARE EDUCATION/TRAINING PROGRAM

## 2023-07-07 PROCEDURE — 96366 THER/PROPH/DIAG IV INF ADDON: CPT

## 2023-07-07 PROCEDURE — 93010 ELECTROCARDIOGRAM REPORT: CPT | Performed by: INTERNAL MEDICINE

## 2023-07-07 PROCEDURE — 87205 SMEAR GRAM STAIN: CPT

## 2023-07-07 PROCEDURE — 36415 COLL VENOUS BLD VENIPUNCTURE: CPT

## 2023-07-07 PROCEDURE — 94640 AIRWAY INHALATION TREATMENT: CPT

## 2023-07-07 PROCEDURE — 99285 EMERGENCY DEPT VISIT HI MDM: CPT

## 2023-07-07 PROCEDURE — 84145 PROCALCITONIN (PCT): CPT

## 2023-07-07 PROCEDURE — 83880 ASSAY OF NATRIURETIC PEPTIDE: CPT

## 2023-07-07 PROCEDURE — 93005 ELECTROCARDIOGRAM TRACING: CPT | Performed by: PHYSICIAN ASSISTANT

## 2023-07-07 PROCEDURE — 87635 SARS-COV-2 COVID-19 AMP PRB: CPT

## 2023-07-07 PROCEDURE — 6360000002 HC RX W HCPCS: Performed by: STUDENT IN AN ORGANIZED HEALTH CARE EDUCATION/TRAINING PROGRAM

## 2023-07-07 PROCEDURE — 94669 MECHANICAL CHEST WALL OSCILL: CPT

## 2023-07-07 PROCEDURE — 96372 THER/PROPH/DIAG INJ SC/IM: CPT

## 2023-07-07 PROCEDURE — 85025 COMPLETE CBC W/AUTO DIFF WBC: CPT

## 2023-07-07 PROCEDURE — 6370000000 HC RX 637 (ALT 250 FOR IP): Performed by: PHYSICIAN ASSISTANT

## 2023-07-07 PROCEDURE — 87040 BLOOD CULTURE FOR BACTERIA: CPT

## 2023-07-07 PROCEDURE — 87899 AGENT NOS ASSAY W/OPTIC: CPT

## 2023-07-07 PROCEDURE — 87070 CULTURE OTHR SPECIMN AEROBIC: CPT

## 2023-07-07 RX ORDER — ACETAMINOPHEN 650 MG/1
650 SUPPOSITORY RECTAL EVERY 6 HOURS PRN
Status: DISCONTINUED | OUTPATIENT
Start: 2023-07-07 | End: 2023-07-09 | Stop reason: HOSPADM

## 2023-07-07 RX ORDER — LEVOFLOXACIN 5 MG/ML
750 INJECTION, SOLUTION INTRAVENOUS EVERY 24 HOURS
Status: DISCONTINUED | OUTPATIENT
Start: 2023-07-07 | End: 2023-07-08

## 2023-07-07 RX ORDER — DOXYCYCLINE HYCLATE 100 MG/1
100 CAPSULE ORAL 2 TIMES DAILY
Status: ON HOLD | COMMUNITY
Start: 2023-06-29 | End: 2023-07-09 | Stop reason: HOSPADM

## 2023-07-07 RX ORDER — LOSARTAN POTASSIUM 25 MG/1
25 TABLET ORAL DAILY
Status: DISCONTINUED | OUTPATIENT
Start: 2023-07-07 | End: 2023-07-09 | Stop reason: HOSPADM

## 2023-07-07 RX ORDER — ACETYLCYSTEINE 200 MG/ML
600 SOLUTION ORAL; RESPIRATORY (INHALATION) 2 TIMES DAILY
Status: DISCONTINUED | OUTPATIENT
Start: 2023-07-07 | End: 2023-07-09 | Stop reason: HOSPADM

## 2023-07-07 RX ORDER — ACETAMINOPHEN 325 MG/1
650 TABLET ORAL EVERY 6 HOURS PRN
Status: DISCONTINUED | OUTPATIENT
Start: 2023-07-07 | End: 2023-07-09 | Stop reason: HOSPADM

## 2023-07-07 RX ORDER — TRAZODONE HYDROCHLORIDE 50 MG/1
50 TABLET ORAL NIGHTLY
Status: DISCONTINUED | OUTPATIENT
Start: 2023-07-07 | End: 2023-07-09 | Stop reason: HOSPADM

## 2023-07-07 RX ORDER — FLUTICASONE PROPIONATE 50 MCG
1 SPRAY, SUSPENSION (ML) NASAL NIGHTLY
Status: DISCONTINUED | OUTPATIENT
Start: 2023-07-07 | End: 2023-07-09 | Stop reason: HOSPADM

## 2023-07-07 RX ORDER — ENOXAPARIN SODIUM 100 MG/ML
30 INJECTION SUBCUTANEOUS 2 TIMES DAILY
Status: DISCONTINUED | OUTPATIENT
Start: 2023-07-07 | End: 2023-07-09 | Stop reason: HOSPADM

## 2023-07-07 RX ORDER — ALBUTEROL SULFATE 90 UG/1
1 AEROSOL, METERED RESPIRATORY (INHALATION) EVERY 6 HOURS PRN
Status: DISCONTINUED | OUTPATIENT
Start: 2023-07-07 | End: 2023-07-09 | Stop reason: HOSPADM

## 2023-07-07 RX ORDER — ONDANSETRON 4 MG/1
4 TABLET, ORALLY DISINTEGRATING ORAL EVERY 8 HOURS PRN
Status: DISCONTINUED | OUTPATIENT
Start: 2023-07-07 | End: 2023-07-09 | Stop reason: HOSPADM

## 2023-07-07 RX ORDER — SODIUM CHLORIDE 9 MG/ML
INJECTION, SOLUTION INTRAVENOUS PRN
Status: DISCONTINUED | OUTPATIENT
Start: 2023-07-07 | End: 2023-07-09 | Stop reason: HOSPADM

## 2023-07-07 RX ORDER — SODIUM CHLORIDE 0.9 % (FLUSH) 0.9 %
5-40 SYRINGE (ML) INJECTION EVERY 12 HOURS SCHEDULED
Status: DISCONTINUED | OUTPATIENT
Start: 2023-07-07 | End: 2023-07-09 | Stop reason: HOSPADM

## 2023-07-07 RX ORDER — SODIUM CHLORIDE 0.9 % (FLUSH) 0.9 %
5-40 SYRINGE (ML) INJECTION PRN
Status: DISCONTINUED | OUTPATIENT
Start: 2023-07-07 | End: 2023-07-09 | Stop reason: HOSPADM

## 2023-07-07 RX ORDER — POLYETHYLENE GLYCOL 3350 17 G/17G
17 POWDER, FOR SOLUTION ORAL DAILY PRN
Status: DISCONTINUED | OUTPATIENT
Start: 2023-07-07 | End: 2023-07-09 | Stop reason: HOSPADM

## 2023-07-07 RX ORDER — ONDANSETRON 2 MG/ML
4 INJECTION INTRAMUSCULAR; INTRAVENOUS EVERY 6 HOURS PRN
Status: DISCONTINUED | OUTPATIENT
Start: 2023-07-07 | End: 2023-07-09 | Stop reason: HOSPADM

## 2023-07-07 RX ORDER — ACETAMINOPHEN 500 MG
1000 TABLET ORAL ONCE
Status: DISCONTINUED | OUTPATIENT
Start: 2023-07-07 | End: 2023-07-09 | Stop reason: HOSPADM

## 2023-07-07 RX ORDER — IPRATROPIUM BROMIDE AND ALBUTEROL SULFATE 2.5; .5 MG/3ML; MG/3ML
1 SOLUTION RESPIRATORY (INHALATION) ONCE
Status: COMPLETED | OUTPATIENT
Start: 2023-07-07 | End: 2023-07-07

## 2023-07-07 RX ADMIN — LEVOFLOXACIN 750 MG: 750 INJECTION, SOLUTION INTRAVENOUS at 16:29

## 2023-07-07 RX ADMIN — SODIUM CHLORIDE, PRESERVATIVE FREE 10 ML: 5 INJECTION INTRAVENOUS at 21:02

## 2023-07-07 RX ADMIN — IPRATROPIUM BROMIDE AND ALBUTEROL SULFATE 1 DOSE: .5; 2.5 SOLUTION RESPIRATORY (INHALATION) at 19:35

## 2023-07-07 RX ADMIN — ACETYLCYSTEINE 600 MG: 200 SOLUTION ORAL; RESPIRATORY (INHALATION) at 19:40

## 2023-07-07 RX ADMIN — TRAZODONE HYDROCHLORIDE 50 MG: 50 TABLET ORAL at 23:04

## 2023-07-07 RX ADMIN — ACETAMINOPHEN 650 MG: 325 TABLET ORAL at 23:04

## 2023-07-07 RX ADMIN — FLUTICASONE PROPIONATE 1 SPRAY: 50 SPRAY, METERED NASAL at 20:59

## 2023-07-07 RX ADMIN — LOSARTAN POTASSIUM 25 MG: 25 TABLET, FILM COATED ORAL at 21:01

## 2023-07-07 RX ADMIN — ENOXAPARIN SODIUM 30 MG: 100 INJECTION SUBCUTANEOUS at 21:01

## 2023-07-07 ASSESSMENT — PAIN SCALES - GENERAL
PAINLEVEL_OUTOF10: 2
PAINLEVEL_OUTOF10: 0
PAINLEVEL_OUTOF10: 0
PAINLEVEL_OUTOF10: 2

## 2023-07-07 ASSESSMENT — PAIN DESCRIPTION - LOCATION
LOCATION: CHEST
LOCATION: CHEST

## 2023-07-07 ASSESSMENT — PAIN DESCRIPTION - DESCRIPTORS
DESCRIPTORS: PRESSURE
DESCRIPTORS: ACHING

## 2023-07-07 ASSESSMENT — PAIN SCALES - WONG BAKER: WONGBAKER_NUMERICALRESPONSE: 0

## 2023-07-07 ASSESSMENT — PAIN DESCRIPTION - ORIENTATION
ORIENTATION: LEFT
ORIENTATION: MID;UPPER

## 2023-07-08 LAB
BASOPHILS ABSOLUTE: 0 K/CU MM
BASOPHILS RELATIVE PERCENT: 0.5 % (ref 0–1)
DIFFERENTIAL TYPE: ABNORMAL
EOSINOPHILS ABSOLUTE: 0.2 K/CU MM
EOSINOPHILS RELATIVE PERCENT: 2.4 % (ref 0–3)
HCT VFR BLD CALC: 35 % (ref 42–52)
HEMOGLOBIN: 11 GM/DL (ref 13.5–18)
IMMATURE NEUTROPHIL %: 0.5 % (ref 0–0.43)
L PNEUMO AG UR QL IA: NEGATIVE
LYMPHOCYTES ABSOLUTE: 1.2 K/CU MM
LYMPHOCYTES RELATIVE PERCENT: 19.2 % (ref 24–44)
MCH RBC QN AUTO: 29.6 PG (ref 27–31)
MCHC RBC AUTO-ENTMCNC: 31.4 % (ref 32–36)
MCV RBC AUTO: 94.1 FL (ref 78–100)
MONOCYTES ABSOLUTE: 0.8 K/CU MM
MONOCYTES RELATIVE PERCENT: 13.7 % (ref 0–4)
NUCLEATED RBC %: 0 %
PDW BLD-RTO: 14.3 % (ref 11.7–14.9)
PLATELET # BLD: 253 K/CU MM (ref 140–440)
PMV BLD AUTO: 9.5 FL (ref 7.5–11.1)
RBC # BLD: 3.72 M/CU MM (ref 4.6–6.2)
S PNEUM AG CSF QL: NORMAL
SEGMENTED NEUTROPHILS ABSOLUTE COUNT: 3.9 K/CU MM
SEGMENTED NEUTROPHILS RELATIVE PERCENT: 63.7 % (ref 36–66)
TOTAL IMMATURE NEUTOROPHIL: 0.03 K/CU MM
TOTAL NUCLEATED RBC: 0 K/CU MM
TROPONIN T: <0.01 NG/ML
TROPONIN T: <0.01 NG/ML
WBC # BLD: 6.1 K/CU MM (ref 4–10.5)

## 2023-07-08 PROCEDURE — 94668 MNPJ CHEST WALL SBSQ: CPT

## 2023-07-08 PROCEDURE — 2580000003 HC RX 258: Performed by: STUDENT IN AN ORGANIZED HEALTH CARE EDUCATION/TRAINING PROGRAM

## 2023-07-08 PROCEDURE — 96367 TX/PROPH/DG ADDL SEQ IV INF: CPT

## 2023-07-08 PROCEDURE — 96372 THER/PROPH/DIAG INJ SC/IM: CPT

## 2023-07-08 PROCEDURE — 94640 AIRWAY INHALATION TREATMENT: CPT

## 2023-07-08 PROCEDURE — 6370000000 HC RX 637 (ALT 250 FOR IP): Performed by: STUDENT IN AN ORGANIZED HEALTH CARE EDUCATION/TRAINING PROGRAM

## 2023-07-08 PROCEDURE — 94761 N-INVAS EAR/PLS OXIMETRY MLT: CPT

## 2023-07-08 PROCEDURE — 85025 COMPLETE CBC W/AUTO DIFF WBC: CPT

## 2023-07-08 PROCEDURE — 6360000002 HC RX W HCPCS: Performed by: STUDENT IN AN ORGANIZED HEALTH CARE EDUCATION/TRAINING PROGRAM

## 2023-07-08 PROCEDURE — 2580000003 HC RX 258: Performed by: NURSE PRACTITIONER

## 2023-07-08 PROCEDURE — 6360000002 HC RX W HCPCS: Performed by: NURSE PRACTITIONER

## 2023-07-08 PROCEDURE — 6370000000 HC RX 637 (ALT 250 FOR IP): Performed by: NURSE PRACTITIONER

## 2023-07-08 PROCEDURE — 36415 COLL VENOUS BLD VENIPUNCTURE: CPT

## 2023-07-08 PROCEDURE — G0378 HOSPITAL OBSERVATION PER HR: HCPCS

## 2023-07-08 PROCEDURE — 84484 ASSAY OF TROPONIN QUANT: CPT

## 2023-07-08 PROCEDURE — 96366 THER/PROPH/DIAG IV INF ADDON: CPT

## 2023-07-08 RX ORDER — IPRATROPIUM BROMIDE AND ALBUTEROL SULFATE 2.5; .5 MG/3ML; MG/3ML
1 SOLUTION RESPIRATORY (INHALATION)
Status: DISCONTINUED | OUTPATIENT
Start: 2023-07-08 | End: 2023-07-09 | Stop reason: HOSPADM

## 2023-07-08 RX ORDER — IPRATROPIUM BROMIDE AND ALBUTEROL SULFATE 2.5; .5 MG/3ML; MG/3ML
1 SOLUTION RESPIRATORY (INHALATION) 2 TIMES DAILY
Status: DISCONTINUED | OUTPATIENT
Start: 2023-07-08 | End: 2023-07-08

## 2023-07-08 RX ORDER — PANTOPRAZOLE SODIUM 40 MG/1
40 TABLET, DELAYED RELEASE ORAL
Status: DISCONTINUED | OUTPATIENT
Start: 2023-07-08 | End: 2023-07-09 | Stop reason: HOSPADM

## 2023-07-08 RX ADMIN — IPRATROPIUM BROMIDE AND ALBUTEROL SULFATE 1 DOSE: .5; 2.5 SOLUTION RESPIRATORY (INHALATION) at 07:52

## 2023-07-08 RX ADMIN — CEFTRIAXONE SODIUM 1000 MG: 1 INJECTION, POWDER, FOR SOLUTION INTRAMUSCULAR; INTRAVENOUS at 09:10

## 2023-07-08 RX ADMIN — TRAZODONE HYDROCHLORIDE 50 MG: 50 TABLET ORAL at 20:46

## 2023-07-08 RX ADMIN — LEVOTHYROXINE SODIUM 175 MCG: 150 TABLET ORAL at 07:26

## 2023-07-08 RX ADMIN — FLUTICASONE PROPIONATE 1 SPRAY: 50 SPRAY, METERED NASAL at 20:47

## 2023-07-08 RX ADMIN — ENOXAPARIN SODIUM 30 MG: 100 INJECTION SUBCUTANEOUS at 08:19

## 2023-07-08 RX ADMIN — PANTOPRAZOLE SODIUM 40 MG: 40 TABLET, DELAYED RELEASE ORAL at 13:26

## 2023-07-08 RX ADMIN — ACETYLCYSTEINE 600 MG: 200 SOLUTION ORAL; RESPIRATORY (INHALATION) at 07:52

## 2023-07-08 RX ADMIN — ACETYLCYSTEINE 600 MG: 200 SOLUTION ORAL; RESPIRATORY (INHALATION) at 18:55

## 2023-07-08 RX ADMIN — IPRATROPIUM BROMIDE AND ALBUTEROL SULFATE 1 DOSE: 2.5; .5 SOLUTION RESPIRATORY (INHALATION) at 18:54

## 2023-07-08 RX ADMIN — SODIUM CHLORIDE, PRESERVATIVE FREE 10 ML: 5 INJECTION INTRAVENOUS at 08:44

## 2023-07-08 RX ADMIN — LOSARTAN POTASSIUM 25 MG: 25 TABLET, FILM COATED ORAL at 20:46

## 2023-07-08 RX ADMIN — ACETAMINOPHEN 650 MG: 325 TABLET ORAL at 08:19

## 2023-07-08 RX ADMIN — ACETAMINOPHEN 650 MG: 325 TABLET ORAL at 22:18

## 2023-07-08 RX ADMIN — AZITHROMYCIN MONOHYDRATE 500 MG: 500 INJECTION, POWDER, LYOPHILIZED, FOR SOLUTION INTRAVENOUS at 09:54

## 2023-07-08 RX ADMIN — ENOXAPARIN SODIUM 30 MG: 100 INJECTION SUBCUTANEOUS at 20:46

## 2023-07-08 RX ADMIN — SODIUM CHLORIDE, PRESERVATIVE FREE 10 ML: 5 INJECTION INTRAVENOUS at 20:46

## 2023-07-08 ASSESSMENT — ENCOUNTER SYMPTOMS
NAUSEA: 0
ABDOMINAL PAIN: 0
VOMITING: 0
CHEST TIGHTNESS: 1
COUGH: 1
SHORTNESS OF BREATH: 1

## 2023-07-08 ASSESSMENT — PAIN DESCRIPTION - DESCRIPTORS
DESCRIPTORS: ACHING
DESCRIPTORS: ACHING

## 2023-07-08 ASSESSMENT — PAIN SCALES - GENERAL
PAINLEVEL_OUTOF10: 1
PAINLEVEL_OUTOF10: 0

## 2023-07-08 ASSESSMENT — PAIN DESCRIPTION - LOCATION
LOCATION: HEAD
LOCATION: CHEST

## 2023-07-08 ASSESSMENT — PAIN SCALES - WONG BAKER
WONGBAKER_NUMERICALRESPONSE: 0
WONGBAKER_NUMERICALRESPONSE: 0

## 2023-07-08 ASSESSMENT — PAIN DESCRIPTION - ORIENTATION: ORIENTATION: MID

## 2023-07-09 ENCOUNTER — APPOINTMENT (OUTPATIENT)
Dept: GENERAL RADIOLOGY | Age: 73
DRG: 193 | End: 2023-07-09
Payer: MEDICARE

## 2023-07-09 VITALS
RESPIRATION RATE: 12 BRPM | DIASTOLIC BLOOD PRESSURE: 61 MMHG | SYSTOLIC BLOOD PRESSURE: 102 MMHG | OXYGEN SATURATION: 93 % | HEART RATE: 68 BPM | HEIGHT: 69 IN | WEIGHT: 269.19 LBS | BODY MASS INDEX: 39.87 KG/M2 | TEMPERATURE: 97.8 F

## 2023-07-09 PROBLEM — J18.9 MULTIFOCAL PNEUMONIA: Status: ACTIVE | Noted: 2023-07-09

## 2023-07-09 PROCEDURE — 96372 THER/PROPH/DIAG INJ SC/IM: CPT

## 2023-07-09 PROCEDURE — 6370000000 HC RX 637 (ALT 250 FOR IP): Performed by: NURSE PRACTITIONER

## 2023-07-09 PROCEDURE — 2580000003 HC RX 258: Performed by: NURSE PRACTITIONER

## 2023-07-09 PROCEDURE — 96366 THER/PROPH/DIAG IV INF ADDON: CPT

## 2023-07-09 PROCEDURE — 71045 X-RAY EXAM CHEST 1 VIEW: CPT

## 2023-07-09 PROCEDURE — 94667 MNPJ CHEST WALL 1ST: CPT

## 2023-07-09 PROCEDURE — 6360000002 HC RX W HCPCS: Performed by: NURSE PRACTITIONER

## 2023-07-09 PROCEDURE — 94640 AIRWAY INHALATION TREATMENT: CPT

## 2023-07-09 PROCEDURE — 94664 DEMO&/EVAL PT USE INHALER: CPT

## 2023-07-09 PROCEDURE — 2580000003 HC RX 258: Performed by: STUDENT IN AN ORGANIZED HEALTH CARE EDUCATION/TRAINING PROGRAM

## 2023-07-09 PROCEDURE — 94618 PULMONARY STRESS TESTING: CPT

## 2023-07-09 PROCEDURE — G0378 HOSPITAL OBSERVATION PER HR: HCPCS

## 2023-07-09 PROCEDURE — 6360000002 HC RX W HCPCS: Performed by: STUDENT IN AN ORGANIZED HEALTH CARE EDUCATION/TRAINING PROGRAM

## 2023-07-09 PROCEDURE — 94761 N-INVAS EAR/PLS OXIMETRY MLT: CPT

## 2023-07-09 PROCEDURE — 6370000000 HC RX 637 (ALT 250 FOR IP): Performed by: STUDENT IN AN ORGANIZED HEALTH CARE EDUCATION/TRAINING PROGRAM

## 2023-07-09 RX ORDER — CEFDINIR 300 MG/1
300 CAPSULE ORAL 2 TIMES DAILY
Qty: 14 CAPSULE | Refills: 0 | Status: SHIPPED | OUTPATIENT
Start: 2023-07-09 | End: 2023-07-12

## 2023-07-09 RX ORDER — ALBUTEROL SULFATE 2.5 MG/3ML
2.5 SOLUTION RESPIRATORY (INHALATION) 4 TIMES DAILY PRN
Qty: 120 EACH | Refills: 3 | Status: ON HOLD | OUTPATIENT
Start: 2023-07-09

## 2023-07-09 RX ORDER — AZITHROMYCIN 500 MG/1
500 TABLET, FILM COATED ORAL DAILY
Qty: 3 TABLET | Refills: 0 | Status: SHIPPED | OUTPATIENT
Start: 2023-07-09 | End: 2023-07-12

## 2023-07-09 RX ADMIN — SODIUM CHLORIDE, PRESERVATIVE FREE 10 ML: 5 INJECTION INTRAVENOUS at 08:32

## 2023-07-09 RX ADMIN — IPRATROPIUM BROMIDE AND ALBUTEROL SULFATE 1 DOSE: 2.5; .5 SOLUTION RESPIRATORY (INHALATION) at 11:41

## 2023-07-09 RX ADMIN — ENOXAPARIN SODIUM 30 MG: 100 INJECTION SUBCUTANEOUS at 08:32

## 2023-07-09 RX ADMIN — PANTOPRAZOLE SODIUM 40 MG: 40 TABLET, DELAYED RELEASE ORAL at 05:02

## 2023-07-09 RX ADMIN — IPRATROPIUM BROMIDE AND ALBUTEROL SULFATE 1 DOSE: 2.5; .5 SOLUTION RESPIRATORY (INHALATION) at 06:51

## 2023-07-09 RX ADMIN — LEVOTHYROXINE SODIUM 175 MCG: 150 TABLET ORAL at 05:02

## 2023-07-09 RX ADMIN — ACETYLCYSTEINE 600 MG: 200 SOLUTION ORAL; RESPIRATORY (INHALATION) at 06:52

## 2023-07-09 RX ADMIN — CEFTRIAXONE SODIUM 1000 MG: 1 INJECTION, POWDER, FOR SOLUTION INTRAMUSCULAR; INTRAVENOUS at 08:34

## 2023-07-09 RX ADMIN — AZITHROMYCIN MONOHYDRATE 500 MG: 500 INJECTION, POWDER, LYOPHILIZED, FOR SOLUTION INTRAVENOUS at 10:24

## 2023-07-09 ASSESSMENT — PAIN - FUNCTIONAL ASSESSMENT: PAIN_FUNCTIONAL_ASSESSMENT: PREVENTS OR INTERFERES SOME ACTIVE ACTIVITIES AND ADLS

## 2023-07-09 ASSESSMENT — PAIN DESCRIPTION - LOCATION: LOCATION: HEAD

## 2023-07-09 ASSESSMENT — PAIN DESCRIPTION - ORIENTATION: ORIENTATION: RIGHT

## 2023-07-09 ASSESSMENT — PAIN DESCRIPTION - DESCRIPTORS: DESCRIPTORS: ACHING

## 2023-07-09 ASSESSMENT — PAIN SCALES - GENERAL: PAINLEVEL_OUTOF10: 2

## 2023-07-10 ENCOUNTER — TELEPHONE (OUTPATIENT)
Dept: FAMILY MEDICINE CLINIC | Age: 73
End: 2023-07-10

## 2023-07-10 ENCOUNTER — CARE COORDINATION (OUTPATIENT)
Dept: CASE MANAGEMENT | Age: 73
End: 2023-07-10

## 2023-07-10 NOTE — CARE COORDINATION
Care Transitions Outreach Attempt-Fever, SOB, CP, PNA    Call within 2 business days of discharge: Yes   Attempted to reach patient for transitions of care follow up. Unable to reach patient. Patient: Salvador Porter Patient : 1950 MRN: [id number]    Last Discharge [address]       Date Complaint Diagnosis Description Type Department Provider    23 Fever; Shortness of Breath; Chest Pain Pneumonia due to infectious organism, unspecified laterality, unspecified part of lung . .. ED to Hosp-Admission (Discharged) (ADMITTED) Maciej Moon MD; Roc Hill MD              Was this an external facility discharge? No Discharge Facility: [facility]     Noted following upcoming appointments from discharge chart review:   [facility] follow up appointment(s):   Future Appointments   Date Time Provider [address]   2023  8:30 AM Camille Pollock MD [facility]   2023 11:00 AM Bridgett Woodson MD [facility]     1st attempt to contact pt for initial care transition follow up. Unable to reach pt. Left message with contact information and request for call back. CTN contacted [phone] [facility]. Spoke with Jameson Partida who confirmed referral received. Resumption of care scheduled for today, 7/10/23. CTN sent email to [phone], Practice Manager to request staff to reach out to pt to schedule a hospital follow up.       Kashif Christiansen, RN  Care Transition Nurse

## 2023-07-10 NOTE — TELEPHONE ENCOUNTER
Care Transitions Initial Follow Up Call    Outreach made within 2 business days of discharge: Yes    Patient: Dinesh Vaz Patient : 1950   MRN: [id number]  Reason for Admission: There are no discharge diagnoses documented for the most recent discharge. Discharge Date: 23       Spoke with: Elizabeth Burdick     Discharge department/facility: [facility]    TCM Interactive Patient Contact:  Was patient able to fill all prescriptions: Yes  Was patient instructed to bring all medications to the follow-up visit: Yes  Is patient taking all medications as directed in the discharge summary?  Yes  Does patient understand their discharge instructions: Yes  Does patient have questions or concerns that need addressed prior to 7-14 day follow up office visit: yes - 23    Scheduled appointment with PCP within 7-14 days    Follow Up  Future Appointments   Date Time Provider [address] [place] [address]   2023  4:30 PM Diantha Hue, APRN - NP [facility]   2023  8:30 AM MD Su George   2023 11:00 AM Bridgett Woodson MD [facility]

## 2023-07-11 ENCOUNTER — TELEPHONE (OUTPATIENT)
Dept: FAMILY MEDICINE CLINIC | Age: 73
End: 2023-07-11

## 2023-07-11 ENCOUNTER — CARE COORDINATION (OUTPATIENT)
Dept: CASE MANAGEMENT | Age: 73
End: 2023-07-11

## 2023-07-11 DIAGNOSIS — J18.9 MULTIFOCAL PNEUMONIA: Primary | ICD-10-CM

## 2023-07-11 LAB
CULTURE: NORMAL
GRAM SMEAR: NORMAL
Lab: NORMAL
SPECIMEN: NORMAL

## 2023-07-11 PROCEDURE — 1111F DSCHRG MED/CURRENT MED MERGE: CPT | Performed by: FAMILY MEDICINE

## 2023-07-11 NOTE — TELEPHONE ENCOUNTER
Received a call from [facility], requesting a verbal for Nursing and Respiratory. Patient has hospital follow up with Zelda Garcia on 7//12/23.  Was advised by ROSA Latham to give verbal.

## 2023-07-11 NOTE — CARE COORDINATION
Care Transitions Outreach Attempt-Fever, SOB, CP, PNA    Call within 2 business days of discharge: Yes   Attempted to reach patient for transitions of care follow up. Unable to reach patient. Patient: Salvador Porter Patient : 1950 MRN: [id number]    Last Discharge [address]       Date Complaint Diagnosis Description Type Department Provider    23 Fever; Shortness of Breath; Chest Pain Pneumonia due to infectious organism, unspecified laterality, unspecified part of lung . .. ED to Hosp-Admission (Discharged) (ADMITTED) Maciej Moon MD; Roc Hill MD              Was this an external facility discharge? No Discharge Facility: [facility]     Noted following upcoming appointments from discharge chart review:   [facility] follow up appointment(s):   Future Appointments   Date Time Provider [address]   2023  4:30 PM DEYA Montes De Oca - NP [facility]   2023  8:30 AM Camille Pollock MD [facility]   2023 11:00 AM Bridgett Woodson MD [facility]     2nd attempt to contact pt for initial care transition follow up. Unable to reach pt. Left message with contact information and request for call back. Episode to be resolved and CTN to sign off if return call is not received from the patient. CTN sent message to Delfin Crigler, ACM who has been following this pt.       Kashif Christiansen, RN  Care Transition Nurse
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
## 2023-07-12 DIAGNOSIS — J44.1 COPD WITH ACUTE EXACERBATION (HCC): ICD-10-CM

## 2023-07-12 LAB
CULTURE: NORMAL
CULTURE: NORMAL
Lab: NORMAL
Lab: NORMAL
SPECIMEN: NORMAL
SPECIMEN: NORMAL

## 2023-07-12 NOTE — H&P
V2.0  History and Physical      Name:  Conchita Allan /Age/Sex: 1950  (74 y.o. male)   MRN & CSN:  [id number] & [id number] Encounter Date/Time: 2023 5:00 PM EDT   Location:   PCP: Logan Levy MD       Hospital Day: 1    Assessment and Plan:   Conchita Allan is a 74 y.o. male who presents with pneumonia    Sepsis due to pneumonia-CXR shows opacities which could represent infectious process. Start on broad-spectrum antibiotics vancomycin and Zosyn. Check respiratory culture and blood cultures. Check respiratory PCR and urine strep and Legionella. Check procalcitonin and CRP. Patient recently discharged on  for pneumonia. SLP consult to check for aspiration. Acute hypoxic respiratory failure-due to pneumonia. On 6 L nasal cannula. Had been discharged on 2 L nasal cannula on . On bronchodilators. Consult pulmonology. Wean oxygen as tolerated. May also have interstitial lung disease. CTA chest pending. Chest pain-likely due to pneumonia. Check serial troponins. EKG shows sinus tachycardia. Possible interstitial lung disease-seen on CXR. Pulmonology consult. COPD  MEGHAN  HTN    Disposition:   Current Living situation: Home  Expected Disposition: TBD  Estimated D/C:     Diet No diet orders on file   DVT Prophylaxis [] Lovenox, [x]  Heparin, [] SCDs, [] Ambulation,  [] Eliquis, [] Xarelto   Code Status Full Code   Surrogate Decision Maker/ POA Jaylan Penalozaman- Brother     Personally reviewed Lab Studies and Imaging       EKG interpreted personally and results sinus tachycardia    History from:     patient    History of Present Illness:     Chief Complaint: Shortness of breath and hypoxia  Conchita Allan is a 74 y.o. male with pmh of abdominal aneurysm, COPD, MEGHAN, HTN, who presents with shortness of breath and hypoxia    Patient was discharged from [facility] following a hospital stay from - due to multifocal pneumonia.   He qualified for home

## 2023-07-12 NOTE — CARE COORDINATION
MCG criteria for Respiratory failure reviewed at this time, criteria supports Inpatient Admission. ERASMO,RN/CM

## 2023-07-12 NOTE — ED NOTES
ED TO INPATIENT SBAR HANDOFF    Patient Name: Calin Gillespie   :  1950  74 y.o. Preferred Name    Family/Caregiver Present no   Restraints no   C-SSRS: Risk of Suicide: No Risk  Sitter no   Sepsis Risk Score Sepsis Risk Score: 6.79      Situation  Chief Complaint   Patient presents with    Shortness of Breath     Patient arrives with complaints of shortness of breath. States he was admitted in the observation unit for 3 days and after being sent home he is declining. Brief Description of Patient's Condition:   Mental Status: oriented  Arrived from: home    Imaging:   XR CHEST PORTABLE   Final Result   Persistent right greater than left pulmonary opacities again could represent   persistent infectious process.   Progressive interstitial fibrotic changes   could give this appearance         CTA CHEST W CONTRAST    (Results Pending)     Abnormal labs:   Abnormal Labs Reviewed   COMPREHENSIVE METABOLIC PANEL - Abnormal; Notable for the following components:       Result Value    Sodium 132 (*)     Creatinine 0.7 (*)     Glucose 108 (*)     AST 38 (*)     All other components within normal limits   CBC WITH AUTO DIFFERENTIAL - Abnormal; Notable for the following components:    WBC 15.1 (*)     RBC 4.09 (*)     Hemoglobin 12.2 (*)     Hematocrit 37.9 (*)     Segs Relative 85.6 (*)     Lymphocytes % 7.9 (*)     Monocytes % 5.5 (*)     Immature Neutrophil % 0.6 (*)     All other components within normal limits   BLOOD GAS, VENOUS - Abnormal; Notable for the following components:    pH, Arsalan 7.46 (*)     pCO2, Arsalan 33 (*)     pO2, Arsalan 20 (*)     O2 Sat, Arsalan 27.6 (*)     All other components within normal limits   BRAIN NATRIURETIC PEPTIDE - Abnormal; Notable for the following components:    Pro-.3 (*)     All other components within normal limits       Background  History:   Past Medical History:   Diagnosis Date    Abdominal aneurysm ([address])     4 cm x 3.6 cm followed at [address] [place] [address],[address] 2014

## 2023-07-12 NOTE — TELEPHONE ENCOUNTER
Pt called this provider's pager for advice. Reports his 02 sat is 80% on RA and he cannot get it above 84% with rest. Reports fever 101.6 and . States \" I feel like crap and things are going downhill\"    He was DC'd from [facility] Sunday with pneumonia and PO ATB. States he is feeling much worse and is wanting to go to the ER. This provider advised ER course of plan. Pt requested that provider call the ER to let them know he was coming. Provider advised ER charge nurse via phone call.

## 2023-07-12 NOTE — ED PROVIDER NOTES
Emergency Department Encounter  Location: [facility] ICU    Patient: Eddie Lechuga  MRN: [id number]  : 1950  Date of evaluation: 2023  ED Provider: Gio Major DO    Chief Complaint:    Shortness of Breath (Patient arrives with complaints of shortness of breath. States he was admitted in the observation unit for 3 days and after being sent home he is declining.)    [place]:  Eddie Lechuga is a 74 y.o. male that presents to the emergency department with concern for shortness of breath. Was recently admitted here for several days for pneumonia. States he has been taking his medications since directed. Actually felt well at the time of discharge but reports worsening symptoms. Today has had a documented fever of 101.6 as well as tachycardia and sats as low as 52%.       Past Medical History:   Diagnosis Date    Abdominal aneurysm ([address] [place] [address])     4 cm x 3.6 cm followed at [place]    Arthritis 2014    Ascending cholangitis 2022    Calculus of gallbladder with acute cholecystitis and obstruction 2022    Chronic back pain     chiropactor [place]    Chronic sinusitis 2014    Common bile duct stone 2022    COPD, mild ([address] [place] [address]) 2014    Diverticulosis     Dr. 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 Johan Meraz    MEGHAN (obstructive sleep apnea) 2014    CPAP changed to bipap (2014)    Otitis media, serous, TM rupture 2014    ENT x 2 s/p PE tubes bilaterally, cipro Otic    Polyp of sigmoid colon 2022    Positive FIT (fecal immunochemical test) 5/3/2022

## 2023-07-13 ENCOUNTER — CARE COORDINATION (OUTPATIENT)
Dept: CARE COORDINATION | Age: 73
End: 2023-07-13

## 2023-07-13 PROBLEM — J96.01 ACUTE RESPIRATORY FAILURE WITH HYPOXIA (HCC): Status: ACTIVE | Noted: 2023-07-07

## 2023-07-13 NOTE — PLAN OF CARE
Problem: Discharge Planning  Goal: Discharge to home or other facility with appropriate resources  Outcome: Progressing  Flowsheets  Taken 7/13/2023 0520 by Sherita Hampton RN  Discharge to home or other facility with appropriate resources: Identify barriers to discharge with patient and caregiver  Taken 7/13/2023 0111 by Sherita Hmapton RN  Discharge to home or other facility with appropriate resources: Identify barriers to discharge with patient and caregiver     Problem: Safety - Adult  Goal: Free from fall injury  Outcome: Progressing     Problem: Pain  Goal: Verbalizes/displays adequate comfort level or baseline comfort level  Outcome: Progressing

## 2023-07-13 NOTE — CARE COORDINATION
07/13/23 1534   Service Assessment   Patient Orientation Alert and Oriented   Cognition Alert   History Provided By Patient   Primary [address] Family Members   Patient's Healthcare Decision Maker is: Named in [address]   PCP Verified by CM Yes   Prior Functional Level Assistance with the following:;Mobility   Current Functional Level Assistance with the following:;Mobility   Can patient return to prior living arrangement Unknown at present   Ability to make needs known: Good   Family able to assist with home care needs: Yes   Would you like for me to discuss the discharge plan with any other family members/significant others, and if so, who? No   Financial Resources Medicare     CM in to see Pt to initiate discharge planning. Pt from home alone and plans to return. Pt recently had [facility] and is agreeable to resuming home care at discharge if recommended. Pt denies any needs at this time.   CM following

## 2023-07-14 PROBLEM — R13.19 ESOPHAGEAL DYSPHAGIA: Status: ACTIVE | Noted: 2023-07-14

## 2023-07-14 NOTE — CARE COORDINATION
CM in to see Pt to follow up on discharge planning. Pt agreeable to therapy recommendation of ARU. Referral made to Bola Patel. Pt denies any other needs at this time.

## 2023-07-15 NOTE — PROGRESS NOTES
Physician Progress Note      Marvin Delarosa  [facility] #:                  [id number]  :                       1950  ADMIT DATE:       2023 1:39 PM  DISCH DATE:        2023 1:13 PM  RESPONDING  PROVIDER #:        John Felder TEXT:    Patient admitted with COPD, pneumonia. Noted documentation of acute on chronic   respiratory failure in H&P. There is no documentation of work of breathing or   labored breathing. Supplemental O2 not needed during admission. In order to   support the diagnosis of acute respiratory failure, please include additional   clinical indicators in your documentation. Or please document if the   diagnosis of acute respiratory failure has been ruled out after further study. The medical record reflects the following:  Risk Factors: COPD, pneumonia  Clinical Indicators: SOB. MEGHAN: On BiPAP at home. Discharge summary: \"O2 sat   maintained from 93 to 97% in room air. However patient's O2 sat did drop to   83% after walking. Respiratory therapist evaluated patient and confirmed that   patient is qualified of home oxygen. \"  Treatment: continuous pulse oximetry, Mucomyst, inhalers, Duonebs    Acute Respiratory Failure Clinical Indicators per  MS-DRG Training Guide and   Quick Reference Guide:  pO2 < 60 mmHg or SpO2 (pulse oximetry) < 91% breathing room air  pCO2 > 50 and pH < 7.35  P/F ratio (pO2 / FIO2) < 300  pO2 decrease or pCO2 increase by 10 mmHg from baseline (if known)  Supplemental oxygen of 40% or more  Presence of respiratory distress, tachypnea, dyspnea, shortness of breath,   wheezing  Unable to speak in complete sentences  Use of accessory muscles to breathe  Extreme anxiety and feeling of impending doom  Tripod position  Confusion/altered mental status/obtunded    YAN Tapia, RN, [facility]  Clinical   [phone]  Options provided:  -- Acute Respiratory Failure as evidenced by, Please document evidence.   --

## 2023-07-17 NOTE — CARE COORDINATION
Pt transferred from  to ICU 7/15/23. Pt is on HHF 02 at present with bipap off and on. Pt lives [place] [facility]. Pt has 1 son in [place], 1 dgt in [place] and 1 brother who lives 8 months of the year in [facility] and 1 months in [place], [place]. 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 Pt long term involvement in [facility] but is not able to attend meetings often at this point in his life. Pt was recently started on Home 02 with [facility]. Pt drives. Pt is weighing his options for discharge as to whether he will need AL at [facility] vs Skilled at [facility] vs return [facility] his apt with [facility].. Pt has had HC in the recent past. Cm will follow to assist with coordination of plans for discharge.

## 2023-07-19 PROBLEM — E44.0 MODERATE MALNUTRITION (HCC): Status: ACTIVE | Noted: 2023-07-19

## 2023-07-19 NOTE — CARE COORDINATION
Discussed care issues with [place] liaison. Patient meets LTACH criteria but would need to have FiO2 less than 70%.  Trena Dewey RN

## 2023-07-19 NOTE — PLAN OF CARE
RT called for changes to VapoTherm per Dr. Ariana Pineda titrate FiO2 as tolerated to keep sats above 88%. RN to continue to monitor.

## 2023-07-20 ENCOUNTER — CARE COORDINATION (OUTPATIENT)
Dept: CARE COORDINATION | Age: 73
End: 2023-07-20

## 2023-07-20 NOTE — PROCEDURES
Intubation    Date/Time: 7/20/2023 10:00 AM  Performed by: Heather Arizmendi MD  Authorized by: Heather Arizmendi MD   Consent: Verbal consent obtained. Risks and benefits: risks, benefits and alternatives were discussed  Consent given by: patient  Patient understanding: patient states understanding of the procedure being performed  Patient identity confirmed: arm band and hospital-assigned identification number  Time out: Immediately prior to procedure a \"time out\" was called to verify the correct patient, procedure, equipment, support staff and site/side marked as required.   Indications: respiratory failure and hypoxemia  Intubation method: video-assisted  Patient status: paralyzed (RSI)  Pretreatment medications: midazolam  Sedatives: etomidate  Paralytic: rocuronium  Laryngoscope size: Mac 3  Tube size: 7.5 mm  Tube type: cuffed  Number of attempts: 1  Cricoid pressure: yes  Cords visualized: yes  Post-procedure assessment: CO2 detector  Breath sounds: equal  Cuff inflated: yes  ETT to lip: 25 cm  Tube secured with: ETT hall  Chest x-ray findings: endotracheal tube too high  Tube repositioned: tube repositioned successfully  Patient tolerance: patient tolerated the procedure well with no immediate complications        Heather Arizmendi MD  7/20/2023

## 2023-07-20 NOTE — PLAN OF CARE
Problem: Safety - Adult  Goal: Free from fall injury  Outcome: Progressing     Problem: Pain  Goal: Verbalizes/displays adequate comfort level or baseline comfort level  Outcome: Progressing  Flowsheets (Taken 7/19/2023 1958)  Verbalizes/displays adequate comfort level or baseline comfort level: Encourage patient to monitor pain and request assistance     Problem: Nutrition Deficit:  Goal: Optimize nutritional status  Outcome: Progressing

## 2023-07-20 NOTE — PLAN OF CARE
Pt 100% on VapoTherm and oxygen sat reading in the 80s. Pt states he is getting tired and would like to talk to the doctor. Dr. Jordan Culver on unit and called to bedside.

## 2023-07-20 NOTE — CARE COORDINATION
Pt opted for intubation this morning and is now on the vent/sedated. Cm to follow for discharge needs.

## 2023-07-20 NOTE — CARE COORDINATION
ARU continues to follow. Patient unfortunately has declined, required ventilation. We will continue to follow in hopes of medical improvement.

## 2023-07-22 NOTE — PLAN OF CARE
Problem: Discharge Planning  Goal: Discharge to home or other facility with appropriate resources  Outcome: Progressing     Problem: Safety - Adult  Goal: Free from fall injury  Outcome: Progressing     Problem: Pain  Goal: Verbalizes/displays adequate comfort level or baseline comfort level  Outcome: Progressing     Problem: Nutrition Deficit:  Goal: Optimize nutritional status  7/21/2023 1426 by Sydnee Motta RD  Outcome: Progressing

## 2023-07-23 NOTE — PROCEDURES
Procedures  Bronchoscopy Procedure Note   Indications: hypoxic resp failure   Procedure: Bronchoscopy   Procedure Clinician: Travis Larose MD   Procedure Assistant: None   Anesthesia: see MAR   Procedure Details: The patient was premedicated with versed and fentanyl infusion. The patient was pre-oxygenated at 100% FiO2 and underwent fiberoptic bronchoscopy through the endotracheal tube. The airways were closely inspected and secretions were evacuated. The trachea is of normal caliber with normal appearing bronchial mucosa and anatomy. The leann is sharp. The tracheobronchial tree was then examined. Upon inspection of the right mainstem, right upper lobe was normal. Inspection of the bronchus intermedius, right middle lobe and right lower lobes were normal in appearance. Upon inspection of left mainstem bronchus, left upper lobe segments, lingula and left lower lobe bronchial segments were normal. There were no lesions or secretions. BAL was performed in the right lobes with serial lavages of  saline and lavage fluid returned and sent to microbiology and pathology for analysis. The scope was then slowly withdrawn and removed. Findings:   Mucous plugging none   A BAL was obtained from the right lobes   Complications: None; patient tolerated the procedure well.       Aramis Flores MD  7/23/2023

## 2023-07-23 NOTE — PLAN OF CARE
Problem: Discharge Planning  Goal: Discharge to home or other facility with appropriate resources  7/22/2023 2010 by Katya Ferris RN  Outcome: Progressing  7/22/2023 2009 by Katya Ferris RN  Outcome: Progressing     Problem: Safety - Adult  Goal: Free from fall injury  7/22/2023 2010 by Katya Ferris RN  Outcome: Progressing  7/22/2023 2009 by Katya Ferris RN  Outcome: Progressing     Problem: Pain  Goal: Verbalizes/displays adequate comfort level or baseline comfort level  7/22/2023 2010 by Katya Ferris RN  Outcome: Progressing  7/22/2023 2009 by Katya Ferris RN  Outcome: Progressing     Problem: Nutrition Deficit:  Goal: Optimize nutritional status  7/22/2023 2010 by Katya Ferris RN  Outcome: Progressing  7/22/2023 2009 by Katya Ferris RN  Flowsheets (Taken 7/22/2023 2009)  Nutrient intake appropriate for improving, restoring, or maintaining nutritional needs: Recommend appropriate diets, oral nutritional supplements, and vitamin/mineral supplements     Problem: Skin/Tissue Integrity  Goal: Absence of new skin breakdown  Description: 1. Monitor for areas of redness and/or skin breakdown  2. Assess vascular access sites hourly  3. Every 4-6 hours minimum:  Change oxygen saturation probe site  4. Every 4-6 hours:  If on nasal continuous positive airway pressure, respiratory therapy assess nares and determine need for appliance change or resting period.   Outcome: Progressing

## 2023-07-24 DIAGNOSIS — J44.1 COPD WITH ACUTE EXACERBATION (HCC): ICD-10-CM

## 2023-07-24 NOTE — PLAN OF CARE
Problem: Discharge Planning  Goal: Discharge to home or other facility with appropriate resources  Outcome: Not Progressing     Problem: Safety - Adult  Goal: Free from fall injury  Outcome: Progressing     Problem: Pain  Goal: Verbalizes/displays adequate comfort level or baseline comfort level  Outcome: Progressing     Problem: Nutrition Deficit:  Goal: Optimize nutritional status  Outcome: Progressing     Problem: Skin/Tissue Integrity  Goal: Absence of new skin breakdown  Description: 1. Monitor for areas of redness and/or skin breakdown  2. Assess vascular access sites hourly  3. Every 4-6 hours minimum:  Change oxygen saturation probe site  4. Every 4-6 hours:  If on nasal continuous positive airway pressure, respiratory therapy assess nares and determine need for appliance change or resting period.   Outcome: Progressing     Problem: Discharge Planning  Goal: Discharge to home or other facility with appropriate resources  Outcome: Not Progressing

## 2023-07-25 NOTE — PLAN OF CARE
Problem: Discharge Planning  Goal: Discharge to home or other facility with appropriate resources  7/24/2023 2301 by Lydia Evans RN  Outcome: Progressing     Problem: Safety - Adult  Goal: Free from fall injury  7/24/2023 2301 by Lydia Evans RN  Outcome: Progressing     Problem: Pain  Goal: Verbalizes/displays adequate comfort level or baseline comfort level  7/24/2023 2301 by Lydia Evans RN  Outcome: Progressing     Problem: Nutrition Deficit:  Goal: Optimize nutritional status  7/24/2023 2301 by Lydia Evans RN  Outcome: Progressing     Problem: Skin/Tissue Integrity  Goal: Absence of new skin breakdown  Description: 1. Monitor for areas of redness and/or skin breakdown  2. Assess vascular access sites hourly  3. Every 4-6 hours minimum:  Change oxygen saturation probe site  4. Every 4-6 hours:  If on nasal continuous positive airway pressure, respiratory therapy assess nares and determine need for appliance change or resting period.   7/24/2023 2301 by Lydia Evans RN  Outcome: Progressing     Problem: Discharge Planning  Goal: Discharge to home or other facility with appropriate resources  7/24/2023 2301 by Lydia Evans RN  Outcome: Progressing No

## 2023-07-25 NOTE — CARE COORDINATION
Pt has been on vent 5 days. Cm spoke with RT, Dr Khushbu Bedolla re; the likelihood of pt potentially being able to be extubated in the relative near future. Cm expressed that if at all possible pt's goal and 1st preference would be to be able to go to [facility] skilled at discharge. If pt unable to be extubated and if needs trach and brother provides consent then [facility] will be necessary.  LTACH is following,

## 2023-07-27 NOTE — CARE COORDINATION
Pt was intubated 7/20. Pt now day 7-8 on vent. Per MD progress note 7/27 pt not appropriate for SAT/SBT yet. Cm attempted to contact pt's brother to discuss potential need for LTACH at discharge with voice mail message left requesting return call. Brother lives in [place], [place] therefore [facility] will discuss [place], [address] [address] [place] options vs [place], [place] options. First choice and goal for discharge is return to [facility] skilled at discharge if medically appropriate. 0 Cm received return call from pt's brother, Te Moser and his wife, Bob Mohan. Cm discussed what to expect moving forward if pt is unable to be weaned from vent while here at [facility]. Cm discussed trach placement and transfer to [facility] for continued vent weanin. Cm did discuss that if pt unable to be weaned from vent at [facility] that the choice of SNFs that accept ventilator pt's is very limited and there is only 1 in [facility]. Cm discussed SNF on vent vs compassionate extubation and quality of life issues if pt ultimately unable to weaned from vent. Brother states that they would prefer [place] for [facility] because brother lives in [place] approx 3 months a year and in [place] [facility] the rest of the year and [place] would be more convenient for his friends to visit. Cm to follow.

## 2023-07-28 NOTE — DISCHARGE INSTR - COC
Continuity of Care Form    Patient Name: Victorina Lopez   :  1950  MRN:  [id number]    Admit date:  2023  Discharge date:  ***    Code Status Order: DNR-CCA   Advance Directives:     Admitting Physician:  Bianca Guerra MD  PCP: Lamar Randall MD    Discharging Nurse: [facility]/Room#: 5661/0786-I  Discharging Unit Phone Number: ***    Emergency Contact:   Extended Emergency Contact Information  Primary Emergency Contact: Jaylan Shankar  Home Phone: [phone]  Relation: Brother/Sister  Secondary Emergency Contact: [address] [facility] Phone: [phone]  Relation: Brother/Sister    Past Surgical History:  Past Surgical History:   Procedure Laterality Date    CHOLECYSTECTOMY, LAPAROSCOPIC N/A 8/10/2022    CHOLECYSTECTOMY LAPAROSCOPIC performed by Jasen Church MD at [facility] [address]  2008    [facility]- normal.      COLONOSCOPY  2014    diverticulosis, internal hemorrhoids    COLONOSCOPY N/A 2022    COLONOSCOPY POLYPECTOMY SNARE/COLD BIOPSY performed by Nga Bianchi MD at [address] OPEN  2022    CT DRAINAGE ABDOM ABSCESS OPEN 2022 [address] SCAN    ERCP  2022    ERCP SPHINCTER/PAPILLOTOMY performed by Dr. Alejandra Johnston at [address] [place] [address]    ERCP N/A 2022    ERCP SPHINCTER/PAPILLOTOMY and sweep and removal of debris, sludge and stones with use of 12-15 extractor balloon performed by Nga Bianchi MD at [facility] Right     JOINT REPLACEMENT Left     KNEE SURGERY Bilateral     replacement    RECTAL SURGERY Right 2023    GLUTEAL SOFT TISSUE INCISION MASS EXCISION AND BIOPSY performed by Jasen Church MD at [facility] Bilateral     toe nail surgery Dr. Madrigal [place] Right     DR. Rascon        Immunization History:   Immunization History   Administered Date(s) Administered    COVID-19, MODERNA BLUE border, Primary or

## 2023-07-28 NOTE — CARE COORDINATION
Cm spoke with pt's brother at bedside. CM provided brother with [employer]. Brother provided a copy of pt's [facility] [address] [place] [address],[address]. Cm following.

## 2023-07-29 NOTE — PROCEDURES
Procedures      Bronchoscopy Procedure Note   Indications: hypoxia   Procedure: Bronchoscopy   Procedure Clinician: Kevin Castillo MD   Procedure Assistant: None   Anesthesia: Prop 50, versed 2, nakul 40   Procedure Details: The patient was premedicated with versed and fentanyl infusion. The patient was pre-oxygenated at 100% FiO2 and underwent fiberoptic bronchoscopy through the endotracheal tube. The airways were closely inspected and secretions were evacuated. The trachea is of normal caliber with normal appearing bronchial mucosa and anatomy with thick mucoid secretions. The leann is sharp. The tracheobronchial tree was then examined. Upon inspection of the right mainstem, right upper lobe was normal. Inspection of the bronchus intermedius, right middle lobe and right lower lobes were normal in appearance. Upon inspection of left mainstem bronchus, left upper lobe segments, lingula and left lower lobe bronchial segments were normal. There were no lesions or secretions. BAL was performed in the right lung with serial lavages of of saline and lavage fluid returned was sent to microbiology and pathology for analysis. The scope was then slowly withdrawn and removed. Findings: Thick mucoid secretions in trachea and right bronchial tree   A BAL was obtained from the right lung   Complications: None; patient tolerated the procedure well.   Cxr pending    Heather Arizmendi MD  7/29/2023

## 2023-07-29 NOTE — PLAN OF CARE
Problem: Discharge Planning  Goal: Discharge to home or other facility with appropriate resources  Outcome: Progressing     Problem: Safety - Adult  Goal: Free from fall injury  Outcome: Progressing     Problem: Pain  Goal: Verbalizes/displays adequate comfort level or baseline comfort level  Outcome: Progressing     Problem: Nutrition Deficit:  Goal: Optimize nutritional status  7/29/2023 0257 by Arnoldo Crowley RN  Outcome: Progressing  7/28/2023 1402 by Santhosh Avila RD  Outcome: Progressing     Problem: Skin/Tissue Integrity  Goal: Absence of new skin breakdown  Description: 1. Monitor for areas of redness and/or skin breakdown  2. Assess vascular access sites hourly  3. Every 4-6 hours minimum:  Change oxygen saturation probe site  4. Every 4-6 hours:  If on nasal continuous positive airway pressure, respiratory therapy assess nares and determine need for appliance change or resting period.   Outcome: Progressing

## 2023-07-29 NOTE — PROCEDURES
ARTERIAL LINE PLACEMENT:      PROCEDURE PERFORMED: Arterial Line Placement, left femoral artery. Ultrasound assessment also shows a patent, femoral  artery with good pulsatility with ultrasound assessment. TIME OUT: Patient identity verified, site verified, side verified, procedure to be done verified, patient position verified. INFORMED CONSENT: Not obtained; emergent. INDICATIONS: Blood pressure monitoring, lab draws,  MONITORS DURING PROCEDURE: EKG, NIBP, SpO2  PATIENT LOCATION: ICU  MEDICATION/TYPE OF SEDATION: Ketamine, Fentanyl, Versed  PREP: Chlorhexidine prep was completely dry at time of puncture. Provider performed hand hygiene; donned mask, sterile gown, sterile gloves. Patient covered with small sterile drape and additional sterile towels. DESCRIPTION OF PROCEDURE:  A 20-gauge needle was used to perform the arterial puncture under real-time ultrasound guidance. The guidewire was threaded through the needle without difficulty and needle removed. Catheter was threaded over the guidewire. The guidewire was removed intact and the catheter was attached to a pressure transducer. The catheter was secured in place with sutures. The area was cleaned with chlorhexidine,  and sterile dressing applied. No immediate complications noted.     Shan Ford AG-ACNP  Critical Care Nurse Practitioner   [facility]

## 2023-07-30 NOTE — PLAN OF CARE
Problem: Discharge Planning  Goal: Discharge to home or other facility with appropriate resources  Outcome: Progressing     Problem: Safety - Adult  Goal: Free from fall injury  Outcome: Progressing     Problem: Pain  Goal: Verbalizes/displays adequate comfort level or baseline comfort level  Outcome: Progressing     Problem: Nutrition Deficit:  Goal: Optimize nutritional status  Outcome: Progressing     Problem: Skin/Tissue Integrity  Goal: Absence of new skin breakdown  Description: 1. Monitor for areas of redness and/or skin breakdown  2. Assess vascular access sites hourly  3. Every 4-6 hours minimum:  Change oxygen saturation probe site  4. Every 4-6 hours:  If on nasal continuous positive airway pressure, respiratory therapy assess nares and determine need for appliance change or resting period.   Outcome: Progressing

## 2023-07-30 NOTE — PROCEDURES
Bronchoscopy Procedure Note   Indications: Hypoxia   Procedure: Bronchoscopy   Procedure Clinician: Shanna Bell MD   Procedure Assistant: None   Anesthesia: per [facility]   Procedure Details: The patient was premedicated with propofol and fentanyl infusion. The patient was pre-oxygenated at 100% FiO2 and underwent fiberoptic bronchoscopy through the endotracheal tube. The airways were closely inspected and secretions were evacuated. The trachea is of normal caliber with normal appearing bronchial mucosa and anatomy. The leann is sharp. The tracheobronchial tree was then examined. Upon inspection of the right mainstem, right upper lobe was normal. Inspection of the bronchus intermedius, right middle lobe and right lower lobes were normal in appearance. Upon inspection of left mainstem bronchus, left upper lobe segments, lingula and left lower lobe bronchial segments were normal. There were no lesions or some scan secretions. The scope was then slowly withdrawn and removed. Findings:   Secretions noted throughout  Complications: None; patient tolerated the procedure well.

## 2023-08-01 NOTE — DISCHARGE SUMMARY
Expiration and discharge summary    This pleasant 75-year-old male with underlying history of suppose it COPD, newly diagnosed interstitial lung disease (likely IPF), sleep apnea and hypertension was admitted to the hospital 7/12/2023 with progressive hypoxemic respiratory failure. He was recently hospitalized from 7/7 through 7/9/2023 with hypoxemic respiratory failure thought likely due to either an exacerbation of newly diagnosed interstitial lung disease or pneumonia. Given progressive decline of his respiratory status in the home setting, he sought emergency room evaluation was readmitted on 7/12/2023. Ultimately, the patient required transitioning from floor to critical care unit setting given progressive hypoxemia and requirement for high flow supplemental oxygen. He received empiric course of antibiotic therapy as well as intravenous steroid for treatment of possible concurrent bacterial pneumonia and a flare of interstitial lung disease. Ultimately, the patient required intubation and invasive ventilatory support. Following intubation bronchoscopy was performed which did not render a specific pathogenic organism. Given the severity of hypoxemia, patient was maintained on pressure control ventilation, high level of sedation and concurrent neuromuscular blockade. Patient required high level ventilatory support throughout his hospital stay. In spite of appropriate aggressive medical measures, the patient did not favorably respond to these measures and ultimately following several discussions with the family, was their impression (particularly the brother) that Mr. Soila Aviles would not wish to continue care given the circumstances as outlined. To the same end, the patient was compassionately extubated on 8/1/2023 and ultimately was apneic and pulseless without measurable blood pressure and pronounced dead at 1532 hrs. on that same day. For additional details of please review the chart.     E

## 2023-08-01 NOTE — CARE COORDINATION
Spoke with pt's nurse this morning when CM noted that pt's FI02 is at 100% with sat of 84% and HR elevated to see if pt's family had been called. RN advised that Dr Ana Wilder asked RN for brother's number and stated that he would call them. Cm spoke with Dr Ana Wilder and he stated that he tried to call family but had to leave a voice mail. Cm offered to also try to call and Dr Ana Wilder in agreement. Cm attempted to contact pt's brother with voice mail message left requesting a return call as soon as possible. Cm also attempted to contact pt's wife with voice mail message left requesting a return call as soon as possible.

## 2023-08-01 NOTE — CARE COORDINATION
Late Entry--CM received voice mail message from pt's brother requesting return call. Cm contacted Mr Manpreet Shankar and he advised that they have now decided if [facility] is needed they prefer select specialty in [place]. Brother will be visiting most often and it is near their home. Brother does not have plans to return to [facility] until mid October. Son advised that the last time he spoke with pt's RN  yesterday that pt's FI02 was at 85%. Cm advised that now his FI02 is set at 95%. Brother asked Cm what would happen if his FI02 need went to 100 and his sat dropped and Cm explained that at that point the physician would talk with him and he may be asked to consider comfort/end of life care. Brother expressed appreciation for the conversation with CM. Cm to follow.

## 2023-08-01 NOTE — PLAN OF CARE
Problem: Discharge Planning  Goal: Discharge to home or other facility with appropriate resources  Outcome: Progressing     Problem: Safety - Adult  Goal: Free from fall injury  Outcome: Progressing     Problem: Pain  Goal: Verbalizes/displays adequate comfort level or baseline comfort level  Outcome: Progressing     Problem: Nutrition Deficit:  Goal: Optimize nutritional status  7/31/2023 2124 by Bekah Clark RN  Outcome: Progressing  7/31/2023 1455 by Nehemias Sosa RD  Outcome: Not Progressing     Problem: Skin/Tissue Integrity  Goal: Absence of new skin breakdown  Description: 1. Monitor for areas of redness and/or skin breakdown  2. Assess vascular access sites hourly  3. Every 4-6 hours minimum:  Change oxygen saturation probe site  4. Every 4-6 hours:  If on nasal continuous positive airway pressure, respiratory therapy assess nares and determine need for appliance change or resting period.   Outcome: Progressing     Problem: Chronic Conditions and Co-morbidities  Goal: Patient's chronic conditions and co-morbidity symptoms are monitored and maintained or improved  Outcome: Progressing     Problem: ABCDS Injury Assessment  Goal: Absence of physical injury  Outcome: Progressing     Problem: Nutrition Deficit:  Goal: Optimize nutritional status  7/31/2023 2124 by Bekah Clark RN  Outcome: Progressing  7/31/2023 1455 by Nehemias Sosa RD  Outcome: Not Progressing

## 2023-08-03 LAB
EKG ATRIAL RATE: 125 BPM
EKG DIAGNOSIS: NORMAL
EKG Q-T INTERVAL: 258 MS
EKG QRS DURATION: 88 MS
EKG QTC CALCULATION (BAZETT): 411 MS
EKG R AXIS: 28 DEGREES
EKG T AXIS: 261 DEGREES
EKG VENTRICULAR RATE: 153 BPM

## 2023-08-03 PROCEDURE — 93010 ELECTROCARDIOGRAM REPORT: CPT | Performed by: INTERNAL MEDICINE

## (undated) DEVICE — ADHESIVE SKIN CLSR 0.7ML TOP DERMBND ADV

## (undated) DEVICE — GLOVE SURG SZ 75 L12IN FNGR THK79MIL GRN LTX FREE

## (undated) DEVICE — TOWEL,OR,DSP,ST,BLUE,STD,6/PK,12PK/CS: Brand: MEDLINE

## (undated) DEVICE — YANKAUER,FLEXIBLE HANDLE,REGLR CAPACITY: Brand: MEDLINE INDUSTRIES, INC.

## (undated) DEVICE — TROCAR: Brand: KII® SLEEVE

## (undated) DEVICE — SURGICEL ENDOSCP APPL

## (undated) DEVICE — PENCIL ES CRD L10FT HND SWCHING ROCK SWCH W/ EDGE COAT BLDE

## (undated) DEVICE — SYRINGE 20ML LL S/C 50

## (undated) DEVICE — CLIP INT M L POLYMER LOK LIG HEM O LOK

## (undated) DEVICE — Z DISCONTINUED NO SUB IDED TUBING ETCO2 AD L6.5FT NSL ORAL CVD PRNG NONFLARED TIP OVR

## (undated) DEVICE — LAPAROSCOPIC TROCAR SLEEVE/SINGLE USE: Brand: KII® OPTICAL ACCESS SYSTEM

## (undated) DEVICE — GAUZE,SPONGE,4"X4",16PLY,XRAY,STRL,LF: Brand: MEDLINE

## (undated) DEVICE — TROCAR: Brand: KII FIOS FIRST ENTRY

## (undated) DEVICE — Device

## (undated) DEVICE — SPHINCTEROTOME: Brand: HYDRATOME RX 44

## (undated) DEVICE — INTENDED FOR TISSUE SEPARATION, AND OTHER PROCEDURES THAT REQUIRE A SHARP SURGICAL BLADE TO PUNCTURE OR CUT.: Brand: BARD-PARKER ® STAINLESS STEEL BLADES

## (undated) DEVICE — AGENT HEMSTAT 3GM OXIDIZED REGENERATED CELOS ABSRB FOR CONT (ORDER MULTIPLES OF 5EA)

## (undated) DEVICE — NEEDLE HYPO 20GA L1.5IN YEL POLYPR HUB S STL REG BVL STR

## (undated) DEVICE — PREMIUM WET SKIN PREP TRAY: Brand: MEDLINE INDUSTRIES, INC.

## (undated) DEVICE — SHEET,DRAPE,53X77,STERILE: Brand: MEDLINE

## (undated) DEVICE — GLOVE SURG SZ 7 L12IN FNGR THK79MIL GRN LTX FREE

## (undated) DEVICE — CLIP INT XL YEL POLYMER HEM-O-LOK WECK

## (undated) DEVICE — PACK,BASIC,SIRUS,V: Brand: MEDLINE

## (undated) DEVICE — TRAY PREP DRY W/ PREM GLV 2 APPL 6 SPNG 2 UNDPD 1 OVERWRAP

## (undated) DEVICE — DRAPE,LITHOTOMY,STERILE: Brand: MEDLINE

## (undated) DEVICE — PACK SURG LAP CHOLE

## (undated) DEVICE — BAG SPEC REM 224ML W4XL6IN DIA10MM 1 HND GYN DISP ENDOPCH

## (undated) DEVICE — SPONGE LAP W18XL18IN WHT COT 4 PLY FLD STRUNG RADPQ DISP ST 2 PER PACK

## (undated) DEVICE — GLOVE ORANGE PI 7   MSG9070

## (undated) DEVICE — COUNTER NDL 30 COUNT FOAM STRP SGL MAG

## (undated) DEVICE — ENDOSCOPY KIT: Brand: MEDLINE INDUSTRIES, INC.

## (undated) DEVICE — RETRIEVAL BALLOON CATHETER: Brand: EXTRACTOR™ PRO RX-S

## (undated) DEVICE — GOWN,SIRUS,POLYRNF,BRTHSLV,XLN/XL,20/CS: Brand: MEDLINE

## (undated) DEVICE — CLIP INT L POLYMER LOK LIG HEM O LOK

## (undated) DEVICE — SUTURE VCRL SZ 2-0 L27IN ABSRB UD L26MM SH 1/2 CIR J417H

## (undated) DEVICE — SUTURE COAT VCRL SZ 4-0 L18IN ABSRB UD L19MM PS-2 1/2 CIR J496G

## (undated) DEVICE — NEEDLE,20GX1.5",BD,YALE,DISP,RG: Brand: MEDLINE

## (undated) DEVICE — CIRCUIT BREATHING SINGLE LIMB AD 72 IN 3 LT 2 FILTER LIMBO

## (undated) DEVICE — SNARE ENDOSCP L240CM SHTH DIA24MM LOOP W10MM POLYP RND REINF

## (undated) DEVICE — SYRINGE MED 20ML STD CLR PLAS LUERLOCK TIP N CTRL DISP

## (undated) DEVICE — MATERNITY KNIT PANTS,SEAMLESS: Brand: WINGS

## (undated) DEVICE — MARKER SURG SKIN UTIL REGULAR/FINE 2 TIP W/ RUL AND 9 LBL

## (undated) DEVICE — SUTURE ETHLN SZ 3-0 L30IN NONABSORBABLE BLK FS-1 L24MM 3/8 669H

## (undated) DEVICE — SET TBNG DISP TIP FOR AHTO

## (undated) DEVICE — TUBING INSUFFLATOR HEAT HI FLO SET PNEUMOCLEAR

## (undated) DEVICE — SUTURE SZ 0 27IN 5/8 CIR UR-6  TAPER PT VIOLET ABSRB VICRYL J603H

## (undated) DEVICE — TUBE CULTURE LF UNIFORM BOTTOM STER

## (undated) DEVICE — SCISSORS ENDOSCP DIA5MM CRV MPLR CAUT W/ RATCH HNDL

## (undated) DEVICE — GLOVE SURG SZ 65 THK91MIL LTX FREE SYN POLYISOPRENE

## (undated) DEVICE — SUTURE ETHLN SZ 3-0 L18IN NONABSORBABLE BLK FS-1 L24MM 3/8 663H

## (undated) DEVICE — SUTURE MCRYL SZ 4-0 L18IN ABSRB UD L19MM PS-2 3/8 CIR PRIM Y496G

## (undated) DEVICE — TUBING, SUCTION, 3/16" X 10', STRAIGHT: Brand: MEDLINE